# Patient Record
Sex: FEMALE | Race: WHITE | NOT HISPANIC OR LATINO | Employment: FULL TIME | URBAN - METROPOLITAN AREA
[De-identification: names, ages, dates, MRNs, and addresses within clinical notes are randomized per-mention and may not be internally consistent; named-entity substitution may affect disease eponyms.]

---

## 2017-12-18 ENCOUNTER — CONVERSION ENCOUNTER (OUTPATIENT)
Dept: GENERAL RADIOLOGY | Facility: HOSPITAL | Age: 62
End: 2017-12-18

## 2018-08-01 ENCOUNTER — OFFICE VISIT CONVERTED (OUTPATIENT)
Dept: FAMILY MEDICINE CLINIC | Facility: CLINIC | Age: 63
End: 2018-08-01
Attending: NURSE PRACTITIONER

## 2019-01-28 ENCOUNTER — OFFICE VISIT CONVERTED (OUTPATIENT)
Dept: FAMILY MEDICINE CLINIC | Facility: CLINIC | Age: 64
End: 2019-01-28
Attending: NURSE PRACTITIONER

## 2019-02-01 ENCOUNTER — HOSPITAL ENCOUNTER (OUTPATIENT)
Dept: GENERAL RADIOLOGY | Facility: HOSPITAL | Age: 64
Discharge: HOME | End: 2019-02-01
Attending: NURSE PRACTITIONER

## 2019-07-26 ENCOUNTER — HOSPITAL ENCOUNTER (OUTPATIENT)
Dept: LAB | Facility: HOSPITAL | Age: 64
Discharge: HOME | End: 2019-07-26
Attending: NURSE PRACTITIONER

## 2019-07-26 ENCOUNTER — OFFICE VISIT CONVERTED (OUTPATIENT)
Dept: FAMILY MEDICINE CLINIC | Facility: CLINIC | Age: 64
End: 2019-07-26
Attending: NURSE PRACTITIONER

## 2019-07-26 LAB
ALBUMIN SERPL-MCNC: 4.1 G/DL (ref 3.5–5)
ALBUMIN/GLOB SERPL: 1.4 {RATIO} (ref 1.4–2.6)
ALP SERPL-CCNC: 64 U/L (ref 43–160)
ALT SERPL-CCNC: 12 U/L (ref 10–40)
ANION GAP SERPL CALC-SCNC: 17 MMOL/L (ref 8–19)
APPEARANCE UR: CLEAR
AST SERPL-CCNC: 15 U/L (ref 15–50)
BILIRUB SERPL-MCNC: 0.44 MG/DL (ref 0.2–1.3)
BILIRUB UR QL: NEGATIVE
BUN SERPL-MCNC: 9 MG/DL (ref 5–25)
BUN/CREAT SERPL: 11 {RATIO} (ref 6–20)
CALCIUM SERPL-MCNC: 9.6 MG/DL (ref 8.7–10.4)
CHLORIDE SERPL-SCNC: 89 MMOL/L (ref 99–111)
CHOLEST SERPL-MCNC: 191 MG/DL (ref 107–200)
CHOLEST/HDLC SERPL: 4.2 {RATIO} (ref 3–6)
COLOR UR: ABNORMAL
CONV BACTERIA: NEGATIVE
CONV CO2: 27 MMOL/L (ref 22–32)
CONV COLLECTION SOURCE (UA): ABNORMAL
CONV HYALINE CASTS IN URINE MICRO: ABNORMAL /[LPF]
CONV TOTAL PROTEIN: 7 G/DL (ref 6.3–8.2)
CONV UROBILINOGEN IN URINE BY AUTOMATED TEST STRIP: 1 {EHRLICHU}/DL (ref 0.1–1)
CREAT UR-MCNC: 0.79 MG/DL (ref 0.5–0.9)
GFR SERPLBLD BASED ON 1.73 SQ M-ARVRAT: >60 ML/MIN/{1.73_M2}
GLOBULIN UR ELPH-MCNC: 2.9 G/DL (ref 2–3.5)
GLUCOSE SERPL-MCNC: 88 MG/DL (ref 65–99)
GLUCOSE UR QL: NEGATIVE MG/DL
HDLC SERPL-MCNC: 46 MG/DL (ref 40–60)
HGB UR QL STRIP: NEGATIVE
KETONES UR QL STRIP: 15 MG/DL
LDLC SERPL CALC-MCNC: 120 MG/DL (ref 70–100)
LEUKOCYTE ESTERASE UR QL STRIP: ABNORMAL
NITRITE UR QL STRIP: NEGATIVE
OSMOLALITY SERPL CALC.SUM OF ELEC: 268 MOSM/KG (ref 273–304)
PH UR STRIP.AUTO: 6 [PH] (ref 5–8)
POTASSIUM SERPL-SCNC: 3.1 MMOL/L (ref 3.5–5.3)
PROT UR QL: NEGATIVE MG/DL
RBC #/AREA URNS HPF: ABNORMAL /[HPF]
SODIUM SERPL-SCNC: 130 MMOL/L (ref 135–147)
SP GR UR: 1.02 (ref 1–1.03)
TRIGL SERPL-MCNC: 123 MG/DL (ref 40–150)
VLDLC SERPL-MCNC: 25 MG/DL (ref 5–37)
WBC #/AREA URNS HPF: ABNORMAL /[HPF]

## 2019-07-27 LAB — 25(OH)D3 SERPL-MCNC: 76.1 NG/ML (ref 30–100)

## 2019-10-03 ENCOUNTER — OFFICE VISIT CONVERTED (OUTPATIENT)
Dept: CARDIOLOGY | Facility: CLINIC | Age: 64
End: 2019-10-03
Attending: INTERNAL MEDICINE

## 2020-05-26 ENCOUNTER — OFFICE VISIT CONVERTED (OUTPATIENT)
Dept: FAMILY MEDICINE CLINIC | Facility: CLINIC | Age: 65
End: 2020-05-26
Attending: NURSE PRACTITIONER

## 2020-05-26 ENCOUNTER — HOSPITAL ENCOUNTER (OUTPATIENT)
Dept: LAB | Facility: HOSPITAL | Age: 65
Discharge: HOME | End: 2020-05-26
Attending: NURSE PRACTITIONER

## 2020-05-26 LAB
ALBUMIN SERPL-MCNC: 4.2 G/DL (ref 3.5–5)
ALBUMIN/GLOB SERPL: 1.4 {RATIO} (ref 1.4–2.6)
ALP SERPL-CCNC: 59 U/L (ref 43–160)
ALT SERPL-CCNC: 12 U/L (ref 10–40)
ANION GAP SERPL CALC-SCNC: 15 MMOL/L (ref 8–19)
APPEARANCE UR: CLEAR
AST SERPL-CCNC: 16 U/L (ref 15–50)
BASOPHILS # BLD AUTO: 0.01 10*3/UL (ref 0–0.2)
BASOPHILS NFR BLD AUTO: 0.2 % (ref 0–3)
BILIRUB SERPL-MCNC: 0.38 MG/DL (ref 0.2–1.3)
BILIRUB UR QL: NEGATIVE
BUN SERPL-MCNC: 7 MG/DL (ref 5–25)
BUN/CREAT SERPL: 10 {RATIO} (ref 6–20)
CALCIUM SERPL-MCNC: 9.6 MG/DL (ref 8.7–10.4)
CHLORIDE SERPL-SCNC: 89 MMOL/L (ref 99–111)
CHOLEST SERPL-MCNC: 223 MG/DL (ref 107–200)
CHOLEST/HDLC SERPL: 4.8 {RATIO} (ref 3–6)
COLOR UR: YELLOW
CONV ABS IMM GRAN: 0.01 10*3/UL (ref 0–0.2)
CONV BACTERIA: NEGATIVE
CONV CO2: 26 MMOL/L (ref 22–32)
CONV COLLECTION SOURCE (UA): ABNORMAL
CONV IMMATURE GRAN: 0.2 % (ref 0–1.8)
CONV TOTAL PROTEIN: 7.1 G/DL (ref 6.3–8.2)
CONV UROBILINOGEN IN URINE BY AUTOMATED TEST STRIP: 0.2 {EHRLICHU}/DL (ref 0.1–1)
CREAT UR-MCNC: 0.71 MG/DL (ref 0.5–0.9)
DEPRECATED RDW RBC AUTO: 40 FL (ref 36.4–46.3)
EOSINOPHIL # BLD AUTO: 0.03 10*3/UL (ref 0–0.7)
EOSINOPHIL # BLD AUTO: 0.6 % (ref 0–7)
ERYTHROCYTE [DISTWIDTH] IN BLOOD BY AUTOMATED COUNT: 12.7 % (ref 11.7–14.4)
GFR SERPLBLD BASED ON 1.73 SQ M-ARVRAT: >60 ML/MIN/{1.73_M2}
GLOBULIN UR ELPH-MCNC: 2.9 G/DL (ref 2–3.5)
GLUCOSE SERPL-MCNC: 92 MG/DL (ref 65–99)
GLUCOSE UR QL: NEGATIVE MG/DL
HCT VFR BLD AUTO: 36.5 % (ref 37–47)
HDLC SERPL-MCNC: 46 MG/DL (ref 40–60)
HGB BLD-MCNC: 12.8 G/DL (ref 12–16)
HGB UR QL STRIP: NEGATIVE
KETONES UR QL STRIP: ABNORMAL MG/DL
LDLC SERPL CALC-MCNC: 149 MG/DL (ref 70–100)
LEUKOCYTE ESTERASE UR QL STRIP: ABNORMAL
LYMPHOCYTES # BLD AUTO: 1.52 10*3/UL (ref 1–5)
LYMPHOCYTES NFR BLD AUTO: 32 % (ref 20–45)
MCH RBC QN AUTO: 30.6 PG (ref 27–31)
MCHC RBC AUTO-ENTMCNC: 35.1 G/DL (ref 33–37)
MCV RBC AUTO: 87.3 FL (ref 81–99)
MONOCYTES # BLD AUTO: 0.43 10*3/UL (ref 0.2–1.2)
MONOCYTES NFR BLD AUTO: 9.1 % (ref 3–10)
NEUTROPHILS # BLD AUTO: 2.75 10*3/UL (ref 2–8)
NEUTROPHILS NFR BLD AUTO: 57.9 % (ref 30–85)
NITRITE UR QL STRIP: NEGATIVE
NRBC CBCN: 0 % (ref 0–0.7)
OSMOLALITY SERPL CALC.SUM OF ELEC: 262 MOSM/KG (ref 273–304)
PH UR STRIP.AUTO: 7 [PH] (ref 5–8)
PLATELET # BLD AUTO: 238 10*3/UL (ref 130–400)
PMV BLD AUTO: 9.3 FL (ref 9.4–12.3)
POTASSIUM SERPL-SCNC: 3 MMOL/L (ref 3.5–5.3)
PROT UR QL: NEGATIVE MG/DL
RBC # BLD AUTO: 4.18 10*6/UL (ref 4.2–5.4)
RBC #/AREA URNS HPF: ABNORMAL /[HPF]
SODIUM SERPL-SCNC: 127 MMOL/L (ref 135–147)
SP GR UR: 1.01 (ref 1–1.03)
TRIGL SERPL-MCNC: 139 MG/DL (ref 40–150)
VLDLC SERPL-MCNC: 28 MG/DL (ref 5–37)
WBC # BLD AUTO: 4.75 10*3/UL (ref 4.8–10.8)
WBC #/AREA URNS HPF: ABNORMAL /[HPF]

## 2020-05-27 LAB
CONV HEPATITIS B SURFACE AG W CONFIRMATION RE: NEGATIVE
HAV IGM SERPL QL IA: NEGATIVE
HBV CORE IGM SERPL QL IA: NEGATIVE
HCV AB SER DONR QL: <0.1 S/CO RATIO (ref 0–0.9)

## 2020-07-02 ENCOUNTER — TELEPHONE CONVERTED (OUTPATIENT)
Dept: GASTROENTEROLOGY | Facility: CLINIC | Age: 65
End: 2020-07-02
Attending: NURSE PRACTITIONER

## 2020-07-07 ENCOUNTER — HOSPITAL ENCOUNTER (OUTPATIENT)
Dept: LAB | Facility: HOSPITAL | Age: 65
Discharge: HOME | End: 2020-07-07
Attending: NURSE PRACTITIONER

## 2020-07-07 LAB
ALBUMIN SERPL-MCNC: 4 G/DL (ref 3.5–5)
ALBUMIN/GLOB SERPL: 1.5 {RATIO} (ref 1.4–2.6)
ALP SERPL-CCNC: 54 U/L (ref 43–160)
ALT SERPL-CCNC: 12 U/L (ref 10–40)
ANION GAP SERPL CALC-SCNC: 14 MMOL/L (ref 8–19)
APPEARANCE UR: CLEAR
AST SERPL-CCNC: 15 U/L (ref 15–50)
BILIRUB SERPL-MCNC: 0.33 MG/DL (ref 0.2–1.3)
BILIRUB UR QL: NEGATIVE
BUN SERPL-MCNC: 11 MG/DL (ref 5–25)
BUN/CREAT SERPL: 14 {RATIO} (ref 6–20)
CALCIUM SERPL-MCNC: 9.4 MG/DL (ref 8.7–10.4)
CHLORIDE SERPL-SCNC: 98 MMOL/L (ref 99–111)
COLOR UR: YELLOW
CONV BACTERIA: NEGATIVE
CONV CO2: 27 MMOL/L (ref 22–32)
CONV COLLECTION SOURCE (UA): ABNORMAL
CONV TOTAL PROTEIN: 6.6 G/DL (ref 6.3–8.2)
CONV UROBILINOGEN IN URINE BY AUTOMATED TEST STRIP: 1 {EHRLICHU}/DL (ref 0.1–1)
CREAT UR-MCNC: 0.77 MG/DL (ref 0.5–0.9)
GFR SERPLBLD BASED ON 1.73 SQ M-ARVRAT: >60 ML/MIN/{1.73_M2}
GLOBULIN UR ELPH-MCNC: 2.6 G/DL (ref 2–3.5)
GLUCOSE SERPL-MCNC: 103 MG/DL (ref 65–99)
GLUCOSE UR QL: NEGATIVE MG/DL
HGB UR QL STRIP: NEGATIVE
KETONES UR QL STRIP: NEGATIVE MG/DL
LEUKOCYTE ESTERASE UR QL STRIP: ABNORMAL
NITRITE UR QL STRIP: NEGATIVE
OSMOLALITY SERPL CALC.SUM OF ELEC: 280 MOSM/KG (ref 273–304)
PH UR STRIP.AUTO: 7.5 [PH] (ref 5–8)
POTASSIUM SERPL-SCNC: 3.7 MMOL/L (ref 3.5–5.3)
PROT UR QL: NEGATIVE MG/DL
RBC #/AREA URNS HPF: ABNORMAL /[HPF]
SODIUM SERPL-SCNC: 135 MMOL/L (ref 135–147)
SP GR UR: 1.01 (ref 1–1.03)
WBC #/AREA URNS HPF: ABNORMAL /[HPF]

## 2020-07-23 ENCOUNTER — HOSPITAL ENCOUNTER (OUTPATIENT)
Dept: GENERAL RADIOLOGY | Facility: HOSPITAL | Age: 65
Discharge: HOME | End: 2020-07-23
Attending: NURSE PRACTITIONER

## 2020-08-03 ENCOUNTER — HOSPITAL ENCOUNTER (OUTPATIENT)
Dept: PREADMISSION TESTING | Facility: HOSPITAL | Age: 65
Discharge: HOME | End: 2020-08-03
Attending: INTERNAL MEDICINE

## 2020-08-04 LAB — SARS-COV-2 RNA SPEC QL NAA+PROBE: NOT DETECTED

## 2020-08-06 ENCOUNTER — HOSPITAL ENCOUNTER (OUTPATIENT)
Dept: GASTROENTEROLOGY | Facility: HOSPITAL | Age: 65
Setting detail: HOSPITAL OUTPATIENT SURGERY
Discharge: HOME | End: 2020-08-06
Attending: INTERNAL MEDICINE

## 2020-09-29 ENCOUNTER — TELEPHONE CONVERTED (OUTPATIENT)
Dept: GASTROENTEROLOGY | Facility: CLINIC | Age: 65
End: 2020-09-29
Attending: NURSE PRACTITIONER

## 2020-10-14 ENCOUNTER — OFFICE VISIT CONVERTED (OUTPATIENT)
Dept: FAMILY MEDICINE CLINIC | Facility: CLINIC | Age: 65
End: 2020-10-14
Attending: NURSE PRACTITIONER

## 2020-10-20 ENCOUNTER — HOSPITAL ENCOUNTER (OUTPATIENT)
Dept: CARDIOLOGY | Facility: HOSPITAL | Age: 65
Discharge: HOME | End: 2020-10-20
Attending: NURSE PRACTITIONER

## 2020-11-03 ENCOUNTER — HOSPITAL ENCOUNTER (OUTPATIENT)
Dept: GENERAL RADIOLOGY | Facility: HOSPITAL | Age: 65
Discharge: HOME | End: 2020-11-03
Attending: NURSE PRACTITIONER

## 2020-12-30 ENCOUNTER — HOSPITAL ENCOUNTER (OUTPATIENT)
Dept: GENERAL RADIOLOGY | Facility: HOSPITAL | Age: 65
Discharge: HOME | End: 2020-12-30
Attending: NURSE PRACTITIONER

## 2021-04-08 ENCOUNTER — OFFICE VISIT CONVERTED (OUTPATIENT)
Dept: FAMILY MEDICINE CLINIC | Facility: CLINIC | Age: 66
End: 2021-04-08
Attending: NURSE PRACTITIONER

## 2021-04-09 ENCOUNTER — HOSPITAL ENCOUNTER (OUTPATIENT)
Dept: LAB | Facility: HOSPITAL | Age: 66
Discharge: HOME | End: 2021-04-09
Attending: NURSE PRACTITIONER

## 2021-04-09 LAB
25(OH)D3 SERPL-MCNC: 73.4 NG/ML (ref 30–100)
ALBUMIN SERPL-MCNC: 3.7 G/DL (ref 3.5–5)
ALBUMIN/GLOB SERPL: 1.1 {RATIO} (ref 1.4–2.6)
ALP SERPL-CCNC: 61 U/L (ref 43–160)
ALT SERPL-CCNC: 11 U/L (ref 10–40)
ANION GAP SERPL CALC-SCNC: 13 MMOL/L (ref 8–19)
APPEARANCE UR: CLEAR
AST SERPL-CCNC: 15 U/L (ref 15–50)
BASOPHILS # BLD AUTO: 0.02 10*3/UL (ref 0–0.2)
BASOPHILS NFR BLD AUTO: 0.5 % (ref 0–3)
BILIRUB SERPL-MCNC: 0.39 MG/DL (ref 0.2–1.3)
BILIRUB UR QL: NEGATIVE
BUN SERPL-MCNC: 9 MG/DL (ref 5–25)
BUN/CREAT SERPL: 13 {RATIO} (ref 6–20)
CALCIUM SERPL-MCNC: 9.5 MG/DL (ref 8.7–10.4)
CHLORIDE SERPL-SCNC: 95 MMOL/L (ref 99–111)
CHOLEST SERPL-MCNC: 219 MG/DL (ref 107–200)
CHOLEST/HDLC SERPL: 4.8 {RATIO} (ref 3–6)
COLOR UR: YELLOW
CONV ABS IMM GRAN: 0.01 10*3/UL (ref 0–0.2)
CONV CO2: 27 MMOL/L (ref 22–32)
CONV COLLECTION SOURCE (UA): ABNORMAL
CONV IMMATURE GRAN: 0.3 % (ref 0–1.8)
CONV TOTAL PROTEIN: 7 G/DL (ref 6.3–8.2)
CONV UROBILINOGEN IN URINE BY AUTOMATED TEST STRIP: 1 {EHRLICHU}/DL (ref 0.1–1)
CREAT UR-MCNC: 0.71 MG/DL (ref 0.5–0.9)
DEPRECATED RDW RBC AUTO: 40.6 FL (ref 36.4–46.3)
EOSINOPHIL # BLD AUTO: 0.05 10*3/UL (ref 0–0.7)
EOSINOPHIL # BLD AUTO: 1.3 % (ref 0–7)
ERYTHROCYTE [DISTWIDTH] IN BLOOD BY AUTOMATED COUNT: 12.4 % (ref 11.7–14.4)
FOLATE SERPL-MCNC: 14.7 NG/ML (ref 4.8–20)
GFR SERPLBLD BASED ON 1.73 SQ M-ARVRAT: >60 ML/MIN/{1.73_M2}
GLOBULIN UR ELPH-MCNC: 3.3 G/DL (ref 2–3.5)
GLUCOSE SERPL-MCNC: 104 MG/DL (ref 65–99)
GLUCOSE UR QL: NEGATIVE MG/DL
HCT VFR BLD AUTO: 37 % (ref 37–47)
HDLC SERPL-MCNC: 46 MG/DL (ref 40–60)
HGB BLD-MCNC: 13.1 G/DL (ref 12–16)
HGB UR QL STRIP: NEGATIVE
KETONES UR QL STRIP: NEGATIVE MG/DL
LDLC SERPL CALC-MCNC: 139 MG/DL (ref 70–100)
LEUKOCYTE ESTERASE UR QL STRIP: ABNORMAL
LYMPHOCYTES # BLD AUTO: 1.36 10*3/UL (ref 1–5)
LYMPHOCYTES NFR BLD AUTO: 35.7 % (ref 20–45)
MCH RBC QN AUTO: 31.6 PG (ref 27–31)
MCHC RBC AUTO-ENTMCNC: 35.4 G/DL (ref 33–37)
MCV RBC AUTO: 89.4 FL (ref 81–99)
MONOCYTES # BLD AUTO: 0.4 10*3/UL (ref 0.2–1.2)
MONOCYTES NFR BLD AUTO: 10.5 % (ref 3–10)
NEUTROPHILS # BLD AUTO: 1.97 10*3/UL (ref 2–8)
NEUTROPHILS NFR BLD AUTO: 51.7 % (ref 30–85)
NITRITE UR QL STRIP: NEGATIVE
NRBC CBCN: 0 % (ref 0–0.7)
OSMOLALITY SERPL CALC.SUM OF ELEC: 271 MOSM/KG (ref 273–304)
PH UR STRIP.AUTO: 8.5 [PH] (ref 5–8)
PLATELET # BLD AUTO: 235 10*3/UL (ref 130–400)
PMV BLD AUTO: 9.3 FL (ref 9.4–12.3)
POTASSIUM SERPL-SCNC: 3.5 MMOL/L (ref 3.5–5.3)
PROT UR QL: NEGATIVE MG/DL
RBC # BLD AUTO: 4.14 10*6/UL (ref 4.2–5.4)
RBC #/AREA URNS HPF: ABNORMAL /[HPF]
SODIUM SERPL-SCNC: 131 MMOL/L (ref 135–147)
SP GR UR: 1.01 (ref 1–1.03)
SQUAMOUS SPT QL MICRO: ABNORMAL /[HPF]
T4 FREE SERPL-MCNC: 1.6 NG/DL (ref 0.9–1.8)
TRIGL SERPL-MCNC: 169 MG/DL (ref 40–150)
TSH SERPL-ACNC: 4.02 M[IU]/L (ref 0.27–4.2)
VIT B12 SERPL-MCNC: 191 PG/ML (ref 211–911)
VLDLC SERPL-MCNC: 34 MG/DL (ref 5–37)
WBC # BLD AUTO: 3.81 10*3/UL (ref 4.8–10.8)
WBC #/AREA URNS HPF: ABNORMAL /[HPF]

## 2021-04-10 LAB — CANCER AG125 SERPL-ACNC: 17.3 U/ML (ref 0–38.1)

## 2021-04-26 ENCOUNTER — HOSPITAL ENCOUNTER (OUTPATIENT)
Dept: MRI IMAGING | Facility: HOSPITAL | Age: 66
Discharge: HOME | End: 2021-04-26
Attending: NURSE PRACTITIONER

## 2021-04-26 LAB
CREAT BLD-MCNC: 0.7 MG/DL (ref 0.6–1.4)
GFR SERPLBLD BASED ON 1.73 SQ M-ARVRAT: >60 ML/MIN/{1.73_M2}

## 2021-04-30 ENCOUNTER — OFFICE VISIT CONVERTED (OUTPATIENT)
Dept: NEUROLOGY | Facility: CLINIC | Age: 66
End: 2021-04-30
Attending: PSYCHIATRY & NEUROLOGY

## 2021-05-11 NOTE — H&P
"   History and Physical      Patient Name: Tatyana Shea   Patient ID: [id number]   Sex: Female   YOB: 1955    Primary Care Provider: John BLOOM   Referring Provider: John BLOOM    Visit Date: April 30, 2021    Provider: Denny Staton MD   Location: [facility]   Location Address: [address] [place] [address]  [id number]   Location Phone: [phone]          Chief Complaint     Pt is here with c/o dizzy spells and feels like her head is \"swimming\"       History Of Present Illness  Tatyana Shea is a 65 year old /White female who presents today to [facility] today referred from John BLOOM.      65-year-old woman evaluated for vertigo.  She states that the symptoms started on March 8.  She states that at that time she was talking to her son and also when she had this episode of vertigo in which she was spinning and she had to keep her eyes closed for 2 minutes.  She denies anything to her son and her son was asking if she was okay.  After the vertigo went away she was able to speak back to her son.  There was no other symptoms.  There was no tinnitus, hearing loss, numbness of the face, tongue, coordination problems, gait abnormalities, focal weakness, double vision, blindness.  She was sitting at that time.  Minor 1 happened again a few weeks later that lasted for about the same time and only was not as bad.  The last 1 happened 1 week ago in which she was vertiginous and nauseated.  She states that she has no associated headache with this.  She has no history of migraine headaches.  She had an MRI of the brain which reviewed which was unremarkable except for nonspecific white matter changes.  She had a carotid ultrasound which was unremarkable as well.    She does not get vertiginous associate with head movements.  She states that she has been off balance for the last year.  She feels like she is tipsy when she walks.  " Sometimes she gets dizzy from a sitting to standing position.    She has no history of migraine headaches.  There is no history of headaches or jaw claudication.    She states that a year ago there was dimming of vision in her left eye that lasted for a minute.  It did not happen again.  There is no other symptoms with it.  She went to see an optometrist.    She has hypercholesterolemia, hypertension.  She is not taking medications for hypercholesterolemia at this time.       Past Medical History  Diverticulitis; Gallstones; Hiatal hernia; Hypertension; Reflux Disease; Screening Mammogram         Past Surgical History  Cholecystecomy         Medication List  D3-50 Cholecalciferol 1,250 mcg (50,000 unit) oral capsule; krill oil 500 mg oral capsule; losartan-hydrochlorothiazide 100-25 mg oral tablet; meloxicam 15 mg oral tablet; pantoprazole 40 mg oral tablet,delayed release (DR/EC)         Allergy List  NO KNOWN DRUG ALLERGIES       Allergies Reconciled  Family Medical History  Atrial Fibrillation; Congestive Heart Failure; No family history of colorectal cancer         Social History  Alcohol (Never); Tobacco (Never)         Immunizations  Name Date Admin   COVID Moderna 03/04/2021   COVID Moderna 02/04/2021   Influenza 10/13/2020   Influenza 10/01/2019   Influenza Refused   [id number] Deferred   Prevnar 13 10/01/2019   Prevnar 13 10/01/2019   Prevnar 13 10/01/2019   Prevnar 13 10/01/2019         Review of Systems  · Constitutional  o Denies  o : chills, excessive sweating, fatigue, fever, sycope/passing out, weight gain, weight loss  · Eyes  o Admits  o : changes in vision  o Denies  o : blurry vision, double vision  · HENT  o Denies  o : loss of hearing, ringing in the ears, ear aches, sore throat, nasal congestion, sinus pain, nose bleeds, seasonal allergies  · Cardiovascular  o Denies  o : blood clots, swollen legs, anemia, easy burising or bleeding, transfusions  · Respiratory  o Denies  o : shortness of  "breath, dry cough, productive cough, pneumonia, COPD  · Gastrointestinal  o Denies  o : difficulty swallowing, reflux  · Genitourinary  o Denies  o : incontinence  · Neurologic  o Admits  o : headache, dizziness/vertigo  o Denies  o : seizure, stroke, tremor, loss of balance, falls, difficulty with sleep, numbness/tingling/paresthesia , difficulty with coordination, difficulty with dexterity, weakness  · Musculoskeletal  o Denies  o : neck stiffness/pain, swollen lymph nodes, muscle aches, joint pain, weakness, spasms, sciatica, pain radiating in arm, pain radiating in leg, low back pain  · Endocrine  o Denies  o : diabetes, thyroid disorder  · Psychiatric  o Denies  o : anxiety, depression      Vitals  Date Time BP Position Site L\R Cuff Size HR RR TEMP (F) WT  HT  BMI kg/m2 BSA m2 O2 Sat FR L/min FiO2 HC       04/30/2021 03:37 /72 Sitting    78 - R   210lbs 0oz 5'  6\" 33.89 2.11             Physical Examination     She is alert, fluent, phasic, follows commands well.  Optic disks are normal, visual fields of full, EOMs are full directions gaze, pupils equal react light, facial sensation symmetrical, facial strength is full, soft palate elevation and tongue are normal.  There is no weakness of the upper or lower extremities individual muscle testing.  Fine finger movements are intact.  Cerebellar testing is intact finger-to-nose and rapid altering movements.  Reflexes are symmetrical in the biceps triceps decrease the patellar's and ankles.  Station gait she is able to tiptoe, heel walk, jia and tandem and loses her balance after 4 steps.  Blood pressure is 150/80 on the right arm 160/80 in the left arm.  Heart is regular rhythm normal in rate.  Carotids are clear bilaterally.  Suring Hallpike maneuver to the left into the right did not produce objective vertigo or nystagmus.  Head thrust testing is negative.           Assessment  · MRI of brain abnormal     793.0/R90.89  I discussed with her that the white " matter changes on MRI are nonspecific. It does not show stroke. He does not show brain tumor. Discussed with her that the white matter changes does not predict that she is going to get dementia.  · Vertigo     780.4/R42  I discussed with her that the symptom of vertigo alone is a low probability for stroke. She does have risk factors including hypertension and her age. She is to take a baby aspirin on a daily basis until the work-up is completed and until I see her in 2 months time. I will order a CT angiogram of the head and neck. I discussed with her that should she have any other symptoms and educated her on the symptoms of TIA and stroke she is to seek medical attention.    Total time spent with patient and coordinating patient care was 60 minutes.    · Unsteadiness     781.2/R26.81  She has had episodes of unsteadiness for the past year. I would recommend for vitamin B12 level, methylmalonic acid to be performed.      Plan  · Orders  o CTA Head with IV Contrast [facility]; no Oral Prep (64772) - 780.4/R42, 781.2/R26.81 - 04/30/2021  o CTA Neck (Not Cervical Spine) without and with IV Contrast [facility]; no Oral Prep (85671) - 780.4/R42, 781.2/R26.81 - 04/30/2021  · Instructions  o Encouraged to follow-up with Primary Care Provider for preventative care.            Electronically Signed by: Denny Staton MD -Author on April 30, 2021 05:28:36 PM

## 2021-05-13 NOTE — PROGRESS NOTES
Progress Note      Patient Name: Tatyana Shea   Patient ID: [id number]   Sex: Female   YOB: 1955    Primary Care Provider: John BLOOM   Referring Provider: Silke BLOOM    Visit Date: October 14, 2020    Provider: MERLE Alvarnega   Location: [facility]   Location Address: [address] [place] [address], [address]  [id number]   Location Phone: [phone]          Chief Complaint     The patient is here for vision changes.       History Of Present Illness  Tatyana Shea is a 65 year old /White female who presents for evaluation and treatment of:      Will have episodes of waking up in the morning and have.  Visual issues and can't see out of left eye.  Has been seen by Ophthamology and to evaluate for carotid doppler.    Brought in life line screening. Has a family history aortic aneurysm and would like checked.    Due for bone density.           Past Medical History  Disease Name Date Onset Notes   Diverticulitis --  --    Gallstones --  --    Hiatal hernia --  --    Hypertension --  --    Reflux Disease --  --    Screening Mammogram 02/01/2019 --          Past Surgical History  Procedure Name Date Notes   Cholecystecomy 2010 --          Medication List  Name Date Started Instructions   D3-50 Cholecalciferol 1,250 mcg (50,000 unit) oral capsule  take 1 capsule by oral route daily   krill oil 500 mg oral capsule  --    losartan-hydrochlorothiazide 100-25 mg oral tablet 05/27/2020 take 1 tablet by oral route once daily for 90 days   meloxicam 15 mg oral tablet 07/26/2019 take 1 tablet by oral route daily as needed for 90 days   pantoprazole 40 mg oral tablet,delayed release (DR/EC) 09/29/2020 take 1 tablet (40 mg) by oral route once daily for 30 days   potassium chloride 10 mEq oral capsule, extended release 05/27/2020 take 1 capsule (10 meq) by oral route once daily with food for 90 days         Allergy List  Allergen Name Date Reaction Notes  "  NO KNOWN DRUG ALLERGIES --  --  --          Family Medical History  Disease Name Relative/Age Notes   Atrial Fibrillation  --    Congestive Heart Failure Mother/    at 82   No family history of colorectal cancer  --          Social History  Finding Status Start/Stop Quantity Notes   Alcohol Never --/-- --  --    Tobacco Never --/-- --  --          Immunizations  NameDate Admin Mfg Trade Name Lot Number Route Inj VIS Given VIS Publication   Zdydofnap33/ SKB Fluarix, quadrivalent, preservative free [id number] NE NE 10/14/2020    Comments:    [id number] 2019 NE Not Entered  NE NE     Comments: pt will get at pharmacy   Prevnar  NE Not Entered  NE NE     Comments: Not Given Information: 2019 Deferred, Tatyana Shea (Self), CVX code Pneumococcal conjugate PCV 13 (133) Comments:         Review of Systems  · Constitutional  o Denies  o : fever, fatigue, weight loss, weight gain  · Eyes  o Admits  o : changes in vision  · Cardiovascular  o Denies  o : lower extremity edema, claudication, chest pressure, palpitations  · Respiratory  o Denies  o : shortness of breath, wheezing, cough, hemoptysis, dyspnea on exertion  · Gastrointestinal  o Denies  o : nausea, vomiting, diarrhea, constipation, abdominal pain      Vitals  Date Time BP Position Site L\R Cuff Size HR RR TEMP (F) WT  HT  BMI kg/m2 BSA m2 O2 Sat FR L/min FiO2 HC       10/14/2020 08:36 /68 Sitting    79 - R 16 97.2 202lbs 0oz 5'  6\" 32.6 2.07 98 %  21%          Physical Examination  · Constitutional  o Appearance  o : well-nourished, well developed, alert, in no acute distress  · Eyes  o Conjunctivae  o : conjunctivae normal  o Sclerae  o : sclerae white  o Pupils and Irises  o : pupils equal, round, and reactive to light and accommodation bilaterally  o Corneas  o : tear film normal, no lesions present  o Eyelids/Ocular Adnexae  o : eyelid appearance normal, no exudates present, eye moisture level " normal  · Respiratory  o Respiratory Effort  o : breathing unlabored  o Inspection of Chest  o : normal appearance, no retractions  o Auscultation of Lungs  o : normal breath sounds throughout  · Cardiovascular  o Heart  o :   § Auscultation of Heart  § : regular rate and rhythm without murmur, PMI normal  o Peripheral Vascular System  o :   § Carotid Arteries  § : normal pulses bilaterally  § Extremities  § : no cyanosis, clubbing or edema; less than 2 second refill noted  · Musculoskeletal  o General  o : No joint swelling or deformity noted. Muscle tone, strength and development grossly normal.  · Skin and Subcutaneous Tissue  o General Inspection  o : no rashes or lesions present, no areas of discoloration  · Neurologic  o Mental Status Examination  o : judgement, insight intact, modd and affect appropriate  o Motor Examination  o : strength grossly intact in all four extremities  o Gait and Station  o : normal gait, able to stand without difficulty          Assessment  · Post menopausal syndrome     V49.81/Z78.0  · Screening for AAA (abdominal aortic aneurysm)     V81.2/Z13.6  · Essential hypertension     401.9/I10  · Family history of abdominal aortic aneurysm     V17.49/Z82.49  · Vision changes     368.9/H53.9  · Dizzy     780.4/R42       If carotid doppler negative will get MRI brain       Plan  · Orders  o DEXA Bone Density, 1 or more sites, axial skeleton [facility] [place] [facility] (90625) - V49.81/Z78.0 - 10/14/2020  o HTN/Lipid Panel (CMP, Lipid) [facility] [place] [facility] (95942, 87340) - 401.9/I10 - 12/14/2020  o CBC with Auto Diff [facility] [place] [facility] (43945) - 401.9/I10 - 12/14/2020  o Urinalysis with Reflex Microscopy ([facility] [place] [facility]) (30950) - 401.9/I10 - 12/14/2020  o ACO-39: Current medications updated and reviewed (, 1159F) - - 10/14/2020  o ACO-14: Influenza immunization administered or previously received [facility] [place] [facility] () - - 10/14/2020  o Abdominal aortic sonography (41549) - V17.49/Z82.49, V81.2/Z13.6 - 10/14/2020  o Carotid Doppler Bilateral [facility] [place] [facility] (38768) - -  10/14/2020  · Medications  o famotidine 20 mg oral tablet   SIG: take 1 tablet (20 mg) by oral route once daily at bedtime   DISP: (0) tablet with 0 refills  Discontinued on 10/14/2020     o Medications have been Reconciled  o Transition of Care or Provider Policy  · Instructions  o Stop taking calcium supplements for at least 48 hours prior to your exam. Failure to stop supplements could alter results, and the radiologists will require you to reschedule your test.  o Patient was educated/instructed on their diagnosis, treatment and medications prior to discharge from the clinic today.  o Minutes spent with patient including greater than 50% in Education/Counseling/Care Coordination.  o Time spent with the patient was minutes, more than 50% face to face.  · Disposition  o Call or Return if symptoms worsen or persist.  o Return in 6 months            Electronically Signed by: MERLE Alvarenga -Author on October 14, 2020 09:28:40 AM

## 2021-05-13 NOTE — PROGRESS NOTES
Progress Note      Patient Name: Tatyana Shea   Patient ID: [id number]   Sex: Female   YOB: 1955    Primary Care Provider: John BLOOM   Referring Provider: John BLOOM    Visit Date: July 2, 2020    Provider: MERLE Riley   [place] [facility]   Location Address: [address] [place] [address]  [id number]   Location Phone: [phone]          Chief Complaint  · ABD pain  · Needing colon      History Of Present Illness  TELEHEALTH TELEPHONE VISIT  Chief Complaint: PT state Dr. Aguilera referred her to get another colonoscopy. She was having ABD pain, headache. She has saw a difference in her stool, color and shape. She did have a CT scan last month, they didn't find anything. No other issue at this time.   Tatyana Shea is a 65 year old /White female who is presenting for evaluation via telehealth telephone visit. Verbal consent obtained before beginning visit.   Provider spent 23 minutes with the patient during the telehealth visit.   The following staff were present during this visit: ANNA Rand; Boy Hull MA   Past Medical History/ Overview of Patient Symptoms     New pt states she has had nausea over the last couple months, no abd pain, states stool is darker, no blood seen, states BMs are not as often as before, but did have a 2 week episode of diarrhea 3 months ago. Pt has HB, takes Pepcid daily, cannot skip. Also on Meloxicam for several yrs, takes a couple times/month. NO unint wt loss. Pt states she's had a couple episodes where her food sits in the esophagus at times and it would not go down.   Last colonoscopy 2010, Dr Francois.     5/26/2020 CMP unremarkable except low sodium and potassium, 127 and 3 respectively, CBC unremarkable with hemoglobin 12.8, acute hepatitis panel negative. Pt was started on K+ and has repeat labs scheduled.   CT of the abdomen and pelvis with contrast 5/27/2020: Moderate sized hiatal hernia,  scattered sigmoid diverticula but no findings to suggest acute diverticulitis, this was read by Dr. Rivas    Pt states she had a negative cardiac w/u last year. Dx w PVCs. Was told to F/U as needed.   Denies pulmonary issues.       Past Medical History  Diverticulitis; Gallstones; Hiatal hernia; Hypertension; Reflux Disease; Screening Mammogram         Past Surgical History  Cholecystecomy         Medication List  Name Date Started Instructions   D3-50 Cholecalciferol 1,250 mcg (50,000 unit) oral capsule  take 1 capsule by oral route daily   famotidine 20 mg oral tablet  take 1 tablet (20 mg) by oral route once daily at bedtime   krill oil 500 mg oral capsule  --    losartan-hydrochlorothiazide 100-25 mg oral tablet 05/27/2020 take 1 tablet by oral route once daily for 90 days   meloxicam 15 mg oral tablet 07/26/2019 take 1 tablet by oral route daily as needed for 90 days   potassium chloride 10 mEq oral capsule, extended release 05/27/2020 take 1 capsule (10 meq) by oral route once daily with food for 90 days         Allergy List  NO KNOWN DRUG ALLERGIES       Allergies Reconciled  Family Medical History  Atrial Fibrillation; Congestive Heart Failure; No family history of colorectal cancer         Social History  Alcohol (Never); Tobacco (Never)         Immunizations  Name Date Admin   Influenza    Influenza Refused   [id number] Deferred   Prevnar 13    Prevnar 13    Prevnar 13    Prevnar 13              Assessment  · Hiatal hernia     553.3/K44.9  · Heartburn     787.1/R12  · Change in bowel habits     787.99/R19.4  · Dark stools     792.1/R19.5  · Nausea     787.02/R11.0  · Dysphagia     787.20/R13.10      Plan  · Orders  o Physician Telephone Evaluation, 21-30 minutes (39070) - - 07/02/2020  o [facility] Pre-Op Covid-19 Screening (46106) - - 07/02/2020  · Medications  o NuLYTELY with Flavor Packs 420 gram oral recon soln   SIG: take as directed for 1 day per office instructions   DISP: (1) 4000 ml bottle with 0  refills  Prescribed on 07/02/2020     o Medications have been Reconciled  o Transition of Care or Provider Policy  · Instructions  o Call the office with any concerns or questions.  o Please Sign Permit for: EGD/COLONOSCOPY Indication: intermittent dysphagia, hiatal hernia/ HB, nausea, change in bowel pattern dark stools Surgical Risk and Benefits: Possible risks/complications, benefits, and alternatives to surgical or invasive procedure have been explained to patient and/or legal guardian; Patient has been evaluated and can tolerate anesthesia and/or sedation. Risks, benefits, and alternatives to anesthesia and sedation have been explained to patient and/or legal guardian.Follow Up after Procedure.Other Instructions:            Electronically Signed by: MERLE Riley -Author on July 2, 2020 11:27:50 AM

## 2021-05-13 NOTE — PROGRESS NOTES
Progress Note      Patient Name: Tatyana Shea   Patient ID: [id number]   Sex: Female   YOB: 1955    Primary Care Provider: John BLOOM   Referring Provider: Silke BLOOM    Visit Date: September 29, 2020    Provider: MERLE Riley   Location: [facility]   Location Address: [address] [place] [address]  [id number]   Location Phone: [phone]          History Of Present Illness  TELEHEALTH TELEPHONE VISIT  Tatyana Shea is a 65 year old /White female who is presenting for evaluation via telehealth telephone visit. Verbal consent obtained before beginning visit.   Provider spent 28 minutes with the patient during the telehealth visit.   The following staff were present during this visit: Boy Hull MA; Tia CASTILLO   Past Medical History/ Overview of Patient Symptoms     Patient initially presented July 2020 with complaints of nausea, darker stool.  Reported being on Pepcid daily and could not skip also on meloxicam for several years taking a couple of times per month.  Intermittent esophageal dysphagia.  CT the abdomen and pelvis with contrast done in May that showed a moderate sized hiatal hernia, scattered sigmoid diverticula, read by Dr. Rivas    EGD colonoscopy 8/6/2020: Large size hiatal hernia, grade B esophagitispositive for intestinal metaplasia, no dysplasia, 3 polyps in the stomach completely removedfundic gland polyps, normal duodenum; good prep, normal mucosa throughout the colon, 15 mm polyp in the appendiceal orifice removed piecemealadenomatous, 5 mm polyp in the rectum completely removedhyperplastic, random colon biopsy negative    Pt does have HB still w Pepcid. Still w c/o nausea, decreased appetite. Pt has had constipation since scope was done, pt states she is taking laxatives on weekends, no BM during week, afraid to take laxatives during week d/t work.       Past Medical History  Diverticulitis;  Gallstones; Hiatal hernia; Hypertension; Reflux Disease; Screening Mammogram         Past Surgical History  Cholecystecomy         Medication List  Name Date Started Instructions   D3-50 Cholecalciferol 1,250 mcg (50,000 unit) oral capsule  take 1 capsule by oral route daily   famotidine 20 mg oral tablet  take 1 tablet (20 mg) by oral route once daily at bedtime   krill oil 500 mg oral capsule  --    losartan-hydrochlorothiazide 100-25 mg oral tablet 05/27/2020 take 1 tablet by oral route once daily for 90 days   meloxicam 15 mg oral tablet 07/26/2019 take 1 tablet by oral route daily as needed for 90 days   potassium chloride 10 mEq oral capsule, extended release 05/27/2020 take 1 capsule (10 meq) by oral route once daily with food for 90 days         Allergy List  NO KNOWN DRUG ALLERGIES       Allergies Reconciled  Family Medical History  Atrial Fibrillation; Congestive Heart Failure; No family history of colorectal cancer         Social History  Alcohol (Never); Tobacco (Never)         Immunizations  Name Date Admin   Influenza    Influenza Refused   [id number] Deferred   Prevnar 13    Prevnar 13    Prevnar 13    Prevnar 13              Assessment  · Hiatal hernia     553.3/K44.9  · Reflux esophagitis     530.11/K21.0  Gr B; + HB symptom  · Li's esophagus     530.85/K22.70  Endo neg/bx + IM GE Jxn  · Fundic gland polyps of stomach, benign     211.1/D13.1  · Constipation     564.00/K59.00  · Polyp of rectum     569.0/K62.1  · Polyp of ascending colon     211.3/K63.5  · Nausea     787.02/R11.0      Plan  · Medications  o pantoprazole 40 mg oral tablet,delayed release (DR/EC)   SIG: take 1 tablet (40 mg) by oral route once daily for 30 days   DISP: (30) tablets with 1 refills  Prescribed on 09/29/2020     o Medications have been Reconciled  o Transition of Care or Provider Policy  · Instructions  o Plan Of Care:   o Recommended pt try Colace and Benefiber for constipation as a daily regimen. If this is not  effective call back and can trial Trulance or Amitiza  o Recommended trying Protonix 40 mg/d x 2 mo then decrease to 20 mg/d. I D/w pt this is recommended w Li's cell changes, also r/w pt long term risks of PPI, so would like to move her down to lower dose if she tolerates. Advised pt to trial for a few weeks to see if nausea improves w this, but if it does not, recommend GES. Pt will call w update.   o Explained need for repeat colonoscopy 6 mo d/t piecemeal removal...pt cannot do in Feb. 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 Please go ahead and schedule during this time frame.   o Recall EGD 3 yrs.             Electronically Signed by: MERLE Riley -Author on September 29, 2020 04:57:35 PM

## 2021-05-13 NOTE — PROGRESS NOTES
Progress Note      Patient Name: Tatyana Shea   Patient ID: [id number]   Sex: Female   YOB: 1955    Primary Care Provider: John BLOOM    Visit Date: May 26, 2020    Provider: MERLE Alvarenga   Location: [facility]   Location Address: [address] [place] [address], [address]  [id number]   Location Phone: [phone]          Chief Complaint     The patient complains of nausea, dx of diverticulosis in the past.       History Of Present Illness  Tatyana Shea is a 64 year old /White female who presents for evaluation and treatment of:      Patient with left lower quadrant tenderness.  Has been told in the past that she had diverticulosis her last colonoscopy by her report was 12 years ago.  She has no stool changes.  But denies blood in her stool.  Denies a fever cough any unexplained weight loss.  Patient is feels very fatigued and drained.  Denies any urinary symptoms.  Has not had a change in her medications or her diet.  Has been in the house more due to COVID.           Past Medical History  Disease Name Date Onset Notes   Diverticulitis --  --    Gallstones --  --    Hiatal hernia --  --    Hypertension --  --    Reflux Disease --  --    Screening Mammogram 02/01/2019 --          Past Surgical History  Procedure Name Date Notes   Cholecystecomy 2010 --          Medication List  Name Date Started Instructions   famotidine 20 mg oral tablet  take 1 tablet (20 mg) by oral route once daily at bedtime   losartan-hydrochlorothiazide 100-25 mg oral tablet 07/26/2019 take 1 tablet by oral route once daily for 90 days   meloxicam 15 mg oral tablet 07/26/2019 take 1 tablet by oral route daily as needed for 90 days   Sleep Aid (diphenhydramine) 25 mg oral capsule  take 1 capsule by oral route daily for 30 days         Allergy List  Allergen Name Date Reaction Notes   NO KNOWN DRUG ALLERGIES --  --  --          Family Medical History  Disease Name Relative/Age Notes   Atrial  "Fibrillation  --    Congestive Heart Failure Mother/    at 82         Social History  Finding Status Start/Stop Quantity Notes   Alcohol Never --/-- --  --    Tobacco Never --/-- --  --          Immunizations  NameDate Admin Mfg Trade Name Lot Number Route Inj VIS Given VIS Publication   Ogykjuwry64/2019 SKB Fluarix, quadrivalent, preservative free 2A2KX NE NE 2020    Comments:    InfluenzaRefused 2019 NE Not Entered  NE NE     Comments:    [id number] 2019 NE Not Entered  NE NE     Comments: pt will get at pharmacy   Prevnar  NE Not Entered  NE NE     Comments: Not Given Information: 2019 DeferredTatyana (Self), CVX code Pneumococcal conjugate PCV 13 (133) Comments:   Prevnar  NE Not Entered  NE NE     Comments: Not Given Information: 2019 DeferredTatynaa (Self), CVX code Pneumococcal conjugate PCV 13 (133) Comments:   Prevnar  NE Not Entered  NE NE     Comments: Not Given Information: 2019 DeferredTatyana (Self), CVX code Pneumococcal conjugate PCV 13 (133) Comments:   Prevnar  NE Not Entered  NE NE     Comments: Not Given Information: 2019 Tatyana Valles (Self), CVX code Pneumococcal conjugate PCV 13 (133) Comments:         Review of Systems  · Constitutional  o Denies  o : fever, fatigue, weight loss, weight gain  · Cardiovascular  o Denies  o : lower extremity edema, claudication, chest pressure, palpitations  · Respiratory  o Denies  o : shortness of breath, wheezing, cough, hemoptysis, dyspnea on exertion  · Gastrointestinal  o Admits  o : nausea, diarrhea, constipation, abdominal pain  o Denies  o : vomiting      Vitals  Date Time BP Position Site L\R Cuff Size HR RR TEMP (F) WT  HT  BMI kg/m2 BSA m2 O2 Sat        2020 01:15 /82 Sitting    76 - R 16 97.8 210lbs 0oz 5'  6\" 33.89 2.11 96 %          Physical Examination  · Constitutional  o Appearance  o : " well-nourished, well developed, alert, in no acute distress  · Eyes  o Conjunctivae  o : conjunctivae normal  o Sclerae  o : sclerae white  o Pupils and Irises  o : pupils equal, round, and reactive to light and accommodation bilaterally  o Corneas  o : tear film normal, no lesions present  o Eyelids/Ocular Adnexae  o : eyelid appearance normal, no exudates present, eye moisture level normal  · Respiratory  o Respiratory Effort  o : breathing unlabored  o Inspection of Chest  o : normal appearance, no retractions  o Auscultation of Lungs  o : normal breath sounds throughout  · Cardiovascular  o Heart  o :   § Auscultation of Heart  § : regular rate and rhythm without murmur, PMI normal  o Peripheral Vascular System  o :   § Extremities  § : no cyanosis, clubbing or edema; less than 2 second refill noted  · Gastrointestinal  o Abdominal Examination  o : tenderness to palpation present, normal bowel sounds, tone normal without rigidity or guarding, no rebound tenderness present  · Musculoskeletal  o General  o : No joint swelling or deformity noted. Muscle tone, strength and development grossly normal.  · Skin and Subcutaneous Tissue  o General Inspection  o : no rashes or lesions present, no areas of discoloration  · Neurologic  o Mental Status Examination  o : judgement, insight intact, modd and affect appropriate  o Motor Examination  o : strength grossly intact in all four extremities  o Gait and Station  o : normal gait, able to stand without difficulty          Assessment  · Screening for depression     V79.0/Z13.89  · Screening for colon cancer     V76.51/Z12.11  · Visit for screening mammogram     V76.12/Z12.31  · Encounter for screening for cardiovascular disorders     V81.2/Z13.6  · Abdominal pain     789.00/R10.9  · Essential hypertension     401.9/I10  · Diverticulosis     562.10/K57.90      Plan  · Orders  o Annual depression screening, 15 minutes (, 59296) - V79.0/Z13.89 - 05/26/2020  o ACO-18:  Negative screen for clinical depression using a standardized tool () - V79.0/Z13.89 - 05/26/2020   due to covid and being in house more.  o COLONOSCOPY REFERRAL (COLON) - V76.51/Z12.11 - 05/26/2020   Has seen dr. Foster in the past  o Screening Mammography; Bilateral 3D (03211, , 81749) - V76.12/Z12.31 - 05/26/2020  o Lipid Panel [facility] (58243) - V81.2/Z13.6 - 05/26/2020  o Acute hepatitis panel (HAV IgM, HbcAb IgM, HbsAg, HCV) (84444, 16131, 21378, 54044, 95763) - 789.00/R10.9 - 05/26/2020  o CBC with Auto Diff [facility] (83004) - 789.00/R10.9 - 05/26/2020  o CMP [facility] (04708) - 789.00/R10.9 - 05/26/2020  o Abdomen and Pelvis (CT) (with contrast) (01569) - 789.00/R10.9 - 05/26/2020   Decision Support Number: [id number] NPI: [id number] Ordering Provider Name: John Patient Age: 64 Patient Gender: Female Selected Service: CT ABDOMEN-PELVIS W CONTRAST Selected Indication(s): LLQ abdominal pain Appropriateness Score: 9 Consultation Results: Adheres [facility] Code:  HCP Modifier: ME  o Urinalysis with Reflex Microscopy if abnormal ([facility]) (64029) - 401.9/I10 - 05/26/2020  o ACO - Pt declines to or was not able to provide an Advance Care Plan or name a Surrogate Decision Maker (1124F) - - 05/26/2020  o Lipid Panel [facility] (73460) - 401.9/I10, V81.2/Z13.6 - 05/26/2020  o ACO-39: Current medications updated and reviewed () - - 05/26/2020  o ACO-15: Pneumococcal Vaccine Administered or Previously Received (4040F) - - 05/26/2020  o ACO-14: Influenza immunization administered or previously received () - - 05/26/2020  o ACO-13: Fall Risk Screening with no falls in past year or only one fall without injury in the past year (1101F) - - 05/26/2020  · Medications  o Medications have been Reconciled  o Transition of Care or Provider Policy  · Instructions  o Depression Screen completed and scanned into the EMR under the designated folder within the patient's documents.  o Today's PHQ-9 result is _5__  o Instructed to seek  medical attention urgently for new or worsening symptoms.  o Patient was educated/instructed on their diagnosis, treatment and medications prior to discharge from the clinic today.  · Disposition  o Return in 6 months  o Care Transition  o ALLAN Sent            Electronically Signed by: MERLE Alvarenga - on May 26, 2020 01:54:21 PM

## 2021-05-14 VITALS
HEIGHT: 66 IN | SYSTOLIC BLOOD PRESSURE: 186 MMHG | HEART RATE: 78 BPM | WEIGHT: 210 LBS | BODY MASS INDEX: 33.75 KG/M2 | DIASTOLIC BLOOD PRESSURE: 72 MMHG

## 2021-05-14 VITALS
DIASTOLIC BLOOD PRESSURE: 86 MMHG | TEMPERATURE: 97.9 F | BODY MASS INDEX: 33.91 KG/M2 | OXYGEN SATURATION: 97 % | HEART RATE: 80 BPM | WEIGHT: 211 LBS | HEIGHT: 66 IN | SYSTOLIC BLOOD PRESSURE: 126 MMHG | RESPIRATION RATE: 18 BRPM

## 2021-05-14 VITALS
OXYGEN SATURATION: 98 % | WEIGHT: 202 LBS | TEMPERATURE: 97.2 F | HEART RATE: 79 BPM | SYSTOLIC BLOOD PRESSURE: 136 MMHG | DIASTOLIC BLOOD PRESSURE: 68 MMHG | RESPIRATION RATE: 16 BRPM | BODY MASS INDEX: 32.47 KG/M2 | HEIGHT: 66 IN

## 2021-05-14 NOTE — PROGRESS NOTES
Progress Note      Patient Name: Tatyana Shea   Patient ID: [id number]   Sex: Female   YOB: 1955    Primary Care Provider: John BLOOM   Referring Provider: John BLOOM    Visit Date: April 8, 2021    Provider: MERLE Alvarenga   Location: [facility]   Location Address: [address] [place] [address], [address] [place] [address]  [id number]   Location Phone: [phone]          Chief Complaint     The patient is here for nausea , dizziness off and on.       History Of Present Illness  Tatyana Shea is a 65 year old /White female who presents for evaluation and treatment of:      Had dizziness episode and felt like in a whirlwind of dizziness lasting 10 seconds.  Bloating and gained a few pounds.  concerned about cancer.  Family history of ovarian cancer.    Has Li's esophagus.  Couldn't  take Pepcid and pantoprazole.  But still having issues with nausea.  Burning after changing eating patterns has helped.    Has a history of vitamin D deficiency and potassium deficiency due to medication most likely.  Needs labs to check       Past Medical History  Disease Name Date Onset Notes   Diverticulitis --  --    Gallstones --  --    Hiatal hernia --  --    Hypertension --  --    Reflux Disease --  --    Screening Mammogram 02/01/2019 --          Past Surgical History  Procedure Name Date Notes   Cholecystecomy 2010 --          Medication List  Name Date Started Instructions   D3-50 Cholecalciferol 1,250 mcg (50,000 unit) oral capsule  take 1 capsule by oral route daily   krill oil 500 mg oral capsule  --    losartan-hydrochlorothiazide 100-25 mg oral tablet 05/27/2020 take 1 tablet by oral route once daily for 90 days   meloxicam 15 mg oral tablet 07/26/2019 take 1 tablet by oral route daily as needed for 90 days   pantoprazole 40 mg oral tablet,delayed release (DR/EC) 12/07/2020 take 1 tablet (40 mg) by oral route once daily for 30 days         Allergy  "List  Allergen Name Date Reaction Notes   NO KNOWN DRUG ALLERGIES --  --  --        Allergies Reconciled  Family Medical History  Disease Name Relative/Age Notes   Atrial Fibrillation  --    Congestive Heart Failure Mother/    at 82   No family history of colorectal cancer  --          Social History  Finding Status Start/Stop Quantity Notes   Alcohol Never --/-- --  --    Tobacco Never --/-- --  --          Immunizations  NameDate Admin Mfg Trade Name Lot Number Route Inj VIS Given VIS Publication   COVID [id number] MOD Moderna COVID-19 Vaccine  NE NE 2021    Comments:    COVID [id number] MOD Moderna COVID-19 Vaccine  NE NE 2021    Comments:    Izzoyfqdx48/ SKB Fluarix, quadrivalent, preservative free 2A2KX NE NE 10/14/2020    Comments:    Tqmpiikbz80Gmkvxphh 2019 NE Not Entered  NE NE     Comments: pt will get at pharmacy   Prevnar  NE Not Entered  NE NE     Comments: Not Given Information: 2019 Deferred, Tatyana Shea (Self), CVX code Pneumococcal conjugate PCV 13 (133) Comments:         Review of Systems  · Constitutional  o Denies  o : fever, fatigue, weight loss, weight gain  · HENT  o Admits  o : vertigo  · Cardiovascular  o Denies  o : lower extremity edema, claudication, chest pressure, palpitations  · Respiratory  o Denies  o : shortness of breath, wheezing, cough, hemoptysis, dyspnea on exertion  · Gastrointestinal  o Admits  o : nausea  o Denies  o : vomiting, diarrhea, constipation, abdominal pain      Vitals  Date Time BP Position Site L\R Cuff Size HR RR TEMP (F) WT  HT  BMI kg/m2 BSA m2 O2 Sat FR L/min FiO2        2021 03:10 /86 Sitting    80 - R 18 97.9 211lbs 0oz 5'  6\" 34.06 2.11 97 %  21%          Physical Examination  · Constitutional  o Appearance  o : well-nourished, well developed, alert, in no acute distress  · Eyes  o Conjunctivae  o : conjunctivae normal  o Sclerae  o : sclerae white  o Pupils and Irises  o : " pupils equal, round, and reactive to light and accommodation bilaterally  o Corneas  o : tear film normal, no lesions present  o Eyelids/Ocular Adnexae  o : eyelid appearance normal, no exudates present, eye moisture level normal  · Ears, Nose, Mouth and Throat  o Ears  o : external ear auricle normal, otic canal normal, TM with no reddness, effusion, retraction  o Nose  o : external normal, nasal mucosa normal, turbinates normal  o Oral Cavity  o : tongue no lesion, oral mucosa normal  o Throat  o : no erythemia, exudate or lesions  · Neck  o Inspection/Palpation  o : normal appearance, no masses or tenderness, trachea midline, no enlarged cervical or supraclavicular lymphnodes palpated  o Thyroid  o : gland size normal, nontender, no nodules or masses present on palpation, thyroid motion normal during swallowing  · Respiratory  o Respiratory Effort  o : breathing unlabored  o Inspection of Chest  o : normal appearance, no retractions  o Auscultation of Lungs  o : normal breath sounds throughout  · Cardiovascular  o Heart  o :   § Auscultation of Heart  § : regular rate and rhythm without murmur, PMI normal  o Peripheral Vascular System  o :   § Carotid Arteries  § : normal pulses bilaterally, no bruits present  § Pedal Pulses  § : pulses 2 bilaterally  § Extremities  § : no cyanosis, clubbing or edema; less than 2 second refill noted  · Musculoskeletal  o General  o : No joint swelling or deformity noted. Muscle tone, strength and development grossly normal.  · Skin and Subcutaneous Tissue  o General Inspection  o : no rashes or lesions present, no areas of discoloration  · Neurologic  o Mental Status Examination  o : judgement, insight intact, modd and affect appropriate  o Motor Examination  o : strength grossly intact in all four extremities  o Gait and Station  o : normal gait, able to stand without difficulty          Assessment  · Vitamin D deficiency     268.9/E55.9  · Dizziness of unknown  etiology     780.4/R42  labs and discussed MRI vs Neurolgy for further evaluation  · Family history of ovarian cancer     V16.41/Z80.41  Gave her the number to the [place] ovarian cancer screening program for her to call and see if she qualifies to get screened every year.  · Nausea     787.02/R11.0  Follows up and having issues with pantoprazole on insurance paying for.      Plan  · Orders  o Vitamin D Level (91074) - 268.9/E55.9 - 04/08/2021  o Thyroid Profile (49605, THYII, 69026) - 780.4/R42 - 04/08/2021  o ACO-14: Influenza immunization administered or previously received [facility] () - - 04/08/2021  o ACO-39: Current medications updated and reviewed (, 1159F) - - 04/08/2021  o B12 Folate levels (B12FO) - 780.4/R42, 787.02/R11.0 - 04/08/2021  o  (73327) - V16.41/Z80.41 - 04/08/2021  o NEUROLOGY CONSULTATION. (NEURO) - 780.4/R42 - 04/08/2021  · Medications  o Medications have been Reconciled  o Transition of Care or Provider Policy  · Instructions  o Patient was educated/instructed on their diagnosis, treatment and medications prior to discharge from the clinic today.  o Minutes spent with patient including greater than 50% in Education/Counseling/Care Coordination.  o Time spent with the patient was minutes, more than 50% face to face.            Electronically Signed by: MERLE Alvarenga -Author on April 9, 2021 01:14:19 PM

## 2021-05-15 VITALS
RESPIRATION RATE: 16 BRPM | SYSTOLIC BLOOD PRESSURE: 128 MMHG | HEIGHT: 66 IN | WEIGHT: 210 LBS | TEMPERATURE: 97.8 F | OXYGEN SATURATION: 96 % | BODY MASS INDEX: 33.75 KG/M2 | HEART RATE: 76 BPM | DIASTOLIC BLOOD PRESSURE: 82 MMHG

## 2021-05-15 VITALS
HEIGHT: 66 IN | HEART RATE: 76 BPM | SYSTOLIC BLOOD PRESSURE: 160 MMHG | BODY MASS INDEX: 32.47 KG/M2 | WEIGHT: 202 LBS | DIASTOLIC BLOOD PRESSURE: 72 MMHG

## 2021-05-15 VITALS
OXYGEN SATURATION: 97 % | HEART RATE: 85 BPM | BODY MASS INDEX: 33.11 KG/M2 | TEMPERATURE: 98.3 F | DIASTOLIC BLOOD PRESSURE: 70 MMHG | SYSTOLIC BLOOD PRESSURE: 137 MMHG | RESPIRATION RATE: 16 BRPM | WEIGHT: 206 LBS | HEIGHT: 66 IN

## 2021-05-16 VITALS
SYSTOLIC BLOOD PRESSURE: 141 MMHG | WEIGHT: 218.31 LBS | OXYGEN SATURATION: 98 % | HEART RATE: 84 BPM | DIASTOLIC BLOOD PRESSURE: 71 MMHG | BODY MASS INDEX: 35.08 KG/M2 | RESPIRATION RATE: 27 BRPM | TEMPERATURE: 97.1 F | HEIGHT: 66 IN

## 2021-05-16 VITALS
WEIGHT: 209 LBS | DIASTOLIC BLOOD PRESSURE: 69 MMHG | SYSTOLIC BLOOD PRESSURE: 142 MMHG | HEIGHT: 66 IN | OXYGEN SATURATION: 98 % | BODY MASS INDEX: 33.59 KG/M2 | RESPIRATION RATE: 16 BRPM | HEART RATE: 76 BPM | TEMPERATURE: 97.8 F

## 2021-05-19 ENCOUNTER — HOSPITAL ENCOUNTER (OUTPATIENT)
Dept: LAB | Facility: HOSPITAL | Age: 66
Discharge: HOME | End: 2021-05-19
Attending: NURSE PRACTITIONER

## 2021-05-19 LAB
ALBUMIN SERPL-MCNC: 4 G/DL (ref 3.5–5)
ALBUMIN/GLOB SERPL: 1.4 {RATIO} (ref 1.4–2.6)
ALP SERPL-CCNC: 56 U/L (ref 43–160)
ALT SERPL-CCNC: 13 U/L (ref 10–40)
ANION GAP SERPL CALC-SCNC: 15 MMOL/L (ref 8–19)
AST SERPL-CCNC: 15 U/L (ref 15–50)
BASOPHILS # BLD AUTO: 0.02 10*3/UL (ref 0–0.2)
BASOPHILS NFR BLD AUTO: 0.5 % (ref 0–3)
BILIRUB SERPL-MCNC: 0.31 MG/DL (ref 0.2–1.3)
BUN SERPL-MCNC: 13 MG/DL (ref 5–25)
BUN/CREAT SERPL: 17 {RATIO} (ref 6–20)
CALCIUM SERPL-MCNC: 9.4 MG/DL (ref 8.7–10.4)
CHLORIDE SERPL-SCNC: 98 MMOL/L (ref 99–111)
CHOLEST SERPL-MCNC: 204 MG/DL (ref 107–200)
CHOLEST/HDLC SERPL: 4.1 {RATIO} (ref 3–6)
CONV ABS IMM GRAN: 0.01 10*3/UL (ref 0–0.2)
CONV CO2: 28 MMOL/L (ref 22–32)
CONV IMMATURE GRAN: 0.2 % (ref 0–1.8)
CONV TOTAL PROTEIN: 6.8 G/DL (ref 6.3–8.2)
CREAT UR-MCNC: 0.76 MG/DL (ref 0.5–0.9)
DEPRECATED RDW RBC AUTO: 40.6 FL (ref 36.4–46.3)
EOSINOPHIL # BLD AUTO: 0.11 10*3/UL (ref 0–0.7)
EOSINOPHIL # BLD AUTO: 2.7 % (ref 0–7)
ERYTHROCYTE [DISTWIDTH] IN BLOOD BY AUTOMATED COUNT: 12.5 % (ref 11.7–14.4)
GFR SERPLBLD BASED ON 1.73 SQ M-ARVRAT: >60 ML/MIN/{1.73_M2}
GLOBULIN UR ELPH-MCNC: 2.8 G/DL (ref 2–3.5)
GLUCOSE SERPL-MCNC: 108 MG/DL (ref 65–99)
HCT VFR BLD AUTO: 33 % (ref 37–47)
HDLC SERPL-MCNC: 50 MG/DL (ref 40–60)
HGB BLD-MCNC: 11.5 G/DL (ref 12–16)
LDLC SERPL CALC-MCNC: 135 MG/DL (ref 70–100)
LYMPHOCYTES # BLD AUTO: 1.21 10*3/UL (ref 1–5)
LYMPHOCYTES NFR BLD AUTO: 29.9 % (ref 20–45)
MCH RBC QN AUTO: 31.4 PG (ref 27–31)
MCHC RBC AUTO-ENTMCNC: 34.8 G/DL (ref 33–37)
MCV RBC AUTO: 90.2 FL (ref 81–99)
MONOCYTES # BLD AUTO: 0.37 10*3/UL (ref 0.2–1.2)
MONOCYTES NFR BLD AUTO: 9.1 % (ref 3–10)
NEUTROPHILS # BLD AUTO: 2.33 10*3/UL (ref 2–8)
NEUTROPHILS NFR BLD AUTO: 57.6 % (ref 30–85)
NRBC CBCN: 0 % (ref 0–0.7)
OSMOLALITY SERPL CALC.SUM OF ELEC: 285 MOSM/KG (ref 273–304)
PLATELET # BLD AUTO: 225 10*3/UL (ref 130–400)
PMV BLD AUTO: 9.3 FL (ref 9.4–12.3)
POTASSIUM SERPL-SCNC: 3.8 MMOL/L (ref 3.5–5.3)
RBC # BLD AUTO: 3.66 10*6/UL (ref 4.2–5.4)
SODIUM SERPL-SCNC: 137 MMOL/L (ref 135–147)
TRIGL SERPL-MCNC: 93 MG/DL (ref 40–150)
VLDLC SERPL-MCNC: 19 MG/DL (ref 5–37)
WBC # BLD AUTO: 4.05 10*3/UL (ref 4.8–10.8)

## 2021-05-26 ENCOUNTER — HOSPITAL ENCOUNTER (OUTPATIENT)
Dept: CT IMAGING | Facility: HOSPITAL | Age: 66
Discharge: HOME | End: 2021-05-26
Attending: PSYCHIATRY & NEUROLOGY

## 2021-05-27 ENCOUNTER — HOSPITAL ENCOUNTER (OUTPATIENT)
Dept: LAB | Facility: HOSPITAL | Age: 66
Discharge: HOME | End: 2021-05-27
Attending: NURSE PRACTITIONER

## 2021-05-27 LAB
FOLATE SERPL-MCNC: 12.6 NG/ML (ref 4.8–20)
IRON SATN MFR SERPL: 12 % (ref 20–55)
IRON SERPL-MCNC: 44 UG/DL (ref 60–170)
TIBC SERPL-MCNC: 368 UG/DL (ref 245–450)
TRANSFERRIN SERPL-MCNC: 257 MG/DL (ref 250–380)
VIT B12 SERPL-MCNC: >2000 PG/ML (ref 211–911)

## 2021-06-18 PROBLEM — I10 HYPERTENSION: Status: ACTIVE | Noted: 2021-06-18

## 2021-06-18 PROBLEM — K21.9 ESOPHAGEAL REFLUX: Status: ACTIVE | Noted: 2021-06-18

## 2021-06-18 PROBLEM — K80.20 GALLSTONES: Status: ACTIVE | Noted: 2021-06-18

## 2021-06-18 PROBLEM — K57.92 DIVERTICULITIS: Status: ACTIVE | Noted: 2021-06-18

## 2021-06-18 PROBLEM — K44.9 HIATAL HERNIA: Status: ACTIVE | Noted: 2021-06-18

## 2021-06-18 RX ORDER — FERROUS SULFATE 325(65) MG
325 TABLET ORAL 2 TIMES DAILY
COMMUNITY
Start: 2021-05-28 | End: 2022-04-08

## 2021-06-18 RX ORDER — RANITIDINE 150 MG/1
CAPSULE ORAL
COMMUNITY
End: 2021-06-18 | Stop reason: SDUPTHER

## 2021-06-18 RX ORDER — DIPHENHYDRAMINE HCL 25 MG
25 CAPSULE ORAL NIGHTLY PRN
COMMUNITY
End: 2021-08-06

## 2021-06-18 RX ORDER — KRILL/OM-3/DHA/EPA/PHOSPHO/AST 500-110 MG
500 CAPSULE ORAL DAILY
COMMUNITY

## 2021-06-18 RX ORDER — FAMOTIDINE 20 MG/1
TABLET, FILM COATED ORAL
COMMUNITY
End: 2021-06-18 | Stop reason: SDUPTHER

## 2021-06-18 RX ORDER — PANTOPRAZOLE SODIUM 40 MG/1
40 TABLET, DELAYED RELEASE ORAL DAILY
COMMUNITY
Start: 2020-12-07 | End: 2022-05-25

## 2021-06-18 RX ORDER — LOSARTAN POTASSIUM AND HYDROCHLOROTHIAZIDE 25; 100 MG/1; MG/1
1 TABLET ORAL DAILY
COMMUNITY
Start: 2021-05-19 | End: 2021-07-30 | Stop reason: HOSPADM

## 2021-06-21 ENCOUNTER — CONSULT (OUTPATIENT)
Dept: ONCOLOGY | Facility: HOSPITAL | Age: 66
End: 2021-06-21

## 2021-06-21 VITALS
DIASTOLIC BLOOD PRESSURE: 72 MMHG | TEMPERATURE: 97 F | OXYGEN SATURATION: 97 % | HEIGHT: 66 IN | RESPIRATION RATE: 16 BRPM | HEART RATE: 72 BPM | BODY MASS INDEX: 33.73 KG/M2 | WEIGHT: 209.88 LBS | SYSTOLIC BLOOD PRESSURE: 157 MMHG

## 2021-06-21 DIAGNOSIS — D64.9 ANEMIA, UNSPECIFIED TYPE: Primary | ICD-10-CM

## 2021-06-21 PROCEDURE — G0463 HOSPITAL OUTPT CLINIC VISIT: HCPCS

## 2021-06-21 PROCEDURE — 99204 OFFICE O/P NEW MOD 45 MIN: CPT | Performed by: INTERNAL MEDICINE

## 2021-06-21 RX ORDER — ASPIRIN 81 MG/1
81 TABLET, CHEWABLE ORAL DAILY
COMMUNITY
End: 2022-05-25

## 2021-06-21 RX ORDER — FAMOTIDINE 10 MG
10 TABLET ORAL 2 TIMES DAILY
COMMUNITY
End: 2021-08-06

## 2021-06-21 NOTE — PROGRESS NOTES
Tatyana Shea   : 1955     LOCATION: [facility] HEMATOLOGY & ONCOLOGY     Chief Complaint  Anemia (New patient evaluation for leukopenia, anemia)    Referring Provider: MERLE Alvarenga  PCP: John Aguilera APRN    Oncology/Hematology History Overview Note   HEME/ONC DX/RX/INVESTIGATIONS  DX:   Anemia, neutropenia, iron and B12 deficiency     RX:   Oral B12, started around 2021  Ferrous sulfate BID started around 2021. Changed to QOD on 2021    INVESTIGATIONS:   2020, EGD: focal intestinal metaplasia consistent with Li's esophagus, no dysplasia identified.  Fundic gland polyp.  Colonoscopy: fragments of tubular adenoma.  Colonic mucosa with lymphoid aggregates.  Rectal polyp, fragments of hyperplastic polyp.    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 On 2020 she had WBC of 4.7 hemoglobin 12.8 platelets 238.  On 2021 WBC was 4.0 hemoglobin 11.5 and platelets 225.  Prior labs in 2021 showed B12 and iron deficiency. She has been taking oral B12 and ferrous sulfate for about a month.  Her history also includes EGD and colonoscopy summarized above.  She was noted to have Li's esophagus and colonic polyps.  There was no evidence of bleeding. The patient acknowledges that missed follow-up appointment with gastroenterology and was advised to reconnect with gastroenterology. The patient is very anxious today. She denies acute symptoms.  There is no dyspnea, palpitation or chest pain.  She has chronic stable fatigue. She was extremely concerned about the possibility of having a leukemic diagnosis.   I reassured the patient that there is no evidence of leukemia.  I advised the patient to continue ferrous sulfate and oral B12. I told her that most likely she has nutritional related  anemia but evaluation is undergoing.    Review of Systems   Constitutional: Positive for fatigue (6/10). Negative for appetite change, diaphoresis, fever, unexpected weight gain and unexpected weight loss.   HENT: Negative for hearing loss, sore throat and voice change.    Eyes: Negative for blurred vision, double vision, pain, redness and visual disturbance.   Respiratory: Negative for cough, shortness of breath and wheezing.    Cardiovascular: Negative for chest pain, palpitations and leg swelling.   Gastrointestinal: Positive for nausea.   Endocrine: Negative for cold intolerance, heat intolerance, polydipsia and polyuria.   Genitourinary: Negative for decreased urine volume, difficulty urinating, frequency and urinary incontinence.   Musculoskeletal: Negative for arthralgias, back pain, joint swelling and myalgias.   Skin: Negative for color change, rash, skin lesions and bruise.   Neurological: Positive for headache. Negative for dizziness, seizures and numbness.   Hematological: Negative for adenopathy. Does not bruise/bleed easily.   Psychiatric/Behavioral: Negative for depressed mood. The patient is not nervous/anxious.      Current Outpatient Medications on File Prior to Visit   Medication Sig Dispense Refill   • aspirin 81 MG chewable tablet Chew 81 mg Daily.     • cholecalciferol (VITAMIN D3) 1.25 MG (49147 UT) capsule D3-50 Cholecalciferol 1,250 mcg (50,000 unit) oral capsule take 1 capsule by oral route daily   Active     • famotidine (PEPCID) 10 MG tablet Take 10 mg by mouth 2 (Two) Times a Day.     • ferrous sulfate 325 (65 FE) MG tablet ferrous sulfate 325 mg (65 mg iron) oral tablet take 1 tablet (325 mg) by oral route 2 times per day for 90 days 5/28/2021  Active     • Krill Oil (Omega-3) 500 MG capsule      • losartan-hydrochlorothiazide (HYZAAR) 100-25 MG per tablet      • pantoprazole (PROTONIX) 40 MG EC tablet pantoprazole 40 mg oral tablet,delayed release (DR/EC) take 1 tablet (40 mg) by oral  "route once daily for 30 days 12/7/2020  Active     • diphenhydrAMINE (BENADRYL) 25 mg capsule Sleep Aid (diphenhydramine) 25 mg oral capsule take 1 capsule by oral route daily for 30 days   Suspended       No current facility-administered medications on file prior to visit.       No Known Allergies    Past Medical History:   Diagnosis Date   • Acid reflux    • Diverticulitis    • Gall stones    • Hiatal hernia    • HTN (hypertension)      Past Surgical History:   Procedure Laterality Date   • CHOLECYSTECTOMY       Social History     Socioeconomic History   • Marital status:      Spouse name: Not on file   • Number of children: Not on file   • Years of education: Not on file   • Highest education level: Not on file   Tobacco Use   • Smoking status: Never Smoker   Substance and Sexual Activity   • Alcohol use: Never     Family History   Problem Relation Age of Onset   • Heart failure Mother    • Atrial fibrillation Other         UNSPECIFIED     Immunization History   Administered Date(s) Administered   • COVID-19 (MODERNA) 02/04/2021, 03/04/2021   • Influenza, Unspecified 10/13/2020   • Pneumococcal Conjugate 13-Valent (PCV13) 10/01/2019       Objective     Vitals:    06/21/21 1019   BP: 157/72   Pulse: 72   Resp: 16   Temp: 97 °F (36.1 °C)   SpO2: 97%   Weight: 95.2 kg (209 lb 14.1 oz)   Height: 168.5 cm (66.34\")   PainSc: 0-No pain     Body mass index is 33.53 kg/m².     Physical Exam    Alert, oriented x3, cooperative, not in distress, increased BMI  HEENT: Normocephalic, wearing a facemask.  Lungs: No tachypnea. Clear bilaterally.  Heart: Regular rate and rhythm.  Extremities: No ankle edema.  Neurologic: Moves all extremities.      Iron   Date Value Ref Range Status   05/27/2021 44 (L) 60 - 170 ug/dL Final     Iron Saturation   Date Value Ref Range Status   05/27/2021 12 (L) 20 - 55 % Final     Transferrin   Date Value Ref Range Status   05/27/2021 257.00 250.00 - 380.00 mg/dL Final     TIBC   Date Value " Ref Range Status   05/27/2021 368 245 - 450 ug/dL Final     Comment:     As of 10/15/03, the chemistry department began utilizing a Transferrin  assay. Data suggests that Transferrin is a better estimate of Total Iron  binding capacity than the TIBC assay. As a result the TIBC will now be a  calculated estimate from the measured Transferrin.       Vitamin B-12   Date Value Ref Range Status   05/27/2021 >2000 (H) 211 - 911 pg/mL Final   04/09/2021 191 (L) 211 - 911 pg/mL Final     Folate   Date Value Ref Range Status   05/27/2021 12.6 4.8 - 20.0 ng/mL Final     Comment:     Results may be falsely decreased due to light exposure if  testing added on after collection.     04/09/2021 14.7 4.8 - 20.0 ng/mL Final     Comment:     Results may be falsely decreased due to light exposure if  testing added on after collection.       ECOG score: 0           Result Review :   The following data was reviewed by: Zenon Ness MD on 06/21/2021:  Lab Results   Component Value Date    HGB 11.5 (L) 05/19/2021    HCT 33.0 (L) 05/19/2021    MCV 90.2 05/19/2021     05/19/2021    WBC 4.05 (L) 05/19/2021    NEUTROABS 2.33 05/19/2021    LYMPHSABS 1.21 05/19/2021    MONOSABS 0.37 05/19/2021    EOSABS 0.11 05/19/2021    BASOSABS 0.02 05/19/2021     Lab Results   Component Value Date    BUN 13 05/19/2021    CREATININE 0.76 05/19/2021     05/19/2021    K 3.8 05/19/2021    CL 98 (L) 05/19/2021    CO2 28 05/19/2021    CALCIUM 9.4 05/19/2021    PROTEINTOT 6.8 05/19/2021    ALBUMIN 4.0 05/19/2021    BILITOT 0.31 05/19/2021    ALKPHOS 56 05/19/2021    AST 15 05/19/2021    ALT 13 05/19/2021          Assessment and Plan      ASSESSMENT  Anemia and minimal neutropenia associated with low iron and low B12 levels.  She started oral replacement with ferrous sulfate and B12 about a month ago.  Differential diagnosis include nutritional deficiency associated anemia and neutropenia, pernicious anemia, and less likely myeloproliferative disorder  and myelodysplasia.     The etiology of the nutritional deficiency is unclear but she had GI work-up last month and has not followed up with GI.  She is on antacids and that may cause nutritional deficiencies.    PLAN/RECOMMENDATIONS  Continue ferrous sulfate and oral B12.    Follow-up next month with labs 1 week prior to visit.    Diagnoses and all orders for this visit:    1. Anemia, unspecified type (Primary)  -     CBC Auto Differential  -     Comprehensive Metabolic Panel  -     Ferritin  -     Iron Profile  -     Vitamin B12 & Folate  -     Intrinsic Factor Ab; Future  -     Anti-parietal Antibody; Future    Other orders  -     SCANNED - LABS  -     SCANNED - LABS  -     SCANNED - LABS        Patient was given instructions and counseling regarding her condition or for health maintenance advice. Please see specific information pulled into the AVS if appropriate.     Zenon Ness MD    6/21/2021

## 2021-07-02 ENCOUNTER — OFFICE VISIT (OUTPATIENT)
Dept: NEUROLOGY | Facility: CLINIC | Age: 66
End: 2021-07-02

## 2021-07-02 VITALS
HEIGHT: 66 IN | HEART RATE: 69 BPM | BODY MASS INDEX: 33.43 KG/M2 | TEMPERATURE: 97.8 F | SYSTOLIC BLOOD PRESSURE: 148 MMHG | DIASTOLIC BLOOD PRESSURE: 75 MMHG | WEIGHT: 208 LBS

## 2021-07-02 DIAGNOSIS — G47.33 OBSTRUCTIVE SLEEP APNEA: Primary | ICD-10-CM

## 2021-07-02 PROCEDURE — 99214 OFFICE O/P EST MOD 30 MIN: CPT | Performed by: PSYCHIATRY & NEUROLOGY

## 2021-07-02 NOTE — PROGRESS NOTES
"Chief Complaint  Neurologic Problem (Review CTA)    Subjective          Tatyana Shea is a 66 y.o. female who presents to [facility] NEUROLOGY & NEUROSURGERY  History of Present Illness  66-year-old woman here for follow-up of another complaint.  She states that she had an dizzy and off pressure in the mornings.  She states that she is like she is drunk when she wakes up in the morning and gets better throughout the day.  She has problems with being fuzzy in her head.  She states that moving her eyes makes her head more fuzzy and dizzy.  She has a hard time concentrating.  This is started in the last 6 months to a year.  This time she feels off balance.  It is not there all the time.    She states that she sleeps well at night.  She feels rested when she wakes up in the morning.  She sleeps by herself.    She had a work-up including a CTA of the head which was normal.  She is not having any more vertiginous symptoms.  That only happened a couple of times.  Her vitamin B12 level is normal.    Objective   Vital Signs:   /75 (BP Location: Right arm, Patient Position: Sitting, Cuff Size: Adult)   Pulse 69   Temp 97.8 °F (36.6 °C)   Ht 168.5 cm (66.34\")   Wt 94.3 kg (208 lb)   BMI 33.23 kg/m²     Physical Exam   She is alert, fluent, phasic, follows commands well.  Reflexes are decreased in the biceps, triceps, patellar's and ankles.  Plantars are flexors.  Vibration is normal.  Romberg is negative.  There is no weakness of the upper or lower extremities and with muscle testing.  Is able to stand up on either leg for 10 seconds at a time.  She is unable to stand on one leg with eyes closed.  She is able to tiptoe, heel walk, jia and tandem without difficulty.        Assessment and Plan  Diagnoses and all orders for this visit:    1. Obstructive sleep apnea (Primary)  Assessment & Plan:  I discussed with her that her feeling like her head is dizzy, hard time concentrating, feeling off " balance is most likely secondary to a sleep disorder such as sleep apnea syndrome.  I will refer her to Dr. Nadege Flores for sleep evaluation.  I will see her again in 2 months time if the sleep study is unremarkable.    I reviewed with her the work-up that was performed.    Orders:  -     Ambulatory Referral to Sleep Medicine       Total time spent with the patient and coordinating patient care was 30 minutes.    Follow Up  Return in about 2 months (around 9/2/2021).  Patient was given instructions and counseling regarding her condition or for health maintenance advice. Please see specific information pulled into the AVS if appropriate.

## 2021-07-09 ENCOUNTER — CLINICAL SUPPORT (OUTPATIENT)
Dept: GASTROENTEROLOGY | Facility: CLINIC | Age: 66
End: 2021-07-09

## 2021-07-09 ENCOUNTER — PREP FOR SURGERY (OUTPATIENT)
Dept: OTHER | Facility: HOSPITAL | Age: 66
End: 2021-07-09

## 2021-07-09 DIAGNOSIS — Z09 ENCOUNTER FOR COLONOSCOPY FOLLOWING COLON POLYP REMOVAL: Primary | ICD-10-CM

## 2021-07-09 DIAGNOSIS — Z86.010 ENCOUNTER FOR COLONOSCOPY FOLLOWING COLON POLYP REMOVAL: Primary | ICD-10-CM

## 2021-07-09 RX ORDER — POLYETHYLENE GLYCOL 3350, SODIUM SULFATE ANHYDROUS, SODIUM BICARBONATE, SODIUM CHLORIDE, POTASSIUM CHLORIDE 236; 22.74; 6.74; 5.86; 2.97 G/4L; G/4L; G/4L; G/4L; G/4L
4 POWDER, FOR SOLUTION ORAL ONCE
Qty: 4000 ML | Refills: 0 | Status: SHIPPED | OUTPATIENT
Start: 2021-07-10 | End: 2021-07-10

## 2021-07-13 PROBLEM — Z12.11 ENCOUNTER FOR COLONOSCOPY FOLLOWING COLON POLYP REMOVAL: Status: ACTIVE | Noted: 2021-07-13

## 2021-07-13 PROBLEM — Z09 ENCOUNTER FOR COLONOSCOPY FOLLOWING COLON POLYP REMOVAL: Status: ACTIVE | Noted: 2021-07-13

## 2021-07-13 PROBLEM — Z86.010 ENCOUNTER FOR COLONOSCOPY FOLLOWING COLON POLYP REMOVAL: Status: ACTIVE | Noted: 2021-07-13

## 2021-07-13 PROBLEM — Z86.0100 ENCOUNTER FOR COLONOSCOPY FOLLOWING COLON POLYP REMOVAL: Status: ACTIVE | Noted: 2021-07-13

## 2021-07-19 ENCOUNTER — LAB (OUTPATIENT)
Dept: ONCOLOGY | Facility: HOSPITAL | Age: 66
End: 2021-07-19

## 2021-07-19 DIAGNOSIS — D64.9 ANEMIA, UNSPECIFIED TYPE: ICD-10-CM

## 2021-07-19 LAB
ALBUMIN SERPL-MCNC: 4.1 G/DL (ref 3.5–5.2)
ALBUMIN/GLOB SERPL: 1.9 G/DL
ALP SERPL-CCNC: 57 U/L (ref 39–117)
ALT SERPL W P-5'-P-CCNC: 9 U/L (ref 1–33)
ANION GAP SERPL CALCULATED.3IONS-SCNC: 6.8 MMOL/L (ref 5–15)
AST SERPL-CCNC: 12 U/L (ref 1–32)
BASOPHILS # BLD AUTO: 0.02 10*3/MM3 (ref 0–0.2)
BASOPHILS NFR BLD AUTO: 0.5 % (ref 0–1.5)
BILIRUB SERPL-MCNC: 0.2 MG/DL (ref 0–1.2)
BUN SERPL-MCNC: 11 MG/DL (ref 8–23)
BUN/CREAT SERPL: 17.2 (ref 7–25)
CALCIUM SPEC-SCNC: 9.6 MG/DL (ref 8.6–10.5)
CHLORIDE SERPL-SCNC: 97 MMOL/L (ref 98–107)
CO2 SERPL-SCNC: 28.2 MMOL/L (ref 22–29)
CREAT SERPL-MCNC: 0.64 MG/DL (ref 0.57–1)
DEPRECATED RDW RBC AUTO: 42.5 FL (ref 37–54)
EOSINOPHIL # BLD AUTO: 0.1 10*3/MM3 (ref 0–0.4)
EOSINOPHIL NFR BLD AUTO: 2.4 % (ref 0.3–6.2)
ERYTHROCYTE [DISTWIDTH] IN BLOOD BY AUTOMATED COUNT: 12.3 % (ref 12.3–15.4)
FERRITIN SERPL-MCNC: 32.95 NG/ML (ref 13–150)
FOLATE SERPL-MCNC: 12.6 NG/ML (ref 4.78–24.2)
GFR SERPL CREATININE-BSD FRML MDRD: 92 ML/MIN/1.73
GLOBULIN UR ELPH-MCNC: 2.2 GM/DL
GLUCOSE SERPL-MCNC: 108 MG/DL (ref 65–99)
HCT VFR BLD AUTO: 31.8 % (ref 34–46.6)
HGB BLD-MCNC: 11.4 G/DL (ref 12–15.9)
IMM GRANULOCYTES # BLD AUTO: 0 10*3/MM3 (ref 0–0.05)
IMM GRANULOCYTES NFR BLD AUTO: 0 % (ref 0–0.5)
IRON 24H UR-MRATE: 64 MCG/DL (ref 37–145)
IRON SATN MFR SERPL: 19 % (ref 20–50)
LYMPHOCYTES # BLD AUTO: 1.32 10*3/MM3 (ref 0.7–3.1)
LYMPHOCYTES NFR BLD AUTO: 31.1 % (ref 19.6–45.3)
MCH RBC QN AUTO: 33.2 PG (ref 26.6–33)
MCHC RBC AUTO-ENTMCNC: 35.8 G/DL (ref 31.5–35.7)
MCV RBC AUTO: 92.7 FL (ref 79–97)
MONOCYTES # BLD AUTO: 0.33 10*3/MM3 (ref 0.1–0.9)
MONOCYTES NFR BLD AUTO: 7.8 % (ref 5–12)
NEUTROPHILS NFR BLD AUTO: 2.47 10*3/MM3 (ref 1.7–7)
NEUTROPHILS NFR BLD AUTO: 58.2 % (ref 42.7–76)
PLATELET # BLD AUTO: 220 10*3/MM3 (ref 140–450)
PMV BLD AUTO: 8.8 FL (ref 6–12)
POTASSIUM SERPL-SCNC: 3.4 MMOL/L (ref 3.5–5.2)
PROT SERPL-MCNC: 6.3 G/DL (ref 6–8.5)
RBC # BLD AUTO: 3.43 10*6/MM3 (ref 3.77–5.28)
SODIUM SERPL-SCNC: 132 MMOL/L (ref 136–145)
TIBC SERPL-MCNC: 344 MCG/DL (ref 298–536)
TRANSFERRIN SERPL-MCNC: 231 MG/DL (ref 200–360)
VIT B12 BLD-MCNC: >2000 PG/ML (ref 211–946)
WBC # BLD AUTO: 4.24 10*3/MM3 (ref 3.4–10.8)

## 2021-07-19 PROCEDURE — 83516 IMMUNOASSAY NONANTIBODY: CPT

## 2021-07-19 PROCEDURE — 84466 ASSAY OF TRANSFERRIN: CPT

## 2021-07-19 PROCEDURE — 86340 INTRINSIC FACTOR ANTIBODY: CPT

## 2021-07-19 PROCEDURE — 82746 ASSAY OF FOLIC ACID SERUM: CPT

## 2021-07-19 PROCEDURE — 80053 COMPREHEN METABOLIC PANEL: CPT

## 2021-07-19 PROCEDURE — 82728 ASSAY OF FERRITIN: CPT

## 2021-07-19 PROCEDURE — 82607 VITAMIN B-12: CPT

## 2021-07-19 PROCEDURE — 36415 COLL VENOUS BLD VENIPUNCTURE: CPT

## 2021-07-19 PROCEDURE — 85025 COMPLETE CBC W/AUTO DIFF WBC: CPT

## 2021-07-19 PROCEDURE — 83540 ASSAY OF IRON: CPT

## 2021-07-20 LAB — PCA AB SER-ACNC: 1.9 UNITS (ref 0–20)

## 2021-07-21 LAB — IF BLOCK AB SER-ACNC: 4.4 AU/ML (ref 0–1.1)

## 2021-07-26 ENCOUNTER — APPOINTMENT (OUTPATIENT)
Dept: CT IMAGING | Facility: HOSPITAL | Age: 66
End: 2021-07-26

## 2021-07-26 ENCOUNTER — OFFICE VISIT (OUTPATIENT)
Dept: ONCOLOGY | Facility: HOSPITAL | Age: 66
End: 2021-07-26

## 2021-07-26 ENCOUNTER — HOSPITAL ENCOUNTER (OUTPATIENT)
Facility: HOSPITAL | Age: 66
Discharge: HOME | End: 2021-07-30
Attending: EMERGENCY MEDICINE | Admitting: INTERNAL MEDICINE

## 2021-07-26 VITALS
SYSTOLIC BLOOD PRESSURE: 158 MMHG | TEMPERATURE: 97.7 F | WEIGHT: 202.38 LBS | DIASTOLIC BLOOD PRESSURE: 67 MMHG | RESPIRATION RATE: 18 BRPM | BODY MASS INDEX: 32.33 KG/M2 | OXYGEN SATURATION: 98 % | HEART RATE: 80 BPM

## 2021-07-26 DIAGNOSIS — D50.9 IRON DEFICIENCY ANEMIA, UNSPECIFIED IRON DEFICIENCY ANEMIA TYPE: Primary | ICD-10-CM

## 2021-07-26 DIAGNOSIS — E87.6 HYPOKALEMIA: ICD-10-CM

## 2021-07-26 DIAGNOSIS — R26.2 DIFFICULTY WALKING: ICD-10-CM

## 2021-07-26 DIAGNOSIS — D64.9 ANEMIA, UNSPECIFIED TYPE: ICD-10-CM

## 2021-07-26 DIAGNOSIS — R55 SYNCOPE, UNSPECIFIED SYNCOPE TYPE: Primary | ICD-10-CM

## 2021-07-26 DIAGNOSIS — D50.0 IRON DEFICIENCY ANEMIA DUE TO CHRONIC BLOOD LOSS: ICD-10-CM

## 2021-07-26 DIAGNOSIS — S09.90XA MINOR CLOSED HEAD INJURY: ICD-10-CM

## 2021-07-26 DIAGNOSIS — S00.01XA ABRASION OF SCALP, INITIAL ENCOUNTER: ICD-10-CM

## 2021-07-26 DIAGNOSIS — K92.2 UPPER GI BLEED: ICD-10-CM

## 2021-07-26 LAB
ALBUMIN SERPL-MCNC: 3.7 G/DL (ref 3.5–5.2)
ALBUMIN/GLOB SERPL: 1.6 G/DL
ALP SERPL-CCNC: 52 U/L (ref 39–117)
ALT SERPL W P-5'-P-CCNC: 11 U/L (ref 1–33)
ANION GAP SERPL CALCULATED.3IONS-SCNC: 12.2 MMOL/L (ref 5–15)
AST SERPL-CCNC: 15 U/L (ref 1–32)
BACTERIA UR QL AUTO: ABNORMAL /HPF
BASOPHILS # BLD AUTO: 0.02 10*3/MM3 (ref 0–0.2)
BASOPHILS NFR BLD AUTO: 0.4 % (ref 0–1.5)
BILIRUB SERPL-MCNC: 0.2 MG/DL (ref 0–1.2)
BILIRUB UR QL STRIP: NEGATIVE
BUN SERPL-MCNC: 11 MG/DL (ref 8–23)
BUN/CREAT SERPL: 10.9 (ref 7–25)
CALCIUM SPEC-SCNC: 8.8 MG/DL (ref 8.6–10.5)
CHLORIDE SERPL-SCNC: 93 MMOL/L (ref 98–107)
CLARITY UR: CLEAR
CO2 SERPL-SCNC: 24.8 MMOL/L (ref 22–29)
COLOR UR: YELLOW
CREAT SERPL-MCNC: 1.01 MG/DL (ref 0.57–1)
DEPRECATED RDW RBC AUTO: 39.3 FL (ref 37–54)
EOSINOPHIL # BLD AUTO: 0.04 10*3/MM3 (ref 0–0.4)
EOSINOPHIL NFR BLD AUTO: 0.7 % (ref 0.3–6.2)
ERYTHROCYTE [DISTWIDTH] IN BLOOD BY AUTOMATED COUNT: 12.1 % (ref 12.3–15.4)
GFR SERPL CREATININE-BSD FRML MDRD: 55 ML/MIN/1.73
GLOBULIN UR ELPH-MCNC: 2.3 GM/DL
GLUCOSE SERPL-MCNC: 137 MG/DL (ref 65–99)
GLUCOSE UR STRIP-MCNC: NEGATIVE MG/DL
HCT VFR BLD AUTO: 28.6 % (ref 34–46.6)
HGB BLD-MCNC: 10.5 G/DL (ref 12–15.9)
HGB UR QL STRIP.AUTO: NEGATIVE
HOLD SPECIMEN: NORMAL
HOLD SPECIMEN: NORMAL
HYALINE CASTS UR QL AUTO: ABNORMAL /LPF
IMM GRANULOCYTES # BLD AUTO: 0.01 10*3/MM3 (ref 0–0.05)
IMM GRANULOCYTES NFR BLD AUTO: 0.2 % (ref 0–0.5)
KETONES UR QL STRIP: ABNORMAL
LEUKOCYTE ESTERASE UR QL STRIP.AUTO: ABNORMAL
LYMPHOCYTES # BLD AUTO: 1.32 10*3/MM3 (ref 0.7–3.1)
LYMPHOCYTES NFR BLD AUTO: 23.2 % (ref 19.6–45.3)
MAGNESIUM SERPL-MCNC: 1.8 MG/DL (ref 1.6–2.4)
MCH RBC QN AUTO: 32.8 PG (ref 26.6–33)
MCHC RBC AUTO-ENTMCNC: 36.7 G/DL (ref 31.5–35.7)
MCV RBC AUTO: 89.4 FL (ref 79–97)
MONOCYTES # BLD AUTO: 0.48 10*3/MM3 (ref 0.1–0.9)
MONOCYTES NFR BLD AUTO: 8.5 % (ref 5–12)
NEUTROPHILS NFR BLD AUTO: 3.81 10*3/MM3 (ref 1.7–7)
NEUTROPHILS NFR BLD AUTO: 67 % (ref 42.7–76)
NITRITE UR QL STRIP: NEGATIVE
NRBC BLD AUTO-RTO: 0 /100 WBC (ref 0–0.2)
PH UR STRIP.AUTO: 7.5 [PH] (ref 5–8)
PLATELET # BLD AUTO: 235 10*3/MM3 (ref 140–450)
PMV BLD AUTO: 9 FL (ref 6–12)
POTASSIUM SERPL-SCNC: 2.7 MMOL/L (ref 3.5–5.2)
PROT SERPL-MCNC: 6 G/DL (ref 6–8.5)
PROT UR QL STRIP: NEGATIVE
RBC # BLD AUTO: 3.2 10*6/MM3 (ref 3.77–5.28)
RBC # UR: ABNORMAL /HPF
REF LAB TEST METHOD: ABNORMAL
SODIUM SERPL-SCNC: 130 MMOL/L (ref 136–145)
SP GR UR STRIP: 1.01 (ref 1–1.03)
SQUAMOUS #/AREA URNS HPF: ABNORMAL /HPF
TROPONIN T SERPL-MCNC: <0.01 NG/ML (ref 0–0.03)
UROBILINOGEN UR QL STRIP: ABNORMAL
WBC # BLD AUTO: 5.68 10*3/MM3 (ref 3.4–10.8)
WBC UR QL AUTO: ABNORMAL /HPF
WHOLE BLOOD HOLD SPECIMEN: NORMAL

## 2021-07-26 PROCEDURE — 99285 EMERGENCY DEPT VISIT HI MDM: CPT

## 2021-07-26 PROCEDURE — 70450 CT HEAD/BRAIN W/O DYE: CPT

## 2021-07-26 PROCEDURE — 25010000003 POTASSIUM CHLORIDE 10 MEQ/100ML SOLUTION: Performed by: EMERGENCY MEDICINE

## 2021-07-26 PROCEDURE — 96375 TX/PRO/DX INJ NEW DRUG ADDON: CPT

## 2021-07-26 PROCEDURE — 81001 URINALYSIS AUTO W/SCOPE: CPT | Performed by: EMERGENCY MEDICINE

## 2021-07-26 PROCEDURE — 82962 GLUCOSE BLOOD TEST: CPT

## 2021-07-26 PROCEDURE — 83735 ASSAY OF MAGNESIUM: CPT | Performed by: EMERGENCY MEDICINE

## 2021-07-26 PROCEDURE — 84484 ASSAY OF TROPONIN QUANT: CPT | Performed by: EMERGENCY MEDICINE

## 2021-07-26 PROCEDURE — 90715 TDAP VACCINE 7 YRS/> IM: CPT | Performed by: EMERGENCY MEDICINE

## 2021-07-26 PROCEDURE — 93010 ELECTROCARDIOGRAM REPORT: CPT | Performed by: INTERNAL MEDICINE

## 2021-07-26 PROCEDURE — G0378 HOSPITAL OBSERVATION PER HR: HCPCS

## 2021-07-26 PROCEDURE — 80053 COMPREHEN METABOLIC PANEL: CPT | Performed by: EMERGENCY MEDICINE

## 2021-07-26 PROCEDURE — 85025 COMPLETE CBC W/AUTO DIFF WBC: CPT | Performed by: EMERGENCY MEDICINE

## 2021-07-26 PROCEDURE — 93005 ELECTROCARDIOGRAM TRACING: CPT | Performed by: EMERGENCY MEDICINE

## 2021-07-26 PROCEDURE — 90471 IMMUNIZATION ADMIN: CPT | Performed by: EMERGENCY MEDICINE

## 2021-07-26 PROCEDURE — 25010000002 TDAP 5-2.5-18.5 LF-MCG/0.5 SUSPENSION: Performed by: EMERGENCY MEDICINE

## 2021-07-26 PROCEDURE — G0463 HOSPITAL OUTPT CLINIC VISIT: HCPCS

## 2021-07-26 PROCEDURE — 96365 THER/PROPH/DIAG IV INF INIT: CPT

## 2021-07-26 PROCEDURE — 93005 ELECTROCARDIOGRAM TRACING: CPT

## 2021-07-26 PROCEDURE — 96366 THER/PROPH/DIAG IV INF ADDON: CPT

## 2021-07-26 PROCEDURE — 99214 OFFICE O/P EST MOD 30 MIN: CPT | Performed by: INTERNAL MEDICINE

## 2021-07-26 PROCEDURE — 87086 URINE CULTURE/COLONY COUNT: CPT | Performed by: EMERGENCY MEDICINE

## 2021-07-26 RX ORDER — SODIUM CHLORIDE 9 MG/ML
250 INJECTION, SOLUTION INTRAVENOUS ONCE
Status: CANCELLED | OUTPATIENT
Start: 2021-08-02

## 2021-07-26 RX ORDER — SODIUM CHLORIDE 9 MG/ML
250 INJECTION, SOLUTION INTRAVENOUS ONCE
Status: CANCELLED | OUTPATIENT
Start: 2021-08-09

## 2021-07-26 RX ORDER — POTASSIUM CHLORIDE 750 MG/1
40 CAPSULE, EXTENDED RELEASE ORAL ONCE
Status: COMPLETED | OUTPATIENT
Start: 2021-07-26 | End: 2021-07-26

## 2021-07-26 RX ORDER — SODIUM CHLORIDE 0.9 % (FLUSH) 0.9 %
10 SYRINGE (ML) INJECTION AS NEEDED
Status: DISCONTINUED | OUTPATIENT
Start: 2021-07-26 | End: 2021-07-30 | Stop reason: HOSPADM

## 2021-07-26 RX ORDER — PANTOPRAZOLE SODIUM 40 MG/10ML
40 INJECTION, POWDER, LYOPHILIZED, FOR SOLUTION INTRAVENOUS ONCE
Status: COMPLETED | OUTPATIENT
Start: 2021-07-26 | End: 2021-07-26

## 2021-07-26 RX ORDER — POTASSIUM CHLORIDE 7.45 MG/ML
10 INJECTION INTRAVENOUS
Status: COMPLETED | OUTPATIENT
Start: 2021-07-26 | End: 2021-07-26

## 2021-07-26 RX ADMIN — POTASSIUM CHLORIDE 40 MEQ: 750 CAPSULE, EXTENDED RELEASE ORAL at 20:28

## 2021-07-26 RX ADMIN — TETANUS TOXOID, REDUCED DIPHTHERIA TOXOID AND ACELLULAR PERTUSSIS VACCINE, ADSORBED 0.5 ML: 5; 2.5; 8; 8; 2.5 SUSPENSION INTRAMUSCULAR at 22:35

## 2021-07-26 RX ADMIN — SODIUM CHLORIDE 1000 ML: 9 INJECTION, SOLUTION INTRAVENOUS at 20:20

## 2021-07-26 RX ADMIN — PANTOPRAZOLE SODIUM 40 MG: 40 INJECTION, POWDER, FOR SOLUTION INTRAVENOUS at 23:02

## 2021-07-26 RX ADMIN — POTASSIUM CHLORIDE 10 MEQ: 7.46 INJECTION, SOLUTION INTRAVENOUS at 20:20

## 2021-07-26 RX ADMIN — POTASSIUM CHLORIDE 10 MEQ: 7.46 INJECTION, SOLUTION INTRAVENOUS at 21:45

## 2021-07-26 NOTE — PROGRESS NOTES
Tatyana Shea   : 1955     LOCATION: [facility] HEMATOLOGY & ONCOLOGY     Chief Complaint  Anemia and Follow-up    Referring Provider: MERLE Alvarenga  PCP: John Aguilera APRN    Oncology/Hematology History Overview Note   HEME/ONC DX/RX/INVESTIGATIONS  DX:   Anemia, neutropenia, iron and B12 deficiency     RX:   Oral B12, started around 2021  Ferrous sulfate BID started around 2021. Changed to QOD on 2021    INVESTIGATIONS:   2020, EGD: focal intestinal metaplasia consistent with Li's esophagus, no dysplasia identified.  Fundic gland polyp.  Colonoscopy: fragments of tubular adenoma.  Colonic mucosa with lymphoid aggregates.  Rectal polyp, fragments of hyperplastic polyp.    21, B12 = 191  21  B12 = 2000, iron 44 TIBC 368 sat 12%           HEME/ONC DX/RX/INVESTIGATIONS  DX:   RX:   INVESTIGATIONS:     Subjective        History of Present Illness   Pt here for f/u anemia   c/o of reflux type Gi symptoms  Denies any AKERS  Review of Systems   Constitutional: Positive for fatigue. Negative for appetite change, diaphoresis, fever, unexpected weight gain and unexpected weight loss.   HENT: Negative for hearing loss, sore throat and voice change.    Eyes: Negative for blurred vision, double vision, pain, redness and visual disturbance.   Respiratory: Negative for cough, shortness of breath and wheezing.    Cardiovascular: Negative for chest pain, palpitations and leg swelling.   Gastrointestinal: Positive for nausea.   Endocrine: Negative for cold intolerance, heat intolerance, polydipsia and polyuria.   Genitourinary: Negative for decreased urine volume, difficulty urinating, frequency and urinary incontinence.   Musculoskeletal: Negative for arthralgias, back pain, joint swelling and myalgias.   Skin: Negative for color change, rash, skin lesions and bruise.   Neurological: Negative for dizziness, seizures, numbness and headache.   Hematological: Negative for  adenopathy. Does not bruise/bleed easily.   Psychiatric/Behavioral: Negative for depressed mood. The patient is not nervous/anxious.      Current Outpatient Medications on File Prior to Visit   Medication Sig Dispense Refill   • aspirin 81 MG chewable tablet Chew 81 mg Daily.     • cholecalciferol (VITAMIN D3) 1.25 MG (71882 UT) capsule D3-50 Cholecalciferol 1,250 mcg (50,000 unit) oral capsule take 1 capsule by oral route daily   Active     • famotidine (PEPCID) 10 MG tablet Take 10 mg by mouth 2 (Two) Times a Day.     • Krill Oil (Omega-3) 500 MG capsule      • losartan-hydrochlorothiazide (HYZAAR) 100-25 MG per tablet      • diphenhydrAMINE (BENADRYL) 25 mg capsule Sleep Aid (diphenhydramine) 25 mg oral capsule take 1 capsule by oral route daily for 30 days   Suspended     • ferrous sulfate 325 (65 FE) MG tablet ferrous sulfate 325 mg (65 mg iron) oral tablet take 1 tablet (325 mg) by oral route 2 times per day for 90 days 5/28/2021  Active     • pantoprazole (PROTONIX) 40 MG EC tablet pantoprazole 40 mg oral tablet,delayed release (DR/EC) take 1 tablet (40 mg) by oral route once daily for 30 days 12/7/2020  Active       No current facility-administered medications on file prior to visit.       No Known Allergies    Past Medical History:   Diagnosis Date   • Acid reflux    • Diverticulitis    • Gall stones    • Hiatal hernia    • HTN (hypertension)      Past Surgical History:   Procedure Laterality Date   • CHOLECYSTECTOMY     • COLONOSCOPY      2020 with       Social History     Socioeconomic History   • Marital status:      Spouse name: Not on file   • Number of children: Not on file   • Years of education: Not on file   • Highest education level: Not on file   Tobacco Use   • Smoking status: Never Smoker   • Smokeless tobacco: Never Used   Substance and Sexual Activity   • Alcohol use: Never     Family History   Problem Relation Age of Onset   • Heart failure Mother    • Atrial fibrillation  Other         UNSPECIFIED     Immunization History   Administered Date(s) Administered   • COVID-19 (MODERNA) 02/04/2021, 03/04/2021   • Influenza, Unspecified 10/13/2020   • Pneumococcal Conjugate 13-Valent (PCV13) 10/01/2019       Objective     /67 Comment: LT ARM  Pulse 80   Temp 97.7 °F (36.5 °C) (Temporal)   Resp 18   Wt 91.8 kg (202 lb 6.1 oz)   SpO2 98% Comment:  AIR  BMI 32.33 kg/m²         Physical Exam  Constitutional:       Appearance: Normal appearance.   HENT:      Head: Normocephalic and atraumatic.   Eyes:      Pupils: Pupils are equal, round, and reactive to light.   Cardiovascular:      Pulses: Normal pulses.   Abdominal:      General: Bowel sounds are normal.   Musculoskeletal:      Cervical back: Normal range of motion.   Skin:     General: Skin is dry.   Neurological:      General: No focal deficit present.      Mental Status: She is alert.               Iron   Date Value Ref Range Status   07/19/2021 64 37 - 145 mcg/dL Final   05/27/2021 44 (L) 60 - 170 ug/dL Final     Iron Saturation   Date Value Ref Range Status   07/19/2021 19 (L) 20 - 50 % Final   05/27/2021 12 (L) 20 - 55 % Final     Transferrin   Date Value Ref Range Status   07/19/2021 231 200 - 360 mg/dL Final   05/27/2021 257.00 250.00 - 380.00 mg/dL Final     TIBC   Date Value Ref Range Status   07/19/2021 344 298 - 536 mcg/dL Final   05/27/2021 368 245 - 450 ug/dL Final     Comment:     As of 10/15/03, the chemistry department began utilizing a Transferrin  assay. Data suggests that Transferrin is a better estimate of Total Iron  binding capacity than the TIBC assay. As a result the TIBC will now be a  calculated estimate from the measured Transferrin.       Ferritin   Date Value Ref Range Status   07/19/2021 32.95 13.00 - 150.00 ng/mL Final     Vitamin B-12   Date Value Ref Range Status   07/19/2021 >2,000 (H) 211 - 946 pg/mL Final   05/27/2021 >2000 (H) 211 - 911 pg/mL Final   04/09/2021 191 (L) 211 - 911 pg/mL Final      Folate   Date Value Ref Range Status   07/19/2021 12.60 4.78 - 24.20 ng/mL Final   05/27/2021 12.6 4.8 - 20.0 ng/mL Final     Comment:     Results may be falsely decreased due to light exposure if  testing added on after collection.     04/09/2021 14.7 4.8 - 20.0 ng/mL Final     Comment:     Results may be falsely decreased due to light exposure if  testing added on after collection.                   PHQ-9 Total Score: 0         Result Review :   The following data was reviewed by: Mabel Dean MD on 07/26/2021:  Lab Results   Component Value Date    HGB 11.4 (L) 07/19/2021    HCT 31.8 (L) 07/19/2021    MCV 92.7 07/19/2021     07/19/2021    WBC 4.24 07/19/2021    NEUTROABS 2.47 07/19/2021    LYMPHSABS 1.32 07/19/2021    MONOSABS 0.33 07/19/2021    EOSABS 0.10 07/19/2021    BASOSABS 0.02 07/19/2021     Lab Results   Component Value Date    GLUCOSE 108 (H) 07/19/2021    BUN 11 07/19/2021    CREATININE 0.64 07/19/2021     (L) 07/19/2021    K 3.4 (L) 07/19/2021    CL 97 (L) 07/19/2021    CO2 28.2 07/19/2021    CALCIUM 9.6 07/19/2021    PROTEINTOT 6.3 07/19/2021    ALBUMIN 4.10 07/19/2021    BILITOT 0.2 07/19/2021    ALKPHOS 57 07/19/2021    AST 12 07/19/2021    ALT 9 07/19/2021         Data reviewed: labs          Assessment and Plan      ASSESSMENT  Iron deficiency    B12 deficiency     PLAN/RECOMMENDATIONS    Pt cbc with anemia persistent and not improving   iron sat still low despite over 3 months of oral iron therapy   Will replete with iv iron    f/u cbc and iron profile in 2 months     2    B12 deficiency   intrinsic factor antibodies noted   will cont on oral b12 for now   if iron replacement does not improve the anemia will consider IM b12 replacement   labs and OV in 2 months     Diagnoses and all orders for this visit:    1. Iron deficiency anemia, unspecified iron deficiency anemia type (Primary)  -     CBC & Differential; Future  -     Iron Profile; Future  -     ONCBCN INFUSION  APPOINTMENT REQUEST 01; Future  -     ONCBCN INFUSION APPOINTMENT REQUEST 01; Future  -     Vitamin B12; Future    Other orders  -     sodium chloride 0.9 % infusion 250 mL  -     ferric carboxymaltose (INJECTAFER) 750 mg in sodium chloride 0.9 % 100 mL IVPB  -     sodium chloride 0.9 % infusion 250 mL  -     ferric carboxymaltose (INJECTAFER) 750 mg in sodium chloride 0.9 % 100 mL IVPB        Patient was given instructions and counseling regarding her condition or for health maintenance advice. Please see specific information pulled into the AVS if appropriate.     Mabel Dean MD    7/26/2021

## 2021-07-27 ENCOUNTER — APPOINTMENT (OUTPATIENT)
Dept: CARDIOLOGY | Facility: HOSPITAL | Age: 66
End: 2021-07-27

## 2021-07-27 LAB
ANION GAP SERPL CALCULATED.3IONS-SCNC: 7.3 MMOL/L (ref 5–15)
BASOPHILS # BLD AUTO: 0.02 10*3/MM3 (ref 0–0.2)
BASOPHILS NFR BLD AUTO: 0.4 % (ref 0–1.5)
BUN SERPL-MCNC: 9 MG/DL (ref 8–23)
BUN/CREAT SERPL: 10.2 (ref 7–25)
CALCIUM SPEC-SCNC: 8.7 MG/DL (ref 8.6–10.5)
CHLORIDE SERPL-SCNC: 97 MMOL/L (ref 98–107)
CO2 SERPL-SCNC: 26.7 MMOL/L (ref 22–29)
CREAT SERPL-MCNC: 0.88 MG/DL (ref 0.57–1)
DEPRECATED RDW RBC AUTO: 39.7 FL (ref 37–54)
DEPRECATED RDW RBC AUTO: 41 FL (ref 37–54)
EOSINOPHIL # BLD AUTO: 0.02 10*3/MM3 (ref 0–0.4)
EOSINOPHIL NFR BLD AUTO: 0.4 % (ref 0.3–6.2)
ERYTHROCYTE [DISTWIDTH] IN BLOOD BY AUTOMATED COUNT: 12.1 % (ref 12.3–15.4)
ERYTHROCYTE [DISTWIDTH] IN BLOOD BY AUTOMATED COUNT: 12.4 % (ref 12.3–15.4)
GFR SERPL CREATININE-BSD FRML MDRD: 64 ML/MIN/1.73
GLUCOSE BLDC GLUCOMTR-MCNC: 111 MG/DL (ref 70–99)
GLUCOSE SERPL-MCNC: 123 MG/DL (ref 65–99)
HCT VFR BLD AUTO: 27.7 % (ref 34–46.6)
HCT VFR BLD AUTO: 29 % (ref 34–46.6)
HGB BLD-MCNC: 10.2 G/DL (ref 12–15.9)
HGB BLD-MCNC: 10.7 G/DL (ref 12–15.9)
IMM GRANULOCYTES # BLD AUTO: 0.01 10*3/MM3 (ref 0–0.05)
IMM GRANULOCYTES NFR BLD AUTO: 0.2 % (ref 0–0.5)
LYMPHOCYTES # BLD AUTO: 0.92 10*3/MM3 (ref 0.7–3.1)
LYMPHOCYTES NFR BLD AUTO: 17.5 % (ref 19.6–45.3)
MCH RBC QN AUTO: 33.4 PG (ref 26.6–33)
MCH RBC QN AUTO: 33.6 PG (ref 26.6–33)
MCHC RBC AUTO-ENTMCNC: 36.8 G/DL (ref 31.5–35.7)
MCHC RBC AUTO-ENTMCNC: 36.9 G/DL (ref 31.5–35.7)
MCV RBC AUTO: 90.6 FL (ref 79–97)
MCV RBC AUTO: 91.1 FL (ref 79–97)
MONOCYTES # BLD AUTO: 0.5 10*3/MM3 (ref 0.1–0.9)
MONOCYTES NFR BLD AUTO: 9.5 % (ref 5–12)
NEUTROPHILS NFR BLD AUTO: 3.79 10*3/MM3 (ref 1.7–7)
NEUTROPHILS NFR BLD AUTO: 72 % (ref 42.7–76)
NRBC BLD AUTO-RTO: 0 /100 WBC (ref 0–0.2)
PLATELET # BLD AUTO: 206 10*3/MM3 (ref 140–450)
PLATELET # BLD AUTO: 209 10*3/MM3 (ref 140–450)
PMV BLD AUTO: 8.8 FL (ref 6–12)
PMV BLD AUTO: 8.8 FL (ref 6–12)
POTASSIUM SERPL-SCNC: 3.8 MMOL/L (ref 3.5–5.2)
QT INTERVAL: 432 MS
RBC # BLD AUTO: 3.04 10*6/MM3 (ref 3.77–5.28)
RBC # BLD AUTO: 3.2 10*6/MM3 (ref 3.77–5.28)
SODIUM SERPL-SCNC: 131 MMOL/L (ref 136–145)
WBC # BLD AUTO: 5.26 10*3/MM3 (ref 3.4–10.8)
WBC # BLD AUTO: 7.41 10*3/MM3 (ref 3.4–10.8)

## 2021-07-27 PROCEDURE — G0378 HOSPITAL OBSERVATION PER HR: HCPCS

## 2021-07-27 PROCEDURE — 85025 COMPLETE CBC W/AUTO DIFF WBC: CPT | Performed by: INTERNAL MEDICINE

## 2021-07-27 PROCEDURE — 80048 BASIC METABOLIC PNL TOTAL CA: CPT | Performed by: PHYSICIAN ASSISTANT

## 2021-07-27 PROCEDURE — 93306 TTE W/DOPPLER COMPLETE: CPT

## 2021-07-27 PROCEDURE — 96361 HYDRATE IV INFUSION ADD-ON: CPT

## 2021-07-27 PROCEDURE — 85027 COMPLETE CBC AUTOMATED: CPT | Performed by: PHYSICIAN ASSISTANT

## 2021-07-27 PROCEDURE — 99220 PR INITIAL OBSERVATION CARE/DAY 70 MINUTES: CPT | Performed by: PHYSICIAN ASSISTANT

## 2021-07-27 RX ORDER — ACETAMINOPHEN 325 MG/1
650 TABLET ORAL EVERY 4 HOURS PRN
Status: DISCONTINUED | OUTPATIENT
Start: 2021-07-27 | End: 2021-07-30 | Stop reason: HOSPADM

## 2021-07-27 RX ORDER — SODIUM CHLORIDE 0.9 % (FLUSH) 0.9 %
10 SYRINGE (ML) INJECTION EVERY 12 HOURS SCHEDULED
Status: DISCONTINUED | OUTPATIENT
Start: 2021-07-27 | End: 2021-07-30 | Stop reason: HOSPADM

## 2021-07-27 RX ORDER — ACETAMINOPHEN 650 MG/1
650 SUPPOSITORY RECTAL EVERY 4 HOURS PRN
Status: DISCONTINUED | OUTPATIENT
Start: 2021-07-27 | End: 2021-07-30 | Stop reason: HOSPADM

## 2021-07-27 RX ORDER — SODIUM CHLORIDE, SODIUM LACTATE, POTASSIUM CHLORIDE, CALCIUM CHLORIDE 600; 310; 30; 20 MG/100ML; MG/100ML; MG/100ML; MG/100ML
100 INJECTION, SOLUTION INTRAVENOUS CONTINUOUS
Status: DISCONTINUED | OUTPATIENT
Start: 2021-07-27 | End: 2021-07-27

## 2021-07-27 RX ORDER — SODIUM CHLORIDE 0.9 % (FLUSH) 0.9 %
10 SYRINGE (ML) INJECTION AS NEEDED
Status: DISCONTINUED | OUTPATIENT
Start: 2021-07-27 | End: 2021-07-30 | Stop reason: HOSPADM

## 2021-07-27 RX ADMIN — SODIUM CHLORIDE, POTASSIUM CHLORIDE, SODIUM LACTATE AND CALCIUM CHLORIDE 100 ML/HR: 600; 310; 30; 20 INJECTION, SOLUTION INTRAVENOUS at 01:01

## 2021-07-27 RX ADMIN — SODIUM CHLORIDE, POTASSIUM CHLORIDE, SODIUM LACTATE AND CALCIUM CHLORIDE 100 ML/HR: 600; 310; 30; 20 INJECTION, SOLUTION INTRAVENOUS at 11:15

## 2021-07-27 RX ADMIN — SODIUM CHLORIDE, PRESERVATIVE FREE 10 ML: 5 INJECTION INTRAVENOUS at 20:12

## 2021-07-27 RX ADMIN — SODIUM CHLORIDE, PRESERVATIVE FREE 10 ML: 5 INJECTION INTRAVENOUS at 01:01

## 2021-07-28 ENCOUNTER — ANESTHESIA (OUTPATIENT)
Dept: GASTROENTEROLOGY | Facility: HOSPITAL | Age: 66
End: 2021-07-28

## 2021-07-28 ENCOUNTER — ANESTHESIA EVENT (OUTPATIENT)
Dept: GASTROENTEROLOGY | Facility: HOSPITAL | Age: 66
End: 2021-07-28

## 2021-07-28 LAB
ALBUMIN SERPL-MCNC: 3.4 G/DL (ref 3.5–5.2)
ALBUMIN/GLOB SERPL: 1.4 G/DL
ALP SERPL-CCNC: 48 U/L (ref 39–117)
ALT SERPL W P-5'-P-CCNC: 8 U/L (ref 1–33)
ANION GAP SERPL CALCULATED.3IONS-SCNC: 6.1 MMOL/L (ref 5–15)
AST SERPL-CCNC: 12 U/L (ref 1–32)
BACTERIA SPEC AEROBE CULT: NORMAL
BASOPHILS # BLD AUTO: 0.01 10*3/MM3 (ref 0–0.2)
BASOPHILS NFR BLD AUTO: 0.2 % (ref 0–1.5)
BILIRUB SERPL-MCNC: 0.2 MG/DL (ref 0–1.2)
BUN SERPL-MCNC: 6 MG/DL (ref 8–23)
BUN/CREAT SERPL: 9.8 (ref 7–25)
CALCIUM SPEC-SCNC: 9 MG/DL (ref 8.6–10.5)
CHLORIDE SERPL-SCNC: 98 MMOL/L (ref 98–107)
CO2 SERPL-SCNC: 28.9 MMOL/L (ref 22–29)
CREAT SERPL-MCNC: 0.61 MG/DL (ref 0.57–1)
DEPRECATED RDW RBC AUTO: 41.9 FL (ref 37–54)
EOSINOPHIL # BLD AUTO: 0.07 10*3/MM3 (ref 0–0.4)
EOSINOPHIL NFR BLD AUTO: 1.5 % (ref 0.3–6.2)
ERYTHROCYTE [DISTWIDTH] IN BLOOD BY AUTOMATED COUNT: 12.5 % (ref 12.3–15.4)
GFR SERPL CREATININE-BSD FRML MDRD: 98 ML/MIN/1.73
GLOBULIN UR ELPH-MCNC: 2.4 GM/DL
GLUCOSE SERPL-MCNC: 105 MG/DL (ref 65–99)
HCT VFR BLD AUTO: 27.8 % (ref 34–46.6)
HGB BLD-MCNC: 10.1 G/DL (ref 12–15.9)
IMM GRANULOCYTES # BLD AUTO: 0 10*3/MM3 (ref 0–0.05)
IMM GRANULOCYTES NFR BLD AUTO: 0 % (ref 0–0.5)
LYMPHOCYTES # BLD AUTO: 1.08 10*3/MM3 (ref 0.7–3.1)
LYMPHOCYTES NFR BLD AUTO: 23.8 % (ref 19.6–45.3)
MAGNESIUM SERPL-MCNC: 1.9 MG/DL (ref 1.6–2.4)
MCH RBC QN AUTO: 33.4 PG (ref 26.6–33)
MCHC RBC AUTO-ENTMCNC: 36.3 G/DL (ref 31.5–35.7)
MCV RBC AUTO: 92.1 FL (ref 79–97)
MONOCYTES # BLD AUTO: 0.49 10*3/MM3 (ref 0.1–0.9)
MONOCYTES NFR BLD AUTO: 10.8 % (ref 5–12)
NEUTROPHILS NFR BLD AUTO: 2.88 10*3/MM3 (ref 1.7–7)
NEUTROPHILS NFR BLD AUTO: 63.7 % (ref 42.7–76)
NRBC BLD AUTO-RTO: 0 /100 WBC (ref 0–0.2)
PLATELET # BLD AUTO: 199 10*3/MM3 (ref 140–450)
PMV BLD AUTO: 9.2 FL (ref 6–12)
POTASSIUM SERPL-SCNC: 3.6 MMOL/L (ref 3.5–5.2)
PROT SERPL-MCNC: 5.8 G/DL (ref 6–8.5)
RBC # BLD AUTO: 3.02 10*6/MM3 (ref 3.77–5.28)
SODIUM SERPL-SCNC: 133 MMOL/L (ref 136–145)
WBC # BLD AUTO: 4.53 10*3/MM3 (ref 3.4–10.8)

## 2021-07-28 PROCEDURE — 80053 COMPREHEN METABOLIC PANEL: CPT | Performed by: INTERNAL MEDICINE

## 2021-07-28 PROCEDURE — G0378 HOSPITAL OBSERVATION PER HR: HCPCS

## 2021-07-28 PROCEDURE — 88305 TISSUE EXAM BY PATHOLOGIST: CPT | Performed by: INTERNAL MEDICINE

## 2021-07-28 PROCEDURE — 94799 UNLISTED PULMONARY SVC/PX: CPT

## 2021-07-28 PROCEDURE — 83735 ASSAY OF MAGNESIUM: CPT | Performed by: INTERNAL MEDICINE

## 2021-07-28 PROCEDURE — 99219 PR INITIAL OBSERVATION CARE/DAY 50 MINUTES: CPT | Performed by: INTERNAL MEDICINE

## 2021-07-28 PROCEDURE — 43239 EGD BIOPSY SINGLE/MULTIPLE: CPT | Performed by: INTERNAL MEDICINE

## 2021-07-28 PROCEDURE — 99226 PR SBSQ OBSERVATION CARE/DAY 35 MINUTES: CPT | Performed by: INTERNAL MEDICINE

## 2021-07-28 PROCEDURE — 97161 PT EVAL LOW COMPLEX 20 MIN: CPT

## 2021-07-28 PROCEDURE — 25010000002 PROPOFOL 10 MG/ML EMULSION: Performed by: NURSE ANESTHETIST, CERTIFIED REGISTERED

## 2021-07-28 PROCEDURE — 85025 COMPLETE CBC W/AUTO DIFF WBC: CPT | Performed by: INTERNAL MEDICINE

## 2021-07-28 RX ORDER — PROPOFOL 10 MG/ML
VIAL (ML) INTRAVENOUS AS NEEDED
Status: DISCONTINUED | OUTPATIENT
Start: 2021-07-28 | End: 2021-07-28 | Stop reason: SURG

## 2021-07-28 RX ORDER — SODIUM CHLORIDE, SODIUM LACTATE, POTASSIUM CHLORIDE, CALCIUM CHLORIDE 600; 310; 30; 20 MG/100ML; MG/100ML; MG/100ML; MG/100ML
30 INJECTION, SOLUTION INTRAVENOUS CONTINUOUS
Status: DISCONTINUED | OUTPATIENT
Start: 2021-07-28 | End: 2021-07-29

## 2021-07-28 RX ORDER — LIDOCAINE HYDROCHLORIDE 20 MG/ML
INJECTION, SOLUTION INFILTRATION; PERINEURAL AS NEEDED
Status: DISCONTINUED | OUTPATIENT
Start: 2021-07-28 | End: 2021-07-28 | Stop reason: SURG

## 2021-07-28 RX ADMIN — POLYETHYLENE GLYCOL-3350 AND ELECTROLYTES 4000 ML: 236; 6.74; 5.86; 2.97; 22.74 POWDER, FOR SOLUTION ORAL at 18:17

## 2021-07-28 RX ADMIN — SODIUM CHLORIDE, PRESERVATIVE FREE 10 ML: 5 INJECTION INTRAVENOUS at 07:36

## 2021-07-28 RX ADMIN — SODIUM CHLORIDE, POTASSIUM CHLORIDE, SODIUM LACTATE AND CALCIUM CHLORIDE: 600; 310; 30; 20 INJECTION, SOLUTION INTRAVENOUS at 15:06

## 2021-07-28 RX ADMIN — LIDOCAINE HYDROCHLORIDE 100 MG: 20 INJECTION, SOLUTION INFILTRATION; PERINEURAL at 15:05

## 2021-07-28 RX ADMIN — PROPOFOL 50 MG: 10 INJECTION, EMULSION INTRAVENOUS at 15:08

## 2021-07-28 RX ADMIN — SODIUM CHLORIDE, PRESERVATIVE FREE 10 ML: 5 INJECTION INTRAVENOUS at 21:39

## 2021-07-28 RX ADMIN — PROPOFOL 150 MCG/KG/MIN: 10 INJECTION, EMULSION INTRAVENOUS at 15:08

## 2021-07-28 NOTE — ANESTHESIA PREPROCEDURE EVALUATION
Anesthesia Evaluation     Patient summary reviewed and Nursing notes reviewed   no history of anesthetic complications:  NPO Solid Status: > 8 hours  NPO Liquid Status: > 2 hours           Airway   Mallampati: II  TM distance: >3 FB  Neck ROM: full  No difficulty expected  Dental      Comment: L upper tooth broke after fall      Pulmonary - normal exam    breath sounds clear to auscultation  (+) sleep apnea,   Cardiovascular - normal exam  Exercise tolerance: good (4-7 METS)    Rhythm: regular    (+) hypertension,       Neuro/Psych  (+) syncope,     GI/Hepatic/Renal/Endo    (+)  hiatal hernia, GERD,      Musculoskeletal (-) negative ROS    Abdominal    Substance History - negative use     OB/GYN negative ob/gyn ROS         Other - negative ROS                     Anesthesia Plan    ASA 3     general   total IV anesthesia(Total IV Anesthesia    Patient understands anesthesia not responsible for dental damage.  )  intravenous induction     Anesthetic plan, all risks, benefits, and alternatives have been provided, discussed and informed consent has been obtained with: patient.    Plan discussed with CRNA.

## 2021-07-28 NOTE — ANESTHESIA POSTPROCEDURE EVALUATION
Patient: Tatyana Shea    Procedure Summary     Date: 07/28/21 Room / Location: [facility] ENDOSCOPY 4 / [facility] ENDOSCOPY    Anesthesia Start: 1506 Anesthesia Stop: 1525    Procedure: ESOPHAGOGASTRODUODENOSCOPY with bxs and cold snares (N/A ) Diagnosis:       Iron deficiency anemia due to chronic blood loss      (Iron deficiency anemia due to chronic blood loss [D50.0])    Surgeons: Ruel Foster MD Provider: Mick Molina MD    Anesthesia Type: general ASA Status: 3          Anesthesia Type: general    Vitals  Vitals Value Taken Time   /64 07/28/21 1539   Temp 37.1 °C (98.7 °F) 07/28/21 1539   Pulse 68 07/28/21 1539   Resp 13 07/28/21 1539   SpO2 99 % 07/28/21 1539           Post Anesthesia Care and Evaluation    Patient location during evaluation: bedside  Patient participation: complete - patient participated  Level of consciousness: awake  Pain management: adequate  Airway patency: patent  Anesthetic complications: No anesthetic complications  PONV Status: none  Cardiovascular status: acceptable  Respiratory status: acceptable  Hydration status: acceptable    Comments: An Anesthesiologist personally participated in the most demanding procedures (including induction and emergence if applicable) in the anesthesia plan, monitored the course of anesthesia administration at frequent intervals and remained physically present and available for immediate diagnosis and treatment of emergencies.

## 2021-07-29 ENCOUNTER — ANESTHESIA (OUTPATIENT)
Dept: GASTROENTEROLOGY | Facility: HOSPITAL | Age: 66
End: 2021-07-29

## 2021-07-29 ENCOUNTER — ANESTHESIA EVENT (OUTPATIENT)
Dept: GASTROENTEROLOGY | Facility: HOSPITAL | Age: 66
End: 2021-07-29

## 2021-07-29 PROBLEM — K63.5 COLON POLYPS: Status: ACTIVE | Noted: 2021-07-29

## 2021-07-29 LAB
ALBUMIN SERPL-MCNC: 3.7 G/DL (ref 3.5–5.2)
ALBUMIN/GLOB SERPL: 1.5 G/DL
ALP SERPL-CCNC: 52 U/L (ref 39–117)
ALT SERPL W P-5'-P-CCNC: 10 U/L (ref 1–33)
ANION GAP SERPL CALCULATED.3IONS-SCNC: 9.1 MMOL/L (ref 5–15)
ASCENDING AORTA: 3.9 CM
AST SERPL-CCNC: 12 U/L (ref 1–32)
BASOPHILS # BLD AUTO: 0.01 10*3/MM3 (ref 0–0.2)
BASOPHILS NFR BLD AUTO: 0.3 % (ref 0–1.5)
BH CV ECHO MEAS - AI P1/2T: 375 MSEC
BH CV ECHO MEAS - AO MAX PG: 25 MMHG
BH CV ECHO MEAS - AO MEAN PG: 11 MMHG
BH CV ECHO MEAS - AO ROOT DIAM: 3.2 CM
BH CV ECHO MEAS - AO V2 MAX: 248 CM/SEC
BH CV ECHO MEAS - AO V2 VTI: 47 CM
BH CV ECHO MEAS - AVA PLANIMETRY TRACED: 2.1 CM2
BH CV ECHO MEAS - EDV(MOD-SP2): 65.5 ML
BH CV ECHO MEAS - EDV(MOD-SP4): 66 ML
BH CV ECHO MEAS - EF(MOD-BP): 62 %
BH CV ECHO MEAS - ESV(MOD-SP2): 22.5 ML
BH CV ECHO MEAS - ESV(MOD-SP4): 29 ML
BH CV ECHO MEAS - IVSD: 1.8 CM
BH CV ECHO MEAS - LA DIMENSION(2D): 4 CM
BH CV ECHO MEAS - LAT PEAK E' VEL: 7.8 CM/SEC
BH CV ECHO MEAS - LV MAX PG: 9 MMHG
BH CV ECHO MEAS - LV MEAN PG: 5 MMHG
BH CV ECHO MEAS - LV V1 MAX: 150 CM/SEC
BH CV ECHO MEAS - LV V1 VTI: 32 CM
BH CV ECHO MEAS - LVIDD: 3.7 CM
BH CV ECHO MEAS - LVIDS: 2.6 CM
BH CV ECHO MEAS - LVOT DIAM: 2 CM
BH CV ECHO MEAS - LVPWD: 1.6 CM
BH CV ECHO MEAS - MED PEAK E' VEL: 8.55 CM/SEC
BH CV ECHO MEAS - MV A MAX VEL: 93.5 CM/SEC
BH CV ECHO MEAS - MV DEC TIME: 238 MSEC
BH CV ECHO MEAS - MV E MAX VEL: 73 CM/SEC
BH CV ECHO MEAS - MV E/A: 0.8
BH CV ECHO MEAS - MV MAX PG: 4 MMHG
BH CV ECHO MEAS - MV MEAN PG: 2 MMHG
BH CV ECHO MEAS - MV P1/2T: 57 MSEC
BH CV ECHO MEAS - MV V2 VTI: 27 CM
BH CV ECHO MEAS - MVA(P1/2T): 3.9 CM2
BH CV ECHO MEAS - RAP SYSTOLE: 3 MMHG
BH CV ECHO MEAS - RVDD: 2.5 CM
BH CV ECHO MEAS - RVSP: 32 MMHG
BH CV ECHO MEAS - TR MAX PG: 29 MMHG
BH CV ECHO MEAS - TR MAX VEL: 269 CM/SEC
BH CV ECHO MEASUREMENTS AVERAGE E/E' RATIO: 8.93
BILIRUB SERPL-MCNC: 0.4 MG/DL (ref 0–1.2)
BUN SERPL-MCNC: 4 MG/DL (ref 8–23)
BUN/CREAT SERPL: 7.7 (ref 7–25)
CALCIUM SPEC-SCNC: 9 MG/DL (ref 8.6–10.5)
CHLORIDE SERPL-SCNC: 96 MMOL/L (ref 98–107)
CO2 SERPL-SCNC: 28.9 MMOL/L (ref 22–29)
CREAT SERPL-MCNC: 0.52 MG/DL (ref 0.57–1)
DEPRECATED RDW RBC AUTO: 38.7 FL (ref 37–54)
EOSINOPHIL # BLD AUTO: 0.08 10*3/MM3 (ref 0–0.4)
EOSINOPHIL NFR BLD AUTO: 2.2 % (ref 0.3–6.2)
ERYTHROCYTE [DISTWIDTH] IN BLOOD BY AUTOMATED COUNT: 11.8 % (ref 12.3–15.4)
GFR SERPL CREATININE-BSD FRML MDRD: 117 ML/MIN/1.73
GLOBULIN UR ELPH-MCNC: 2.4 GM/DL
GLUCOSE SERPL-MCNC: 96 MG/DL (ref 65–99)
HCT VFR BLD AUTO: 29.5 % (ref 34–46.6)
HGB BLD-MCNC: 10.8 G/DL (ref 12–15.9)
IMM GRANULOCYTES # BLD AUTO: 0.01 10*3/MM3 (ref 0–0.05)
IMM GRANULOCYTES NFR BLD AUTO: 0.3 % (ref 0–0.5)
IVRT: 90 MSEC
LEFT ATRIUM VOLUME INDEX: 32 ML/M2
LYMPHOCYTES # BLD AUTO: 0.94 10*3/MM3 (ref 0.7–3.1)
LYMPHOCYTES NFR BLD AUTO: 25.7 % (ref 19.6–45.3)
MAGNESIUM SERPL-MCNC: 1.7 MG/DL (ref 1.6–2.4)
MAXIMAL PREDICTED HEART RATE: 152 BPM
MCH RBC QN AUTO: 33.2 PG (ref 26.6–33)
MCHC RBC AUTO-ENTMCNC: 36.6 G/DL (ref 31.5–35.7)
MCV RBC AUTO: 90.8 FL (ref 79–97)
MONOCYTES # BLD AUTO: 0.36 10*3/MM3 (ref 0.1–0.9)
MONOCYTES NFR BLD AUTO: 9.8 % (ref 5–12)
NEUTROPHILS NFR BLD AUTO: 2.26 10*3/MM3 (ref 1.7–7)
NEUTROPHILS NFR BLD AUTO: 61.7 % (ref 42.7–76)
NRBC BLD AUTO-RTO: 0 /100 WBC (ref 0–0.2)
PLATELET # BLD AUTO: 213 10*3/MM3 (ref 140–450)
PMV BLD AUTO: 8.9 FL (ref 6–12)
POTASSIUM SERPL-SCNC: 3.5 MMOL/L (ref 3.5–5.2)
PROT SERPL-MCNC: 6.1 G/DL (ref 6–8.5)
RBC # BLD AUTO: 3.25 10*6/MM3 (ref 3.77–5.28)
SODIUM SERPL-SCNC: 134 MMOL/L (ref 136–145)
STRESS TARGET HR: 129 BPM
WBC # BLD AUTO: 3.66 10*3/MM3 (ref 3.4–10.8)

## 2021-07-29 PROCEDURE — 80053 COMPREHEN METABOLIC PANEL: CPT | Performed by: INTERNAL MEDICINE

## 2021-07-29 PROCEDURE — 83735 ASSAY OF MAGNESIUM: CPT | Performed by: INTERNAL MEDICINE

## 2021-07-29 PROCEDURE — G0378 HOSPITAL OBSERVATION PER HR: HCPCS

## 2021-07-29 PROCEDURE — 25010000002 PROPOFOL 10 MG/ML EMULSION: Performed by: NURSE ANESTHETIST, CERTIFIED REGISTERED

## 2021-07-29 PROCEDURE — 45385 COLONOSCOPY W/LESION REMOVAL: CPT | Performed by: INTERNAL MEDICINE

## 2021-07-29 PROCEDURE — 88305 TISSUE EXAM BY PATHOLOGIST: CPT | Performed by: INTERNAL MEDICINE

## 2021-07-29 PROCEDURE — 99225 PR SBSQ OBSERVATION CARE/DAY 25 MINUTES: CPT | Performed by: INTERNAL MEDICINE

## 2021-07-29 PROCEDURE — 85025 COMPLETE CBC W/AUTO DIFF WBC: CPT | Performed by: INTERNAL MEDICINE

## 2021-07-29 RX ORDER — PROPOFOL 10 MG/ML
VIAL (ML) INTRAVENOUS AS NEEDED
Status: DISCONTINUED | OUTPATIENT
Start: 2021-07-29 | End: 2021-07-29 | Stop reason: SURG

## 2021-07-29 RX ORDER — LIDOCAINE HYDROCHLORIDE 20 MG/ML
INJECTION, SOLUTION INFILTRATION; PERINEURAL AS NEEDED
Status: DISCONTINUED | OUTPATIENT
Start: 2021-07-29 | End: 2021-07-29 | Stop reason: SURG

## 2021-07-29 RX ORDER — SODIUM CHLORIDE, SODIUM LACTATE, POTASSIUM CHLORIDE, CALCIUM CHLORIDE 600; 310; 30; 20 MG/100ML; MG/100ML; MG/100ML; MG/100ML
30 INJECTION, SOLUTION INTRAVENOUS CONTINUOUS
Status: DISCONTINUED | OUTPATIENT
Start: 2021-07-29 | End: 2021-07-29

## 2021-07-29 RX ADMIN — SODIUM CHLORIDE, PRESERVATIVE FREE 10 ML: 5 INJECTION INTRAVENOUS at 21:43

## 2021-07-29 RX ADMIN — PROPOFOL 50 MG: 10 INJECTION, EMULSION INTRAVENOUS at 17:22

## 2021-07-29 RX ADMIN — PROPOFOL 125 MCG/KG/MIN: 10 INJECTION, EMULSION INTRAVENOUS at 17:22

## 2021-07-29 RX ADMIN — SODIUM CHLORIDE, POTASSIUM CHLORIDE, SODIUM LACTATE AND CALCIUM CHLORIDE 30 ML/HR: 600; 310; 30; 20 INJECTION, SOLUTION INTRAVENOUS at 16:35

## 2021-07-29 RX ADMIN — SODIUM CHLORIDE, PRESERVATIVE FREE 10 ML: 5 INJECTION INTRAVENOUS at 08:50

## 2021-07-29 RX ADMIN — LIDOCAINE HYDROCHLORIDE 50 MG: 20 INJECTION, SOLUTION INFILTRATION; PERINEURAL at 17:22

## 2021-07-29 NOTE — ANESTHESIA PREPROCEDURE EVALUATION
Anesthesia Evaluation     Patient summary reviewed and Nursing notes reviewed   no history of anesthetic complications:  NPO Solid Status: > 8 hours  NPO Liquid Status: > 2 hours           Airway   Mallampati: I  TM distance: >3 FB  Neck ROM: full  No difficulty expected  Dental      Comment: Broken implant      Pulmonary - normal exam    breath sounds clear to auscultation  (+) sleep apnea,   Cardiovascular - normal exam  Exercise tolerance: good (4-7 METS)    Rhythm: regular    (+) hypertension,     ROS comment: echo 7/26/21 EF 60%, TR, mod AI    Neuro/Psych  (+) syncope (this admission),     GI/Hepatic/Renal/Endo - negative ROS     Musculoskeletal (-) negative ROS    Abdominal    Substance History - negative use     OB/GYN negative ob/gyn ROS         Other   blood dyscrasia anemia,                     Anesthesia Plan    ASA 3     general   total IV anesthesia    Anesthetic plan, all risks, benefits, and alternatives have been provided, discussed and informed consent has been obtained with: patient.

## 2021-07-29 NOTE — ANESTHESIA POSTPROCEDURE EVALUATION
Patient: Tatyana Shea    Procedure Summary     Date: 07/29/21 Room / Location: [facility] ENDOSCOPY 2 / [facility] ENDOSCOPY    Anesthesia Start: 1721 Anesthesia Stop: 1756    Procedure: COLONOSCOPY with polypectomy/snare (N/A ) Diagnosis:       Iron deficiency anemia due to chronic blood loss      (Iron deficiency anemia due to chronic blood loss [D50.0])    Surgeons: Ruel Foster MD Provider: Atul Hernandez MD    Anesthesia Type: general ASA Status: 3          Anesthesia Type: general    Vitals  Vitals Value Taken Time   /62 07/29/21 1758   Temp     Pulse 75 07/29/21 1801   Resp     SpO2 98 % 07/29/21 1801   Vitals shown include unvalidated device data.        Post Anesthesia Care and Evaluation    Patient location during evaluation: bedside  Patient participation: complete - patient participated  Level of consciousness: awake  Pain management: adequate  Airway patency: patent  Anesthetic complications: No anesthetic complications  PONV Status: none  Cardiovascular status: acceptable and stable  Respiratory status: acceptable  Hydration status: acceptable    Comments: An Anesthesiologist personally participated in the most demanding procedures (including induction and emergence if applicable) in the anesthesia plan, monitored the course of anesthesia administration at frequent intervals and remained physically present and available for immediate diagnosis and treatment of emergencies.

## 2021-07-30 ENCOUNTER — READMISSION MANAGEMENT (OUTPATIENT)
Dept: CALL CENTER | Facility: HOSPITAL | Age: 66
End: 2021-07-30

## 2021-07-30 VITALS
BODY MASS INDEX: 32.77 KG/M2 | DIASTOLIC BLOOD PRESSURE: 62 MMHG | WEIGHT: 203.93 LBS | TEMPERATURE: 97.9 F | OXYGEN SATURATION: 97 % | RESPIRATION RATE: 20 BRPM | HEART RATE: 78 BPM | HEIGHT: 66 IN | SYSTOLIC BLOOD PRESSURE: 130 MMHG

## 2021-07-30 LAB
ALBUMIN SERPL-MCNC: 3.5 G/DL (ref 3.5–5.2)
ALBUMIN/GLOB SERPL: 1.6 G/DL
ALP SERPL-CCNC: 49 U/L (ref 39–117)
ALT SERPL W P-5'-P-CCNC: 7 U/L (ref 1–33)
ANION GAP SERPL CALCULATED.3IONS-SCNC: 9.2 MMOL/L (ref 5–15)
AST SERPL-CCNC: 11 U/L (ref 1–32)
BASOPHILS # BLD AUTO: 0.01 10*3/MM3 (ref 0–0.2)
BASOPHILS NFR BLD AUTO: 0.3 % (ref 0–1.5)
BILIRUB SERPL-MCNC: 0.3 MG/DL (ref 0–1.2)
BUN SERPL-MCNC: 5 MG/DL (ref 8–23)
BUN/CREAT SERPL: 9.6 (ref 7–25)
CALCIUM SPEC-SCNC: 8.9 MG/DL (ref 8.6–10.5)
CHLORIDE SERPL-SCNC: 97 MMOL/L (ref 98–107)
CO2 SERPL-SCNC: 28.8 MMOL/L (ref 22–29)
CREAT SERPL-MCNC: 0.52 MG/DL (ref 0.57–1)
CYTO UR: NORMAL
DEPRECATED RDW RBC AUTO: 39.7 FL (ref 37–54)
EOSINOPHIL # BLD AUTO: 0.07 10*3/MM3 (ref 0–0.4)
EOSINOPHIL NFR BLD AUTO: 1.9 % (ref 0.3–6.2)
ERYTHROCYTE [DISTWIDTH] IN BLOOD BY AUTOMATED COUNT: 11.9 % (ref 12.3–15.4)
GFR SERPL CREATININE-BSD FRML MDRD: 117 ML/MIN/1.73
GLOBULIN UR ELPH-MCNC: 2.2 GM/DL
GLUCOSE SERPL-MCNC: 95 MG/DL (ref 65–99)
HCT VFR BLD AUTO: 28.8 % (ref 34–46.6)
HGB BLD-MCNC: 10.5 G/DL (ref 12–15.9)
IMM GRANULOCYTES # BLD AUTO: 0.01 10*3/MM3 (ref 0–0.05)
IMM GRANULOCYTES NFR BLD AUTO: 0.3 % (ref 0–0.5)
LAB AP CASE REPORT: NORMAL
LAB AP CLINICAL INFORMATION: NORMAL
LYMPHOCYTES # BLD AUTO: 1.03 10*3/MM3 (ref 0.7–3.1)
LYMPHOCYTES NFR BLD AUTO: 27.9 % (ref 19.6–45.3)
MAGNESIUM SERPL-MCNC: 1.7 MG/DL (ref 1.6–2.4)
MCH RBC QN AUTO: 33 PG (ref 26.6–33)
MCHC RBC AUTO-ENTMCNC: 36.5 G/DL (ref 31.5–35.7)
MCV RBC AUTO: 90.6 FL (ref 79–97)
MONOCYTES # BLD AUTO: 0.41 10*3/MM3 (ref 0.1–0.9)
MONOCYTES NFR BLD AUTO: 11.1 % (ref 5–12)
NEUTROPHILS NFR BLD AUTO: 2.16 10*3/MM3 (ref 1.7–7)
NEUTROPHILS NFR BLD AUTO: 58.5 % (ref 42.7–76)
NRBC BLD AUTO-RTO: 0 /100 WBC (ref 0–0.2)
PATH REPORT.FINAL DX SPEC: NORMAL
PATH REPORT.GROSS SPEC: NORMAL
PLATELET # BLD AUTO: 188 10*3/MM3 (ref 140–450)
PMV BLD AUTO: 8.8 FL (ref 6–12)
POTASSIUM SERPL-SCNC: 3.3 MMOL/L (ref 3.5–5.2)
PROT SERPL-MCNC: 5.7 G/DL (ref 6–8.5)
RBC # BLD AUTO: 3.18 10*6/MM3 (ref 3.77–5.28)
SODIUM SERPL-SCNC: 135 MMOL/L (ref 136–145)
WBC # BLD AUTO: 3.69 10*3/MM3 (ref 3.4–10.8)

## 2021-07-30 PROCEDURE — 85025 COMPLETE CBC W/AUTO DIFF WBC: CPT | Performed by: INTERNAL MEDICINE

## 2021-07-30 PROCEDURE — 80053 COMPREHEN METABOLIC PANEL: CPT | Performed by: INTERNAL MEDICINE

## 2021-07-30 PROCEDURE — 83735 ASSAY OF MAGNESIUM: CPT | Performed by: INTERNAL MEDICINE

## 2021-07-30 PROCEDURE — G0378 HOSPITAL OBSERVATION PER HR: HCPCS

## 2021-07-30 PROCEDURE — 99217 PR OBSERVATION CARE DISCHARGE MANAGEMENT: CPT | Performed by: INTERNAL MEDICINE

## 2021-07-30 RX ORDER — LOSARTAN POTASSIUM 25 MG/1
25 TABLET ORAL DAILY
Qty: 30 TABLET | Refills: 3 | Status: SHIPPED | OUTPATIENT
Start: 2021-07-30 | End: 2021-08-06 | Stop reason: SDUPTHER

## 2021-07-30 RX ADMIN — SODIUM CHLORIDE, PRESERVATIVE FREE 10 ML: 5 INJECTION INTRAVENOUS at 08:36

## 2021-07-30 NOTE — OUTREACH NOTE
Prep Survey      Responses   [facility] facility patient discharged from?  [facility]   Is LACE score < 7 ?  Yes   Emergency Room discharge w/ pulse ox?  No   Eligibility  [facility]   Hospital  [facility]   Date of Admission  07/26/21   Date of Discharge  07/30/21   Discharge Disposition  Home or Self Care   Discharge diagnosis  Iron deficiency anemia   Does the patient have one of the following disease processes/diagnoses(primary or secondary)?  Other   Does the patient have Home health ordered?  No   Is there a DME ordered?  No   Prep survey completed?  Yes          Shanta Gaines RN

## 2021-08-02 ENCOUNTER — TRANSITIONAL CARE MANAGEMENT TELEPHONE ENCOUNTER (OUTPATIENT)
Dept: CALL CENTER | Facility: HOSPITAL | Age: 66
End: 2021-08-02

## 2021-08-02 ENCOUNTER — HOSPITAL ENCOUNTER (OUTPATIENT)
Dept: ONCOLOGY | Facility: HOSPITAL | Age: 66
Setting detail: INFUSION SERIES
Discharge: HOME | End: 2021-08-02

## 2021-08-02 VITALS
HEART RATE: 80 BPM | RESPIRATION RATE: 18 BRPM | DIASTOLIC BLOOD PRESSURE: 82 MMHG | SYSTOLIC BLOOD PRESSURE: 149 MMHG | TEMPERATURE: 98.1 F | OXYGEN SATURATION: 100 %

## 2021-08-02 DIAGNOSIS — D50.9 IRON DEFICIENCY ANEMIA, UNSPECIFIED IRON DEFICIENCY ANEMIA TYPE: Primary | ICD-10-CM

## 2021-08-02 LAB
CYTO UR: NORMAL
LAB AP CASE REPORT: NORMAL
LAB AP CLINICAL INFORMATION: NORMAL
PATH REPORT.FINAL DX SPEC: NORMAL
PATH REPORT.GROSS SPEC: NORMAL

## 2021-08-02 PROCEDURE — 25010000002 FERRIC CARBOXYMALTOSE 750 MG/15ML SOLUTION 15 ML VIAL: Performed by: INTERNAL MEDICINE

## 2021-08-02 PROCEDURE — 96374 THER/PROPH/DIAG INJ IV PUSH: CPT

## 2021-08-02 PROCEDURE — 96375 TX/PRO/DX INJ NEW DRUG ADDON: CPT

## 2021-08-02 RX ORDER — SODIUM CHLORIDE 9 MG/ML
250 INJECTION, SOLUTION INTRAVENOUS ONCE
Status: COMPLETED | OUTPATIENT
Start: 2021-08-02 | End: 2021-08-02

## 2021-08-02 RX ADMIN — FERRIC CARBOXYMALTOSE INJECTION 750 MG: 50 INJECTION, SOLUTION INTRAVENOUS at 14:07

## 2021-08-02 RX ADMIN — SODIUM CHLORIDE 250 ML: 9 INJECTION, SOLUTION INTRAVENOUS at 14:05

## 2021-08-02 NOTE — OUTREACH NOTE
Call Center TCM Note      Responses   [facility] patient discharged from?  [facility]   Does the patient have one of the following disease processes/diagnoses(primary or secondary)?  Other   TCM attempt successful?  No   Unsuccessful attempts  Attempt 1          Delmis Chanel RN    8/2/2021, 14:40 EDT

## 2021-08-02 NOTE — OUTREACH NOTE
Call Center TCM Note      Responses   [facility] patient discharged from?  [facility]   Does the patient have one of the following disease processes/diagnoses(primary or secondary)?  Other   TCM attempt successful?  No   Unsuccessful attempts  Attempt 2          Delmis Chanel RN    8/2/2021, 17:53 EDT

## 2021-08-03 ENCOUNTER — EDUCATION (OUTPATIENT)
Dept: ONCOLOGY | Facility: HOSPITAL | Age: 66
End: 2021-08-03

## 2021-08-03 ENCOUNTER — TRANSITIONAL CARE MANAGEMENT TELEPHONE ENCOUNTER (OUTPATIENT)
Dept: CALL CENTER | Facility: HOSPITAL | Age: 66
End: 2021-08-03

## 2021-08-03 NOTE — OUTREACH NOTE
Call Center TCM Note      Responses   [facility] patient discharged from?  [facility]   Does the patient have one of the following disease processes/diagnoses(primary or secondary)?  Other   TCM attempt successful?  Yes   Call start time  1105   Call end time  1130   Discharge diagnosis  Iron deficiency anemia   Meds reviewed with patient/caregiver?  Yes   Does the patient have all medications ordered at discharge?  No   What is keeping the patient from filling the prescriptions?  Patient desires to consult PCP [Pt does not want to make medication changes suggested until f/u with PCP]   Nursing Interventions  -- [Encouraged to monitor BP readings/record to take w/her to f/u appts]   Comments regarding appointments  Pt had iron infusion 8/2/21 with another scheduled next week w/ f/u labs to check for absorptions   Does the patient have a primary care provider?   Yes   Does the patient have an appointment with their PCP within 7 days of discharge?  Yes [Hospital D/C follow-up scheduled for 8/6/21 @130pm]   Has the patient kept scheduled appointments due by today?  Yes   Comments  GI appt on 8/5/21   Has home health visited the patient within 72 hours of discharge?  N/A   Psychosocial issues?  No   Did the patient receive a copy of their discharge instructions?  Yes   Nursing interventions  Reviewed instructions with patient   What is the patient's perception of their health status since discharge?  Same [Pt has returned to work but is very concerned regarding her low HGB levels and reason as to decline in numbers.  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 F/u labs on 8/5 ]   Is the patient/caregiver able to teach back signs and symptoms related to disease process for when to call PCP?  Yes   Is the patient/caregiver able to teach back signs and symptoms related to disease process for when to call 911?  Yes   If the patient is a current  smoker, are they able to teach back resources for cessation?  Not a smoker   Additional teach back comments  Pt wants to meet w/a dietician to discuss iron rich diet--will forward to PCP for f/u at visit.    TCM call completed?  Yes          Delmis Chanel RN    8/3/2021, 11:31 EDT

## 2021-08-03 NOTE — PROGRESS NOTES
[facility] • [address] • [place] [address] • [phone]      FERRIC CARBOXYMALTOSE EDUCATION    NAME:  Tatyana Shea      : 1955           DATE: 2021    Education Sheets: (list all that apply)  Injectafer                                                                                                                                                                Regimen:  Injectafer 750mg x 2 doses      Notes:   Discussed possible Side effects with patient including headache, dizziness, flushing, N/V, and high blood pressure. Informed patient that the infusion time would be approximately 15 minutes with a 30 minute observation period afterwards to ensure tolerability prior to leaving. Encouraged patient to let staff know if she experiences any allergic reaction or unusual feelings. Patient also aware that this is the 1st of 2 doses and will return next week for second dose. Patient had questions about how iron was made at a pharmaceutical level, while I did not have the answers for this questions at this time. I did give her some information to contact the company for further information on how the test and develop their IV iron formulation.

## 2021-08-05 ENCOUNTER — TELEMEDICINE (OUTPATIENT)
Dept: GASTROENTEROLOGY | Facility: CLINIC | Age: 66
End: 2021-08-05

## 2021-08-05 DIAGNOSIS — D50.8 OTHER IRON DEFICIENCY ANEMIA: Primary | ICD-10-CM

## 2021-08-05 DIAGNOSIS — K29.70 GASTRITIS WITHOUT BLEEDING, UNSPECIFIED CHRONICITY, UNSPECIFIED GASTRITIS TYPE: ICD-10-CM

## 2021-08-05 DIAGNOSIS — R11.0 NAUSEA: ICD-10-CM

## 2021-08-05 DIAGNOSIS — K44.9 HIATAL HERNIA: ICD-10-CM

## 2021-08-05 DIAGNOSIS — K63.5 POLYP OF CECUM: ICD-10-CM

## 2021-08-05 PROCEDURE — 99213 OFFICE O/P EST LOW 20 MIN: CPT | Performed by: NURSE PRACTITIONER

## 2021-08-05 RX ORDER — MULTIPLE VITAMINS W/ MINERALS TAB 9MG-400MCG
1 TAB ORAL DAILY
COMMUNITY

## 2021-08-05 RX ORDER — CYANOCOBALAMIN (VITAMIN B-12) 500 MCG
500 TABLET ORAL DAILY
COMMUNITY
End: 2022-11-30

## 2021-08-05 NOTE — PROGRESS NOTES
Patient Name: Tatyana Shea   Visit Date: 08/05/2021   Patient ID: [id number]  Provider: MERLE Ellis    Sex: female  Location:  Location Address:  Location Phone: [phone]  [place] [address] [phone]    YOB: 1955  Age: 66 y.o.   Primary Care Provider John Aguilera APRN      Referring Provider: No ref. provider found        Chief Complaint  EGD/Colonoscopy Follow-Up    History of Present Illness  Patient initially presented July 2020 with complaints of nausea and darker stool reported being on Pepcid daily and could not skip, also was on meloxicam for several years and was taking as needed.  Patient had EGD and colonoscopy by Dr. Foster in August 2020 that showed  large hiatal hernia, grade B esophagitis with biopsy positive for intestinal metaplasia, no dysplasia, fundic gland polyps removed from the stomach; piecemeal colon polyp performed at the appendiceal orifice that was adenomatous and a small hyperplastic rectal polyp also removed.  Random colon biopsies negative (recall EGD 3 years)  Her last visit was September 2020, patient had heartburn complaint with Pepcid and complaint of nausea and decreased appetite.  She also had constipation.  I prescribed her Protonix 40 mg/day recommended Colace and Benefiber, repeat colonoscopy recommended in 6 months patient reported she could not do at that time due to her work schedule.  Pt was admitted after syncopal episode 7/26-7/30/21, anemia, hypokalemia noted, Dr. Foster was consulted: EGD 7/28/2021: Large hiatal hernia, mild inflammation characterized by erosions and erythema found in the gastric body with biopsy taken-biopsy negative, fundic gland polyp also on biopsy, duodenum was normal  Colonoscopy 7/29/2021: 3 mm polyp found in the cecum completely removed, benign colon polyp negative for adenoma    Pt states she's been on Iron for the last 3 months, dark stool, but since colonoscopy dark stool resolved but patient is  still taking oral iron so she was a little surprised at this.  Denies nausea now, didn't resolve until was admitted, didn't get home meds at that time. Stopped taking Protonix around Jan 2021. Took Pepcid until hospitalization. Seeing heme and had Iron infusion this week and has one next week. She is feeling much better since iron infusion, no dizziness, no GI c/o.   Past Medical History:   Diagnosis Date   • Acid reflux    • Diverticulitis    • Gall stones    • Hiatal hernia    • HTN (hypertension)    • Iron deficiency anemia        Past Surgical History:   Procedure Laterality Date   • CHOLECYSTECTOMY     • COLONOSCOPY      2020 with     • COLONOSCOPY N/A 7/29/2021    Procedure: COLONOSCOPY with polypectomy/snare;  Surgeon: Ruel Foster MD;  Location: [facility] END[facility]COPY;  Service: Gastroenterology;  Laterality: N/A;  colon polyp   • UPPER GASTROINTESTINAL ENDOSCOPY  07/28/2021    Dr. Foster         Current Outpatient Medications:   •  aspirin 81 MG chewable tablet, Chew 81 mg Daily., Disp: , Rfl:   •  Cholecalciferol 125 MCG (5000 UT) tablet, Take 5,000 Units by mouth Daily., Disp: , Rfl:   •  Cyanocobalamin (Vitamin B 12) 500 MCG tablet, Take 500 mcg by mouth Daily., Disp: , Rfl:   •  ferrous sulfate 325 (65 FE) MG tablet, Take 325 mg by mouth 2 (two) times a day., Disp: , Rfl:   •  Krill Oil (Omega-3) 500 MG capsule, Take 500 mg by mouth Daily., Disp: , Rfl:   •  losartan (Cozaar) 25 MG tablet, Take 1 tablet by mouth Daily., Disp: 30 tablet, Rfl: 3  •  multivitamin with minerals (CENTRUM ADULTS PO), Take 1 tablet by mouth Daily., Disp: , Rfl:   •  diphenhydrAMINE (BENADRYL) 25 mg capsule, Take 25 mg by mouth At Night As Needed., Disp: , Rfl:   •  famotidine (PEPCID) 10 MG tablet, Take 10 mg by mouth 2 (Two) Times a Day., Disp: , Rfl:   •  pantoprazole (PROTONIX) 40 MG EC tablet, Take 40 mg by mouth Daily., Disp: , Rfl:      No Known Allergies    Family History   Problem Relation Age of Onset   •  Heart failure Mother    • Atrial fibrillation Other         UNSPECIFIED   • Colon cancer Neg Hx         Social History     Tobacco Use   • Smoking status: Never Smoker   • Smokeless tobacco: Never Used   Vaping Use   • Vaping Use: Never used   Substance Use Topics   • Alcohol use: Never   • Drug use: Never       Objective     Vital Signs:   There were no vitals taken for this visit.      Physical Exam      Result Review :   The following data was reviewed by: MERLE Ellis on 08/05/2021:  CMP    CMP 7/28/21 7/29/21 7/30/21   Glucose 105 (A) 96 95   BUN 6 (A) 4 (A) 5 (A)   Creatinine 0.61 0.52 (A) 0.52 (A)   eGFR Non African Am 98 117 117   Sodium 133 (A) 134 (A) 135 (A)   Potassium 3.6 3.5 3.3 (A)   Chloride 98 96 (A) 97 (A)   Calcium 9.0 9.0 8.9   Albumin 3.40 (A) 3.70 3.50   Total Bilirubin 0.2 0.4 0.3   Alkaline Phosphatase 48 52 49   AST (SGOT) 12 12 11   ALT (SGPT) 8 10 7   (A) Abnormal value            CBC    CBC 7/28/21 7/29/21 7/30/21   WBC 4.53 3.66 3.69   RBC 3.02 (A) 3.25 (A) 3.18 (A)   Hemoglobin 10.1 (A) 10.8 (A) 10.5 (A)   Hematocrit 27.8 (A) 29.5 (A) 28.8 (A)   MCV 92.1 90.8 90.6   MCH 33.4 (A) 33.2 (A) 33.0   MCHC 36.3 (A) 36.6 (A) 36.5 (A)   RDW 12.5 11.8 (A) 11.9 (A)   Platelets 199 213 188   (A) Abnormal value                      Assessment and Plan    Diagnoses and all orders for this visit:    1. Other iron deficiency anemia (Primary)  -     CBC Auto Differential; Future  -     Endoscopy, GI With Capsule    2. Hiatal hernia    3. Gastritis without bleeding, unspecified chronicity, unspecified gastritis type    4. Polyp of cecum    5. Nausea  Comments:  RESOLVED            Follow Up   Recheck CBC end of this week to ensure stable, patient will continue to follow-up with hematology as well  Capsule endo, r/b d/w pt including less than 3% risk of retention and she's agreeable, no contraindications identified.  Recommended PPI x 1 month (patient was not wanting to take but was  agreeable for short-term)  EGD recall for August 2023 (hx IM GE jxn 2020), colonoscopy recall 5 years  We had to convert visit to phone due to issues connecting online with video.  It was a 31-minute phone call.    Patient was given instructions and counseling regarding her condition or for health maintenance advice. Please see specific information pulled into the AVS if appropriate.

## 2021-08-06 ENCOUNTER — OFFICE VISIT (OUTPATIENT)
Dept: FAMILY MEDICINE CLINIC | Facility: CLINIC | Age: 66
End: 2021-08-06

## 2021-08-06 VITALS
DIASTOLIC BLOOD PRESSURE: 82 MMHG | OXYGEN SATURATION: 93 % | TEMPERATURE: 98.1 F | WEIGHT: 203 LBS | HEART RATE: 63 BPM | HEIGHT: 66 IN | BODY MASS INDEX: 32.62 KG/M2 | RESPIRATION RATE: 16 BRPM | SYSTOLIC BLOOD PRESSURE: 154 MMHG

## 2021-08-06 DIAGNOSIS — E87.6 HYPOKALEMIA: ICD-10-CM

## 2021-08-06 DIAGNOSIS — E55.9 VITAMIN D DEFICIENCY: ICD-10-CM

## 2021-08-06 DIAGNOSIS — D64.9 ANEMIA, UNSPECIFIED TYPE: Primary | ICD-10-CM

## 2021-08-06 DIAGNOSIS — I10 ESSENTIAL HYPERTENSION: ICD-10-CM

## 2021-08-06 PROCEDURE — 99213 OFFICE O/P EST LOW 20 MIN: CPT | Performed by: NURSE PRACTITIONER

## 2021-08-06 RX ORDER — LOSARTAN POTASSIUM 25 MG/1
25 TABLET ORAL DAILY
Qty: 30 TABLET | Refills: 3 | Status: SHIPPED | OUTPATIENT
Start: 2021-08-06 | End: 2021-09-10

## 2021-08-06 NOTE — PROGRESS NOTES
Chief Complaint  Hospital Follow Up Visit (syncope)    Subjective        Tatyana Shea presents to [facility]  Here for follow up with hospital follow up.  Passed out at home.  Concerned about anemia and bleeding in gastroenterology.  Has had nausea since last year.  Held medication .  States nausea has gone away.  Has a little headache.        Past History:    Medical History: has a past medical history of Acid reflux, Diverticulitis, Gall stones, Hiatal hernia, HTN (hypertension), and Iron deficiency anemia.     Surgical History: has a past surgical history that includes Cholecystectomy; Colonoscopy; Colonoscopy (N/A, 7/29/2021); and Upper gastrointestinal endoscopy (07/28/2021).     Family History: family history includes Atrial fibrillation in an other family member; Heart failure in her mother.     Social History: reports that she has never smoked. She has never used smokeless tobacco. She reports that she does not drink alcohol and does not use drugs.    Allergies: Patient has no known allergies.          Current Outpatient Medications:   •  aspirin 81 MG chewable tablet, Chew 81 mg Daily., Disp: , Rfl:   •  Cholecalciferol 125 MCG (5000 UT) tablet, Take 5,000 Units by mouth Daily., Disp: , Rfl:   •  Cyanocobalamin (Vitamin B 12) 500 MCG tablet, Take 500 mcg by mouth Daily., Disp: , Rfl:   •  ferrous sulfate 325 (65 FE) MG tablet, Take 325 mg by mouth 2 (two) times a day., Disp: , Rfl:   •  Krill Oil (Omega-3) 500 MG capsule, Take 500 mg by mouth Daily., Disp: , Rfl:   •  losartan (Cozaar) 25 MG tablet, Take 1 tablet by mouth Daily., Disp: 30 tablet, Rfl: 3  •  multivitamin with minerals (CENTRUM ADULTS PO), Take 1 tablet by mouth Daily., Disp: , Rfl:   •  pantoprazole (PROTONIX) 40 MG EC tablet, Take 40 mg by mouth Daily., Disp: , Rfl:   •  diphenhydrAMINE (BENADRYL) 25 mg capsule, Take 25 mg by mouth At Night As Needed., Disp: , Rfl:   •  famotidine (PEPCID) 10 MG tablet,  "Take 10 mg by mouth 2 (Two) Times a Day., Disp: , Rfl:     Medications Discontinued During This Encounter   Medication Reason   • losartan (Cozaar) 25 MG tablet Reorder         Review of Systems   Constitutional: Negative for fever.   Respiratory: Negative for cough and shortness of breath.    Cardiovascular: Negative for chest pain, palpitations and leg swelling.   Neurological: Negative for dizziness, weakness, numbness and headache.        Objective         Vitals:    08/06/21 1342   BP: 154/82   BP Location: Right arm   Patient Position: Sitting   Cuff Size: Adult   Pulse: 63   Resp: 16   Temp: 98.1 °F (36.7 °C)   TempSrc: Infrared   SpO2: 93%   Weight: 92.1 kg (203 lb)   Height: 167.6 cm (66\")     Body mass index is 32.77 kg/m².         Physical Exam  Vitals reviewed.   Constitutional:       Appearance: Normal appearance. She is well-developed.   HENT:      Head: Normocephalic and atraumatic.      Mouth/Throat:      Pharynx: No oropharyngeal exudate.   Eyes:      Conjunctiva/sclera: Conjunctivae normal.      Pupils: Pupils are equal, round, and reactive to light.   Cardiovascular:      Rate and Rhythm: Normal rate and regular rhythm.      Heart sounds: No murmur heard.   No friction rub. No gallop.    Pulmonary:      Effort: Pulmonary effort is normal.      Breath sounds: Normal breath sounds. No wheezing or rhonchi.   Skin:     General: Skin is warm and dry.   Neurological:      Mental Status: She is alert and oriented to person, place, and time.   Psychiatric:         Mood and Affect: Mood and affect normal.         Behavior: Behavior normal.         Thought Content: Thought content normal.         Judgment: Judgment normal.             Result Review :  Hospital follow up.  Had a syncopal episode and diagnosed with anemia and had colonoscopy, and EGD that were normal.                 Assessment and Plan     Diagnoses and all orders for this visit:    1. Anemia, unspecified type (Primary)  -     Vitamin B12; " Future  -     CBC w AUTO Differential; Future    2. Vitamin D deficiency  -     Vitamin D 25 hydroxy; Future    3. Hypokalemia  -     Comprehensive metabolic panel; Future    4. Essential hypertension  -     losartan (Cozaar) 25 MG tablet; Take 1 tablet by mouth Daily.  Dispense: 30 tablet; Refill: 3              Follow Up     Return in about 1 month (around 9/6/2021) for Next scheduled follow up.    Patient was given instructions and counseling regarding her condition or for health maintenance advice. Please see specific information pulled into the AVS if appropriate.

## 2021-08-09 ENCOUNTER — HOSPITAL ENCOUNTER (OUTPATIENT)
Dept: ONCOLOGY | Facility: HOSPITAL | Age: 66
Setting detail: INFUSION SERIES
Discharge: HOME | End: 2021-08-09

## 2021-08-09 VITALS
RESPIRATION RATE: 18 BRPM | DIASTOLIC BLOOD PRESSURE: 74 MMHG | HEART RATE: 74 BPM | SYSTOLIC BLOOD PRESSURE: 155 MMHG | TEMPERATURE: 98.1 F | OXYGEN SATURATION: 100 %

## 2021-08-09 DIAGNOSIS — E55.9 VITAMIN D DEFICIENCY: ICD-10-CM

## 2021-08-09 DIAGNOSIS — E87.6 HYPOKALEMIA: ICD-10-CM

## 2021-08-09 DIAGNOSIS — D50.9 IRON DEFICIENCY ANEMIA, UNSPECIFIED IRON DEFICIENCY ANEMIA TYPE: ICD-10-CM

## 2021-08-09 DIAGNOSIS — D64.9 ANEMIA, UNSPECIFIED TYPE: Primary | ICD-10-CM

## 2021-08-09 LAB
ALBUMIN SERPL-MCNC: 4.3 G/DL (ref 3.5–5.2)
ALBUMIN/GLOB SERPL: 1.6 G/DL
ALP SERPL-CCNC: 76 U/L (ref 39–117)
ALT SERPL W P-5'-P-CCNC: 11 U/L (ref 1–33)
ANION GAP SERPL CALCULATED.3IONS-SCNC: 8.6 MMOL/L (ref 5–15)
AST SERPL-CCNC: 14 U/L (ref 1–32)
BASOPHILS # BLD AUTO: 0.01 10*3/MM3 (ref 0–0.2)
BASOPHILS NFR BLD AUTO: 0.3 % (ref 0–1.5)
BILIRUB SERPL-MCNC: 0.2 MG/DL (ref 0–1.2)
BUN SERPL-MCNC: 9 MG/DL (ref 8–23)
BUN/CREAT SERPL: 15.5 (ref 7–25)
CALCIUM SPEC-SCNC: 9 MG/DL (ref 8.6–10.5)
CHLORIDE SERPL-SCNC: 99 MMOL/L (ref 98–107)
CO2 SERPL-SCNC: 25.4 MMOL/L (ref 22–29)
CREAT SERPL-MCNC: 0.58 MG/DL (ref 0.57–1)
DEPRECATED RDW RBC AUTO: 44.1 FL (ref 37–54)
EOSINOPHIL # BLD AUTO: 0.06 10*3/MM3 (ref 0–0.4)
EOSINOPHIL NFR BLD AUTO: 1.8 % (ref 0.3–6.2)
ERYTHROCYTE [DISTWIDTH] IN BLOOD BY AUTOMATED COUNT: 13 % (ref 12.3–15.4)
GFR SERPL CREATININE-BSD FRML MDRD: 103 ML/MIN/1.73
GLOBULIN UR ELPH-MCNC: 2.7 GM/DL
GLUCOSE SERPL-MCNC: 100 MG/DL (ref 65–99)
HCT VFR BLD AUTO: 35 % (ref 34–46.6)
HGB BLD-MCNC: 12.2 G/DL (ref 12–15.9)
IMM GRANULOCYTES # BLD AUTO: 0 10*3/MM3 (ref 0–0.05)
IMM GRANULOCYTES NFR BLD AUTO: 0 % (ref 0–0.5)
LYMPHOCYTES # BLD AUTO: 1.03 10*3/MM3 (ref 0.7–3.1)
LYMPHOCYTES NFR BLD AUTO: 30.4 % (ref 19.6–45.3)
MCH RBC QN AUTO: 33.1 PG (ref 26.6–33)
MCHC RBC AUTO-ENTMCNC: 34.9 G/DL (ref 31.5–35.7)
MCV RBC AUTO: 94.9 FL (ref 79–97)
MONOCYTES # BLD AUTO: 0.3 10*3/MM3 (ref 0.1–0.9)
MONOCYTES NFR BLD AUTO: 8.8 % (ref 5–12)
NEUTROPHILS NFR BLD AUTO: 1.99 10*3/MM3 (ref 1.7–7)
NEUTROPHILS NFR BLD AUTO: 58.7 % (ref 42.7–76)
NRBC BLD AUTO-RTO: 0 /100 WBC (ref 0–0.2)
PLATELET # BLD AUTO: 240 10*3/MM3 (ref 140–450)
PMV BLD AUTO: 9.2 FL (ref 6–12)
POTASSIUM SERPL-SCNC: 3.5 MMOL/L (ref 3.5–5.2)
PROT SERPL-MCNC: 7 G/DL (ref 6–8.5)
RBC # BLD AUTO: 3.69 10*6/MM3 (ref 3.77–5.28)
SODIUM SERPL-SCNC: 133 MMOL/L (ref 136–145)
WBC # BLD AUTO: 3.39 10*3/MM3 (ref 3.4–10.8)

## 2021-08-09 PROCEDURE — 96374 THER/PROPH/DIAG INJ IV PUSH: CPT

## 2021-08-09 PROCEDURE — 96375 TX/PRO/DX INJ NEW DRUG ADDON: CPT

## 2021-08-09 PROCEDURE — 80053 COMPREHEN METABOLIC PANEL: CPT | Performed by: NURSE PRACTITIONER

## 2021-08-09 PROCEDURE — 25010000002 FERRIC CARBOXYMALTOSE 750 MG/15ML SOLUTION 15 ML VIAL: Performed by: INTERNAL MEDICINE

## 2021-08-09 PROCEDURE — 82607 VITAMIN B-12: CPT | Performed by: NURSE PRACTITIONER

## 2021-08-09 PROCEDURE — 85025 COMPLETE CBC W/AUTO DIFF WBC: CPT | Performed by: NURSE PRACTITIONER

## 2021-08-09 PROCEDURE — 82306 VITAMIN D 25 HYDROXY: CPT | Performed by: NURSE PRACTITIONER

## 2021-08-09 RX ORDER — SODIUM CHLORIDE 9 MG/ML
250 INJECTION, SOLUTION INTRAVENOUS ONCE
Status: COMPLETED | OUTPATIENT
Start: 2021-08-09 | End: 2021-08-09

## 2021-08-09 RX ADMIN — FERRIC CARBOXYMALTOSE INJECTION 750 MG: 50 INJECTION, SOLUTION INTRAVENOUS at 14:45

## 2021-08-09 RX ADMIN — SODIUM CHLORIDE 250 ML: 9 INJECTION, SOLUTION INTRAVENOUS at 14:45

## 2021-08-10 DIAGNOSIS — D64.9 ANEMIA, UNSPECIFIED TYPE: Primary | ICD-10-CM

## 2021-08-10 LAB
25(OH)D3 SERPL-MCNC: 71.3 NG/ML
VIT B12 BLD-MCNC: >2000 PG/ML (ref 211–946)

## 2021-08-26 DIAGNOSIS — R79.89 ABNORMAL CBC: ICD-10-CM

## 2021-08-26 DIAGNOSIS — E87.1 LOW SODIUM LEVELS: Primary | ICD-10-CM

## 2021-08-27 ENCOUNTER — LAB (OUTPATIENT)
Dept: LAB | Facility: HOSPITAL | Age: 66
End: 2021-08-27

## 2021-08-27 DIAGNOSIS — D50.9 IRON DEFICIENCY ANEMIA, UNSPECIFIED IRON DEFICIENCY ANEMIA TYPE: ICD-10-CM

## 2021-08-27 DIAGNOSIS — D50.8 OTHER IRON DEFICIENCY ANEMIA: ICD-10-CM

## 2021-08-27 LAB
ALBUMIN SERPL-MCNC: 4.1 G/DL (ref 3.5–5.2)
ALBUMIN/GLOB SERPL: 1.9 G/DL
ALP SERPL-CCNC: 67 U/L (ref 39–117)
ALT SERPL W P-5'-P-CCNC: 13 U/L (ref 1–33)
ANION GAP SERPL CALCULATED.3IONS-SCNC: 9.9 MMOL/L (ref 5–15)
AST SERPL-CCNC: 16 U/L (ref 1–32)
BASOPHILS # BLD AUTO: 0.01 10*3/MM3 (ref 0–0.2)
BASOPHILS NFR BLD AUTO: 0.2 % (ref 0–1.5)
BILIRUB SERPL-MCNC: <0.2 MG/DL (ref 0–1.2)
BUN SERPL-MCNC: 10 MG/DL (ref 8–23)
BUN/CREAT SERPL: 21.3 (ref 7–25)
CALCIUM SPEC-SCNC: 8.9 MG/DL (ref 8.6–10.5)
CHLORIDE SERPL-SCNC: 99 MMOL/L (ref 98–107)
CO2 SERPL-SCNC: 26.1 MMOL/L (ref 22–29)
CREAT SERPL-MCNC: 0.47 MG/DL (ref 0.57–1)
DEPRECATED RDW RBC AUTO: 48 FL (ref 37–54)
EOSINOPHIL # BLD AUTO: 0.06 10*3/MM3 (ref 0–0.4)
EOSINOPHIL NFR BLD AUTO: 1.4 % (ref 0.3–6.2)
ERYTHROCYTE [DISTWIDTH] IN BLOOD BY AUTOMATED COUNT: 13.4 % (ref 12.3–15.4)
GFR SERPL CREATININE-BSD FRML MDRD: 132 ML/MIN/1.73
GLOBULIN UR ELPH-MCNC: 2.2 GM/DL
GLUCOSE SERPL-MCNC: 93 MG/DL (ref 65–99)
HCT VFR BLD AUTO: 32.3 % (ref 34–46.6)
HGB BLD-MCNC: 11.2 G/DL (ref 12–15.9)
IMM GRANULOCYTES # BLD AUTO: 0.01 10*3/MM3 (ref 0–0.05)
IMM GRANULOCYTES NFR BLD AUTO: 0.2 % (ref 0–0.5)
IRON 24H UR-MRATE: 68 MCG/DL (ref 37–145)
IRON SATN MFR SERPL: 26 % (ref 20–50)
LYMPHOCYTES # BLD AUTO: 1.31 10*3/MM3 (ref 0.7–3.1)
LYMPHOCYTES NFR BLD AUTO: 31.5 % (ref 19.6–45.3)
MCH RBC QN AUTO: 33.9 PG (ref 26.6–33)
MCHC RBC AUTO-ENTMCNC: 34.7 G/DL (ref 31.5–35.7)
MCV RBC AUTO: 97.9 FL (ref 79–97)
MONOCYTES # BLD AUTO: 0.31 10*3/MM3 (ref 0.1–0.9)
MONOCYTES NFR BLD AUTO: 7.5 % (ref 5–12)
NEUTROPHILS NFR BLD AUTO: 2.46 10*3/MM3 (ref 1.7–7)
NEUTROPHILS NFR BLD AUTO: 59.2 % (ref 42.7–76)
NRBC BLD AUTO-RTO: 0 /100 WBC (ref 0–0.2)
PLATELET # BLD AUTO: 205 10*3/MM3 (ref 140–450)
PMV BLD AUTO: 9.5 FL (ref 6–12)
POTASSIUM SERPL-SCNC: 3.5 MMOL/L (ref 3.5–5.2)
PROT SERPL-MCNC: 6.3 G/DL (ref 6–8.5)
RBC # BLD AUTO: 3.3 10*6/MM3 (ref 3.77–5.28)
SODIUM SERPL-SCNC: 135 MMOL/L (ref 136–145)
TIBC SERPL-MCNC: 262 MCG/DL (ref 298–536)
TRANSFERRIN SERPL-MCNC: 176 MG/DL (ref 200–360)
VIT B12 BLD-MCNC: 917 PG/ML (ref 211–946)
WBC # BLD AUTO: 4.16 10*3/MM3 (ref 3.4–10.8)

## 2021-08-27 PROCEDURE — 80053 COMPREHEN METABOLIC PANEL: CPT | Performed by: NURSE PRACTITIONER

## 2021-08-27 PROCEDURE — 84466 ASSAY OF TRANSFERRIN: CPT

## 2021-08-27 PROCEDURE — 82607 VITAMIN B-12: CPT

## 2021-08-27 PROCEDURE — 36415 COLL VENOUS BLD VENIPUNCTURE: CPT | Performed by: NURSE PRACTITIONER

## 2021-08-27 PROCEDURE — 85025 COMPLETE CBC W/AUTO DIFF WBC: CPT

## 2021-08-27 PROCEDURE — 83540 ASSAY OF IRON: CPT

## 2021-08-29 DIAGNOSIS — D64.9 ANEMIA, UNSPECIFIED TYPE: Primary | ICD-10-CM

## 2021-09-10 ENCOUNTER — OFFICE VISIT (OUTPATIENT)
Dept: FAMILY MEDICINE CLINIC | Facility: CLINIC | Age: 66
End: 2021-09-10

## 2021-09-10 VITALS
HEART RATE: 96 BPM | SYSTOLIC BLOOD PRESSURE: 142 MMHG | RESPIRATION RATE: 16 BRPM | OXYGEN SATURATION: 100 % | TEMPERATURE: 97.3 F | DIASTOLIC BLOOD PRESSURE: 90 MMHG

## 2021-09-10 DIAGNOSIS — I10 ESSENTIAL HYPERTENSION: ICD-10-CM

## 2021-09-10 DIAGNOSIS — D50.9 IRON DEFICIENCY ANEMIA, UNSPECIFIED IRON DEFICIENCY ANEMIA TYPE: Primary | ICD-10-CM

## 2021-09-10 DIAGNOSIS — E78.00 ELEVATED CHOLESTEROL: ICD-10-CM

## 2021-09-10 PROCEDURE — 99213 OFFICE O/P EST LOW 20 MIN: CPT | Performed by: NURSE PRACTITIONER

## 2021-09-10 RX ORDER — LOSARTAN POTASSIUM AND HYDROCHLOROTHIAZIDE 12.5; 5 MG/1; MG/1
1 TABLET ORAL DAILY
Qty: 90 TABLET | Refills: 1 | Status: SHIPPED | OUTPATIENT
Start: 2021-09-10 | End: 2022-05-25

## 2021-09-10 NOTE — PROGRESS NOTES
Chief Complaint  Hypertension (f/u)    Subjective        Tatyana Shea presents to [facility]  Here for follow up on anemia:    Hypertension:  Having a little swelling in legs. Denies shortness of air.  Blood pressure 142/90.        Past History:    Medical History: has a past medical history of Acid reflux, Diverticulitis, Gall stones, Hiatal hernia, HTN (hypertension), and Iron deficiency anemia.     Surgical History: has a past surgical history that includes Cholecystectomy; Colonoscopy; Colonoscopy (N/A, 7/29/2021); Upper gastrointestinal endoscopy (07/28/2021); and Esophagogastroduodenoscopy (N/A, 7/28/2021).     Family History: family history includes Atrial fibrillation in an other family member; Heart failure in her mother.     Social History: reports that she has never smoked. She has never used smokeless tobacco. She reports that she does not drink alcohol and does not use drugs.    Allergies: Patient has no known allergies.          Current Outpatient Medications:   •  aspirin 81 MG chewable tablet, Chew 81 mg Daily., Disp: , Rfl:   •  Cholecalciferol 125 MCG (5000 UT) tablet, Take 5,000 Units by mouth Daily., Disp: , Rfl:   •  Cyanocobalamin (Vitamin B 12) 500 MCG tablet, Take 500 mcg by mouth Daily., Disp: , Rfl:   •  ferrous sulfate 325 (65 FE) MG tablet, Take 325 mg by mouth 2 (two) times a day., Disp: , Rfl:   •  Krill Oil (Omega-3) 500 MG capsule, Take 500 mg by mouth Daily., Disp: , Rfl:   •  multivitamin with minerals (CENTRUM ADULTS PO), Take 1 tablet by mouth Daily., Disp: , Rfl:   •  losartan-hydrochlorothiazide (HYZAAR) 50-12.5 MG per tablet, Take 1 tablet by mouth Daily., Disp: 90 tablet, Rfl: 1  •  pantoprazole (PROTONIX) 40 MG EC tablet, Take 40 mg by mouth Daily., Disp: , Rfl:     Medications Discontinued During This Encounter   Medication Reason   • losartan (Cozaar) 25 MG tablet *Therapy completed         Review of Systems   Constitutional: Negative for  fever.   Respiratory: Negative for cough and shortness of breath.    Cardiovascular: Negative for chest pain, palpitations and leg swelling.   Neurological: Negative for dizziness, weakness, numbness and headache.        Objective         Vitals:    09/10/21 1536   BP: 142/90   BP Location: Right arm   Patient Position: Sitting   Cuff Size: Adult   Pulse: 96   Resp: 16   Temp: 97.3 °F (36.3 °C)   TempSrc: Infrared   SpO2: 100%     There is no height or weight on file to calculate BMI.         Physical Exam  Vitals reviewed.   Constitutional:       Appearance: Normal appearance. She is well-developed.   HENT:      Head: Normocephalic and atraumatic.      Mouth/Throat:      Pharynx: No oropharyngeal exudate.   Eyes:      Conjunctiva/sclera: Conjunctivae normal.      Pupils: Pupils are equal, round, and reactive to light.   Cardiovascular:      Rate and Rhythm: Normal rate and regular rhythm.      Heart sounds: No murmur heard.   No friction rub. No gallop.    Pulmonary:      Effort: Pulmonary effort is normal.      Breath sounds: Normal breath sounds. No wheezing or rhonchi.   Skin:     General: Skin is warm and dry.   Neurological:      Mental Status: She is alert and oriented to person, place, and time.   Psychiatric:         Mood and Affect: Mood and affect normal.         Behavior: Behavior normal.         Thought Content: Thought content normal.         Judgment: Judgment normal.             Result Review :               Assessment and Plan     Diagnoses and all orders for this visit:    1. Iron deficiency anemia, unspecified iron deficiency anemia type (Primary)  -     Ferritin; Future    2. Essential hypertension  -     losartan-hydrochlorothiazide (HYZAAR) 50-12.5 MG per tablet; Take 1 tablet by mouth Daily.  Dispense: 90 tablet; Refill: 1  -     Comprehensive metabolic panel; Future    3. Elevated cholesterol  -     Lipid Panel w/ Chol/HDL Ratio; Future              Follow Up     Return in about 1 month  (around 10/10/2021).    Patient was given instructions and counseling regarding her condition or for health maintenance advice. Please see specific information pulled into the AVS if appropriate.          Yes

## 2021-09-23 DIAGNOSIS — D50.9 IRON DEFICIENCY ANEMIA, UNSPECIFIED IRON DEFICIENCY ANEMIA TYPE: Primary | ICD-10-CM

## 2021-09-24 ENCOUNTER — LAB (OUTPATIENT)
Dept: ONCOLOGY | Facility: HOSPITAL | Age: 66
End: 2021-09-24

## 2021-09-24 DIAGNOSIS — D50.9 IRON DEFICIENCY ANEMIA, UNSPECIFIED IRON DEFICIENCY ANEMIA TYPE: ICD-10-CM

## 2021-09-24 LAB
BASOPHILS # BLD AUTO: 0.01 10*3/MM3 (ref 0–0.2)
BASOPHILS NFR BLD AUTO: 0.2 % (ref 0–1.5)
DEPRECATED RDW RBC AUTO: 43.8 FL (ref 37–54)
EOSINOPHIL # BLD AUTO: 0.06 10*3/MM3 (ref 0–0.4)
EOSINOPHIL NFR BLD AUTO: 1.2 % (ref 0.3–6.2)
ERYTHROCYTE [DISTWIDTH] IN BLOOD BY AUTOMATED COUNT: 12.4 % (ref 12.3–15.4)
HCT VFR BLD AUTO: 32.6 % (ref 34–46.6)
HGB BLD-MCNC: 11.5 G/DL (ref 12–15.9)
IMM GRANULOCYTES # BLD AUTO: 0.01 10*3/MM3 (ref 0–0.05)
IMM GRANULOCYTES NFR BLD AUTO: 0.2 % (ref 0–0.5)
IRON 24H UR-MRATE: 49 MCG/DL (ref 37–145)
IRON SATN MFR SERPL: 16 % (ref 20–50)
LYMPHOCYTES # BLD AUTO: 1.77 10*3/MM3 (ref 0.7–3.1)
LYMPHOCYTES NFR BLD AUTO: 35.7 % (ref 19.6–45.3)
MCH RBC QN AUTO: 34.1 PG (ref 26.6–33)
MCHC RBC AUTO-ENTMCNC: 35.3 G/DL (ref 31.5–35.7)
MCV RBC AUTO: 96.7 FL (ref 79–97)
MONOCYTES # BLD AUTO: 0.45 10*3/MM3 (ref 0.1–0.9)
MONOCYTES NFR BLD AUTO: 9.1 % (ref 5–12)
NEUTROPHILS NFR BLD AUTO: 2.66 10*3/MM3 (ref 1.7–7)
NEUTROPHILS NFR BLD AUTO: 53.6 % (ref 42.7–76)
NRBC BLD AUTO-RTO: 0 /100 WBC (ref 0–0.2)
PLATELET # BLD AUTO: 200 10*3/MM3 (ref 140–450)
PMV BLD AUTO: 9.3 FL (ref 6–12)
RBC # BLD AUTO: 3.37 10*6/MM3 (ref 3.77–5.28)
TIBC SERPL-MCNC: 302 MCG/DL (ref 298–536)
TRANSFERRIN SERPL-MCNC: 203 MG/DL (ref 200–360)
VIT B12 BLD-MCNC: 1612 PG/ML (ref 211–946)
WBC # BLD AUTO: 4.96 10*3/MM3 (ref 3.4–10.8)

## 2021-09-24 PROCEDURE — 85025 COMPLETE CBC W/AUTO DIFF WBC: CPT

## 2021-09-24 PROCEDURE — 82607 VITAMIN B-12: CPT

## 2021-09-24 PROCEDURE — 84466 ASSAY OF TRANSFERRIN: CPT

## 2021-09-24 PROCEDURE — 83540 ASSAY OF IRON: CPT

## 2021-09-24 PROCEDURE — 36415 COLL VENOUS BLD VENIPUNCTURE: CPT

## 2021-09-27 ENCOUNTER — OFFICE VISIT (OUTPATIENT)
Dept: ONCOLOGY | Facility: HOSPITAL | Age: 66
End: 2021-09-27

## 2021-09-27 VITALS
HEART RATE: 82 BPM | RESPIRATION RATE: 16 BRPM | TEMPERATURE: 97.6 F | SYSTOLIC BLOOD PRESSURE: 170 MMHG | OXYGEN SATURATION: 100 % | WEIGHT: 203.71 LBS | DIASTOLIC BLOOD PRESSURE: 80 MMHG | BODY MASS INDEX: 32.88 KG/M2

## 2021-09-27 DIAGNOSIS — D50.9 IRON DEFICIENCY ANEMIA, UNSPECIFIED IRON DEFICIENCY ANEMIA TYPE: ICD-10-CM

## 2021-09-27 PROCEDURE — 99212 OFFICE O/P EST SF 10 MIN: CPT | Performed by: INTERNAL MEDICINE

## 2021-09-27 PROCEDURE — G0463 HOSPITAL OUTPT CLINIC VISIT: HCPCS

## 2021-09-27 NOTE — PROGRESS NOTES
Tatyana Shea   : 1955     LOCATION: [facility]EMAT[facility]     Chief Complaint  Anemia (-fu)    Referring Provider: MERLE Alvarenga  PCP: John Aguilera APRN    Oncology/Hematology History Overview Note   HEME/ONC DX/RX/INVESTIGATIONS  DX:   Anemia, neutropenia, iron and B12 deficiency     RX:   Oral B12, started around 2021  Ferrous sulfate BID started around 2021. Changed to QOD on 2021    INVESTIGATIONS:   2020, EGD: focal intestinal metaplasia consistent with Li's esophagus, no dysplasia identified.  Fundic gland polyp.  Colonoscopy: fragments of tubular adenoma.  Colonic mucosa with lymphoid aggregates.  Rectal polyp, fragments of hyperplastic polyp.    21, B12 = 191  21  B12 = 2000, iron 44 TIBC 368 sat 12%             Subjective        History of Present Illness   Here for f/u iron deficiency anemia  S/p iv iron therapy   last dose 21        Review of Systems   Constitutional: Positive for fatigue (10). Negative for appetite change, diaphoresis, fever, unexpected weight gain and unexpected weight loss.   HENT: Negative for hearing loss, sore throat and voice change.    Eyes: Negative for blurred vision, double vision, pain, redness and visual disturbance.   Respiratory: Negative for cough, shortness of breath and wheezing.    Cardiovascular: Negative for chest pain, palpitations and leg swelling.   Endocrine: Negative for cold intolerance, heat intolerance, polydipsia and polyuria.   Genitourinary: Negative for decreased urine volume, difficulty urinating, frequency and urinary incontinence.   Musculoskeletal: Negative for arthralgias, back pain, joint swelling and myalgias.   Skin: Negative for color change, rash, skin lesions and bruise.   Neurological: Negative for dizziness, seizures, numbness and headache.   Hematological: Negative for adenopathy. Does not bruise/bleed easily.   Psychiatric/Behavioral: Negative for depressed  mood. The patient is not nervous/anxious.    All other systems reviewed and are negative.    Current Outpatient Medications on File Prior to Visit   Medication Sig Dispense Refill   • aspirin 81 MG chewable tablet Chew 81 mg Daily.     • Cholecalciferol 125 MCG (5000 UT) tablet Take 5,000 Units by mouth Daily.     • Cyanocobalamin (Vitamin B 12) 500 MCG tablet Take 500 mcg by mouth Daily.     • ferrous sulfate 325 (65 FE) MG tablet Take 325 mg by mouth 2 (two) times a day.     • Krill Oil (Omega-3) 500 MG capsule Take 500 mg by mouth Daily.     • losartan-hydrochlorothiazide (HYZAAR) 50-12.5 MG per tablet Take 1 tablet by mouth Daily. 90 tablet 1   • multivitamin with minerals (CENTRUM ADULTS PO) Take 1 tablet by mouth Daily.     • pantoprazole (PROTONIX) 40 MG EC tablet Take 40 mg by mouth Daily.       No current facility-administered medications on file prior to visit.       No Known Allergies    Past Medical History:   Diagnosis Date   • Acid reflux    • Diverticulitis    • Gall stones    • Hiatal hernia    • HTN (hypertension)    • Iron deficiency anemia      Past Surgical History:   Procedure Laterality Date   • CHOLECYSTECTOMY     • COLONOSCOPY      2020 with     • COLONOSCOPY N/A 7/29/2021    Procedure: COLONOSCOPY with polypectomy/snare;  Surgeon: Ruel Foster MD;  Location: [facility] ENDOSCOPY;  Service: Gastroenterology;  Laterality: N/A;  colon polyp   • ENDOSCOPY N/A 7/28/2021    Procedure: ESOPHAGOGASTRODUODENOSCOPY with bxs and cold snares;  Surgeon: Ruel Foster MD;  Location: [facility] ENDOSCOPY;  Service: Gastroenterology;  Laterality: N/A;  hiatal hernia  gastric polyps   • UPPER GASTROINTESTINAL ENDOSCOPY  07/28/2021    Dr. Foster     Social History     Socioeconomic History   • Marital status: Single     Spouse name: Not on file   • Number of children: Not on file   • Years of education: Not on file   • Highest education level: Not on file   Tobacco Use   • Smoking status:  Never Smoker   • Smokeless tobacco: Never Used   Vaping Use   • Vaping Use: Never used   Substance and Sexual Activity   • Alcohol use: Never   • Drug use: Never   • Sexual activity: Defer     Family History   Problem Relation Age of Onset   • Heart failure Mother    • Atrial fibrillation Other         UNSPECIFIED   • Colon cancer Neg Hx      Immunization History   Administered Date(s) Administered   • COVID-19 (MODERNA) 02/03/2021, 02/04/2021, 03/03/2021, 03/04/2021   • Influenza, Unspecified 10/13/2020   • Pneumococcal Conjugate 13-Valent (PCV13) 10/01/2019   • Tdap 07/26/2021       Objective     Vitals:    09/27/21 1528   BP: 170/80   Pulse: 82   Resp: 16   Temp: 97.6 °F (36.4 °C)   SpO2: 100%   Weight: 92.4 kg (203 lb 11.3 oz)   PainSc: 0-No pain     Body mass index is 32.88 kg/m².     Physical Exam    Deferred for discussion      Iron   Date Value Ref Range Status   09/24/2021 49 37 - 145 mcg/dL Final   08/27/2021 68 37 - 145 mcg/dL Final   07/19/2021 64 37 - 145 mcg/dL Final     Iron Saturation   Date Value Ref Range Status   09/24/2021 16 (L) 20 - 50 % Final   08/27/2021 26 20 - 50 % Final   07/19/2021 19 (L) 20 - 50 % Final     Transferrin   Date Value Ref Range Status   09/24/2021 203 200 - 360 mg/dL Final   08/27/2021 176 (L) 200 - 360 mg/dL Final   07/19/2021 231 200 - 360 mg/dL Final     TIBC   Date Value Ref Range Status   09/24/2021 302 298 - 536 mcg/dL Final   08/27/2021 262 (L) 298 - 536 mcg/dL Final   07/19/2021 344 298 - 536 mcg/dL Final     Ferritin   Date Value Ref Range Status   07/19/2021 32.95 13.00 - 150.00 ng/mL Final     Vitamin B-12   Date Value Ref Range Status   09/24/2021 1,612 (H) 211 - 946 pg/mL Final   08/27/2021 917 211 - 946 pg/mL Final   08/09/2021 >2,000 (H) 211 - 946 pg/mL Final     Folate   Date Value Ref Range Status   07/19/2021 12.60 4.78 - 24.20 ng/mL Final   05/27/2021 12.6 4.8 - 20.0 ng/mL Final     Comment:     Results may be falsely decreased due to light exposure  if  testing added on after collection.     04/09/2021 14.7 4.8 - 20.0 ng/mL Final     Comment:     Results may be falsely decreased due to light exposure if  testing added on after collection.       ECOG score: 0           PHQ-9 Total Score:           Result Review :   The following data was reviewed by: Mabel Dean MD on 09/27/2021:  Lab Results   Component Value Date    HGB 11.5 (L) 09/24/2021    HCT 32.6 (L) 09/24/2021    MCV 96.7 09/24/2021     09/24/2021    WBC 4.96 09/24/2021    NEUTROABS 2.66 09/24/2021    LYMPHSABS 1.77 09/24/2021    MONOSABS 0.45 09/24/2021    EOSABS 0.06 09/24/2021    BASOSABS 0.01 09/24/2021     Lab Results   Component Value Date    GLUCOSE 93 08/27/2021    BUN 10 08/27/2021    CREATININE 0.47 (L) 08/27/2021     (L) 08/27/2021    K 3.5 08/27/2021    CL 99 08/27/2021    CO2 26.1 08/27/2021    CALCIUM 8.9 08/27/2021    PROTEINTOT 6.3 08/27/2021    ALBUMIN 4.10 08/27/2021    BILITOT <0.2 08/27/2021    ALKPHOS 67 08/27/2021    AST 16 08/27/2021    ALT 13 08/27/2021         Data reviewed: labs          Assessment and Plan      ASSESSMENT  anemia   PLAN/RECOMMENDATIONS  Labs cbc improving   iron sat still low   advised pt to restart oral iron therapy with vit C to help absorption  F/u  2 months with repeat labs  Diagnoses and all orders for this visit:    1. Iron deficiency anemia, unspecified iron deficiency anemia type  -     CBC & Differential; Future  -     Iron Profile; Future  -     Ferritin; Future      I spent 15 minutes caring for Tatyana on this date of service. This time includes time spent by me in the following activities: reviewing tests, counseling and educating the patient/family/caregiver, documenting information in the medical record and independently interpreting results and communicating that information with the patient/family/caregiver    Patient was given instructions and counseling regarding her condition or for health maintenance advice. Please see specific  information pulled into the AVS if appropriate.     Mabel Dean MD    9/27/2021

## 2021-11-02 ENCOUNTER — TRANSCRIBE ORDERS (OUTPATIENT)
Dept: ADMINISTRATIVE | Facility: HOSPITAL | Age: 66
End: 2021-11-02

## 2021-11-02 DIAGNOSIS — Z12.31 BREAST CANCER SCREENING BY MAMMOGRAM: Primary | ICD-10-CM

## 2021-11-24 ENCOUNTER — LAB (OUTPATIENT)
Dept: LAB | Facility: HOSPITAL | Age: 66
End: 2021-11-24

## 2021-11-24 DIAGNOSIS — D50.9 IRON DEFICIENCY ANEMIA, UNSPECIFIED IRON DEFICIENCY ANEMIA TYPE: ICD-10-CM

## 2021-11-24 LAB
BASOPHILS # BLD AUTO: 0.01 10*3/MM3 (ref 0–0.2)
BASOPHILS NFR BLD AUTO: 0.3 % (ref 0–1.5)
DEPRECATED RDW RBC AUTO: 42.5 FL (ref 37–54)
EOSINOPHIL # BLD AUTO: 0.09 10*3/MM3 (ref 0–0.4)
EOSINOPHIL NFR BLD AUTO: 2.7 % (ref 0.3–6.2)
ERYTHROCYTE [DISTWIDTH] IN BLOOD BY AUTOMATED COUNT: 11.8 % (ref 12.3–15.4)
FERRITIN SERPL-MCNC: 81.87 NG/ML (ref 13–150)
HCT VFR BLD AUTO: 30.9 % (ref 34–46.6)
HGB BLD-MCNC: 10.3 G/DL (ref 12–15.9)
IMM GRANULOCYTES # BLD AUTO: 0.02 10*3/MM3 (ref 0–0.05)
IMM GRANULOCYTES NFR BLD AUTO: 0.6 % (ref 0–0.5)
IRON 24H UR-MRATE: 99 MCG/DL (ref 37–145)
IRON SATN MFR SERPL: 30 % (ref 20–50)
LYMPHOCYTES # BLD AUTO: 1.14 10*3/MM3 (ref 0.7–3.1)
LYMPHOCYTES NFR BLD AUTO: 34.8 % (ref 19.6–45.3)
MCH RBC QN AUTO: 33.1 PG (ref 26.6–33)
MCHC RBC AUTO-ENTMCNC: 33.3 G/DL (ref 31.5–35.7)
MCV RBC AUTO: 99.4 FL (ref 79–97)
MONOCYTES # BLD AUTO: 0.32 10*3/MM3 (ref 0.1–0.9)
MONOCYTES NFR BLD AUTO: 9.8 % (ref 5–12)
NEUTROPHILS NFR BLD AUTO: 1.7 10*3/MM3 (ref 1.7–7)
NEUTROPHILS NFR BLD AUTO: 51.8 % (ref 42.7–76)
NRBC BLD AUTO-RTO: 0 /100 WBC (ref 0–0.2)
PLATELET # BLD AUTO: 225 10*3/MM3 (ref 140–450)
PMV BLD AUTO: 9.3 FL (ref 6–12)
RBC # BLD AUTO: 3.11 10*6/MM3 (ref 3.77–5.28)
TIBC SERPL-MCNC: 331 MCG/DL (ref 298–536)
TRANSFERRIN SERPL-MCNC: 222 MG/DL (ref 200–360)
WBC NRBC COR # BLD: 3.28 10*3/MM3 (ref 3.4–10.8)

## 2021-11-24 PROCEDURE — 36415 COLL VENOUS BLD VENIPUNCTURE: CPT

## 2021-11-24 PROCEDURE — 82728 ASSAY OF FERRITIN: CPT

## 2021-11-24 PROCEDURE — 85025 COMPLETE CBC W/AUTO DIFF WBC: CPT

## 2021-11-24 PROCEDURE — 84466 ASSAY OF TRANSFERRIN: CPT

## 2021-11-24 PROCEDURE — 83540 ASSAY OF IRON: CPT

## 2021-11-29 ENCOUNTER — OFFICE VISIT (OUTPATIENT)
Dept: ONCOLOGY | Facility: HOSPITAL | Age: 66
End: 2021-11-29

## 2021-11-29 VITALS
DIASTOLIC BLOOD PRESSURE: 80 MMHG | BODY MASS INDEX: 32.77 KG/M2 | RESPIRATION RATE: 16 BRPM | HEART RATE: 80 BPM | OXYGEN SATURATION: 100 % | WEIGHT: 203.04 LBS | TEMPERATURE: 97.5 F | SYSTOLIC BLOOD PRESSURE: 160 MMHG

## 2021-11-29 DIAGNOSIS — D64.9 ANEMIA, UNSPECIFIED TYPE: Primary | ICD-10-CM

## 2021-11-29 PROCEDURE — G0463 HOSPITAL OUTPT CLINIC VISIT: HCPCS

## 2021-11-29 PROCEDURE — 99214 OFFICE O/P EST MOD 30 MIN: CPT | Performed by: INTERNAL MEDICINE

## 2021-11-29 NOTE — PROGRESS NOTES
Tatyanamaycol Shea   : 1955     LOCATION: [facility] HEMATOLOGY & ONCOLOGY     Chief Complaint  Anemia (-fu)    Referring Provider: MERLE Alvarenga  PCP: John Aguilera APRN    Oncology/Hematology History Overview Note   HEME/ONC DX/RX/INVESTIGATIONS  DX:   Anemia, neutropenia, iron and B12 deficiency     RX:   Oral B12, started around 2021  Ferrous sulfate BID started around 2021. Changed to QOD on 2021    INVESTIGATIONS:   2020, EGD: focal intestinal metaplasia consistent with Li's esophagus, no dysplasia identified.  Fundic gland polyp.  Colonoscopy: fragments of tubular adenoma.  Colonic mucosa with lymphoid aggregates.  Rectal polyp, fragments of hyperplastic polyp.    21, B12 = 191  21  B12 = 2000, iron 44 TIBC 368 sat 12%           Subjective        History of Present Illness   Ms. Shea presents for follow-up anemia  She received IV iron therapy in summer 2021 and more recently has been placed on oral iron therapy.  Despite this however her hemoglobin has continued to slowly decline  She denies any dyspnea on exertion or shortness of breath    Review of Systems   Constitutional: Positive for fatigue.   Neurological: Positive for dizziness.     Current Outpatient Medications on File Prior to Visit   Medication Sig Dispense Refill   • aspirin 81 MG chewable tablet Chew 81 mg Daily.     • Cholecalciferol 125 MCG (5000 UT) tablet Take 5,000 Units by mouth Daily.     • Cyanocobalamin (Vitamin B 12) 500 MCG tablet Take 500 mcg by mouth Daily.     • ferrous sulfate 325 (65 FE) MG tablet Take 325 mg by mouth 2 (two) times a day.     • Krill Oil (Omega-3) 500 MG capsule Take 500 mg by mouth Daily.     • losartan-hydrochlorothiazide (HYZAAR) 50-12.5 MG per tablet Take 1 tablet by mouth Daily. 90 tablet 1   • multivitamin with minerals (CENTRUM ADULTS PO) Take 1 tablet by mouth Daily.     • pantoprazole (PROTONIX) 40 MG EC tablet Take 40 mg by mouth Daily.       No  current facility-administered medications on file prior to visit.       No Known Allergies    Past Medical History:   Diagnosis Date   • Acid reflux    • Diverticulitis    • Gall stones    • Hiatal hernia    • HTN (hypertension)    • Iron deficiency anemia      Past Surgical History:   Procedure Laterality Date   • CHOLECYSTECTOMY     • COLONOSCOPY      2020 with     • COLONOSCOPY N/A 7/29/2021    Procedure: COLONOSCOPY with polypectomy/snare;  Surgeon: Ruel Foster MD;  Location: [facility] ENDOSCOPY;  Service: Gastroenterology;  Laterality: N/A;  colon polyp   • ENDOSCOPY N/A 7/28/2021    Procedure: ESOPHAGOGASTRODUODENOSCOPY with bxs and cold snares;  Surgeon: Ruel Foster MD;  Location: [facility] ENDOSCOPY;  Service: Gastroenterology;  Laterality: N/A;  hiatal hernia  gastric polyps   • UPPER GASTROINTESTINAL ENDOSCOPY  07/28/2021    Dr. Foster     Social History     Socioeconomic History   • Marital status: Single   Tobacco Use   • Smoking status: Never Smoker   • Smokeless tobacco: Never Used   Vaping Use   • Vaping Use: Never used   Substance and Sexual Activity   • Alcohol use: Never   • Drug use: Never   • Sexual activity: Defer     Family History   Problem Relation Age of Onset   • Heart failure Mother    • Atrial fibrillation Other         UNSPECIFIED   • Colon cancer Neg Hx      Immunization History   Administered Date(s) Administered   • COVID-19 (MODERNA) 1st, 2nd, 3rd Dose Only 02/03/2021, 02/04/2021, 03/03/2021, 03/04/2021   • COVID-19 (PFIZER) 11/22/2021   • Influenza, Unspecified 10/13/2020   • Pneumococcal Conjugate 13-Valent (PCV13) 10/01/2019   • Tdap 07/26/2021       Objective           Physical Exam    Deferred for discussion      Iron   Date Value Ref Range Status   11/24/2021 99 37 - 145 mcg/dL Final   09/24/2021 49 37 - 145 mcg/dL Final   08/27/2021 68 37 - 145 mcg/dL Final     Iron Saturation   Date Value Ref Range Status   11/24/2021 30 20 - 50 % Final   09/24/2021 16  (L) 20 - 50 % Final   08/27/2021 26 20 - 50 % Final     Transferrin   Date Value Ref Range Status   11/24/2021 222 200 - 360 mg/dL Final   09/24/2021 203 200 - 360 mg/dL Final   08/27/2021 176 (L) 200 - 360 mg/dL Final     TIBC   Date Value Ref Range Status   11/24/2021 331 298 - 536 mcg/dL Final   09/24/2021 302 298 - 536 mcg/dL Final   08/27/2021 262 (L) 298 - 536 mcg/dL Final     Ferritin   Date Value Ref Range Status   11/24/2021 81.87 13.00 - 150.00 ng/mL Final   07/19/2021 32.95 13.00 - 150.00 ng/mL Final     Vitamin B-12   Date Value Ref Range Status   09/24/2021 1,612 (H) 211 - 946 pg/mL Final   08/27/2021 917 211 - 946 pg/mL Final   08/09/2021 >2,000 (H) 211 - 946 pg/mL Final     Folate   Date Value Ref Range Status   07/19/2021 12.60 4.78 - 24.20 ng/mL Final   05/27/2021 12.6 4.8 - 20.0 ng/mL Final     Comment:     Results may be falsely decreased due to light exposure if  testing added on after collection.     04/09/2021 14.7 4.8 - 20.0 ng/mL Final     Comment:     Results may be falsely decreased due to light exposure if  testing added on after collection.       ECOG score: 0           PHQ-9 Total Score: 2         Result Review :   The following data was reviewed by: Mabel Dean MD on 11/29/2021:  Lab Results   Component Value Date    HGB 10.3 (L) 11/24/2021    HCT 30.9 (L) 11/24/2021    MCV 99.4 (H) 11/24/2021     11/24/2021    WBC 3.28 (L) 11/24/2021    NEUTROABS 1.70 11/24/2021    LYMPHSABS 1.14 11/24/2021    MONOSABS 0.32 11/24/2021    EOSABS 0.09 11/24/2021    BASOSABS 0.01 11/24/2021     Lab Results   Component Value Date    GLUCOSE 93 08/27/2021    BUN 10 08/27/2021    CREATININE 0.47 (L) 08/27/2021     (L) 08/27/2021    K 3.5 08/27/2021    CL 99 08/27/2021    CO2 26.1 08/27/2021    CALCIUM 8.9 08/27/2021    PROTEINTOT 6.3 08/27/2021    ALBUMIN 4.10 08/27/2021    BILITOT <0.2 08/27/2021    ALKPHOS 67 08/27/2021    AST 16 08/27/2021    ALT 13 08/27/2021         Data reviewed: labs           Assessment and Plan      ASSESSMENT   anemia  PLAN/RECOMMENDATIONS  I discussed with the patient that given her progressive anemia, that a bone marrow aspirate and biopsy should be considered at this point to rule out a myelodysplastic syndrome  Patient agrees with this plan and bone marrow aspirate and biopsy has been ordered  Follow-up post bone marrow aspirate and biopsy for next steps    Diagnoses and all orders for this visit:    1. Anemia, unspecified type (Primary)  -     Bone Marrow Exam; Future  -     CT Guided Biopsy Bone Marrow; Future  -     COVID PRE-OP / PRE-PROCEDURE SCREENING ORDER (NO ISOLATION) - Swab, Nasopharynx; Future      I spent 20 minutes caring for Tatyana on this date of service. This time includes time spent by me in the following activities: reviewing tests, counseling and educating the patient/family/caregiver, ordering medications, tests, or procedures, documenting information in the medical record and independently interpreting results and communicating that information with the patient/family/caregiver    Patient was given instructions and counseling regarding her condition or for health maintenance advice. Please see specific information pulled into the AVS if appropriate.     Mabel Dean MD    11/29/2021

## 2021-12-03 ENCOUNTER — TELEPHONE (OUTPATIENT)
Dept: ONCOLOGY | Facility: HOSPITAL | Age: 66
End: 2021-12-03

## 2021-12-03 NOTE — TELEPHONE ENCOUNTER
This nurse spoke to the pt around 1435 today. The pt had called and was speaking to Kae in scheduling. The pt is worried about her HGB dropping below 10 before her f/u with Dr Dean and questions when her Bone Marrow Biopsy results would be back. Kae reached out to this nurse and Dr Dean. This nurse spoke to Dr Dean about the pt's POC. This nurse spoke to the pt. This nurse informed the pt that Dr Dean states that her HGB will not drop below 10 in the period of time between now and her f/u apt. The pt was also instructed that her Bone Marrow Biopsy results should be completed and on file in around 3 weeks. pt was made aware that if there are any emergent results, that someone would call her. pt was made aware that if her results were not emergent that Dr Dean would discuss the results with her at her F/U visit. pt voices understanding.

## 2021-12-03 NOTE — TELEPHONE ENCOUNTER
Caller: Tatyana Shea    Relationship to patient: Self    Best call back number: [phone]    Type of visit: FOLLOW UP    Requested date: ANY DAY ASAP AFTER 3PM    If rescheduling, when is the original appointment: 01/17/22    Additional notes: PLEASE CALL ONCE R/S.

## 2021-12-06 ENCOUNTER — TELEPHONE (OUTPATIENT)
Dept: ONCOLOGY | Facility: HOSPITAL | Age: 66
End: 2021-12-06

## 2021-12-06 NOTE — TELEPHONE ENCOUNTER
Caller: Tatyana Shea    Relationship to patient: Self    Best call back number: [phone]    Chief complaint: R/S F/U APPT WOULD LIKE TO MOVE IT BACK TO 01/17/22 11:30 OR EARLIER WHERE IT WAS ORIGINALLY SCHEDULED  PLEASE REALIZED IS OFF THAT DAY     Type of visit: F/U APPT    Requested date: 01/17 11:30 OR EARLIER PLEASE     If rescheduling, when is the original appointment: 01/18/2022    Additional notes:

## 2021-12-20 ENCOUNTER — HOSPITAL ENCOUNTER (OUTPATIENT)
Dept: CT IMAGING | Facility: HOSPITAL | Age: 66
Discharge: HOME | End: 2021-12-20
Admitting: INTERNAL MEDICINE

## 2021-12-20 VITALS
SYSTOLIC BLOOD PRESSURE: 155 MMHG | OXYGEN SATURATION: 100 % | HEART RATE: 80 BPM | DIASTOLIC BLOOD PRESSURE: 84 MMHG | RESPIRATION RATE: 13 BRPM

## 2021-12-20 DIAGNOSIS — D64.9 ANEMIA, UNSPECIFIED TYPE: ICD-10-CM

## 2021-12-20 LAB
BASOPHILS # BLD AUTO: 0.01 10*3/MM3 (ref 0–0.2)
BASOPHILS NFR BLD AUTO: 0.3 % (ref 0–1.5)
DEPRECATED RDW RBC AUTO: 42.1 FL (ref 37–54)
EOSINOPHIL # BLD AUTO: 0.06 10*3/MM3 (ref 0–0.4)
EOSINOPHIL NFR BLD AUTO: 1.7 % (ref 0.3–6.2)
ERYTHROCYTE [DISTWIDTH] IN BLOOD BY AUTOMATED COUNT: 12.2 % (ref 12.3–15.4)
HCT VFR BLD AUTO: 29.9 % (ref 34–46.6)
HGB BLD-MCNC: 10.4 G/DL (ref 12–15.9)
LYMPHOCYTES # BLD AUTO: 1.16 10*3/MM3 (ref 0.7–3.1)
LYMPHOCYTES # BLD MANUAL: 1.17 10*3/MM3 (ref 0.7–3.1)
LYMPHOCYTES NFR BLD AUTO: 32.8 % (ref 19.6–45.3)
LYMPHOCYTES NFR BLD MANUAL: 6 % (ref 5–12)
MCH RBC QN AUTO: 32.6 PG (ref 26.6–33)
MCHC RBC AUTO-ENTMCNC: 34.8 G/DL (ref 31.5–35.7)
MCV RBC AUTO: 93.7 FL (ref 79–97)
MONOCYTES # BLD AUTO: 0.29 10*3/MM3 (ref 0.1–0.9)
MONOCYTES # BLD: 0.21 10*3/MM3 (ref 0.1–0.9)
MONOCYTES NFR BLD AUTO: 8.2 % (ref 5–12)
NEUTROPHILS # BLD AUTO: 2.16 10*3/MM3 (ref 1.7–7)
NEUTROPHILS NFR BLD AUTO: 2.01 10*3/MM3 (ref 1.7–7)
NEUTROPHILS NFR BLD AUTO: 56.7 % (ref 42.7–76)
NEUTROPHILS NFR BLD MANUAL: 61 % (ref 42.7–76)
PLAT MORPH BLD: NORMAL
PLATELET # BLD AUTO: 191 10*3/MM3 (ref 140–450)
PMV BLD AUTO: 8.8 FL (ref 6–12)
RBC # BLD AUTO: 3.19 10*6/MM3 (ref 3.77–5.28)
RBC MORPH BLD: NORMAL
VARIANT LYMPHS NFR BLD MANUAL: 33 % (ref 19.6–45.3)
WBC MORPH BLD: NORMAL
WBC NRBC COR # BLD: 3.54 10*3/MM3 (ref 3.4–10.8)

## 2021-12-20 PROCEDURE — 88311 DECALCIFY TISSUE: CPT | Performed by: INTERNAL MEDICINE

## 2021-12-20 PROCEDURE — 85007 BL SMEAR W/DIFF WBC COUNT: CPT | Performed by: RADIOLOGY

## 2021-12-20 PROCEDURE — 25010000002 FENTANYL CITRATE (PF) 50 MCG/ML SOLUTION: Performed by: RADIOLOGY

## 2021-12-20 PROCEDURE — 88305 TISSUE EXAM BY PATHOLOGIST: CPT | Performed by: INTERNAL MEDICINE

## 2021-12-20 PROCEDURE — 25010000002 MIDAZOLAM PER 1MG: Performed by: RADIOLOGY

## 2021-12-20 PROCEDURE — 88313 SPECIAL STAINS GROUP 2: CPT | Performed by: INTERNAL MEDICINE

## 2021-12-20 PROCEDURE — 85097 BONE MARROW INTERPRETATION: CPT | Performed by: INTERNAL MEDICINE

## 2021-12-20 PROCEDURE — 77012 CT SCAN FOR NEEDLE BIOPSY: CPT

## 2021-12-20 PROCEDURE — 85025 COMPLETE CBC W/AUTO DIFF WBC: CPT | Performed by: RADIOLOGY

## 2021-12-20 RX ORDER — MIDAZOLAM HYDROCHLORIDE 2 MG/2ML
INJECTION, SOLUTION INTRAMUSCULAR; INTRAVENOUS
Status: COMPLETED | OUTPATIENT
Start: 2021-12-20 | End: 2021-12-20

## 2021-12-20 RX ORDER — HYDROCODONE BITARTRATE AND ACETAMINOPHEN 5; 325 MG/1; MG/1
2 TABLET ORAL EVERY 6 HOURS PRN
Status: DISCONTINUED | OUTPATIENT
Start: 2021-12-20 | End: 2021-12-21 | Stop reason: HOSPADM

## 2021-12-20 RX ORDER — FENTANYL CITRATE 50 UG/ML
INJECTION, SOLUTION INTRAMUSCULAR; INTRAVENOUS
Status: DISPENSED
Start: 2021-12-20 | End: 2021-12-20

## 2021-12-20 RX ORDER — FENTANYL CITRATE 50 UG/ML
INJECTION, SOLUTION INTRAMUSCULAR; INTRAVENOUS
Status: COMPLETED | OUTPATIENT
Start: 2021-12-20 | End: 2021-12-20

## 2021-12-20 RX ORDER — LIDOCAINE HYDROCHLORIDE 20 MG/ML
INJECTION, SOLUTION INFILTRATION; PERINEURAL
Status: DISPENSED
Start: 2021-12-20 | End: 2021-12-20

## 2021-12-20 RX ORDER — MIDAZOLAM HYDROCHLORIDE 2 MG/2ML
INJECTION, SOLUTION INTRAMUSCULAR; INTRAVENOUS
Status: DISPENSED
Start: 2021-12-20 | End: 2021-12-20

## 2021-12-20 RX ADMIN — MIDAZOLAM HYDROCHLORIDE 1 MG: 1 INJECTION, SOLUTION INTRAMUSCULAR; INTRAVENOUS at 09:03

## 2021-12-20 RX ADMIN — FENTANYL CITRATE 50 MCG: 50 INJECTION, SOLUTION INTRAMUSCULAR; INTRAVENOUS at 09:03

## 2021-12-20 NOTE — POST-PROCEDURE NOTE
IR POST OP NOTE    Procedure:CT bone marrow biopsy      Pre Op DX:Anemia      Post Op DX:same      Anesthesia: Local, CS      Findings:See dictation      Complications:No immediate.       Provider Signature: Dr. Daniel Wright

## 2021-12-20 NOTE — NURSING NOTE
DISCUSSED DISCHARGE INSTRUCTIONS WITH PATIENT. PATIENT RECEIVED PAPER INSTRUCTIONS. DRESSING TO LOWER BACK CDI. DENIES PAIN. ESCORTED PATIENT TO POV. TOLERATED WELL. DRIVEN HOME BY SON.

## 2021-12-20 NOTE — DISCHARGE INSTRUCTIONS
Bone Marrow Aspiration and Bone Marrow Biopsy, Adult, Care After  This sheet gives you information about how to care for yourself after your procedure. Your health care provider may also give you more specific instructions. If you have problems or questions, contact your health care provider.  What can I expect after the procedure?  After the procedure, it is common to have:  · Mild pain and tenderness.  · Swelling.  · Bruising.  Follow these instructions at home:  Puncture site care    · Follow instructions from your health care provider about how to take care of the puncture site. Make sure you:  ? Wash your hands with soap and water before and after you change your bandage (dressing). If soap and water are not available, use hand .  ? Change your dressing as told by your health care provider.  · Check your puncture site every day for signs of infection. Check for:  ? More redness, swelling, or pain.  ? Fluid or blood.  ? Warmth.  ? Pus or a bad smell.    Activity  · Return to your normal activities as told by your health care provider. Ask your health care provider what activities are safe for you.  · Do not lift anything that is heavier than 10 lb (4.5 kg), or the limit that you are told, until your health care provider says that it is safe.  · Do not drive for 24 hours if you were given a sedative during your procedure.  General instructions    · Take over-the-counter and prescription medicines only as told by your health care provider.  · Do not take baths, swim, or use a hot tub until your health care provider approves. Ask your health care provider if you may take showers. You may only be allowed to take sponge baths.  · If directed, put ice on the affected area. To do this:  ? Put ice in a plastic bag.  ? Place a towel between your skin and the bag.  ? Leave the ice on for 20 minutes, 2-3 times a day.  · Keep all follow-up visits as told by your health care provider. This is important.    Contact a  health care provider if:  · Your pain is not controlled with medicine.  · You have a fever.  · You have more redness, swelling, or pain around the puncture site.  · You have fluid or blood coming from the puncture site.  · Your puncture site feels warm to the touch.  · You have pus or a bad smell coming from the puncture site.  Summary  · After the procedure, it is common to have mild pain, tenderness, swelling, and bruising.  · Follow instructions from your health care provider about how to take care of the puncture site and what activities are safe for you.  · Take over-the-counter and prescription medicines only as told by your health care provider.  · Contact a health care provider if you have any signs of infection, such as fluid or blood coming from the puncture site.  This information is not intended to replace advice given to you by your health care provider. Make sure you discuss any questions you have with your health care provider.  Document Revised: 05/05/2020 Document Reviewed: 05/05/2020  [email or web address] Patient Education © 2021 [employer]

## 2021-12-22 LAB — REF LAB TEST METHOD: NORMAL

## 2021-12-27 LAB
CYTO UR: NORMAL
DIFF PNL MAR: NORMAL
LAB AP ASPIRATE SMEAR: NORMAL
LAB AP CASE REPORT: NORMAL
LAB AP CLINICAL INFORMATION: NORMAL
LAB AP CLOT SECTION: NORMAL
LAB AP CORE BIOPSY: NORMAL
LAB AP FLOW CYTOMETRY SUMMARY: NORMAL
LAB AP SPECIAL STAINS: NORMAL
PATH REPORT.FINAL DX SPEC: NORMAL
PATH REPORT.GROSS SPEC: NORMAL
REF LAB TEST METHOD: NORMAL

## 2022-01-17 ENCOUNTER — OFFICE VISIT (OUTPATIENT)
Dept: ONCOLOGY | Facility: HOSPITAL | Age: 67
End: 2022-01-17

## 2022-01-17 ENCOUNTER — LAB (OUTPATIENT)
Dept: ONCOLOGY | Facility: HOSPITAL | Age: 67
End: 2022-01-17

## 2022-01-17 VITALS
WEIGHT: 203.26 LBS | BODY MASS INDEX: 32.81 KG/M2 | TEMPERATURE: 97.3 F | OXYGEN SATURATION: 97 % | HEART RATE: 80 BPM | DIASTOLIC BLOOD PRESSURE: 79 MMHG | SYSTOLIC BLOOD PRESSURE: 169 MMHG | RESPIRATION RATE: 16 BRPM

## 2022-01-17 DIAGNOSIS — D50.9 IRON DEFICIENCY ANEMIA, UNSPECIFIED IRON DEFICIENCY ANEMIA TYPE: ICD-10-CM

## 2022-01-17 LAB
ALBUMIN SERPL-MCNC: 4.2 G/DL (ref 3.5–5.2)
ALBUMIN/GLOB SERPL: 1.6 G/DL
ALP SERPL-CCNC: 61 U/L (ref 39–117)
ALT SERPL W P-5'-P-CCNC: 13 U/L (ref 1–33)
ANION GAP SERPL CALCULATED.3IONS-SCNC: 7 MMOL/L (ref 5–15)
AST SERPL-CCNC: 13 U/L (ref 1–32)
BASOPHILS # BLD AUTO: 0.02 10*3/MM3 (ref 0–0.2)
BASOPHILS NFR BLD AUTO: 0.4 % (ref 0–1.5)
BILIRUB SERPL-MCNC: 0.2 MG/DL (ref 0–1.2)
BUN SERPL-MCNC: 9 MG/DL (ref 8–23)
BUN/CREAT SERPL: 14.3 (ref 7–25)
CALCIUM SPEC-SCNC: 9.2 MG/DL (ref 8.6–10.5)
CHLORIDE SERPL-SCNC: 101 MMOL/L (ref 98–107)
CO2 SERPL-SCNC: 28 MMOL/L (ref 22–29)
CREAT SERPL-MCNC: 0.63 MG/DL (ref 0.57–1)
DEPRECATED RDW RBC AUTO: 40.9 FL (ref 37–54)
EOSINOPHIL # BLD AUTO: 0.07 10*3/MM3 (ref 0–0.4)
EOSINOPHIL NFR BLD AUTO: 1.5 % (ref 0.3–6.2)
ERYTHROCYTE [DISTWIDTH] IN BLOOD BY AUTOMATED COUNT: 12 % (ref 12.3–15.4)
FERRITIN SERPL-MCNC: 21.48 NG/ML (ref 13–150)
FOLATE SERPL-MCNC: >20 NG/ML (ref 4.78–24.2)
GFR SERPL CREATININE-BSD FRML MDRD: 94 ML/MIN/1.73
GLOBULIN UR ELPH-MCNC: 2.6 GM/DL
GLUCOSE SERPL-MCNC: 101 MG/DL (ref 65–99)
HCT VFR BLD AUTO: 34.8 % (ref 34–46.6)
HGB BLD-MCNC: 11.9 G/DL (ref 12–15.9)
IMM GRANULOCYTES # BLD AUTO: 0 10*3/MM3 (ref 0–0.05)
IMM GRANULOCYTES NFR BLD AUTO: 0 % (ref 0–0.5)
IRON 24H UR-MRATE: 56 MCG/DL (ref 37–145)
IRON SATN MFR SERPL: 14 % (ref 20–50)
LYMPHOCYTES # BLD AUTO: 1.34 10*3/MM3 (ref 0.7–3.1)
LYMPHOCYTES NFR BLD AUTO: 29.1 % (ref 19.6–45.3)
MCH RBC QN AUTO: 31.5 PG (ref 26.6–33)
MCHC RBC AUTO-ENTMCNC: 34.2 G/DL (ref 31.5–35.7)
MCV RBC AUTO: 92.1 FL (ref 79–97)
MONOCYTES # BLD AUTO: 0.29 10*3/MM3 (ref 0.1–0.9)
MONOCYTES NFR BLD AUTO: 6.3 % (ref 5–12)
NEUTROPHILS NFR BLD AUTO: 2.88 10*3/MM3 (ref 1.7–7)
NEUTROPHILS NFR BLD AUTO: 62.7 % (ref 42.7–76)
PLATELET # BLD AUTO: 211 10*3/MM3 (ref 140–450)
PMV BLD AUTO: 9 FL (ref 6–12)
POTASSIUM SERPL-SCNC: 3.3 MMOL/L (ref 3.5–5.2)
PROT SERPL-MCNC: 6.8 G/DL (ref 6–8.5)
RBC # BLD AUTO: 3.78 10*6/MM3 (ref 3.77–5.28)
SODIUM SERPL-SCNC: 136 MMOL/L (ref 136–145)
T4 FREE SERPL-MCNC: 1.34 NG/DL (ref 0.93–1.7)
TIBC SERPL-MCNC: 390 MCG/DL (ref 298–536)
TRANSFERRIN SERPL-MCNC: 262 MG/DL (ref 200–360)
TSH SERPL DL<=0.05 MIU/L-ACNC: 2.09 UIU/ML (ref 0.27–4.2)
VIT B12 BLD-MCNC: 736 PG/ML (ref 211–946)
WBC NRBC COR # BLD: 4.6 10*3/MM3 (ref 3.4–10.8)

## 2022-01-17 PROCEDURE — G0463 HOSPITAL OUTPT CLINIC VISIT: HCPCS

## 2022-01-17 PROCEDURE — 84439 ASSAY OF FREE THYROXINE: CPT

## 2022-01-17 PROCEDURE — 82607 VITAMIN B-12: CPT

## 2022-01-17 PROCEDURE — 99214 OFFICE O/P EST MOD 30 MIN: CPT | Performed by: INTERNAL MEDICINE

## 2022-01-17 PROCEDURE — 36415 COLL VENOUS BLD VENIPUNCTURE: CPT

## 2022-01-17 PROCEDURE — 82746 ASSAY OF FOLIC ACID SERUM: CPT

## 2022-01-17 PROCEDURE — 84466 ASSAY OF TRANSFERRIN: CPT

## 2022-01-17 PROCEDURE — 80050 GENERAL HEALTH PANEL: CPT

## 2022-01-17 PROCEDURE — 83540 ASSAY OF IRON: CPT

## 2022-01-17 PROCEDURE — 82728 ASSAY OF FERRITIN: CPT

## 2022-01-17 NOTE — PROGRESS NOTES
Tatyana Shea   : 1955     LOCATION: [facility] HEMATOLOGY & ONCOLOGY     Chief Complaint  Anemia (-bone marrow results)    Referring Provider: MERLE Alvarenga  PCP: John Aguilera APRN    Oncology/Hematology History Overview Note   HEME/ONC DX/RX/INVESTIGATIONS  DX:   Anemia, neutropenia, iron and B12 deficiency     RX:   Oral B12, started around 2021  Ferrous sulfate BID started around 2021. Changed to QOD on 2021    INVESTIGATIONS:   2020, EGD: focal intestinal metaplasia consistent with Li's esophagus, no dysplasia identified.  Fundic gland polyp.  Colonoscopy: fragments of tubular adenoma.  Colonic mucosa with lymphoid aggregates.  Rectal polyp, fragments of hyperplastic polyp.    21, B12 = 191  21  B12 = 2000, iron 44 TIBC 368 sat 12%         HEME/ONC DX/RX/INVESTIGATIONS  DX:anemia   RX:  Iv iron given and 2021  INVESTIGATIONS:   Bone marrow 21  Final Diagnosis   1-2:  Bone marrow (left iliac crest), aspirate smears, clot section, and core biopsy:               -Hypercellular marrow with maturing trilineage hematopoiesis and erythroid hyperplasia               -No dysplasia     3.  Peripheral blood (CBC and smear):               - WBC:  Adequate, normal morphology               - RBC:  Normocytic anemia, normal morphology               - Platelets:  Normal platelet count and morphology     Special Stains    Iron stain demonstrates no iron on core biopsy, no iron on clot section, and no iron and no ring sideroblasts on aspirate smear.  Controls are appropriate.     Flow cytometry: No significant immunophenotypic abnormalities detected     MDS FISH panel: No assay specific abnormalities detected    Subjective        History of Present Illness    pt here for f/u normocytic normochromic anemia  Bone marrow 21  Final Diagnosis   1-2:  Bone marrow (left iliac crest), aspirate smears, clot section, and core biopsy:                -Hypercellular marrow with maturing trilineage hematopoiesis and erythroid hyperplasia               -No dysplasia     3.  Peripheral blood (CBC and smear):               - WBC:  Adequate, normal morphology               - RBC:  Normocytic anemia, normal morphology               - Platelets:  Normal platelet count and morphology     Special Stains    Iron stain demonstrates no iron on core biopsy, no iron on clot section, and no iron and no ring sideroblasts on aspirate smear.  Controls are appropriate.     Flow cytometry: No significant immunophenotypic abnormalities detected     MDS FISH panel: No assay specific abnormalities detected    Review of Systems   Constitutional: Positive for fatigue.   Gastrointestinal: Positive for nausea (when headaches are at worst).   Neurological: Positive for dizziness (upon standing) and headache.   Psychiatric/Behavioral: Positive for depressed mood.   All other systems reviewed and are negative.    Current Outpatient Medications on File Prior to Visit   Medication Sig Dispense Refill   • aspirin 81 MG chewable tablet Chew 81 mg Daily.     • Cholecalciferol 125 MCG (5000 UT) tablet Take 5,000 Units by mouth Daily.     • Cyanocobalamin (Vitamin B 12) 500 MCG tablet Take 500 mcg by mouth Daily.     • ferrous sulfate 325 (65 FE) MG tablet Take 325 mg by mouth 2 (two) times a day.     • Krill Oil (Omega-3) 500 MG capsule Take 500 mg by mouth Daily.     • losartan-hydrochlorothiazide (HYZAAR) 50-12.5 MG per tablet Take 1 tablet by mouth Daily. 90 tablet 1   • multivitamin with minerals (CENTRUM ADULTS PO) Take 1 tablet by mouth Daily.     • pantoprazole (PROTONIX) 40 MG EC tablet Take 40 mg by mouth Daily.       No current facility-administered medications on file prior to visit.       No Known Allergies    Past Medical History:   Diagnosis Date   • Acid reflux    • Diverticulitis    • Gall stones    • Hiatal hernia    • HTN (hypertension)    • Iron deficiency anemia      Past  Surgical History:   Procedure Laterality Date   • CHOLECYSTECTOMY     • COLONOSCOPY      2020 with     • COLONOSCOPY N/A 7/29/2021    Procedure: COLONOSCOPY with polypectomy/snare;  Surgeon: Ruel Foster MD;  Location: [facility] ENDOSCOPY;  Service: Gastroenterology;  Laterality: N/A;  colon polyp   • ENDOSCOPY N/A 7/28/2021    Procedure: ESOPHAGOGASTRODUODENOSCOPY with bxs and cold snares;  Surgeon: Ruel Foster MD;  Location: [facility] ENDOSCOPY;  Service: Gastroenterology;  Laterality: N/A;  hiatal hernia  gastric polyps   • UPPER GASTROINTESTINAL ENDOSCOPY  07/28/2021    Dr. Foster     Social History     Socioeconomic History   • Marital status: Single   Tobacco Use   • Smoking status: Never Smoker   • Smokeless tobacco: Never Used   Vaping Use   • Vaping Use: Never used   Substance and Sexual Activity   • Alcohol use: Never   • Drug use: Never   • Sexual activity: Defer     Family History   Problem Relation Age of Onset   • Heart failure Mother    • Atrial fibrillation Other         UNSPECIFIED   • Colon cancer Neg Hx      Immunization History   Administered Date(s) Administered   • COVID-19 (MODERNA) 1st, 2nd, 3rd Dose Only 02/03/2021, 02/04/2021, 03/03/2021, 03/04/2021   • COVID-19 (PFIZER) 11/22/2021   • Influenza, Unspecified 10/13/2020   • Pneumococcal Conjugate 13-Valent (PCV13) 10/01/2019   • Tdap 07/26/2021       Objective     /79   Pulse 80   Temp 97.3 °F (36.3 °C)   Resp 16   Wt 92.2 kg (203 lb 4.2 oz)   LMP  (LMP Unknown)   SpO2 97%   BMI 32.81 kg/m²         Physical Exam    Deferred for discussion      Iron   Date Value Ref Range Status   11/24/2021 99 37 - 145 mcg/dL Final   09/24/2021 49 37 - 145 mcg/dL Final   08/27/2021 68 37 - 145 mcg/dL Final     Iron Saturation   Date Value Ref Range Status   11/24/2021 30 20 - 50 % Final   09/24/2021 16 (L) 20 - 50 % Final   08/27/2021 26 20 - 50 % Final     Transferrin   Date Value Ref Range Status   11/24/2021 222 200 - 360  mg/dL Final   09/24/2021 203 200 - 360 mg/dL Final   08/27/2021 176 (L) 200 - 360 mg/dL Final     TIBC   Date Value Ref Range Status   11/24/2021 331 298 - 536 mcg/dL Final   09/24/2021 302 298 - 536 mcg/dL Final   08/27/2021 262 (L) 298 - 536 mcg/dL Final     Ferritin   Date Value Ref Range Status   11/24/2021 81.87 13.00 - 150.00 ng/mL Final   07/19/2021 32.95 13.00 - 150.00 ng/mL Final     Vitamin B-12   Date Value Ref Range Status   09/24/2021 1,612 (H) 211 - 946 pg/mL Final   08/27/2021 917 211 - 946 pg/mL Final   08/09/2021 >2,000 (H) 211 - 946 pg/mL Final     Folate   Date Value Ref Range Status   07/19/2021 12.60 4.78 - 24.20 ng/mL Final   05/27/2021 12.6 4.8 - 20.0 ng/mL Final     Comment:     Results may be falsely decreased due to light exposure if  testing added on after collection.     04/09/2021 14.7 4.8 - 20.0 ng/mL Final     Comment:     Results may be falsely decreased due to light exposure if  testing added on after collection.       ECOG score: 0           PHQ-9 Total Score: 3         Result Review :   The following data was reviewed by: Mabel Dean MD on 01/17/2022:  Lab Results   Component Value Date    HGB 11.9 (L) 01/17/2022    HCT 34.8 01/17/2022    MCV 92.1 01/17/2022     01/17/2022    WBC 4.60 01/17/2022    NEUTROABS 2.88 01/17/2022    LYMPHSABS 1.34 01/17/2022    MONOSABS 0.29 01/17/2022    EOSABS 0.07 01/17/2022    BASOSABS 0.02 01/17/2022     Lab Results   Component Value Date    GLUCOSE 93 08/27/2021    BUN 10 08/27/2021    CREATININE 0.47 (L) 08/27/2021     (L) 08/27/2021    K 3.5 08/27/2021    CL 99 08/27/2021    CO2 26.1 08/27/2021    CALCIUM 8.9 08/27/2021    PROTEINTOT 6.3 08/27/2021    ALBUMIN 4.10 08/27/2021    BILITOT <0.2 08/27/2021    ALKPHOS 67 08/27/2021    AST 16 08/27/2021    ALT 13 08/27/2021         Data reviewed: labs          Assessment and Plan      ASSESSMENT   normochromic normocytic anemia  PLAN/RECOMMENDATIONS  Bone marrow with no evidence for  dysplasia  No iron noted in bone marrow asp or biopsy      persistent anemia      will check iron profile , TSH, cbc today CMP and B12 and folate levels   As no iron noted on bone marrow may benefit from addition IV iron especially if iron levels are decreased   pt will be called with results of her blood testing from today  Diagnoses and all orders for this visit:    1. Iron deficiency anemia, unspecified iron deficiency anemia type  -     Iron Profile; Future  -     Ferritin; Future  -     CBC & Differential; Future  -     Comprehensive Metabolic Panel; Future  -     TSH+Free T4; Future  -     Vitamin B12; Future  -     Folate; Future      I spent 30 minutes caring for Tatyana on this date of service. This time includes time spent by me in the following activities: reviewing tests, counseling and educating the patient/family/caregiver, ordering medications, tests, or procedures, documenting information in the medical record and independently interpreting results and communicating that information with the patient/family/caregiver    Patient was given instructions and counseling regarding her condition or for health maintenance advice. Please see specific information pulled into the AVS if appropriate.     Mabel Dean MD    1/17/2022

## 2022-01-18 ENCOUNTER — PATIENT MESSAGE (OUTPATIENT)
Dept: FAMILY MEDICINE CLINIC | Facility: CLINIC | Age: 67
End: 2022-01-18

## 2022-01-18 DIAGNOSIS — E87.6 HYPOKALEMIA: Primary | ICD-10-CM

## 2022-01-19 RX ORDER — POTASSIUM CHLORIDE 750 MG/1
10 TABLET, FILM COATED, EXTENDED RELEASE ORAL DAILY
Qty: 90 TABLET | Refills: 0 | Status: SHIPPED | OUTPATIENT
Start: 2022-01-19 | End: 2022-05-25 | Stop reason: SDUPTHER

## 2022-01-19 NOTE — TELEPHONE ENCOUNTER
From: Tatyana Shea  To: MERLE Alvarenga  Sent: 1/18/2022 7:50 AM EST  Subject: Potassium and sodium are low    I just had lab work done yesterday. It shows that my potassium is in the low range. When my potassium level is low, I have nausea. I believe that I need a prescription for potassium. Thoughts?    Thank you,  Tatyana Shea

## 2022-04-08 ENCOUNTER — LAB (OUTPATIENT)
Dept: ONCOLOGY | Facility: HOSPITAL | Age: 67
End: 2022-04-08

## 2022-04-08 ENCOUNTER — OFFICE VISIT (OUTPATIENT)
Dept: ONCOLOGY | Facility: HOSPITAL | Age: 67
End: 2022-04-08

## 2022-04-08 VITALS
DIASTOLIC BLOOD PRESSURE: 86 MMHG | TEMPERATURE: 97 F | RESPIRATION RATE: 16 BRPM | OXYGEN SATURATION: 96 % | WEIGHT: 205.47 LBS | SYSTOLIC BLOOD PRESSURE: 177 MMHG | HEART RATE: 84 BPM | BODY MASS INDEX: 33.16 KG/M2

## 2022-04-08 DIAGNOSIS — E87.6 HYPOKALEMIA: Primary | ICD-10-CM

## 2022-04-08 DIAGNOSIS — K90.9 MALABSORPTION OF IRON: ICD-10-CM

## 2022-04-08 DIAGNOSIS — D50.9 IRON DEFICIENCY ANEMIA, UNSPECIFIED IRON DEFICIENCY ANEMIA TYPE: Primary | ICD-10-CM

## 2022-04-08 DIAGNOSIS — D50.9 IRON DEFICIENCY ANEMIA, UNSPECIFIED IRON DEFICIENCY ANEMIA TYPE: ICD-10-CM

## 2022-04-08 LAB
ALBUMIN SERPL-MCNC: 4.2 G/DL (ref 3.5–5.2)
ALBUMIN/GLOB SERPL: 1.5 G/DL
ALP SERPL-CCNC: 59 U/L (ref 39–117)
ALT SERPL W P-5'-P-CCNC: 25 U/L (ref 1–33)
ANION GAP SERPL CALCULATED.3IONS-SCNC: 11.4 MMOL/L (ref 5–15)
AST SERPL-CCNC: 21 U/L (ref 1–32)
BASOPHILS # BLD AUTO: 0.01 10*3/MM3 (ref 0–0.2)
BASOPHILS NFR BLD AUTO: 0.3 % (ref 0–1.5)
BILIRUB SERPL-MCNC: 0.5 MG/DL (ref 0–1.2)
BUN SERPL-MCNC: 15 MG/DL (ref 8–23)
BUN/CREAT SERPL: 20.3 (ref 7–25)
CALCIUM SPEC-SCNC: 9.8 MG/DL (ref 8.6–10.5)
CHLORIDE SERPL-SCNC: 98 MMOL/L (ref 98–107)
CO2 SERPL-SCNC: 28.6 MMOL/L (ref 22–29)
CREAT SERPL-MCNC: 0.74 MG/DL (ref 0.57–1)
DEPRECATED RDW RBC AUTO: 42.5 FL (ref 37–54)
EGFRCR SERPLBLD CKD-EPI 2021: 88.3 ML/MIN/1.73
EOSINOPHIL # BLD AUTO: 0.07 10*3/MM3 (ref 0–0.4)
EOSINOPHIL NFR BLD AUTO: 1.9 % (ref 0.3–6.2)
ERYTHROCYTE [DISTWIDTH] IN BLOOD BY AUTOMATED COUNT: 12.9 % (ref 12.3–15.4)
FERRITIN SERPL-MCNC: 35.9 NG/ML (ref 13–150)
GLOBULIN UR ELPH-MCNC: 2.8 GM/DL
GLUCOSE SERPL-MCNC: 110 MG/DL (ref 65–99)
HCT VFR BLD AUTO: 37.5 % (ref 34–46.6)
HGB BLD-MCNC: 13.3 G/DL (ref 12–15.9)
IMM GRANULOCYTES # BLD AUTO: 0.01 10*3/MM3 (ref 0–0.05)
IMM GRANULOCYTES NFR BLD AUTO: 0.3 % (ref 0–0.5)
IRON 24H UR-MRATE: 115 MCG/DL (ref 37–145)
IRON SATN MFR SERPL: 33 % (ref 20–50)
LYMPHOCYTES # BLD AUTO: 1.23 10*3/MM3 (ref 0.7–3.1)
LYMPHOCYTES NFR BLD AUTO: 34.1 % (ref 19.6–45.3)
MCH RBC QN AUTO: 31.6 PG (ref 26.6–33)
MCHC RBC AUTO-ENTMCNC: 35.5 G/DL (ref 31.5–35.7)
MCV RBC AUTO: 89.1 FL (ref 79–97)
MONOCYTES # BLD AUTO: 0.3 10*3/MM3 (ref 0.1–0.9)
MONOCYTES NFR BLD AUTO: 8.3 % (ref 5–12)
NEUTROPHILS NFR BLD AUTO: 1.99 10*3/MM3 (ref 1.7–7)
NEUTROPHILS NFR BLD AUTO: 55.1 % (ref 42.7–76)
PLATELET # BLD AUTO: 201 10*3/MM3 (ref 140–450)
PMV BLD AUTO: 8.9 FL (ref 6–12)
POTASSIUM SERPL-SCNC: 3.5 MMOL/L (ref 3.5–5.2)
PROT SERPL-MCNC: 7 G/DL (ref 6–8.5)
RBC # BLD AUTO: 4.21 10*6/MM3 (ref 3.77–5.28)
SODIUM SERPL-SCNC: 138 MMOL/L (ref 136–145)
TIBC SERPL-MCNC: 350 MCG/DL (ref 298–536)
TRANSFERRIN SERPL-MCNC: 235 MG/DL (ref 200–360)
WBC NRBC COR # BLD: 3.61 10*3/MM3 (ref 3.4–10.8)

## 2022-04-08 PROCEDURE — 80053 COMPREHEN METABOLIC PANEL: CPT

## 2022-04-08 PROCEDURE — 36415 COLL VENOUS BLD VENIPUNCTURE: CPT

## 2022-04-08 PROCEDURE — 99213 OFFICE O/P EST LOW 20 MIN: CPT | Performed by: INTERNAL MEDICINE

## 2022-04-08 PROCEDURE — 82728 ASSAY OF FERRITIN: CPT

## 2022-04-08 PROCEDURE — G0463 HOSPITAL OUTPT CLINIC VISIT: HCPCS

## 2022-04-08 PROCEDURE — 85025 COMPLETE CBC W/AUTO DIFF WBC: CPT

## 2022-04-08 PROCEDURE — 84466 ASSAY OF TRANSFERRIN: CPT

## 2022-04-08 PROCEDURE — 83540 ASSAY OF IRON: CPT

## 2022-04-08 RX ORDER — SODIUM CHLORIDE 9 MG/ML
250 INJECTION, SOLUTION INTRAVENOUS ONCE
Status: CANCELLED | OUTPATIENT
Start: 2022-04-08

## 2022-04-08 RX ORDER — SODIUM CHLORIDE 9 MG/ML
250 INJECTION, SOLUTION INTRAVENOUS ONCE
Status: CANCELLED | OUTPATIENT
Start: 2022-04-15

## 2022-04-08 NOTE — PROGRESS NOTES
Tatyana Shea   : 1955     LOCATION: [facility] HEMATOLOGY & ONCOLOGY     Chief Complaint  Anemia    Referring Provider: MERLE Alvarenga  PCP: John Aguilera APRN    Oncology/Hematology History Overview Note   HEME/ONC DX/RX/INVESTIGATIONS  DX:   Anemia, neutropenia, iron and B12 deficiency     RX:   Oral B12, started around 2021  Ferrous sulfate BID started around 2021. Changed to QOD on 2021    INVESTIGATIONS:   2020, EGD: focal intestinal metaplasia consistent with Li's esophagus, no dysplasia identified.  Fundic gland polyp.  Colonoscopy: fragments of tubular adenoma.  Colonic mucosa with lymphoid aggregates.  Rectal polyp, fragments of hyperplastic polyp.    21, B12 = 191  21  B12 = 2000, iron 44 TIBC 368 sat 12%         Bone marrow 21  Final Diagnosis   1-2:  Bone marrow (left iliac crest), aspirate smears, clot section, and core biopsy:               -Hypercellular marrow with maturing trilineage hematopoiesis and erythroid hyperplasia               -No dysplasia     3.  Peripheral blood (CBC and smear):               - WBC:  Adequate, normal morphology               - RBC:  Normocytic anemia, normal morphology               - Platelets:  Normal platelet count and morphology      Special Stains     Iron stain demonstrates no iron on core biopsy, no iron on clot section, and no iron and no ring sideroblasts on aspirate smear.  Controls are appropriate.      Flow cytometry: No significant immunophenotypic abnormalities detected     MDS FISH panel: No assay specific abnormalities detected        Subjective        History of Present Illness   Patient here for follow-up iron deficiency  Repeat labs showed that her iron saturation is decreased and her bone marrow smear showed no evidence for iron unstained  I does jarrett with the patient that given her persistent mild anemia slightly decreased iron saturation that she would benefit from additional IV  iron    Review of Systems   Constitutional: Positive for fatigue. Negative for appetite change, diaphoresis, fever, unexpected weight gain and unexpected weight loss.   HENT: Negative for hearing loss, sore throat and voice change.    Eyes: Negative for blurred vision, double vision, pain, redness and visual disturbance.   Respiratory: Negative for cough, shortness of breath and wheezing.    Cardiovascular: Negative for chest pain, palpitations and leg swelling.   Gastrointestinal: Positive for nausea.   Endocrine: Negative for cold intolerance, heat intolerance, polydipsia and polyuria.   Genitourinary: Negative for decreased urine volume, difficulty urinating, frequency and urinary incontinence.   Musculoskeletal: Negative for arthralgias, back pain, joint swelling and myalgias.   Skin: Negative for color change, rash, skin lesions and wound.   Neurological: Positive for dizziness (constant) and headache. Negative for seizures and numbness.   Hematological: Negative for adenopathy. Does not bruise/bleed easily.   Psychiatric/Behavioral: Negative for depressed mood. The patient is not nervous/anxious.    All other systems reviewed and are negative.    Current Outpatient Medications on File Prior to Visit   Medication Sig Dispense Refill   • aspirin 81 MG chewable tablet Chew 81 mg Daily.     • Cholecalciferol 125 MCG (5000 UT) tablet Take 5,000 Units by mouth Daily.     • Cyanocobalamin (Vitamin B 12) 500 MCG tablet Take 500 mcg by mouth Daily.     • Krill Oil (Omega-3) 500 MG capsule Take 500 mg by mouth Daily.     • losartan-hydrochlorothiazide (HYZAAR) 50-12.5 MG per tablet Take 1 tablet by mouth Daily. 90 tablet 1   • multivitamin with minerals tablet tablet Take 1 tablet by mouth Daily.     • pantoprazole (PROTONIX) 40 MG EC tablet Take 40 mg by mouth Daily.     • potassium chloride 10 MEQ CR tablet Take 1 tablet by mouth Daily. 90 tablet 0   • [DISCONTINUED] ferrous sulfate 325 (65 FE) MG tablet Take 325 mg  by mouth 2 (two) times a day.       No current facility-administered medications on file prior to visit.       No Known Allergies    Past Medical History:   Diagnosis Date   • Acid reflux    • Diverticulitis    • Gall stones    • Hiatal hernia    • HTN (hypertension)    • Iron deficiency anemia      Past Surgical History:   Procedure Laterality Date   • CHOLECYSTECTOMY     • COLONOSCOPY      2020 with     • COLONOSCOPY N/A 7/29/2021    Procedure: COLONOSCOPY with polypectomy/snare;  Surgeon: Ruel Foster MD;  Location: [facility] ENDOSCOPY;  Service: Gastroenterology;  Laterality: N/A;  colon polyp   • ENDOSCOPY N/A 7/28/2021    Procedure: ESOPHAGOGASTRODUODENOSCOPY with bxs and cold snares;  Surgeon: Ruel Foster MD;  Location: [facility] ENDOSCOPY;  Service: Gastroenterology;  Laterality: N/A;  hiatal hernia  gastric polyps   • UPPER GASTROINTESTINAL ENDOSCOPY  07/28/2021    Dr. Foster     Social History     Socioeconomic History   • Marital status: Single   Tobacco Use   • Smoking status: Never Smoker   • Smokeless tobacco: Never Used   Vaping Use   • Vaping Use: Never used   Substance and Sexual Activity   • Alcohol use: Never   • Drug use: Never   • Sexual activity: Defer     Family History   Problem Relation Age of Onset   • Heart failure Mother    • Atrial fibrillation Other         UNSPECIFIED   • Colon cancer Neg Hx      Immunization History   Administered Date(s) Administered   • COVID-19 (MODERNA) 1st, 2nd, 3rd Dose Only 02/03/2021, 02/04/2021, 03/03/2021, 03/04/2021   • Influenza, Unspecified 10/13/2020   • Pneumococcal Conjugate 13-Valent (PCV13) 10/01/2019   • Tdap 07/26/2021       Objective     /86   Pulse 84   Temp 97 °F (36.1 °C)   Resp 16   Wt 93.2 kg (205 lb 7.5 oz)   LMP  (LMP Unknown)   SpO2 96%   BMI 33.16 kg/m²         Physical Exam    Deferred for discussion      Iron   Date Value Ref Range Status   01/17/2022 56 37 - 145 mcg/dL Final   11/24/2021 99 37 - 145  mcg/dL Final   09/24/2021 49 37 - 145 mcg/dL Final     Iron Saturation   Date Value Ref Range Status   01/17/2022 14 (L) 20 - 50 % Final   11/24/2021 30 20 - 50 % Final   09/24/2021 16 (L) 20 - 50 % Final     Transferrin   Date Value Ref Range Status   01/17/2022 262 200 - 360 mg/dL Final   11/24/2021 222 200 - 360 mg/dL Final   09/24/2021 203 200 - 360 mg/dL Final     TIBC   Date Value Ref Range Status   01/17/2022 390 298 - 536 mcg/dL Final   11/24/2021 331 298 - 536 mcg/dL Final   09/24/2021 302 298 - 536 mcg/dL Final     Ferritin   Date Value Ref Range Status   01/17/2022 21.48 13.00 - 150.00 ng/mL Final   11/24/2021 81.87 13.00 - 150.00 ng/mL Final   07/19/2021 32.95 13.00 - 150.00 ng/mL Final     Vitamin B-12   Date Value Ref Range Status   01/17/2022 736 211 - 946 pg/mL Final   09/24/2021 1,612 (H) 211 - 946 pg/mL Final   08/27/2021 917 211 - 946 pg/mL Final     Folate   Date Value Ref Range Status   01/17/2022 >20.00 4.78 - 24.20 ng/mL Final   07/19/2021 12.60 4.78 - 24.20 ng/mL Final   05/27/2021 12.6 4.8 - 20.0 ng/mL Final     Comment:     Results may be falsely decreased due to light exposure if  testing added on after collection.     04/09/2021 14.7 4.8 - 20.0 ng/mL Final     Comment:     Results may be falsely decreased due to light exposure if  testing added on after collection.       ECOG score: 0           PHQ-9 Total Score:           Result Review :   The following data was reviewed by: Mabel Dean MD on 04/08/2022:  Lab Results   Component Value Date    HGB 13.3 04/08/2022    HCT 37.5 04/08/2022    MCV 89.1 04/08/2022     04/08/2022    WBC 3.61 04/08/2022    NEUTROABS 1.99 04/08/2022    LYMPHSABS 1.23 04/08/2022    MONOSABS 0.30 04/08/2022    EOSABS 0.07 04/08/2022    BASOSABS 0.01 04/08/2022     Lab Results   Component Value Date    GLUCOSE 101 (H) 01/17/2022    BUN 9 01/17/2022    CREATININE 0.63 01/17/2022     01/17/2022    K 3.3 (L) 01/17/2022     01/17/2022    CO2 28.0  01/17/2022    CALCIUM 9.2 01/17/2022    PROTEINTOT 6.8 01/17/2022    ALBUMIN 4.20 01/17/2022    BILITOT 0.2 01/17/2022    ALKPHOS 61 01/17/2022    AST 13 01/17/2022    ALT 13 01/17/2022         Data reviewed: labs          Assessment and Plan      ASSESSMENT   anemia  Iron deficiency  PLAN/RECOMMENDATIONS  I discussed with the patient that her iron saturation remains low and there was no evidence of iron on her bone marrow smears .  In addition there is mild anemia and thus I believe she would benefit from additional IV iron  IV iron ordered  Repeat labs today iron profile and CBC and repeat in 3 months after IV iron infusion  Follow-up in 3 months    Diagnoses and all orders for this visit:    1. Iron deficiency anemia, unspecified iron deficiency anemia type (Primary)  -     CBC & Differential; Future  -     Ferritin; Future  -     Iron Profile; Future  -     CBC & Differential; Future  -     Ferritin; Future  -     Iron Profile; Future  -     ONCBCN INFUSION APPOINTMENT REQUEST 01; Future  -     ONCBCN INFUSION APPOINTMENT REQUEST 01; Future    I spent 20 minutes caring for Tatyana on this date of service. This time includes time spent by me in the following activities: reviewing tests, counseling and educating the patient/family/caregiver, ordering medications, tests, or procedures, documenting information in the medical record and independently interpreting results and communicating that information with the patient/family/caregiver      Patient was given instructions and counseling regarding her condition or for health maintenance advice. Please see specific information pulled into the AVS if appropriate.     Mabel Dean MD    4/8/2022

## 2022-04-11 ENCOUNTER — PATIENT MESSAGE (OUTPATIENT)
Dept: FAMILY MEDICINE CLINIC | Facility: CLINIC | Age: 67
End: 2022-04-11

## 2022-04-11 DIAGNOSIS — I10 ESSENTIAL HYPERTENSION: ICD-10-CM

## 2022-04-11 PROBLEM — K90.9 MALABSORPTION OF IRON: Status: ACTIVE | Noted: 2022-04-11

## 2022-04-11 RX ORDER — ACETAMINOPHEN 325 MG/1
650 TABLET ORAL ONCE
Status: CANCELLED | OUTPATIENT
Start: 2022-06-21

## 2022-04-11 RX ORDER — SODIUM CHLORIDE 9 MG/ML
250 INJECTION, SOLUTION INTRAVENOUS ONCE
Status: CANCELLED | OUTPATIENT
Start: 2022-06-21

## 2022-04-11 RX ORDER — ACETAMINOPHEN 325 MG/1
650 TABLET ORAL ONCE
Status: CANCELLED | OUTPATIENT
Start: 2022-07-05

## 2022-04-11 RX ORDER — SODIUM CHLORIDE 9 MG/ML
250 INJECTION, SOLUTION INTRAVENOUS ONCE
Status: CANCELLED | OUTPATIENT
Start: 2022-05-31

## 2022-04-11 RX ORDER — ACETAMINOPHEN 325 MG/1
650 TABLET ORAL ONCE
Status: CANCELLED | OUTPATIENT
Start: 2022-05-31

## 2022-04-11 RX ORDER — ACETAMINOPHEN 325 MG/1
650 TABLET ORAL ONCE
Status: CANCELLED | OUTPATIENT
Start: 2022-06-14

## 2022-04-11 RX ORDER — SODIUM CHLORIDE 9 MG/ML
250 INJECTION, SOLUTION INTRAVENOUS ONCE
Status: CANCELLED | OUTPATIENT
Start: 2022-06-28

## 2022-04-11 RX ORDER — SODIUM CHLORIDE 9 MG/ML
250 INJECTION, SOLUTION INTRAVENOUS ONCE
Status: CANCELLED | OUTPATIENT
Start: 2022-05-17

## 2022-04-11 RX ORDER — SODIUM CHLORIDE 9 MG/ML
250 INJECTION, SOLUTION INTRAVENOUS ONCE
Status: CANCELLED | OUTPATIENT
Start: 2022-06-07

## 2022-04-11 RX ORDER — ACETAMINOPHEN 325 MG/1
650 TABLET ORAL ONCE
Status: CANCELLED | OUTPATIENT
Start: 2022-06-07

## 2022-04-11 RX ORDER — SODIUM CHLORIDE 9 MG/ML
250 INJECTION, SOLUTION INTRAVENOUS ONCE
Status: CANCELLED | OUTPATIENT
Start: 2022-05-24

## 2022-04-11 RX ORDER — SODIUM CHLORIDE 9 MG/ML
250 INJECTION, SOLUTION INTRAVENOUS ONCE
Status: CANCELLED | OUTPATIENT
Start: 2022-06-14

## 2022-04-11 RX ORDER — ACETAMINOPHEN 325 MG/1
650 TABLET ORAL ONCE
Status: CANCELLED | OUTPATIENT
Start: 2022-06-28

## 2022-04-11 RX ORDER — ACETAMINOPHEN 325 MG/1
650 TABLET ORAL ONCE
Status: CANCELLED | OUTPATIENT
Start: 2022-05-24

## 2022-04-11 RX ORDER — ACETAMINOPHEN 325 MG/1
650 TABLET ORAL ONCE
Status: CANCELLED | OUTPATIENT
Start: 2022-05-17

## 2022-04-11 RX ORDER — SODIUM CHLORIDE 9 MG/ML
250 INJECTION, SOLUTION INTRAVENOUS ONCE
Status: CANCELLED | OUTPATIENT
Start: 2022-07-05

## 2022-04-11 NOTE — TELEPHONE ENCOUNTER
From: Tatyana Shea  To: MERLE Alvarenga  Sent: 4/11/2022 2:41 PM EDT  Subject: Blood Pressure    My blood pressure has been slowing increasing over the past few months. It was 177 at my visit with Dr. Dean on 4/9. I've also had headaches above my eyes for a few months now. I don't know if this increase is due to my iron deficiency diagnosis or not. I'm told that my body is not absorbing iron, and I have no iron stores in my bone marrow. Can my body also not be absorbing the BP medication, or do those two things work in different ways? I have an appointment with you, but it's not until 5/25.    Thanks,  Tatyana Shea

## 2022-05-17 ENCOUNTER — HOSPITAL ENCOUNTER (OUTPATIENT)
Dept: ONCOLOGY | Facility: HOSPITAL | Age: 67
Setting detail: INFUSION SERIES
Discharge: HOME | End: 2022-05-17

## 2022-05-17 ENCOUNTER — HOSPITAL ENCOUNTER (OUTPATIENT)
Dept: MAMMOGRAPHY | Facility: HOSPITAL | Age: 67
Discharge: HOME | End: 2022-05-17
Admitting: NURSE PRACTITIONER

## 2022-05-17 VITALS
HEART RATE: 81 BPM | OXYGEN SATURATION: 96 % | DIASTOLIC BLOOD PRESSURE: 74 MMHG | RESPIRATION RATE: 16 BRPM | TEMPERATURE: 98.6 F | SYSTOLIC BLOOD PRESSURE: 140 MMHG

## 2022-05-17 DIAGNOSIS — Z12.31 BREAST CANCER SCREENING BY MAMMOGRAM: ICD-10-CM

## 2022-05-17 DIAGNOSIS — K90.9 MALABSORPTION OF IRON: Primary | ICD-10-CM

## 2022-05-17 DIAGNOSIS — D50.9 IRON DEFICIENCY ANEMIA, UNSPECIFIED IRON DEFICIENCY ANEMIA TYPE: ICD-10-CM

## 2022-05-17 PROCEDURE — 77067 SCR MAMMO BI INCL CAD: CPT

## 2022-05-17 PROCEDURE — 25010000002 NA FERRIC GLUC CPLX PER 12.5 MG: Performed by: INTERNAL MEDICINE

## 2022-05-17 PROCEDURE — 77063 BREAST TOMOSYNTHESIS BI: CPT

## 2022-05-17 PROCEDURE — 96365 THER/PROPH/DIAG IV INF INIT: CPT

## 2022-05-17 RX ORDER — SODIUM CHLORIDE 9 MG/ML
250 INJECTION, SOLUTION INTRAVENOUS ONCE
Status: COMPLETED | OUTPATIENT
Start: 2022-05-17 | End: 2022-05-17

## 2022-05-17 RX ORDER — ACETAMINOPHEN 325 MG/1
650 TABLET ORAL ONCE
Status: COMPLETED | OUTPATIENT
Start: 2022-05-17 | End: 2022-05-17

## 2022-05-17 RX ADMIN — SODIUM CHLORIDE 250 ML: 9 INJECTION, SOLUTION INTRAVENOUS at 14:32

## 2022-05-17 RX ADMIN — ACETAMINOPHEN 650 MG: 325 TABLET ORAL at 14:29

## 2022-05-17 RX ADMIN — SODIUM CHLORIDE 125 MG: 9 INJECTION, SOLUTION INTRAVENOUS at 14:34

## 2022-05-18 DIAGNOSIS — Z12.31 ENCOUNTER FOR SCREENING MAMMOGRAM FOR MALIGNANT NEOPLASM OF BREAST: Primary | ICD-10-CM

## 2022-05-20 ENCOUNTER — TELEPHONE (OUTPATIENT)
Dept: FAMILY MEDICINE CLINIC | Facility: CLINIC | Age: 67
End: 2022-05-20

## 2022-05-24 ENCOUNTER — HOSPITAL ENCOUNTER (OUTPATIENT)
Dept: ONCOLOGY | Facility: HOSPITAL | Age: 67
Setting detail: INFUSION SERIES
Discharge: HOME | End: 2022-05-24

## 2022-05-24 VITALS
TEMPERATURE: 97.8 F | DIASTOLIC BLOOD PRESSURE: 77 MMHG | SYSTOLIC BLOOD PRESSURE: 162 MMHG | HEART RATE: 78 BPM | RESPIRATION RATE: 16 BRPM | OXYGEN SATURATION: 97 %

## 2022-05-24 DIAGNOSIS — K90.9 MALABSORPTION OF IRON: Primary | ICD-10-CM

## 2022-05-24 DIAGNOSIS — D50.9 IRON DEFICIENCY ANEMIA, UNSPECIFIED IRON DEFICIENCY ANEMIA TYPE: ICD-10-CM

## 2022-05-24 PROCEDURE — 96374 THER/PROPH/DIAG INJ IV PUSH: CPT

## 2022-05-24 PROCEDURE — 25010000002 NA FERRIC GLUC CPLX PER 12.5 MG: Performed by: INTERNAL MEDICINE

## 2022-05-24 PROCEDURE — 96365 THER/PROPH/DIAG IV INF INIT: CPT

## 2022-05-24 RX ORDER — ACETAMINOPHEN 325 MG/1
650 TABLET ORAL ONCE
Status: COMPLETED | OUTPATIENT
Start: 2022-05-24 | End: 2022-05-24

## 2022-05-24 RX ORDER — SODIUM CHLORIDE 9 MG/ML
250 INJECTION, SOLUTION INTRAVENOUS ONCE
Status: COMPLETED | OUTPATIENT
Start: 2022-05-24 | End: 2022-05-24

## 2022-05-24 RX ADMIN — SODIUM CHLORIDE 125 MG: 9 INJECTION, SOLUTION INTRAVENOUS at 13:36

## 2022-05-24 RX ADMIN — ACETAMINOPHEN 650 MG: 325 TABLET ORAL at 13:35

## 2022-05-24 RX ADMIN — SODIUM CHLORIDE 250 ML: 9 INJECTION, SOLUTION INTRAVENOUS at 13:35

## 2022-05-25 ENCOUNTER — OFFICE VISIT (OUTPATIENT)
Dept: FAMILY MEDICINE CLINIC | Facility: CLINIC | Age: 67
End: 2022-05-25

## 2022-05-25 VITALS
HEIGHT: 66 IN | WEIGHT: 205.6 LBS | BODY MASS INDEX: 33.04 KG/M2 | SYSTOLIC BLOOD PRESSURE: 158 MMHG | DIASTOLIC BLOOD PRESSURE: 90 MMHG | TEMPERATURE: 97.7 F | RESPIRATION RATE: 16 BRPM | HEART RATE: 81 BPM | OXYGEN SATURATION: 97 %

## 2022-05-25 DIAGNOSIS — I10 ESSENTIAL HYPERTENSION: ICD-10-CM

## 2022-05-25 DIAGNOSIS — D50.8 OTHER IRON DEFICIENCY ANEMIA: Primary | ICD-10-CM

## 2022-05-25 DIAGNOSIS — R14.0 BLOATING: ICD-10-CM

## 2022-05-25 DIAGNOSIS — E87.6 HYPOKALEMIA: ICD-10-CM

## 2022-05-25 PROCEDURE — 99214 OFFICE O/P EST MOD 30 MIN: CPT | Performed by: NURSE PRACTITIONER

## 2022-05-25 RX ORDER — FAMOTIDINE 20 MG/1
20 TABLET, FILM COATED ORAL 2 TIMES DAILY
COMMUNITY
End: 2022-11-30 | Stop reason: SDUPTHER

## 2022-05-25 RX ORDER — LOSARTAN POTASSIUM 50 MG/1
50 TABLET ORAL DAILY
Qty: 90 TABLET | Refills: 1 | Status: SHIPPED | OUTPATIENT
Start: 2022-05-25 | End: 2022-11-30 | Stop reason: SDUPTHER

## 2022-05-25 RX ORDER — POTASSIUM CHLORIDE 750 MG/1
10 TABLET, FILM COATED, EXTENDED RELEASE ORAL DAILY
Qty: 90 TABLET | Refills: 1 | Status: SHIPPED | OUTPATIENT
Start: 2022-05-25 | End: 2022-11-30 | Stop reason: SDUPTHER

## 2022-05-25 RX ORDER — FUROSEMIDE 20 MG/1
20 TABLET ORAL DAILY
Qty: 90 TABLET | Refills: 1 | Status: SHIPPED | OUTPATIENT
Start: 2022-05-25 | End: 2022-11-30 | Stop reason: SDUPTHER

## 2022-05-25 NOTE — PROGRESS NOTES
Answers for HPI/ROS submitted by the patient on 5/24/2022  Please describe your symptoms.: Dizziness, Nausea, Headaches, Iron anemia  Have you had these symptoms before?: Yes  How long have you been having these symptoms?: Greater than 2 weeks  Please list any medications you are currently taking for this condition.: Iron infusions are treating low iron, but no diagnosis after a year.  Please describe any probable cause for these symptoms. : Unknown.  What is the primary reason for your visit?: Other    Chief Complaint  Headache, Hypertension (fluctuation), Anemia (Undiagnosed reason for it), Urinary Frequency, Nausea (For 2 years), and Abdominal Pain (For 2 weeks)    Subjective        Tatyana Shea presents to [facility]  Headache that is continuing every day upon awakening with nausea.      Hypertension:  Blood pressure is fluctuating.  The water pill she is on  Is still causing edema and is urinating more with this medication than lasix.  Is still on potassium for hypokalemia.    Abdominal pain:  For 2 weeks. Left lower quadrant warmth and tenderness.    Iron deficiency is still seeing     Urinary frequency:  Getting up at night to have to urinat.            Past History:    Medical History: has a past medical history of Acid reflux, Diverticulitis, Diverticulosis (15 yrs ago), Gall stones, Headache (2021), Hiatal hernia, HTN (hypertension), Iron deficiency anemia, Obesity (In my 40s), Peptic ulceration (In my 40s), and Pulmonary embolism (HCC) (1975).     Surgical History: has a past surgical history that includes Cholecystectomy; Colonoscopy; Colonoscopy (N/A, 07/29/2021); Upper gastrointestinal endoscopy (07/28/2021); Esophagogastroduodenoscopy (N/A, 07/28/2021); and Cosmetic surgery (2013).     Family History: family history includes Atrial fibrillation in an other family member; Developmental Disability in her son; Heart disease in her mother; Heart failure in her mother;  "Hyperlipidemia in her mother.     Social History: reports that she has never smoked. She has never used smokeless tobacco. She reports previous alcohol use. She reports that she does not use drugs.    Allergies: Patient has no known allergies.          Current Outpatient Medications:   •  Cholecalciferol 125 MCG (5000 UT) tablet, Take 5,000 Units by mouth Daily., Disp: , Rfl:   •  Cyanocobalamin (Vitamin B 12) 500 MCG tablet, Take 500 mcg by mouth Daily., Disp: , Rfl:   •  famotidine (PEPCID) 20 MG tablet, Take 20 mg by mouth 2 (Two) Times a Day., Disp: , Rfl:   •  Krill Oil (Omega-3) 500 MG capsule, Take 500 mg by mouth Daily., Disp: , Rfl:   •  multivitamin with minerals tablet tablet, Take 1 tablet by mouth Daily., Disp: , Rfl:   •  potassium chloride 10 MEQ CR tablet, Take 1 tablet by mouth Daily., Disp: 90 tablet, Rfl: 1  •  furosemide (Lasix) 20 MG tablet, Take 1 tablet by mouth Daily., Disp: 90 tablet, Rfl: 1  •  losartan (Cozaar) 50 MG tablet, Take 1 tablet by mouth Daily., Disp: 90 tablet, Rfl: 1  No current facility-administered medications for this visit.    Medications Discontinued During This Encounter   Medication Reason   • aspirin 81 MG chewable tablet *Therapy completed   • pantoprazole (PROTONIX) 40 MG EC tablet *Therapy completed   • losartan-hydrochlorothiazide (HYZAAR) 50-12.5 MG per tablet *Therapy completed   • potassium chloride 10 MEQ CR tablet Reorder         Review of Systems     Objective         Vitals:    05/25/22 0717   BP: 158/90   BP Location: Right arm   Patient Position: Sitting   Cuff Size: Adult   Pulse: 81   Resp: 16   Temp: 97.7 °F (36.5 °C)   TempSrc: Infrared   SpO2: 97%   Weight: 93.3 kg (205 lb 9.6 oz)   Height: 167.6 cm (65.98\")     Body mass index is 33.2 kg/m².         Physical Exam  Vitals reviewed.   Constitutional:       Appearance: Normal appearance. She is well-developed.   HENT:      Head: Normocephalic and atraumatic.      Mouth/Throat:      Pharynx: No " oropharyngeal exudate.   Eyes:      Conjunctiva/sclera: Conjunctivae normal.      Pupils: Pupils are equal, round, and reactive to light.   Cardiovascular:      Rate and Rhythm: Normal rate and regular rhythm.      Heart sounds: Normal heart sounds. No murmur heard.    No friction rub. No gallop.   Pulmonary:      Effort: Pulmonary effort is normal.      Breath sounds: Normal breath sounds. No wheezing or rhonchi.   Abdominal:      Tenderness: There is abdominal tenderness in the epigastric area.          Comments: Epigastric.   Skin:     General: Skin is warm and dry.   Neurological:      Mental Status: She is alert and oriented to person, place, and time.   Psychiatric:         Mood and Affect: Mood and affect normal.         Behavior: Behavior normal.         Thought Content: Thought content normal.         Judgment: Judgment normal.             Result Review :               Assessment and Plan     Diagnoses and all orders for this visit:    1. Other iron deficiency anemia (Primary)  -     Cancel: Ambulatory Referral to Gastroenterology  -     Vitamin B12; Future  -     Ambulatory Referral to Gastroenterology    2. Hypokalemia  -     potassium chloride 10 MEQ CR tablet; Take 1 tablet by mouth Daily.  Dispense: 90 tablet; Refill: 1    3. Essential hypertension  -     Lipid Panel w/ Chol/HDL Ratio; Future  -     Comprehensive metabolic panel; Future  -     losartan (Cozaar) 50 MG tablet; Take 1 tablet by mouth Daily.  Dispense: 90 tablet; Refill: 1  -     furosemide (Lasix) 20 MG tablet; Take 1 tablet by mouth Daily.  Dispense: 90 tablet; Refill: 1    4. Bloating  -     Celiac Panel Reflex To Titer; Future  -     H. Pylori Breath Test - Breath, Lung; Future              Follow Up     Return in about 6 months (around 11/25/2022).    Patient was given instructions and counseling regarding her condition or for health maintenance advice. Please see specific information pulled into the AVS if appropriate.

## 2022-05-26 ENCOUNTER — LAB (OUTPATIENT)
Dept: LAB | Facility: HOSPITAL | Age: 67
End: 2022-05-26

## 2022-05-26 DIAGNOSIS — D50.8 OTHER IRON DEFICIENCY ANEMIA: ICD-10-CM

## 2022-05-26 DIAGNOSIS — I10 ESSENTIAL HYPERTENSION: ICD-10-CM

## 2022-05-26 DIAGNOSIS — R14.0 BLOATING: ICD-10-CM

## 2022-05-26 LAB
ALBUMIN SERPL-MCNC: 4.1 G/DL (ref 3.5–5.2)
ALBUMIN/GLOB SERPL: 1.6 G/DL
ALP SERPL-CCNC: 55 U/L (ref 39–117)
ALT SERPL W P-5'-P-CCNC: 9 U/L (ref 1–33)
ANION GAP SERPL CALCULATED.3IONS-SCNC: 9.3 MMOL/L (ref 5–15)
AST SERPL-CCNC: 10 U/L (ref 1–32)
BILIRUB SERPL-MCNC: 0.5 MG/DL (ref 0–1.2)
BUN SERPL-MCNC: 13 MG/DL (ref 8–23)
BUN/CREAT SERPL: 22.8 (ref 7–25)
CALCIUM SPEC-SCNC: 9.5 MG/DL (ref 8.6–10.5)
CHLORIDE SERPL-SCNC: 99 MMOL/L (ref 98–107)
CHOLEST SERPL-MCNC: 239 MG/DL (ref 0–200)
CO2 SERPL-SCNC: 27.7 MMOL/L (ref 22–29)
CREAT SERPL-MCNC: 0.57 MG/DL (ref 0.57–1)
EGFRCR SERPLBLD CKD-EPI 2021: 99.1 ML/MIN/1.73
GLOBULIN UR ELPH-MCNC: 2.6 GM/DL
GLUCOSE SERPL-MCNC: 95 MG/DL (ref 65–99)
HDLC SERPL QL: 4.98
HDLC SERPL-MCNC: 48 MG/DL (ref 40–60)
LDLC SERPL CALC-MCNC: 161 MG/DL (ref 0–100)
POTASSIUM SERPL-SCNC: 3.5 MMOL/L (ref 3.5–5.2)
PROT SERPL-MCNC: 6.7 G/DL (ref 6–8.5)
SODIUM SERPL-SCNC: 136 MMOL/L (ref 136–145)
TRIGL SERPL-MCNC: 166 MG/DL (ref 0–150)
UREA BREATH TEST QL: NEGATIVE
VIT B12 BLD-MCNC: >2000 PG/ML (ref 211–946)
VLDLC SERPL-MCNC: 30 MG/DL (ref 5–40)

## 2022-05-26 PROCEDURE — 82784 ASSAY IGA/IGD/IGG/IGM EACH: CPT

## 2022-05-26 PROCEDURE — 82607 VITAMIN B-12: CPT

## 2022-05-26 PROCEDURE — 36415 COLL VENOUS BLD VENIPUNCTURE: CPT

## 2022-05-26 PROCEDURE — 83013 H PYLORI (C-13) BREATH: CPT

## 2022-05-26 PROCEDURE — 80061 LIPID PANEL: CPT

## 2022-05-26 PROCEDURE — 86364 TISS TRNSGLTMNASE EA IG CLAS: CPT

## 2022-05-26 PROCEDURE — 86258 DGP ANTIBODY EACH IG CLASS: CPT

## 2022-05-26 PROCEDURE — 80053 COMPREHEN METABOLIC PANEL: CPT

## 2022-05-27 LAB
GLIADIN PEPTIDE IGA SER-ACNC: 4 UNITS (ref 0–19)
IGA SERPL-MCNC: 219 MG/DL (ref 87–352)
TTG IGA SER-ACNC: <2 U/ML (ref 0–3)

## 2022-05-31 ENCOUNTER — APPOINTMENT (OUTPATIENT)
Dept: ONCOLOGY | Facility: HOSPITAL | Age: 67
End: 2022-05-31

## 2022-05-31 ENCOUNTER — HOSPITAL ENCOUNTER (OUTPATIENT)
Dept: ONCOLOGY | Facility: HOSPITAL | Age: 67
Setting detail: INFUSION SERIES
Discharge: HOME | End: 2022-05-31

## 2022-05-31 VITALS
HEART RATE: 77 BPM | TEMPERATURE: 98.7 F | RESPIRATION RATE: 18 BRPM | DIASTOLIC BLOOD PRESSURE: 61 MMHG | OXYGEN SATURATION: 96 % | SYSTOLIC BLOOD PRESSURE: 140 MMHG

## 2022-05-31 DIAGNOSIS — K90.9 MALABSORPTION OF IRON: Primary | ICD-10-CM

## 2022-05-31 DIAGNOSIS — D50.9 IRON DEFICIENCY ANEMIA, UNSPECIFIED IRON DEFICIENCY ANEMIA TYPE: ICD-10-CM

## 2022-05-31 PROCEDURE — 96365 THER/PROPH/DIAG IV INF INIT: CPT

## 2022-05-31 PROCEDURE — 25010000002 NA FERRIC GLUC CPLX PER 12.5 MG: Performed by: INTERNAL MEDICINE

## 2022-05-31 RX ORDER — SODIUM CHLORIDE 9 MG/ML
250 INJECTION, SOLUTION INTRAVENOUS ONCE
Status: COMPLETED | OUTPATIENT
Start: 2022-05-31 | End: 2022-05-31

## 2022-05-31 RX ORDER — ACETAMINOPHEN 325 MG/1
650 TABLET ORAL ONCE
Status: COMPLETED | OUTPATIENT
Start: 2022-05-31 | End: 2022-05-31

## 2022-05-31 RX ADMIN — SODIUM CHLORIDE 250 ML: 9 INJECTION, SOLUTION INTRAVENOUS at 14:42

## 2022-05-31 RX ADMIN — SODIUM CHLORIDE 125 MG: 9 INJECTION, SOLUTION INTRAVENOUS at 15:07

## 2022-05-31 RX ADMIN — ACETAMINOPHEN 650 MG: 325 TABLET ORAL at 14:45

## 2022-06-02 ENCOUNTER — TELEPHONE (OUTPATIENT)
Dept: FAMILY MEDICINE CLINIC | Facility: CLINIC | Age: 67
End: 2022-06-02

## 2022-06-06 ENCOUNTER — TELEPHONE (OUTPATIENT)
Dept: FAMILY MEDICINE CLINIC | Facility: CLINIC | Age: 67
End: 2022-06-06

## 2022-06-06 DIAGNOSIS — D50.9 IRON DEFICIENCY ANEMIA, UNSPECIFIED IRON DEFICIENCY ANEMIA TYPE: ICD-10-CM

## 2022-06-06 DIAGNOSIS — E78.2 MIXED HYPERLIPIDEMIA: Primary | ICD-10-CM

## 2022-06-06 RX ORDER — ATORVASTATIN CALCIUM 10 MG/1
10 TABLET, FILM COATED ORAL DAILY
Qty: 90 TABLET | Refills: 1 | Status: SHIPPED | OUTPATIENT
Start: 2022-06-06

## 2022-06-06 NOTE — TELEPHONE ENCOUNTER
I placed an order to go see Dr. Dunbar in [place].  And unfortunately your levels are not getting any better on your cholesterol.  But watch for muscle aches or pains.  After you start so that we can determine as that is a side effect of that medication.

## 2022-06-06 NOTE — TELEPHONE ENCOUNTER
----- Message from Tatyana Shea sent at 6/5/2022  1:51 PM EDT -----  Regarding: Lab results  If [facility] thinks I need to go on statins, then I guess I should. I don't like what I read about them, however. Please ask John to make a referral for me to see a gastroenterologist (see her April message to me).  I was called by [facility] and quizzed about wanting to be referred to another gastro Dr., and said they would review it, but they did not get back to me about it. I suppose I can go back to the last Dr that saw me, since I am in pain and have multiple symptoms that I need diagnosed. Thanks.

## 2022-06-07 ENCOUNTER — HOSPITAL ENCOUNTER (OUTPATIENT)
Dept: ONCOLOGY | Facility: HOSPITAL | Age: 67
Setting detail: INFUSION SERIES
Discharge: HOME | End: 2022-06-07

## 2022-06-07 VITALS
OXYGEN SATURATION: 97 % | TEMPERATURE: 98.6 F | RESPIRATION RATE: 18 BRPM | HEART RATE: 77 BPM | SYSTOLIC BLOOD PRESSURE: 132 MMHG | DIASTOLIC BLOOD PRESSURE: 60 MMHG

## 2022-06-07 DIAGNOSIS — D50.9 IRON DEFICIENCY ANEMIA, UNSPECIFIED IRON DEFICIENCY ANEMIA TYPE: ICD-10-CM

## 2022-06-07 DIAGNOSIS — K90.9 MALABSORPTION OF IRON: Primary | ICD-10-CM

## 2022-06-07 PROCEDURE — 25010000002 NA FERRIC GLUC CPLX PER 12.5 MG: Performed by: INTERNAL MEDICINE

## 2022-06-07 PROCEDURE — 96365 THER/PROPH/DIAG IV INF INIT: CPT

## 2022-06-07 RX ORDER — SODIUM CHLORIDE 9 MG/ML
250 INJECTION, SOLUTION INTRAVENOUS ONCE
Status: COMPLETED | OUTPATIENT
Start: 2022-06-07 | End: 2022-06-07

## 2022-06-07 RX ADMIN — SODIUM CHLORIDE 250 ML: 9 INJECTION, SOLUTION INTRAVENOUS at 13:36

## 2022-06-07 RX ADMIN — SODIUM CHLORIDE 125 MG: 9 INJECTION, SOLUTION INTRAVENOUS at 13:37

## 2022-06-09 ENCOUNTER — OFFICE VISIT (OUTPATIENT)
Dept: FAMILY MEDICINE CLINIC | Facility: CLINIC | Age: 67
End: 2022-06-09

## 2022-06-09 VITALS
WEIGHT: 206 LBS | HEIGHT: 66 IN | BODY MASS INDEX: 33.11 KG/M2 | SYSTOLIC BLOOD PRESSURE: 154 MMHG | RESPIRATION RATE: 16 BRPM | DIASTOLIC BLOOD PRESSURE: 76 MMHG | HEART RATE: 92 BPM | OXYGEN SATURATION: 97 % | TEMPERATURE: 97.8 F

## 2022-06-09 DIAGNOSIS — R10.32 LEFT LOWER QUADRANT ABDOMINAL PAIN: Primary | ICD-10-CM

## 2022-06-09 PROCEDURE — 99213 OFFICE O/P EST LOW 20 MIN: CPT | Performed by: NURSE PRACTITIONER

## 2022-06-09 NOTE — PROGRESS NOTES
Chief Complaint  Abdominal Pain (Hx of diverticulitis has had constant pain ), Headache, and Nausea    Subjective        Tatyana Shea presents to [facility]  Having pain left lower quadrant.  Constant. Since 6 weeks this time and hasn't gone away.  Headaches and  nausea        Past History:    Medical History: has a past medical history of Acid reflux, Diverticulitis, Diverticulosis (15 yrs ago), Gall stones, Headache (2021), Hiatal hernia, HTN (hypertension), Iron deficiency anemia, Obesity (In my 40s), Peptic ulceration (In my 40s), and Pulmonary embolism (HCC) (1975).     Surgical History: has a past surgical history that includes Cholecystectomy; Colonoscopy; Colonoscopy (N/A, 07/29/2021); Upper gastrointestinal endoscopy (07/28/2021); Esophagogastroduodenoscopy (N/A, 07/28/2021); and Cosmetic surgery (2013).     Family History: family history includes Atrial fibrillation in an other family member; Developmental Disability in her son; Heart disease in her mother; Heart failure in her mother; Hyperlipidemia in her mother.     Social History: reports that she has never smoked. She has never used smokeless tobacco. She reports previous alcohol use. She reports that she does not use drugs.    Allergies: Patient has no known allergies.          Current Outpatient Medications:   •  atorvastatin (LIPITOR) 10 MG tablet, Take 1 tablet by mouth Daily., Disp: 90 tablet, Rfl: 1  •  Cholecalciferol 125 MCG (5000 UT) tablet, Take 5,000 Units by mouth Daily., Disp: , Rfl:   •  Cyanocobalamin (Vitamin B 12) 500 MCG tablet, Take 500 mcg by mouth Daily., Disp: , Rfl:   •  famotidine (PEPCID) 20 MG tablet, Take 20 mg by mouth 2 (Two) Times a Day., Disp: , Rfl:   •  furosemide (Lasix) 20 MG tablet, Take 1 tablet by mouth Daily., Disp: 90 tablet, Rfl: 1  •  Krill Oil (Omega-3) 500 MG capsule, Take 500 mg by mouth Daily., Disp: , Rfl:   •  losartan (Cozaar) 50 MG tablet, Take 1 tablet by mouth  "Daily., Disp: 90 tablet, Rfl: 1  •  multivitamin with minerals tablet tablet, Take 1 tablet by mouth Daily., Disp: , Rfl:   •  potassium chloride 10 MEQ CR tablet, Take 1 tablet by mouth Daily., Disp: 90 tablet, Rfl: 1    There are no discontinued medications.      Review of Systems   Constitutional: Negative for fever.   Respiratory: Negative for cough and shortness of breath.    Cardiovascular: Negative for chest pain, palpitations and leg swelling.   Neurological: Negative for dizziness, weakness, numbness and headache.        Objective         Vitals:    06/09/22 1334   BP: 154/76   BP Location: Right arm   Patient Position: Sitting   Cuff Size: Adult   Pulse: 92   Resp: 16   Temp: 97.8 °F (36.6 °C)   TempSrc: Infrared   SpO2: 97%   Weight: 93.4 kg (206 lb)   Height: 167.6 cm (65.98\")     Body mass index is 33.27 kg/m².         Physical Exam  Vitals reviewed.   Constitutional:       Appearance: Normal appearance. She is well-developed.   HENT:      Head: Normocephalic and atraumatic.      Mouth/Throat:      Pharynx: No oropharyngeal exudate.   Eyes:      Conjunctiva/sclera: Conjunctivae normal.      Pupils: Pupils are equal, round, and reactive to light.   Cardiovascular:      Rate and Rhythm: Normal rate and regular rhythm.      Heart sounds: Normal heart sounds. No murmur heard.    No friction rub. No gallop.   Pulmonary:      Effort: Pulmonary effort is normal.      Breath sounds: Normal breath sounds. No wheezing or rhonchi.   Abdominal:      General: Bowel sounds are normal.      Palpations: Abdomen is soft.      Tenderness: There is abdominal tenderness.          Comments: Tenderness to area     Skin:     General: Skin is warm and dry.   Neurological:      Mental Status: She is alert and oriented to person, place, and time.   Psychiatric:         Mood and Affect: Mood and affect normal.         Behavior: Behavior normal.         Thought Content: Thought content normal.         Judgment: Judgment normal. "             Result Review :               Assessment and Plan     Diagnoses and all orders for this visit:    1. Left lower quadrant abdominal pain (Primary)  -     CT Abdomen Pelvis With & Without Contrast; Future              Follow Up     No follow-ups on file.    Patient was given instructions and counseling regarding her condition or for health maintenance advice. Please see specific information pulled into the AVS if appropriate.

## 2022-06-14 ENCOUNTER — APPOINTMENT (OUTPATIENT)
Dept: ONCOLOGY | Facility: HOSPITAL | Age: 67
End: 2022-06-14

## 2022-06-14 ENCOUNTER — HOSPITAL ENCOUNTER (OUTPATIENT)
Dept: ONCOLOGY | Facility: HOSPITAL | Age: 67
Setting detail: INFUSION SERIES
Discharge: HOME | End: 2022-06-14

## 2022-06-14 VITALS
TEMPERATURE: 97.6 F | OXYGEN SATURATION: 97 % | DIASTOLIC BLOOD PRESSURE: 71 MMHG | RESPIRATION RATE: 20 BRPM | SYSTOLIC BLOOD PRESSURE: 146 MMHG | HEART RATE: 77 BPM

## 2022-06-14 DIAGNOSIS — D50.9 IRON DEFICIENCY ANEMIA, UNSPECIFIED IRON DEFICIENCY ANEMIA TYPE: ICD-10-CM

## 2022-06-14 DIAGNOSIS — K90.9 MALABSORPTION OF IRON: Primary | ICD-10-CM

## 2022-06-14 PROCEDURE — 25010000002 NA FERRIC GLUC CPLX PER 12.5 MG: Performed by: INTERNAL MEDICINE

## 2022-06-14 PROCEDURE — 96365 THER/PROPH/DIAG IV INF INIT: CPT

## 2022-06-14 RX ORDER — SODIUM CHLORIDE 9 MG/ML
250 INJECTION, SOLUTION INTRAVENOUS ONCE
Status: COMPLETED | OUTPATIENT
Start: 2022-06-14 | End: 2022-06-14

## 2022-06-14 RX ORDER — ACETAMINOPHEN 325 MG/1
650 TABLET ORAL ONCE
Status: COMPLETED | OUTPATIENT
Start: 2022-06-14 | End: 2022-06-14

## 2022-06-14 RX ADMIN — SODIUM CHLORIDE 125 MG: 9 INJECTION, SOLUTION INTRAVENOUS at 09:03

## 2022-06-14 RX ADMIN — ACETAMINOPHEN 650 MG: 325 TABLET ORAL at 08:58

## 2022-06-14 RX ADMIN — SODIUM CHLORIDE 250 ML: 9 INJECTION, SOLUTION INTRAVENOUS at 08:58

## 2022-06-21 ENCOUNTER — HOSPITAL ENCOUNTER (OUTPATIENT)
Dept: ONCOLOGY | Facility: HOSPITAL | Age: 67
Setting detail: INFUSION SERIES
Discharge: HOME | End: 2022-06-21

## 2022-06-21 VITALS
OXYGEN SATURATION: 100 % | SYSTOLIC BLOOD PRESSURE: 145 MMHG | TEMPERATURE: 98.2 F | DIASTOLIC BLOOD PRESSURE: 62 MMHG | HEART RATE: 66 BPM | RESPIRATION RATE: 18 BRPM

## 2022-06-21 DIAGNOSIS — K90.9 MALABSORPTION OF IRON: Primary | ICD-10-CM

## 2022-06-21 DIAGNOSIS — D50.9 IRON DEFICIENCY ANEMIA, UNSPECIFIED IRON DEFICIENCY ANEMIA TYPE: ICD-10-CM

## 2022-06-21 PROCEDURE — 96374 THER/PROPH/DIAG INJ IV PUSH: CPT

## 2022-06-21 PROCEDURE — 25010000002 NA FERRIC GLUC CPLX PER 12.5 MG: Performed by: INTERNAL MEDICINE

## 2022-06-21 RX ORDER — SODIUM CHLORIDE 9 MG/ML
250 INJECTION, SOLUTION INTRAVENOUS ONCE
Status: COMPLETED | OUTPATIENT
Start: 2022-06-21 | End: 2022-06-21

## 2022-06-21 RX ADMIN — SODIUM CHLORIDE 125 MG: 9 INJECTION, SOLUTION INTRAVENOUS at 14:54

## 2022-06-21 RX ADMIN — SODIUM CHLORIDE 250 ML: 9 INJECTION, SOLUTION INTRAVENOUS at 14:49

## 2022-06-22 ENCOUNTER — PATIENT MESSAGE (OUTPATIENT)
Dept: FAMILY MEDICINE CLINIC | Facility: CLINIC | Age: 67
End: 2022-06-22

## 2022-06-27 ENCOUNTER — TELEPHONE (OUTPATIENT)
Dept: FAMILY MEDICINE CLINIC | Facility: CLINIC | Age: 67
End: 2022-06-27

## 2022-06-27 NOTE — TELEPHONE ENCOUNTER
Patient called into the office and was asking for her results on her CT scan. The patient stated that she hadn't heard anything back.     Results were given, patient was made aware but still had many questions. She is concerned that she has colon or pancreatic cancer. I advised the patient that I as going to send John a message and see what the next steps are, patient declined and stated that she is already going to gastro this Wednesday, and she will just follow up with them concerning this issue.

## 2022-06-27 NOTE — TELEPHONE ENCOUNTER
LMTCB to give results of CT Abdomen/Pelvis.  Hub to read or share    Your CT shows diverticulosis that you already knew about but it did not have any inflammation or diverticulitis or colitis.  And your hiatal hernia would be in your stomach at your esophagus.  That could cause you to have some issues with digestion as far as heartburn and things of that nature.  Nothing else was acute.

## 2022-06-28 ENCOUNTER — HOSPITAL ENCOUNTER (OUTPATIENT)
Dept: ONCOLOGY | Facility: HOSPITAL | Age: 67
Setting detail: INFUSION SERIES
End: 2022-06-28

## 2022-06-28 ENCOUNTER — HOSPITAL ENCOUNTER (OUTPATIENT)
Dept: ONCOLOGY | Facility: HOSPITAL | Age: 67
Setting detail: INFUSION SERIES
Discharge: HOME | End: 2022-06-28

## 2022-06-28 VITALS
OXYGEN SATURATION: 97 % | RESPIRATION RATE: 18 BRPM | HEART RATE: 88 BPM | DIASTOLIC BLOOD PRESSURE: 55 MMHG | TEMPERATURE: 98.2 F | SYSTOLIC BLOOD PRESSURE: 144 MMHG

## 2022-06-28 DIAGNOSIS — K90.9 MALABSORPTION OF IRON: Primary | ICD-10-CM

## 2022-06-28 DIAGNOSIS — D50.9 IRON DEFICIENCY ANEMIA, UNSPECIFIED IRON DEFICIENCY ANEMIA TYPE: ICD-10-CM

## 2022-06-28 PROCEDURE — 25010000002 NA FERRIC GLUC CPLX PER 12.5 MG: Performed by: INTERNAL MEDICINE

## 2022-06-28 PROCEDURE — 96365 THER/PROPH/DIAG IV INF INIT: CPT

## 2022-06-28 RX ORDER — SODIUM CHLORIDE 9 MG/ML
250 INJECTION, SOLUTION INTRAVENOUS ONCE
Status: COMPLETED | OUTPATIENT
Start: 2022-06-28 | End: 2022-06-28

## 2022-06-28 RX ADMIN — SODIUM CHLORIDE 250 ML: 9 INJECTION, SOLUTION INTRAVENOUS at 14:38

## 2022-06-28 RX ADMIN — SODIUM CHLORIDE 125 MG: 9 INJECTION, SOLUTION INTRAVENOUS at 14:39

## 2022-06-29 ENCOUNTER — OFFICE VISIT (OUTPATIENT)
Dept: GASTROENTEROLOGY | Facility: CLINIC | Age: 67
End: 2022-06-29

## 2022-06-29 ENCOUNTER — PREP FOR SURGERY (OUTPATIENT)
Dept: SURGERY | Facility: SURGERY CENTER | Age: 67
End: 2022-06-29

## 2022-06-29 VITALS
HEIGHT: 65 IN | SYSTOLIC BLOOD PRESSURE: 147 MMHG | TEMPERATURE: 97.2 F | HEART RATE: 75 BPM | BODY MASS INDEX: 34.52 KG/M2 | OXYGEN SATURATION: 98 % | WEIGHT: 207.2 LBS | DIASTOLIC BLOOD PRESSURE: 78 MMHG

## 2022-06-29 DIAGNOSIS — K44.9 HIATAL HERNIA: ICD-10-CM

## 2022-06-29 DIAGNOSIS — D50.8 OTHER IRON DEFICIENCY ANEMIA: Primary | ICD-10-CM

## 2022-06-29 DIAGNOSIS — K63.5 POLYP OF CECUM: ICD-10-CM

## 2022-06-29 DIAGNOSIS — R14.0 BLOATING: ICD-10-CM

## 2022-06-29 DIAGNOSIS — R68.81 EARLY SATIETY: ICD-10-CM

## 2022-06-29 DIAGNOSIS — R11.0 NAUSEA: Primary | ICD-10-CM

## 2022-06-29 DIAGNOSIS — R11.0 NAUSEA: ICD-10-CM

## 2022-06-29 DIAGNOSIS — K57.30 DIVERTICULOSIS OF COLON: ICD-10-CM

## 2022-06-29 DIAGNOSIS — D50.8 OTHER IRON DEFICIENCY ANEMIA: ICD-10-CM

## 2022-06-29 PROCEDURE — 99214 OFFICE O/P EST MOD 30 MIN: CPT | Performed by: INTERNAL MEDICINE

## 2022-06-29 RX ORDER — SODIUM CHLORIDE 0.9 % (FLUSH) 0.9 %
3 SYRINGE (ML) INJECTION EVERY 12 HOURS SCHEDULED
Status: CANCELLED | OUTPATIENT
Start: 2022-06-29

## 2022-06-29 RX ORDER — SODIUM CHLORIDE 0.9 % (FLUSH) 0.9 %
10 SYRINGE (ML) INJECTION AS NEEDED
Status: CANCELLED | OUTPATIENT
Start: 2022-06-29

## 2022-06-29 RX ORDER — SODIUM CHLORIDE, SODIUM LACTATE, POTASSIUM CHLORIDE, CALCIUM CHLORIDE 600; 310; 30; 20 MG/100ML; MG/100ML; MG/100ML; MG/100ML
30 INJECTION, SOLUTION INTRAVENOUS CONTINUOUS PRN
Status: CANCELLED | OUTPATIENT
Start: 2022-06-29

## 2022-06-29 NOTE — PATIENT INSTRUCTIONS
For further evaluation of early satiety and nausea, we will order a gastric emptying study. You will be contacted to schedule this testing.    2.  For further evaluation of maira deficiency anemia, we will check your stool for blood.    3.  For further evaluation of nausea and dysphagia, we will schedule an EGD.

## 2022-06-29 NOTE — PROGRESS NOTES
"Chief Complaint   Patient presents with   • Nausea   • Difficulty Swallowing         History of Present Illness  Patient today for consultation are new and unexplained iron deficiency anemia as well as nausea and bloating.  She had recent CT showed a hiatal hernia and diverticulosis.  Colonoscopy last year showed a small cecal polyp.  H. pylori breath test was negative and celiac antibodies were negative.    She reports a history of nausea for 2 years. She reports feeling \"terrible and worn out\". She has a history of dysphagia and epigastric pressure. Dysphagia is not her most prominent symptom. She has a history of a large hiatal hernia. She reports early satiety. She is not diabetic.     She reports having LLQ abdominal discomfort for the past couple of months. She underwent a CT scan of the abdomen and pelvis on 6/17/2022. Her iron saturation was at 14% 5 months ago, most recent lab work demonstrated a normal iron panel, ferritin, and CBC. She has received iron infusions over the past few months. There has not been an identified etiology for her low iron saturation.        Result Review :       COLONOSCOPY (07/29/2021 17:16)  COLONOSCOPY (07/29/2021 17:16)  H. Pylori Breath Test - Breath, Lung (05/26/2022 07:52)  Celiac Panel Reflex To Titer (05/26/2022 07:48)  Progress Notes by John Aguilera APRN (06/09/2022 13:30)  SCANNED - IMAGING (06/17/2022)  CBC & Differential (04/08/2022 10:21)  Iron Profile (04/08/2022 10:21)  Ferritin (04/08/2022 10:21)    Vital Signs:   /78   Pulse 75   Temp 97.2 °F (36.2 °C)   Ht 165.1 cm (65\")   Wt 94 kg (207 lb 3.2 oz)   SpO2 98%   BMI 34.48 kg/m²     Body mass index is 34.48 kg/m².     Physical Exam  Vitals reviewed.   Constitutional:       Appearance: Normal appearance.   Abdominal:      General: Bowel sounds are normal. There is no distension.      Palpations: Abdomen is soft. Abdomen is not rigid. There is no mass or pulsatile mass.      Tenderness: There is no " abdominal tenderness. There is no guarding or rebound.           Assessment and Plan    Diagnoses and all orders for this visit:    1. Other iron deficiency anemia (Primary)  -     NM Gastric Emptying; Future  -     Occult Blood, Fecal By Immunoassay - Stool, Per Rectum    2. Nausea  -     NM Gastric Emptying; Future  -     Occult Blood, Fecal By Immunoassay - Stool, Per Rectum    3. Bloating  -     NM Gastric Emptying; Future  -     Occult Blood, Fecal By Immunoassay - Stool, Per Rectum    4. Polyp of cecum    5. Hiatal hernia  -     NM Gastric Emptying; Future  -     Occult Blood, Fecal By Immunoassay - Stool, Per Rectum    6. Diverticulosis of colon    7. Early satiety  -     NM Gastric Emptying; Future  -     Occult Blood, Fecal By Immunoassay - Stool, Per Rectum         BRIEF SUMMARY  Patient here for consultation.  She complains of new issues with dysphagia as well as worsening issues with nausea and unexplained iron deficiency.  We will proceed with a gastric emptying study to evaluate her nausea and will repeat her EGD with possible dilation also to assess for history of Li's esophagus and to assess for Dashawn's erosions given large hiatal hernia.  Also check stool Hemoccult.  May proceed with capsule study pending outcome of other studies.    I have reviewed and confirmed the accuracy of the HPI and Assessment and Plan as documented by the MERLE Grace        Follow Up   No follow-ups on file.    Patient Instructions   1. For further evaluation of early satiety and nausea, we will order a gastric emptying study. You will be contacted to schedule this testing.    2.  For further evaluation of maira deficiency anemia, we will check your stool for blood.    3.  For further evaluation of nausea and dysphagia, we will schedule an EGD.

## 2022-06-30 PROBLEM — R68.81 EARLY SATIETY: Status: ACTIVE | Noted: 2022-06-30

## 2022-06-30 PROBLEM — R14.0 BLOATING: Status: ACTIVE | Noted: 2022-06-30

## 2022-06-30 PROBLEM — R11.0 NAUSEA: Status: ACTIVE | Noted: 2022-06-30

## 2022-07-05 ENCOUNTER — HOSPITAL ENCOUNTER (OUTPATIENT)
Dept: ONCOLOGY | Facility: HOSPITAL | Age: 67
Setting detail: INFUSION SERIES
Discharge: HOME | End: 2022-07-05

## 2022-07-05 ENCOUNTER — APPOINTMENT (OUTPATIENT)
Dept: ONCOLOGY | Facility: HOSPITAL | Age: 67
End: 2022-07-05

## 2022-07-05 VITALS
HEART RATE: 82 BPM | RESPIRATION RATE: 16 BRPM | TEMPERATURE: 98.8 F | DIASTOLIC BLOOD PRESSURE: 60 MMHG | OXYGEN SATURATION: 96 % | SYSTOLIC BLOOD PRESSURE: 118 MMHG

## 2022-07-05 DIAGNOSIS — D50.9 IRON DEFICIENCY ANEMIA, UNSPECIFIED IRON DEFICIENCY ANEMIA TYPE: ICD-10-CM

## 2022-07-05 DIAGNOSIS — K90.9 MALABSORPTION OF IRON: Primary | ICD-10-CM

## 2022-07-05 PROCEDURE — 25010000002 NA FERRIC GLUC CPLX PER 12.5 MG: Performed by: INTERNAL MEDICINE

## 2022-07-05 PROCEDURE — 96374 THER/PROPH/DIAG INJ IV PUSH: CPT

## 2022-07-05 PROCEDURE — 96365 THER/PROPH/DIAG IV INF INIT: CPT

## 2022-07-05 RX ORDER — SODIUM CHLORIDE 9 MG/ML
250 INJECTION, SOLUTION INTRAVENOUS ONCE
Status: COMPLETED | OUTPATIENT
Start: 2022-07-05 | End: 2022-07-05

## 2022-07-05 RX ADMIN — SODIUM CHLORIDE 125 MG: 9 INJECTION, SOLUTION INTRAVENOUS at 14:43

## 2022-07-05 RX ADMIN — SODIUM CHLORIDE 250 ML: 9 INJECTION, SOLUTION INTRAVENOUS at 14:43

## 2022-07-09 LAB — HEMOCCULT STL QL IA: NEGATIVE

## 2022-07-26 ENCOUNTER — PATIENT MESSAGE (OUTPATIENT)
Dept: FAMILY MEDICINE CLINIC | Facility: CLINIC | Age: 67
End: 2022-07-26

## 2022-07-26 DIAGNOSIS — M54.50 ACUTE LOW BACK PAIN, UNSPECIFIED BACK PAIN LATERALITY, UNSPECIFIED WHETHER SCIATICA PRESENT: Primary | ICD-10-CM

## 2022-07-26 DIAGNOSIS — M79.642 LEFT HAND PAIN: ICD-10-CM

## 2022-07-26 NOTE — TELEPHONE ENCOUNTER
"From: Tatyana Shea  To: MERLE Alvarenga  Sent: 7/26/2022 6:38 AM EDT  Subject: Bone pain    Good Morning,  I have a bone in my thumb that is very painful and when I bend my thumb, it pops in and out. On the other side of my thumb is a bone that is either out of place, or swollen. It is very painful to touch. I also have lower back pain (all summer), and pain in both legs. I don't want to self-diagnose, but I have many symptoms of bone cancer. The cause of my iron anemia has not been diagnosed yet, and with the pain I currently have, could they tie together? Apparently, I no longer have Dr. Dean, and my next appointment (8/19) I see that I've been assigned a new APRN to just \"manage\" my low iron. As you know, I really want to get to the bottom of my low iron, and now the bone pain. This is all very scary. Please advise.  Thank you,  Tatyana Shea  "

## 2022-08-10 ENCOUNTER — APPOINTMENT (OUTPATIENT)
Dept: NUCLEAR MEDICINE | Facility: HOSPITAL | Age: 67
End: 2022-08-10

## 2022-08-12 ENCOUNTER — LAB (OUTPATIENT)
Dept: ONCOLOGY | Facility: HOSPITAL | Age: 67
End: 2022-08-12

## 2022-08-12 DIAGNOSIS — D50.9 IRON DEFICIENCY ANEMIA, UNSPECIFIED IRON DEFICIENCY ANEMIA TYPE: ICD-10-CM

## 2022-08-12 DIAGNOSIS — E87.6 HYPOKALEMIA: ICD-10-CM

## 2022-08-12 LAB
ALBUMIN SERPL-MCNC: 4.3 G/DL (ref 3.5–5.2)
ALBUMIN/GLOB SERPL: 1.5 G/DL
ALP SERPL-CCNC: 64 U/L (ref 39–117)
ALT SERPL W P-5'-P-CCNC: 13 U/L (ref 1–33)
ANION GAP SERPL CALCULATED.3IONS-SCNC: 12.2 MMOL/L (ref 5–15)
AST SERPL-CCNC: 14 U/L (ref 1–32)
BASOPHILS # BLD AUTO: 0.01 10*3/MM3 (ref 0–0.2)
BASOPHILS NFR BLD AUTO: 0.2 % (ref 0–1.5)
BILIRUB SERPL-MCNC: 0.3 MG/DL (ref 0–1.2)
BUN SERPL-MCNC: 14 MG/DL (ref 8–23)
BUN/CREAT SERPL: 20.6 (ref 7–25)
CALCIUM SPEC-SCNC: 9.6 MG/DL (ref 8.6–10.5)
CHLORIDE SERPL-SCNC: 101 MMOL/L (ref 98–107)
CO2 SERPL-SCNC: 26.8 MMOL/L (ref 22–29)
CREAT SERPL-MCNC: 0.68 MG/DL (ref 0.57–1)
DEPRECATED RDW RBC AUTO: 40.4 FL (ref 37–54)
EGFRCR SERPLBLD CKD-EPI 2021: 94.4 ML/MIN/1.73
EOSINOPHIL # BLD AUTO: 0.07 10*3/MM3 (ref 0–0.4)
EOSINOPHIL NFR BLD AUTO: 1.5 % (ref 0.3–6.2)
ERYTHROCYTE [DISTWIDTH] IN BLOOD BY AUTOMATED COUNT: 11.9 % (ref 12.3–15.4)
FERRITIN SERPL-MCNC: 205.2 NG/ML (ref 13–150)
GLOBULIN UR ELPH-MCNC: 2.9 GM/DL
GLUCOSE SERPL-MCNC: 87 MG/DL (ref 65–99)
HCT VFR BLD AUTO: 37.4 % (ref 34–46.6)
HGB BLD-MCNC: 13.4 G/DL (ref 12–15.9)
IMM GRANULOCYTES # BLD AUTO: 0.01 10*3/MM3 (ref 0–0.05)
IMM GRANULOCYTES NFR BLD AUTO: 0.2 % (ref 0–0.5)
IRON 24H UR-MRATE: 100 MCG/DL (ref 37–145)
IRON SATN MFR SERPL: 33 % (ref 20–50)
LYMPHOCYTES # BLD AUTO: 1.75 10*3/MM3 (ref 0.7–3.1)
LYMPHOCYTES NFR BLD AUTO: 37 % (ref 19.6–45.3)
MCH RBC QN AUTO: 33.2 PG (ref 26.6–33)
MCHC RBC AUTO-ENTMCNC: 35.8 G/DL (ref 31.5–35.7)
MCV RBC AUTO: 92.6 FL (ref 79–97)
MONOCYTES # BLD AUTO: 0.33 10*3/MM3 (ref 0.1–0.9)
MONOCYTES NFR BLD AUTO: 7 % (ref 5–12)
NEUTROPHILS NFR BLD AUTO: 2.56 10*3/MM3 (ref 1.7–7)
NEUTROPHILS NFR BLD AUTO: 54.1 % (ref 42.7–76)
NRBC BLD AUTO-RTO: 0 /100 WBC (ref 0–0.2)
PLATELET # BLD AUTO: 178 10*3/MM3 (ref 140–450)
PMV BLD AUTO: 9 FL (ref 6–12)
POTASSIUM SERPL-SCNC: 3.6 MMOL/L (ref 3.5–5.2)
PROT SERPL-MCNC: 7.2 G/DL (ref 6–8.5)
RBC # BLD AUTO: 4.04 10*6/MM3 (ref 3.77–5.28)
SODIUM SERPL-SCNC: 140 MMOL/L (ref 136–145)
TIBC SERPL-MCNC: 302 MCG/DL (ref 298–536)
TRANSFERRIN SERPL-MCNC: 203 MG/DL (ref 200–360)
WBC NRBC COR # BLD: 4.73 10*3/MM3 (ref 3.4–10.8)

## 2022-08-12 PROCEDURE — 84466 ASSAY OF TRANSFERRIN: CPT

## 2022-08-12 PROCEDURE — 36415 COLL VENOUS BLD VENIPUNCTURE: CPT

## 2022-08-12 PROCEDURE — 83540 ASSAY OF IRON: CPT

## 2022-08-12 PROCEDURE — 80053 COMPREHEN METABOLIC PANEL: CPT

## 2022-08-12 PROCEDURE — 82728 ASSAY OF FERRITIN: CPT

## 2022-08-12 PROCEDURE — 85025 COMPLETE CBC W/AUTO DIFF WBC: CPT

## 2022-08-16 ENCOUNTER — TELEPHONE (OUTPATIENT)
Dept: FAMILY MEDICINE CLINIC | Facility: CLINIC | Age: 67
End: 2022-08-16

## 2022-08-16 DIAGNOSIS — M79.642 LEFT HAND PAIN: ICD-10-CM

## 2022-08-16 DIAGNOSIS — R10.30 INGUINAL PAIN, UNSPECIFIED LATERALITY: Primary | ICD-10-CM

## 2022-08-16 NOTE — TELEPHONE ENCOUNTER
Patient went to get xrays completed. Xray of hand was cancelled, patient is still needing this completed. Are we able to reorder this?    Also patient is concerned that the xray of the lumbar spine will not show the area she is having pain from. She thinks that she has pulled a groin muscle. She is having pain at the top of her right leg going straight down.

## 2022-08-17 ENCOUNTER — TELEPHONE (OUTPATIENT)
Dept: FAMILY MEDICINE CLINIC | Facility: CLINIC | Age: 67
End: 2022-08-17

## 2022-08-17 NOTE — TELEPHONE ENCOUNTER
Went ahead and placed an order for bilateral hip x-ray.  Should be able to image all the groin and pelvis.  Make sure that it is not anything in the bone.  Could be soft tissue or ligamental strain.

## 2022-08-18 ENCOUNTER — HOSPITAL ENCOUNTER (OUTPATIENT)
Dept: GENERAL RADIOLOGY | Facility: HOSPITAL | Age: 67
Discharge: HOME | End: 2022-08-18

## 2022-08-18 DIAGNOSIS — M79.642 LEFT HAND PAIN: ICD-10-CM

## 2022-08-18 DIAGNOSIS — R10.30 INGUINAL PAIN, UNSPECIFIED LATERALITY: ICD-10-CM

## 2022-08-18 DIAGNOSIS — M54.50 ACUTE LOW BACK PAIN, UNSPECIFIED BACK PAIN LATERALITY, UNSPECIFIED WHETHER SCIATICA PRESENT: ICD-10-CM

## 2022-08-18 PROCEDURE — 73522 X-RAY EXAM HIPS BI 3-4 VIEWS: CPT

## 2022-08-18 PROCEDURE — 72110 X-RAY EXAM L-2 SPINE 4/>VWS: CPT

## 2022-08-18 PROCEDURE — 73130 X-RAY EXAM OF HAND: CPT

## 2022-08-19 ENCOUNTER — OFFICE VISIT (OUTPATIENT)
Dept: ONCOLOGY | Facility: HOSPITAL | Age: 67
End: 2022-08-19

## 2022-08-19 DIAGNOSIS — D50.9 IRON DEFICIENCY ANEMIA, UNSPECIFIED IRON DEFICIENCY ANEMIA TYPE: Primary | ICD-10-CM

## 2022-08-19 PROCEDURE — 99214 OFFICE O/P EST MOD 30 MIN: CPT | Performed by: NURSE PRACTITIONER

## 2022-08-19 PROCEDURE — G0463 HOSPITAL OUTPT CLINIC VISIT: HCPCS | Performed by: NURSE PRACTITIONER

## 2022-08-19 NOTE — PROGRESS NOTES
Chief Complaint  Anemia    John Aguilera, MERLE Aguilera, John, APR[place]        Subjective          Tatyana Shea presents to [facility] for anemia    History of Present Illness    Patient saw Dr. Dean back in April 2022. She ws given recent Ferrlecit infusions x 8 finishing these on 7/5/2022. She presents for follow up today to review her lab results. Also reports she is following with GI specialist Dr. Manjinder ABDALLA in [place] now and has plans to do further testing.     She reports she feels fatigued and rates 5/10 on scale.     Labs shows iron studies, ferritin are normalized now. Hemoglobin is in the normal range. Folate and B-12 were normal previously. Prior GI evaluation by Dr. Foster showed large HH, gastritis. Polyp was removed. Capsule endoscopy was ordered but do not see results.       Oncology/Hematology History Overview Note   HEME/ONC DX/RX/INVESTIGATIONS  DX:   Anemia, neutropenia, iron and B12 deficiency     RX:   Oral B12, started around 4/2021  Ferrous sulfate BID started around 5/2021. Changed to QOD on 6/21/2021    INVESTIGATIONS:   8/6/2020, EGD: focal intestinal metaplasia consistent with Li's esophagus, no dysplasia identified.  Fundic gland polyp.  Colonoscopy: fragments of tubular adenoma.  Colonic mucosa with lymphoid aggregates.  Rectal polyp, fragments of hyperplastic polyp.    4/9/21, B12 = 191  5/27/21  B12 = 2000, iron 44 TIBC 368 sat 12%         Review of Systems   Constitutional: Positive for fatigue.   HENT: Negative.    Eyes: Negative.    Respiratory: Negative.    Cardiovascular: Negative.    Gastrointestinal: Negative.    Endocrine: Negative.    Genitourinary: Negative.    Musculoskeletal: Negative.    Allergic/Immunologic: Negative.    Neurological: Positive for headache.   Hematological: Negative.    Psychiatric/Behavioral: Negative.    All other systems reviewed and are negative.      Current Outpatient Medications on File Prior to  Visit   Medication Sig Dispense Refill   • atorvastatin (LIPITOR) 10 MG tablet Take 1 tablet by mouth Daily. 90 tablet 1   • Cholecalciferol 125 MCG (5000 UT) tablet Take 5,000 Units by mouth Daily.     • Cyanocobalamin (Vitamin B 12) 500 MCG tablet Take 500 mcg by mouth Daily.     • famotidine (PEPCID) 20 MG tablet Take 20 mg by mouth 2 (Two) Times a Day.     • furosemide (Lasix) 20 MG tablet Take 1 tablet by mouth Daily. 90 tablet 1   • Krill Oil (Omega-3) 500 MG capsule Take 500 mg by mouth Daily.     • losartan (Cozaar) 50 MG tablet Take 1 tablet by mouth Daily. 90 tablet 1   • multivitamin with minerals tablet tablet Take 1 tablet by mouth Daily.     • potassium chloride 10 MEQ CR tablet Take 1 tablet by mouth Daily. 90 tablet 1     No current facility-administered medications on file prior to visit.       No Known Allergies  Past Medical History:   Diagnosis Date   • Acid reflux    • Diverticulitis    • Diverticulosis 15 yrs ago    Currently having some mild pain in lower left   • Gall stones    • Headache 2021   • Hiatal hernia    • HTN (hypertension)    • Iron deficiency anemia    • Obesity In my 40s    Eat for temporary comfort   • Peptic ulceration In my 40s    I had a bleeding ulcer due to my lifestyle at that time   • Pulmonary embolism (HCC) 1975    Due to birth control pills, I was told     Past Surgical History:   Procedure Laterality Date   • CHOLECYSTECTOMY     • COLONOSCOPY      2020 with     • COLONOSCOPY N/A 07/29/2021    Procedure: COLONOSCOPY with polypectomy/snare;  Surgeon: Ruel Foster MD;  Location: [facility];  Service: Gastroenterology;  Laterality: N/A;  colon polyp   • COSMETIC SURGERY  2013    Removed bags under and above my eyes   • ENDOSCOPY N/A 07/28/2021    Procedure: ESOPHAGOGASTRODUODENOSCOPY with bxs and cold snares;  Surgeon: Ruel Foster MD;  Location: [facility] ENDOSCOPY;  Service: Gastroenterology;  Laterality: N/A;  hiatal hernia  gastric polyps    • UPPER GASTROINTESTINAL ENDOSCOPY  07/28/2021    Dr. Foster     Social History     Socioeconomic History   • Marital status: Single   Tobacco Use   • Smoking status: Never Smoker   • Smokeless tobacco: Never Used   Vaping Use   • Vaping Use: Never used   Substance and Sexual Activity   • Alcohol use: Not Currently     Comment: Glass of wine a couple times a year   • Drug use: Never   • Sexual activity: Not Currently     Birth control/protection: None     Family History   Problem Relation Age of Onset   • Heart failure Mother    • Heart disease Mother         Had a heart attack, then congestive heart failure, dead in one month at 82.   • Hyperlipidemia Mother    • Developmental Disability Son         Auditory processing disorder, anxiety, language and learning disorder in math.   • Atrial fibrillation Other         UNSPECIFIED   • Colon cancer Neg Hx    • Colon polyps Neg Hx    • Crohn's disease Neg Hx    • Irritable bowel syndrome Neg Hx    • Ulcerative colitis Neg Hx      Immunization History   Administered Date(s) Administered   • COVID-19 (MODERNA) 1st, 2nd, 3rd Dose Only 02/03/2021, 02/04/2021, 03/03/2021, 03/04/2021   • COVID-19 (PFIZER) PURPLE CAP 11/22/2021   • Fluzone High-Dose 65+yrs 10/07/2021   • Influenza, Unspecified 10/13/2020   • Pneumococcal Conjugate 13-Valent (PCV13) 10/01/2019   • Tdap 07/26/2021       Objective   Physical Exam  Vitals and nursing note reviewed.   Constitutional:       Appearance: She is obese.   HENT:      Nose: Nose normal.      Mouth/Throat:      Mouth: Mucous membranes are moist.   Eyes:      Pupils: Pupils are equal, round, and reactive to light.   Cardiovascular:      Rate and Rhythm: Normal rate and regular rhythm.      Pulses: Normal pulses.      Heart sounds: Normal heart sounds.   Pulmonary:      Effort: Pulmonary effort is normal.      Breath sounds: Normal breath sounds.   Abdominal:      Palpations: Abdomen is soft.   Musculoskeletal:         General: Normal range  of motion.      Cervical back: Normal range of motion and neck supple.   Skin:     General: Skin is warm and dry.      Capillary Refill: Capillary refill takes less than 2 seconds.   Neurological:      General: No focal deficit present.      Mental Status: She is alert and oriented to person, place, and time.   Psychiatric:         Mood and Affect: Mood normal.         Thought Content: Thought content normal.         Judgment: Judgment normal.         There were no vitals filed for this visit.  ECOG score: 0         ECOG: (0) Fully Active - Able to Carry On All Pre-disease Performance Without Restriction  Fall Risk Assessment was completed, and patient is at low risk for falls.  PHQ-9 Total Score: 1       The patient is  experiencing fatigue. Fatigue score: 5    PT/OT Functional Screening: PT fx screen: No needs identified  Speech Functional Screening: Speech fx screen: No needs identified  Rehab to be ordered: Rehab to be ordered: No needs identified        Result Review :   The following data was reviewed by: MERLE Espinal on 08/19/2022:  Lab Results   Component Value Date    HGB 13.4 08/12/2022    HCT 37.4 08/12/2022    MCV 92.6 08/12/2022     08/12/2022    WBC 4.73 08/12/2022    NEUTROABS 2.56 08/12/2022    LYMPHSABS 1.75 08/12/2022    MONOSABS 0.33 08/12/2022    EOSABS 0.07 08/12/2022    BASOSABS 0.01 08/12/2022     Lab Results   Component Value Date    GLUCOSE 87 08/12/2022    BUN 14 08/12/2022    CREATININE 0.68 08/12/2022     08/12/2022    K 3.6 08/12/2022     08/12/2022    CO2 26.8 08/12/2022    CALCIUM 9.6 08/12/2022    PROTEINTOT 7.2 08/12/2022    ALBUMIN 4.30 08/12/2022    BILITOT 0.3 08/12/2022    ALKPHOS 64 08/12/2022    AST 14 08/12/2022    ALT 13 08/12/2022          Assessment and Plan    Diagnoses and all orders for this visit:    1. Iron deficiency anemia, unspecified iron deficiency anemia type (Primary)  -     Iron Profile; Future  -     Ferritin; Future  -      CBC & Differential; Future    History of iron deficiency anemia. Received 8 Ferrlecit infusions and tolerate well finishing on 7/5/22. Has adequate iron stores now and hemoglobin is normalized. Continue close follow up with Dr. Dunbar in [place] for GI evaluation. Patient reports she snores at nite. We discussed sleep study. She could have sleep apnea causing her fatigue as well or medication related.     Recheck labs in 4 months with follow up with MD.       I spent 20 minutes caring for Tatyana on this date of service. This time includes time spent by me in the following activities:preparing for the visit, reviewing tests, obtaining and/or reviewing a separately obtained history, performing a medically appropriate examination and/or evaluation , counseling and educating the patient/family/caregiver, ordering medications, tests, or procedures, referring and communicating with other health care professionals , documenting information in the medical record and independently interpreting results and communicating that information with the patient/family/caregiver    Patient was given instructions and counseling regarding her condition or for health maintenance advice. Please see specific information pulled into the AVS if appropriate.     Monica Nelson, APRN    8/19/2022

## 2022-08-22 ENCOUNTER — PATIENT MESSAGE (OUTPATIENT)
Dept: FAMILY MEDICINE CLINIC | Facility: CLINIC | Age: 67
End: 2022-08-22

## 2022-08-22 DIAGNOSIS — M79.642 LEFT HAND PAIN: Primary | ICD-10-CM

## 2022-08-24 NOTE — TELEPHONE ENCOUNTER
From: Tatyana Shea  To: MERLE Alvarenga  Sent: 8/22/2022 5:47 AM EDT  Subject: My left hand pain    Can you refer me to someone (maybe hand specialist) about my left hand/thumb pain? If anything can be done to improve it, then I want to pursue it. It has reduced the use of my thumb by at least 50%. When I  books or try to unscrew a med bottle, I am in great pain. There is a bone in my thumb that sticks out more than my other thumb and when I put any pressure on it, it greatly hurts.  Thanks,  Tatyana Shea

## 2022-08-25 PROCEDURE — U0004 COV-19 TEST NON-CDC HGH THRU: HCPCS | Performed by: PHYSICIAN ASSISTANT

## 2022-09-13 ENCOUNTER — OFFICE VISIT (OUTPATIENT)
Dept: ORTHOPEDIC SURGERY | Facility: CLINIC | Age: 67
End: 2022-09-13

## 2022-09-13 ENCOUNTER — PREP FOR SURGERY (OUTPATIENT)
Dept: OTHER | Facility: HOSPITAL | Age: 67
End: 2022-09-13

## 2022-09-13 VITALS — HEIGHT: 65 IN | HEART RATE: 67 BPM | BODY MASS INDEX: 34.49 KG/M2 | OXYGEN SATURATION: 99 % | WEIGHT: 207 LBS

## 2022-09-13 DIAGNOSIS — R22.32 MASS OF SKIN OF LEFT THUMB: ICD-10-CM

## 2022-09-13 DIAGNOSIS — M79.642 LEFT HAND PAIN: Primary | ICD-10-CM

## 2022-09-13 DIAGNOSIS — M65.312 TRIGGER FINGER OF LEFT THUMB: Primary | ICD-10-CM

## 2022-09-13 DIAGNOSIS — M65.312 TRIGGER FINGER OF LEFT THUMB: ICD-10-CM

## 2022-09-13 PROCEDURE — 99203 OFFICE O/P NEW LOW 30 MIN: CPT | Performed by: ORTHOPAEDIC SURGERY

## 2022-09-13 RX ORDER — CEFAZOLIN SODIUM IN 0.9 % NACL 3 G/100 ML
3 INTRAVENOUS SOLUTION, PIGGYBACK (ML) INTRAVENOUS ONCE
Status: CANCELLED | OUTPATIENT
Start: 2022-09-13 | End: 2022-09-13

## 2022-09-13 RX ORDER — CEFAZOLIN SODIUM 2 G/100ML
2 INJECTION, SOLUTION INTRAVENOUS ONCE
Status: CANCELLED | OUTPATIENT
Start: 2022-09-13 | End: 2022-09-13

## 2022-09-13 NOTE — PROGRESS NOTES
"Chief Complaint  Initial Evaluation of the Left Hand     Subjective      Tatyana Shea presents to [facility] for an evaluation of left hand. Patient states pain at the palm of the thumb, she feels snapping of the thumb as well with motion. She also has a mass on the thumb. One morning she had difficulty with bending the thumb. This has been ongoing for some time. Symptoms progressively worsened with time.     No Known Allergies     Social History     Socioeconomic History   • Marital status: Single   Tobacco Use   • Smoking status: Never Smoker   • Smokeless tobacco: Never Used   Vaping Use   • Vaping Use: Never used   Substance and Sexual Activity   • Alcohol use: Not Currently     Comment: Glass of wine a couple times a year   • Drug use: Never   • Sexual activity: Not Currently     Birth control/protection: None        Review of Systems     Objective   Vital Signs:   Pulse 67   Ht 165.1 cm (65\")   Wt 93.9 kg (207 lb)   SpO2 99%   BMI 34.45 kg/m²       Physical Exam  Constitutional:       Appearance: Normal appearance. Patient is well-developed and normal weight.   HENT:      Head: Normocephalic.      Right Ear: Hearing and external ear normal.      Left Ear: Hearing and external ear normal.      Nose: Nose normal.   Eyes:      Conjunctiva/sclera: Conjunctivae normal.   Cardiovascular:      Rate and Rhythm: Normal rate.   Pulmonary:      Effort: Pulmonary effort is normal.      Breath sounds: No wheezing or rales.   Abdominal:      Palpations: Abdomen is soft.      Tenderness: There is no abdominal tenderness.   Musculoskeletal:      Cervical back: Normal range of motion.   Skin:     Findings: No rash.   Neurological:      Mental Status: Patient is alert and oriented to person, place, and time.   Psychiatric:         Mood and Affect: Mood and affect normal.         Judgment: Judgment normal.       Ortho Exam      LEFT HAND: Tender A1 pulley. Swelling of A1 pulley. Positive " triggering of the left thumb. Sensation grossly intact. Neurovascular intact.  Mass about 1 cm in size on the dorsum of the thumb. Skin intact. Full wrist flexion and extension. Intact thumb motion with pain.     Procedures      Imaging Results (Most Recent)     Procedure Component Value Units Date/Time    XR Hand 2 View Left [[id number]] Resulted: 09/13/22 1532     Updated: 09/13/22 1520           Result Review :     X-Ray Report:  left hand pain  X-Ray  Indication: Evaluation of left hand pain   AP and Lateral view(s)  Findings: Mild interphalangeal osteoarthritis. No fractures.   Prior studies available for comparison: no            Assessment and Plan     Diagnoses and all orders for this visit:    1. Left hand pain (Primary)  -     XR Hand 2 View Left    2. Mass of skin of left thumb    3. Trigger finger of left thumb        Discussed trigger thumb release versus injection. Patient wishes to proceed with trigger thumb release.     Discussed surgery., Risks/benefits discussed with patient including, but not limited to: infection, bleeding, neurovascular damage, re-rupture, aesthetic deformity, need for further surgery, and death. and Surgery pamphlet given.    Follow Up     Post-operatively.       Patient was given instructions and counseling regarding her condition or for health maintenance advice. Please see specific information pulled into the AVS if appropriate.     Scribed for Inés Rashid MD by Janette Hennessy.  09/13/22   15:26 EDT    I have personally performed the services described in this document as scribed by the above individual and it is both accurate and complete. Inés Rashid MD 09/13/22

## 2022-10-10 ENCOUNTER — HOSPITAL ENCOUNTER (OUTPATIENT)
Dept: NUCLEAR MEDICINE | Facility: HOSPITAL | Age: 67
Discharge: HOME | End: 2022-10-10
Admitting: NURSE PRACTITIONER

## 2022-10-10 DIAGNOSIS — R11.0 NAUSEA: ICD-10-CM

## 2022-10-10 DIAGNOSIS — R14.0 BLOATING: ICD-10-CM

## 2022-10-10 DIAGNOSIS — K44.9 HIATAL HERNIA: ICD-10-CM

## 2022-10-10 DIAGNOSIS — R68.81 EARLY SATIETY: ICD-10-CM

## 2022-10-10 DIAGNOSIS — D50.8 OTHER IRON DEFICIENCY ANEMIA: ICD-10-CM

## 2022-10-10 PROCEDURE — 0 TECHNETIUM SULFUR COLLOID: Performed by: NURSE PRACTITIONER

## 2022-10-10 PROCEDURE — 78264 GASTRIC EMPTYING IMG STUDY: CPT

## 2022-10-10 PROCEDURE — A9541 TC99M SULFUR COLLOID: HCPCS | Performed by: NURSE PRACTITIONER

## 2022-10-10 RX ADMIN — TECHNETIUM TC 99M SULFUR COLLOID 1 DOSE: KIT at 08:15

## 2022-10-21 ENCOUNTER — OUTSIDE FACILITY SERVICE (OUTPATIENT)
Dept: GASTROENTEROLOGY | Facility: CLINIC | Age: 67
End: 2022-10-21

## 2022-10-21 PROCEDURE — 43249 ESOPH EGD DILATION <30 MM: CPT | Performed by: INTERNAL MEDICINE

## 2022-10-21 PROCEDURE — 43239 EGD BIOPSY SINGLE/MULTIPLE: CPT | Performed by: INTERNAL MEDICINE

## 2022-10-21 RX ORDER — PANTOPRAZOLE SODIUM 40 MG/1
40 TABLET, DELAYED RELEASE ORAL DAILY
Qty: 30 TABLET | Refills: 2 | Status: SHIPPED | OUTPATIENT
Start: 2022-10-21 | End: 2023-01-23

## 2022-11-01 DIAGNOSIS — R19.8 ABNORMAL FINDINGS ON ESOPHAGOGASTRODUODENOSCOPY (EGD): Primary | ICD-10-CM

## 2022-11-03 ENCOUNTER — TELEPHONE (OUTPATIENT)
Dept: GASTROENTEROLOGY | Facility: CLINIC | Age: 67
End: 2022-11-03

## 2022-11-03 NOTE — TELEPHONE ENCOUNTER
Patient called wanting results for procedure done by Dr. Dunbar on 10/21. Path is in the chart but has not been reviewed.

## 2022-11-08 ENCOUNTER — TELEPHONE (OUTPATIENT)
Dept: GASTROENTEROLOGY | Facility: CLINIC | Age: 67
End: 2022-11-08

## 2022-11-10 ENCOUNTER — TELEPHONE (OUTPATIENT)
Dept: GASTROENTEROLOGY | Facility: CLINIC | Age: 67
End: 2022-11-10

## 2022-11-30 ENCOUNTER — OFFICE VISIT (OUTPATIENT)
Dept: FAMILY MEDICINE CLINIC | Facility: CLINIC | Age: 67
End: 2022-11-30

## 2022-11-30 VITALS
OXYGEN SATURATION: 97 % | SYSTOLIC BLOOD PRESSURE: 156 MMHG | HEART RATE: 95 BPM | TEMPERATURE: 97.6 F | BODY MASS INDEX: 36.25 KG/M2 | RESPIRATION RATE: 16 BRPM | DIASTOLIC BLOOD PRESSURE: 80 MMHG | HEIGHT: 65 IN | WEIGHT: 217.6 LBS

## 2022-11-30 DIAGNOSIS — I73.9 INTERMITTENT CLAUDICATION: ICD-10-CM

## 2022-11-30 DIAGNOSIS — I10 PRIMARY HYPERTENSION: Primary | ICD-10-CM

## 2022-11-30 DIAGNOSIS — Z82.49 FAMILY HISTORY OF MI (MYOCARDIAL INFARCTION): ICD-10-CM

## 2022-11-30 DIAGNOSIS — E87.6 HYPOKALEMIA: ICD-10-CM

## 2022-11-30 DIAGNOSIS — Q84.6 NAIL ANOMALY: ICD-10-CM

## 2022-11-30 DIAGNOSIS — D50.9 IRON DEFICIENCY ANEMIA, UNSPECIFIED IRON DEFICIENCY ANEMIA TYPE: ICD-10-CM

## 2022-11-30 DIAGNOSIS — I10 ESSENTIAL HYPERTENSION: ICD-10-CM

## 2022-11-30 PROCEDURE — 99214 OFFICE O/P EST MOD 30 MIN: CPT | Performed by: NURSE PRACTITIONER

## 2022-11-30 RX ORDER — POTASSIUM CHLORIDE 750 MG/1
10 TABLET, FILM COATED, EXTENDED RELEASE ORAL DAILY
Qty: 90 TABLET | Refills: 1 | Status: SHIPPED | OUTPATIENT
Start: 2022-11-30

## 2022-11-30 RX ORDER — FAMOTIDINE 20 MG/1
20 TABLET, FILM COATED ORAL 2 TIMES DAILY
Qty: 180 TABLET | Refills: 1 | Status: SHIPPED | OUTPATIENT
Start: 2022-11-30

## 2022-11-30 RX ORDER — LOSARTAN POTASSIUM 50 MG/1
50 TABLET ORAL 2 TIMES DAILY
Qty: 180 TABLET | Refills: 1 | Status: SHIPPED | OUTPATIENT
Start: 2022-11-30 | End: 2022-12-29 | Stop reason: SDUPTHER

## 2022-11-30 RX ORDER — FUROSEMIDE 20 MG/1
20 TABLET ORAL 2 TIMES DAILY
Qty: 180 TABLET | Refills: 1 | Status: SHIPPED | OUTPATIENT
Start: 2022-11-30 | End: 2022-12-29 | Stop reason: SDUPTHER

## 2022-11-30 NOTE — PROGRESS NOTES
Chief Complaint  Hypertension (Bp has been up, would like bp med and diaretic together if she is having to take both), Anemia (Had IV this summer and has lab f/u for it soon), Leg Pain (Right - from groin down. Feels she hurt it this summer while pushing carpet), Dizziness (Still having dizziness), and ingrown thumb nail    Subjective        Tatyana Shea presents to [facility]  History of Present Illness  Headache that is continuing every day upon awakening with nausea.      Hypertension:  Blood pressure is fluctuating.  The water pill she is on  Is still causing edema and is urinating more with this medication than lasix.  Is still on potassium for hypokalemia.    Iron deficiency is still seeing hematology    Right groin pain and will start after walking awhile.            Past History:    Medical History: has a past medical history of Acid reflux, Diverticulitis, Diverticulosis (15 yrs ago), Gall stones, Headache (2021), Hiatal hernia, HTN (hypertension), Iron deficiency anemia, Obesity (In my 40s), Peptic ulceration (In my 40s), and Pulmonary embolism (HCC) (1975).     Surgical History: has a past surgical history that includes Cholecystectomy; Colonoscopy; Colonoscopy (N/A, 07/29/2021); Upper gastrointestinal endoscopy (07/28/2021); Esophagogastroduodenoscopy (N/A, 07/28/2021); and Cosmetic surgery (2013).     Family History: family history includes Atrial fibrillation in an other family member; Developmental Disability in her son; Heart disease in her mother; Heart failure in her mother; Hyperlipidemia in her mother.     Social History: reports that she has never smoked. She has never used smokeless tobacco. She reports that she does not currently use alcohol. She reports that she does not use drugs.    Allergies: Patient has no known allergies.          Current Outpatient Medications:   •  atorvastatin (LIPITOR) 10 MG tablet, Take 1 tablet by mouth Daily., Disp: 90 tablet,  "Rfl: 1  •  Cholecalciferol 125 MCG (5000 UT) tablet, Take 5,000 Units by mouth Daily., Disp: , Rfl:   •  famotidine (PEPCID) 20 MG tablet, Take 20 mg by mouth 2 (Two) Times a Day., Disp: , Rfl:   •  furosemide (Lasix) 20 MG tablet, Take 1 tablet by mouth Daily., Disp: 90 tablet, Rfl: 1  •  Krill Oil (Omega-3) 500 MG capsule, Take 500 mg by mouth Daily., Disp: , Rfl:   •  losartan (Cozaar) 50 MG tablet, Take 1 tablet by mouth Daily., Disp: 90 tablet, Rfl: 1  •  multivitamin with minerals tablet tablet, Take 1 tablet by mouth Daily., Disp: , Rfl:   •  pantoprazole (PROTONIX) 40 MG EC tablet, Take 1 tablet by mouth Daily., Disp: 30 tablet, Rfl: 2  •  potassium chloride 10 MEQ CR tablet, Take 1 tablet by mouth Daily., Disp: 90 tablet, Rfl: 1    Medications Discontinued During This Encounter   Medication Reason   • Cyanocobalamin (Vitamin B 12) 500 MCG tablet *Therapy completed         Review of Systems   Constitutional: Negative for fever.   Respiratory: Negative for cough and shortness of breath.    Cardiovascular: Negative for chest pain, palpitations and leg swelling.   Neurological: Negative for dizziness, weakness, numbness and headache.        Objective         Vitals:    11/30/22 1512   BP: 174/80   BP Location: Right arm   Patient Position: Sitting   Cuff Size: Adult   Pulse: 95   Resp: 16   Temp: 97.6 °F (36.4 °C)   TempSrc: Temporal   SpO2: 97%   Weight: 98.7 kg (217 lb 9.6 oz)   Height: 165.1 cm (65\")     Body mass index is 36.21 kg/m².         Physical Exam  Vitals reviewed.   Constitutional:       Appearance: Normal appearance. She is well-developed.   HENT:      Head: Normocephalic and atraumatic.      Mouth/Throat:      Pharynx: No oropharyngeal exudate.   Eyes:      Conjunctiva/sclera: Conjunctivae normal.      Pupils: Pupils are equal, round, and reactive to light.   Cardiovascular:      Rate and Rhythm: Normal rate and regular rhythm.      Heart sounds: Normal heart sounds. No murmur heard.    No " friction rub. No gallop.   Pulmonary:      Effort: Pulmonary effort is normal.      Breath sounds: Normal breath sounds. No wheezing or rhonchi.   Skin:     General: Skin is warm and dry.   Neurological:      Mental Status: She is alert and oriented to person, place, and time.   Psychiatric:         Mood and Affect: Mood and affect normal.         Behavior: Behavior normal.         Thought Content: Thought content normal.         Judgment: Judgment normal.             Result Review :               Assessment and Plan     Diagnoses and all orders for this visit:    1. Primary hypertension (Primary)  -     Comprehensive metabolic panel; Future  -     Lipid Panel w/ Chol/HDL Ratio; Future  -     Urinalysis With Culture If Indicated -; Future  -     Ambulatory Referral to Cardiology    2. Family history of MI (myocardial infarction)  -     Ambulatory Referral to Cardiology    3. Nail anomaly  -     Ambulatory Referral to Dermatology    4. Intermittent claudication (HCC)  -     Doppler Arterial Lower Extremity Stress CAR; Future    5. Iron deficiency anemia, unspecified iron deficiency anemia type        Will increase losartan to 2 a day and will try double lasix on Friday to see how she tolerates with urination.  If blood pressure better with double lasix will go back to 1 losartan.      Follow Up     Return in about 6 months (around 5/30/2023).    Patient was given instructions and counseling regarding her condition or for health maintenance advice. Please see specific information pulled into the AVS if appropriate.

## 2022-12-14 ENCOUNTER — HOSPITAL ENCOUNTER (OUTPATIENT)
Dept: CARDIOLOGY | Facility: HOSPITAL | Age: 67
Discharge: HOME | End: 2022-12-14
Admitting: NURSE PRACTITIONER

## 2022-12-14 DIAGNOSIS — I73.9 INTERMITTENT CLAUDICATION: ICD-10-CM

## 2022-12-14 LAB
BH CV LOWER ARTERIAL LEFT ABI RATIO: 1.12
BH CV LOWER ARTERIAL LEFT DORSALIS PEDIS SYS MAX: 195
BH CV LOWER ARTERIAL LEFT GREAT TOE SYS MAX: 167
BH CV LOWER ARTERIAL LEFT LOW THIGH SYS MAX: 197
BH CV LOWER ARTERIAL LEFT POPLITEAL SYS MAX: 226
BH CV LOWER ARTERIAL LEFT POST TIBIAL SYS MAX: 200
BH CV LOWER ARTERIAL LEFT TBI RATIO: 0.94
BH CV LOWER ARTERIAL RIGHT ABI RATIO: 1.12
BH CV LOWER ARTERIAL RIGHT DORSALIS PEDIS SYS MAX: 200
BH CV LOWER ARTERIAL RIGHT GREAT TOE SYS MAX: 154
BH CV LOWER ARTERIAL RIGHT LOW THIGH SYS MAX: 194
BH CV LOWER ARTERIAL RIGHT POPLITEAL SYS MAX: 203
BH CV LOWER ARTERIAL RIGHT POST TIBIAL SYS MAX: 187
BH CV LOWER ARTERIAL RIGHT TBI RATIO: 0.87
MAXIMAL PREDICTED HEART RATE: 151 BPM
STRESS TARGET HR: 128 BPM
UPPER ARTERIAL LEFT ARM BRACHIAL SYS MAX: 175 MMHG
UPPER ARTERIAL RIGHT ARM BRACHIAL SYS MAX: 178 MMHG

## 2022-12-14 PROCEDURE — 93924 LWR XTR VASC STDY BILAT: CPT

## 2022-12-14 PROCEDURE — 93924 LWR XTR VASC STDY BILAT: CPT | Performed by: SURGERY

## 2022-12-19 ENCOUNTER — TELEPHONE (OUTPATIENT)
Dept: FAMILY MEDICINE CLINIC | Facility: CLINIC | Age: 67
End: 2022-12-19

## 2022-12-19 ENCOUNTER — LAB (OUTPATIENT)
Dept: ONCOLOGY | Facility: HOSPITAL | Age: 67
End: 2022-12-19

## 2022-12-19 DIAGNOSIS — D50.9 IRON DEFICIENCY ANEMIA, UNSPECIFIED IRON DEFICIENCY ANEMIA TYPE: ICD-10-CM

## 2022-12-19 LAB
BASOPHILS # BLD AUTO: 0.01 10*3/MM3 (ref 0–0.2)
BASOPHILS NFR BLD AUTO: 0.2 % (ref 0–1.5)
DEPRECATED RDW RBC AUTO: 41.1 FL (ref 37–54)
EOSINOPHIL # BLD AUTO: 0.05 10*3/MM3 (ref 0–0.4)
EOSINOPHIL NFR BLD AUTO: 1.2 % (ref 0.3–6.2)
ERYTHROCYTE [DISTWIDTH] IN BLOOD BY AUTOMATED COUNT: 11.8 % (ref 12.3–15.4)
FERRITIN SERPL-MCNC: 107.8 NG/ML (ref 13–150)
HCT VFR BLD AUTO: 37.3 % (ref 34–46.6)
HGB BLD-MCNC: 13.3 G/DL (ref 12–15.9)
IMM GRANULOCYTES # BLD AUTO: 0 10*3/MM3 (ref 0–0.05)
IMM GRANULOCYTES NFR BLD AUTO: 0 % (ref 0–0.5)
IRON 24H UR-MRATE: 109 MCG/DL (ref 37–145)
IRON SATN MFR SERPL: 33 % (ref 20–50)
LYMPHOCYTES # BLD AUTO: 1.39 10*3/MM3 (ref 0.7–3.1)
LYMPHOCYTES NFR BLD AUTO: 33.3 % (ref 19.6–45.3)
MCH RBC QN AUTO: 33.3 PG (ref 26.6–33)
MCHC RBC AUTO-ENTMCNC: 35.7 G/DL (ref 31.5–35.7)
MCV RBC AUTO: 93.3 FL (ref 79–97)
MONOCYTES # BLD AUTO: 0.3 10*3/MM3 (ref 0.1–0.9)
MONOCYTES NFR BLD AUTO: 7.2 % (ref 5–12)
NEUTROPHILS NFR BLD AUTO: 2.42 10*3/MM3 (ref 1.7–7)
NEUTROPHILS NFR BLD AUTO: 58.1 % (ref 42.7–76)
PLATELET # BLD AUTO: 178 10*3/MM3 (ref 140–450)
PMV BLD AUTO: 8.7 FL (ref 6–12)
RBC # BLD AUTO: 4 10*6/MM3 (ref 3.77–5.28)
TIBC SERPL-MCNC: 326 MCG/DL (ref 298–536)
TRANSFERRIN SERPL-MCNC: 219 MG/DL (ref 200–360)
WBC NRBC COR # BLD: 4.17 10*3/MM3 (ref 3.4–10.8)

## 2022-12-19 PROCEDURE — 36415 COLL VENOUS BLD VENIPUNCTURE: CPT

## 2022-12-19 PROCEDURE — 85025 COMPLETE CBC W/AUTO DIFF WBC: CPT

## 2022-12-19 PROCEDURE — 84466 ASSAY OF TRANSFERRIN: CPT

## 2022-12-19 PROCEDURE — 83540 ASSAY OF IRON: CPT

## 2022-12-19 PROCEDURE — 82728 ASSAY OF FERRITIN: CPT

## 2022-12-20 ENCOUNTER — OFFICE VISIT (OUTPATIENT)
Dept: GASTROENTEROLOGY | Facility: CLINIC | Age: 67
End: 2022-12-20

## 2022-12-20 VITALS
HEART RATE: 80 BPM | BODY MASS INDEX: 36.24 KG/M2 | DIASTOLIC BLOOD PRESSURE: 82 MMHG | HEIGHT: 65 IN | SYSTOLIC BLOOD PRESSURE: 160 MMHG | WEIGHT: 217.5 LBS | TEMPERATURE: 98.7 F | OXYGEN SATURATION: 98 %

## 2022-12-20 DIAGNOSIS — K44.9 HIATAL HERNIA: Chronic | ICD-10-CM

## 2022-12-20 DIAGNOSIS — R10.13 EPIGASTRIC PAIN: Chronic | ICD-10-CM

## 2022-12-20 DIAGNOSIS — K21.9 GASTROESOPHAGEAL REFLUX DISEASE, UNSPECIFIED WHETHER ESOPHAGITIS PRESENT: Primary | Chronic | ICD-10-CM

## 2022-12-20 PROCEDURE — 99214 OFFICE O/P EST MOD 30 MIN: CPT | Performed by: NURSE PRACTITIONER

## 2022-12-20 NOTE — PATIENT INSTRUCTIONS
For GERD and hiatal hernia, we recommend continuing pantoprazole 40 mg daily. We recommend that you change the timing of your medication and take it 1 hour before your evening meal.    2.   Continue antireflux precautions such as avoiding eating 3-4 hours before bedtime and sleeping with the head of the bed slightly elevated.     3.    We also recommend liquid Gaviscon OTC. This is available at  your local grocery or pharmacy. You may follow package instructions for use and will likely benefit by taking this at bedtime.     4.   Recommend follow up telephone visit in 3 months for reassessment of symptoms.

## 2022-12-20 NOTE — PROGRESS NOTES
Chief Complaint   Patient presents with   • Follow-up   • Heartburn     After EGD, 8 cm HH         History of Present Illness  67-year-old female presents the office today for follow-up.  She was last seen in office on 6/29/2022.  She is a history of a hiatal hernia, iron deficiency anemia, nausea, bloating, colon polyps.  She underwent EGD on 10/21/2022 revealing an 8 cm hiatal hernia, erosive gastropathy, and a mild Schatzki's ring that was traversed and dilated.  She has questions regarding the use of pantoprazole and famotidine.    She reports having heartburn and epigastric pain daily. Symptoms are worse at night.  She sleeps with the head of her bed elevated and avoids eating 3 to 4 hours before bedtime.  She currently takes her pantoprazole around 8 PM, which is after dinner.  She reports epigastric discomfort that occurs on an almost daily basis.  Recent CBC demonstrated normal H/H.  Her iron profile was also within normal limits as well as her ferritin level.  It is likely that her gastric erosions were the source of her anemia.  She denies any nausea, vomiting, or dysphagia.    Last colonoscopy was performed on 7/29/2021 revealing 1 benign 3 mm polyp in the colon.  No evidence of adenomatous tissue on biopsy.    Review of Systems   Constitutional: Negative for fever and unexpected weight change.   HENT: Negative for trouble swallowing.    Cardiovascular: Negative for chest pain.   Gastrointestinal: Positive for abdominal pain. Negative for abdominal distention, anal bleeding, blood in stool, constipation, diarrhea, nausea, rectal pain and vomiting.         Result Review :       Office Visit with Marcelo Dunbar MD (06/29/2022)  ENDOSCOPY, INT (10/21/2022)  SCANNED PATHOLOGY (10/21/2022)  COLONOSCOPY (07/29/2021 17:16)  CBC & Differential (12/19/2022 08:34)  Ferritin (12/19/2022 08:34)  Iron Profile (12/19/2022 08:34)  Celiac Panel Reflex To Titer (05/26/2022 07:48)  Tissue Pathology Exam (07/29/2021  "17:38)    Vital Signs:   /82   Pulse 80   Temp 98.7 °F (37.1 °C)   Ht 165.1 cm (65\")   Wt 98.7 kg (217 lb 8 oz)   SpO2 98%   BMI 36.19 kg/m²     Body mass index is 36.19 kg/m².     Physical Exam  Vitals reviewed.   Constitutional:       Appearance: Normal appearance.   Pulmonary:      Effort: Pulmonary effort is normal. No respiratory distress.   Abdominal:      General: Abdomen is flat. Bowel sounds are normal. There is no distension.      Palpations: Abdomen is soft. There is no mass.      Tenderness: There is no guarding.   Musculoskeletal:         General: Normal range of motion.   Skin:     General: Skin is warm and dry.   Neurological:      General: No focal deficit present.      Mental Status: She is alert and oriented to person, place, and time.   Psychiatric:         Mood and Affect: Mood normal.         Behavior: Behavior normal.         Thought Content: Thought content normal.         Judgment: Judgment normal.       Assessment and Plan    Diagnoses and all orders for this visit:    1. Gastroesophageal reflux disease, unspecified whether esophagitis present (Primary)    2. Hiatal hernia  Comments:  8 cm    3. Epigastric pain            Patient Instructions   1.  For GERD and hiatal hernia, we recommend continuing pantoprazole 40 mg daily. We recommend that you change the timing of your medication and take it 1 hour before your evening meal.    2.   Continue antireflux precautions such as avoiding eating 3-4 hours before bedtime and sleeping with the head of the bed slightly elevated.     3.    We also recommend liquid Gaviscon OTC. This is available at  your local grocery or pharmacy. You may follow package instructions for use and will likely benefit by taking this at bedtime.     4.   Recommend follow up telephone visit in 3 months for reassessment of symptoms.      Discussion:    EGD findings and pathology reviewed with patient today during her office visit.  Discussion was held regarding " use of pantoprazole versus famotidine and how the medications work.  Patient continues to report epigastric discomfort; however, she has taken her medication in a cold hours after her evening meal.  We will have her change the timing of her medication and take it 1 to 2 hours before her evening meal.  We have also recommended the use of liquid Gaviscon at bedtime to see if this helps improve her evening symptoms.  We will plan to reassess symptoms again in 3 months.  May need to consider adding famotidine back into her regimen and/or use of sucralfate.  Continue to implement antireflux precautions.  Patient verbalized understanding of above plan of care and is in agreement.  All questions answered and support provided.     EMR Dragon/Transcription Disclaimer:  This document has been Dictated utilizing Dragon dictation.

## 2022-12-21 ENCOUNTER — HOSPITAL ENCOUNTER (OUTPATIENT)
Facility: HOSPITAL | Age: 67
Setting detail: HOSPITAL OUTPATIENT SURGERY
Discharge: HOME | End: 2022-12-21
Attending: ORTHOPAEDIC SURGERY | Admitting: ORTHOPAEDIC SURGERY

## 2022-12-21 ENCOUNTER — ANESTHESIA (OUTPATIENT)
Dept: PERIOP | Facility: HOSPITAL | Age: 67
End: 2022-12-21

## 2022-12-21 ENCOUNTER — ANESTHESIA EVENT (OUTPATIENT)
Dept: PERIOP | Facility: HOSPITAL | Age: 67
End: 2022-12-21

## 2022-12-21 VITALS
HEART RATE: 65 BPM | HEIGHT: 66 IN | OXYGEN SATURATION: 100 % | SYSTOLIC BLOOD PRESSURE: 155 MMHG | RESPIRATION RATE: 12 BRPM | DIASTOLIC BLOOD PRESSURE: 77 MMHG | TEMPERATURE: 96.9 F | WEIGHT: 218.03 LBS | BODY MASS INDEX: 35.04 KG/M2

## 2022-12-21 DIAGNOSIS — M65.312 TRIGGER FINGER OF LEFT THUMB: ICD-10-CM

## 2022-12-21 PROCEDURE — 25010000002 PROPOFOL 10 MG/ML EMULSION: Performed by: NURSE ANESTHETIST, CERTIFIED REGISTERED

## 2022-12-21 PROCEDURE — 25010000002 MIDAZOLAM PER 1 MG: Performed by: ANESTHESIOLOGY

## 2022-12-21 PROCEDURE — 26055 INCISE FINGER TENDON SHEATH: CPT | Performed by: ORTHOPAEDIC SURGERY

## 2022-12-21 PROCEDURE — 25010000002 CEFAZOLIN IN DEXTROSE 2-4 GM/100ML-% SOLUTION: Performed by: ORTHOPAEDIC SURGERY

## 2022-12-21 PROCEDURE — 25010000002 FENTANYL CITRATE (PF) 50 MCG/ML SOLUTION: Performed by: NURSE ANESTHETIST, CERTIFIED REGISTERED

## 2022-12-21 RX ORDER — ACETAMINOPHEN 500 MG
1000 TABLET ORAL ONCE
Status: COMPLETED | OUTPATIENT
Start: 2022-12-21 | End: 2022-12-21

## 2022-12-21 RX ORDER — FENTANYL CITRATE 50 UG/ML
INJECTION, SOLUTION INTRAMUSCULAR; INTRAVENOUS AS NEEDED
Status: DISCONTINUED | OUTPATIENT
Start: 2022-12-21 | End: 2022-12-21 | Stop reason: SURG

## 2022-12-21 RX ORDER — SODIUM CHLORIDE, SODIUM LACTATE, POTASSIUM CHLORIDE, CALCIUM CHLORIDE 600; 310; 30; 20 MG/100ML; MG/100ML; MG/100ML; MG/100ML
9 INJECTION, SOLUTION INTRAVENOUS CONTINUOUS PRN
Status: DISCONTINUED | OUTPATIENT
Start: 2022-12-21 | End: 2022-12-21 | Stop reason: HOSPADM

## 2022-12-21 RX ORDER — HYDROCODONE BITARTRATE AND ACETAMINOPHEN 7.5; 325 MG/1; MG/1
1 TABLET ORAL ONCE AS NEEDED
Status: DISCONTINUED | OUTPATIENT
Start: 2022-12-21 | End: 2022-12-21 | Stop reason: HOSPADM

## 2022-12-21 RX ORDER — LIDOCAINE HYDROCHLORIDE 20 MG/ML
INJECTION, SOLUTION EPIDURAL; INFILTRATION; INTRACAUDAL; PERINEURAL AS NEEDED
Status: DISCONTINUED | OUTPATIENT
Start: 2022-12-21 | End: 2022-12-21 | Stop reason: SURG

## 2022-12-21 RX ORDER — BUPIVACAINE HYDROCHLORIDE 5 MG/ML
INJECTION, SOLUTION EPIDURAL; INTRACAUDAL AS NEEDED
Status: DISCONTINUED | OUTPATIENT
Start: 2022-12-21 | End: 2022-12-21 | Stop reason: HOSPADM

## 2022-12-21 RX ORDER — CEFAZOLIN SODIUM IN 0.9 % NACL 3 G/100 ML
3 INTRAVENOUS SOLUTION, PIGGYBACK (ML) INTRAVENOUS ONCE
Status: DISCONTINUED | OUTPATIENT
Start: 2022-12-21 | End: 2022-12-21 | Stop reason: DRUGHIGH

## 2022-12-21 RX ORDER — MIDAZOLAM HYDROCHLORIDE 1 MG/ML
1 INJECTION INTRAMUSCULAR; INTRAVENOUS ONCE
Status: COMPLETED | OUTPATIENT
Start: 2022-12-21 | End: 2022-12-21

## 2022-12-21 RX ORDER — HYDROCODONE BITARTRATE AND ACETAMINOPHEN 7.5; 325 MG/1; MG/1
1 TABLET ORAL EVERY 4 HOURS PRN
Qty: 20 TABLET | Refills: 0 | Status: SHIPPED | OUTPATIENT
Start: 2022-12-21

## 2022-12-21 RX ORDER — MIDAZOLAM HYDROCHLORIDE 1 MG/ML
2 INJECTION INTRAMUSCULAR; INTRAVENOUS ONCE
Status: DISCONTINUED | OUTPATIENT
Start: 2022-12-21 | End: 2022-12-21

## 2022-12-21 RX ORDER — CEFAZOLIN SODIUM 2 G/100ML
2 INJECTION, SOLUTION INTRAVENOUS ONCE
Status: COMPLETED | OUTPATIENT
Start: 2022-12-21 | End: 2022-12-21

## 2022-12-21 RX ADMIN — ACETAMINOPHEN 1000 MG: 500 TABLET ORAL at 06:48

## 2022-12-21 RX ADMIN — LIDOCAINE HYDROCHLORIDE 50 MG: 20 INJECTION, SOLUTION EPIDURAL; INFILTRATION; INTRACAUDAL; PERINEURAL at 07:23

## 2022-12-21 RX ADMIN — MIDAZOLAM HYDROCHLORIDE 1 MG: 2 INJECTION, SOLUTION INTRAMUSCULAR; INTRAVENOUS at 06:48

## 2022-12-21 RX ADMIN — PROPOFOL 100 MCG/KG/MIN: 10 INJECTION, EMULSION INTRAVENOUS at 07:22

## 2022-12-21 RX ADMIN — FENTANYL CITRATE 50 MCG: 50 INJECTION, SOLUTION INTRAMUSCULAR; INTRAVENOUS at 07:22

## 2022-12-21 RX ADMIN — SODIUM CHLORIDE, POTASSIUM CHLORIDE, SODIUM LACTATE AND CALCIUM CHLORIDE 9 ML/HR: 600; 310; 30; 20 INJECTION, SOLUTION INTRAVENOUS at 06:48

## 2022-12-21 RX ADMIN — CEFAZOLIN SODIUM 2 G: 2 INJECTION, SOLUTION INTRAVENOUS at 07:22

## 2022-12-21 NOTE — ANESTHESIA POSTPROCEDURE EVALUATION
Patient: Tatyana Shea    Procedure Summary     Date: 12/21/22 Room / Location: [facility] OR  / [facility]    Anesthesia Start: 0715 Anesthesia Stop: 0753    Procedure: LEFT THUMB FINGER TRIGGER RELEASE AND LEFT THUMB CYST EXCISION (Left: Fingers) Diagnosis:       Trigger finger of left thumb      (Trigger finger of left thumb [M65.312])    Surgeons: Inés Rashid MD Provider: Mick Molina MD    Anesthesia Type: general ASA Status: 2          Anesthesia Type: general    Vitals  Vitals Value Taken Time   /76 12/21/22 0825   Temp 36.3 °C (97.3 °F) 12/21/22 0752   Pulse 66 12/21/22 0825   Resp 12 12/21/22 0752   SpO2 99 % 12/21/22 0825           Post Anesthesia Care and Evaluation    Patient location during evaluation: bedside  Patient participation: complete - patient participated  Level of consciousness: awake and alert  Pain management: adequate    Airway patency: patent  Anesthetic complications: No anesthetic complications  PONV Status: none  Cardiovascular status: acceptable  Respiratory status: acceptable  Hydration status: acceptable    Comments: An Anesthesiologist personally participated in the most demanding procedures (including induction and emergence if applicable) in the anesthesia plan, monitored the course of anesthesia administration at frequent intervals and remained physically present and available for immediate diagnosis and treatment of emergencies.

## 2022-12-21 NOTE — OP NOTE
FINGER TRIGGER RELEASE  Procedure Report    Patient Name:  Tatyana Shea  YOB: 1955    Date of Surgery:  12/21/2022     Indications:  locking/catching of finger, failed conservative care    Pre-op Diagnosis:   Trigger finger of left thumb [M65.312]       Post-Op Diagnosis Codes:     * Trigger finger of left thumb [M65.312]    Procedure/CPT® Codes:      Procedure(s):  LEFT THUMB FINGER TRIGGER RELEASE AND LEFT THUMB CYST EXCISION    Staff:  Surgeon(s):  Inés Rashid MD    Assistant: Kaila Kimbrough    Anesthesia: Choice    Estimated Blood Loss: none    Implants:    Nothing was implanted during the procedure    Specimen:          None        Findings: triggering resolved    Complications: none    Description of Procedure: The patient was brought to the operating room where the patient underwent Dheeraj block anesthesia. The patient was prepped and draped in a sterile fashion. An incision was made directly over the A1 pulley of the finger. This was dissected down. The A1 pulley was identified. This was then opened using a 15 blade, then completed using tenotomy scissors. Tendon was checked. There was no tendinous damage.  There was hypertrophic tissue around the pulley that was the mass.  This was excised.  This was then thoroughly irrigated. Closed using 4-0 nylon. Half percent plain Marcaine was injected. A sterile dressing was applied. Tourniquet deflated. Taken to the recovery room in stable condition. No complications.    Assistant: Kaila Kimbrough  was responsible for performing the following activities: Retraction, Suction, Irrigation and Placing Dressing and their skilled assistance was necessary for the success of this case.    Inés Rashid MD     Date: 12/21/2022  Time: 07:48 EST

## 2022-12-21 NOTE — DISCHARGE INSTRUCTIONS
DISCHARGE INSTRUCTIONS  Trigger finger release      For your surgery you had:      Local anesthesia  Monitored anesthesia care    You may experience dizziness, drowsiness, or lightheadedness for several hours following surgery.  Do not stay alone today or tonight.  Limit your activity for 24 hours.  Resume your diet slowly.    You should not drive or operate machinery, drink alcohol, or sign legally binding documents for 24 hours or while you are taking pain medication.  Elevate arm so that the hand and wrist are above the level of the heart. Elevate affected arm on a pillow when resting.   Use ice to affected area for 48-72 hours. Apply 20 minutes on - 20 minutes off. Do NOT apply directly to skin.  Exercise fingers frequently by making a full fist and fully straightening the fingers. This will help prevent swelling and stiffness.  Do NOT do any heavy lifting, pulling or strenuous activities using the affected hand.     [] Remove gauze in  saturday    .  [] Do NOT submerge in water.  [] Keep incision area clean and dry.  [] You may shower saturday     .  NOTIFY YOUR DOCTOR IF YOU EXPERIENCE ANY OF THE FOLLOWING:  Temperature greater than 101 degrees Fahrenheit  Shaking Chills  Redness or excessive drainage from incision  Nausea, vomiting and/or pain that is not controlled by prescribed medications  Increase in bleeding or bleeding that is excessive  Unable to urinate in 6 hours after surgery  If unable to reach your doctor, please go to the closest Emergency Room  Last dose of pain medication was given at:   .  You should see   for follow-up care   on   .  Phone number:      SPECIAL INSTRUCTIONS:               I have read and received the above instructions.

## 2022-12-21 NOTE — H&P
"Chief Complaint  No chief complaint on file.     Subjective      Tatyana Shea presents to [facility] OR for an evaluation of left hand. Patient states pain at the palm of the thumb, she feels snapping of the thumb as well with motion. She also has a mass on the thumb. One morning she had difficulty with bending the thumb. This has been ongoing for some time. Symptoms progressively worsened with time.     No Known Allergies     Social History     Socioeconomic History   • Marital status: Single   Tobacco Use   • Smoking status: Never   • Smokeless tobacco: Never   Vaping Use   • Vaping Use: Never used   Substance and Sexual Activity   • Alcohol use: Not Currently     Comment: Glass of wine a couple times a year   • Drug use: Never   • Sexual activity: Defer     Birth control/protection: None        Review of Systems     Objective   Vital Signs:   /79 (BP Location: Left arm)   Pulse 84   Temp 98.6 °F (37 °C) (Temporal)   Resp 18   Ht 167.6 cm (66\")   Wt 98.9 kg (218 lb 0.6 oz)   SpO2 96%   BMI 35.19 kg/m²       Physical Exam  Constitutional:       Appearance: Normal appearance. Patient is well-developed and normal weight.   HENT:      Head: Normocephalic.      Right Ear: Hearing and external ear normal.      Left Ear: Hearing and external ear normal.      Nose: Nose normal.   Eyes:      Conjunctiva/sclera: Conjunctivae normal.   Cardiovascular:      Rate and Rhythm: Normal rate.   Pulmonary:      Effort: Pulmonary effort is normal.      Breath sounds: No wheezing or rales.   Abdominal:      Palpations: Abdomen is soft.      Tenderness: There is no abdominal tenderness.   Musculoskeletal:      Cervical back: Normal range of motion.   Skin:     Findings: No rash.   Neurological:      Mental Status: Patient is alert and oriented to person, place, and time.   Psychiatric:         Mood and Affect: Mood and affect normal.         Judgment: Judgment normal.       Ortho Exam      LEFT HAND: Tender " A1 pulley. Swelling of A1 pulley. Positive triggering of the left thumb. Sensation grossly intact. Neurovascular intact.  Mass about 1 cm in size on the dorsum of the thumb. Skin intact. Full wrist flexion and extension. Intact thumb motion with pain.     Procedures      Imaging Results (Most Recent)     None           Result Review :     X-Ray Report:  left hand pain  X-Ray  Indication: Evaluation of left hand pain   AP and Lateral view(s)  Findings: Mild interphalangeal osteoarthritis. No fractures.   Prior studies available for comparison: no            Assessment and Plan     Diagnoses and all orders for this visit:    1. Trigger finger of left thumb  -     ceFAZolin in dextrose (ANCEF) IVPB solution 2 g  -     Discontinue: ceFAZolin in Sodium Chloride (ANCEF) IVPB solution 3 g    Other orders  -     Follow Anesthesia Guidelines / Protocol; Standing  -     Nerve Block; Standing  -     Verify NPO Status; Standing  -     SCD (Sequential Compression Device) Place on Patient in Pre-Op; Standing  -     POC Glucose Once; Standing  -     Clip Operative Site; Standing  -     Obtain Informed Consent (If Not Done Inpatient or PAT); Standing  -     Instructions on coughing, deep breathing, and incentive spirometry.; Standing  -     Follow Anesthesia Guidelines / Protocol  -     Nerve Block  -     Verify NPO Status  -     SCD (Sequential Compression Device) Place on Patient in Pre-Op  -     POC Glucose Once  -     Clip Operative Site  -     Obtain Informed Consent (If Not Done Inpatient or PAT)  -     Instructions on coughing, deep breathing, and incentive spirometry.  -     Instructions on coughing, deep breathing, and incentive spirometry.  -     Instructions on coughing, deep breathing, and incentive spirometry.  -     Instructions on coughing, deep breathing, and incentive spirometry.  -     Instructions on coughing, deep breathing, and incentive spirometry.        Discussed trigger thumb release versus injection.  Patient wishes to proceed with trigger thumb release.     Discussed surgery., Risks/benefits discussed with patient including, but not limited to: infection, bleeding, neurovascular damage, re-rupture, aesthetic deformity, need for further surgery, and death. and Surgery pamphlet given.    Follow Up     Post-operatively.       I have personally performed the services described in this document as scribed by the above individual and it is both accurate and complete. Inés Rashid MD 12/21/22

## 2022-12-21 NOTE — ANESTHESIA PREPROCEDURE EVALUATION
Anesthesia Evaluation     Patient summary reviewed and Nursing notes reviewed   no history of anesthetic complications:  NPO Solid Status: > 8 hours  NPO Liquid Status: > 2 hours           Airway   Mallampati: II  TM distance: >3 FB  Neck ROM: full  No difficulty expected  Dental      Pulmonary - normal exam    breath sounds clear to auscultation  (+) pulmonary embolism, sleep apnea,   Cardiovascular - normal exam  Exercise tolerance: good (4-7 METS)    Rhythm: regular  Rate: normal    (+) hypertension,       Neuro/Psych  (+) headaches, syncope,    GI/Hepatic/Renal/Endo    (+) obesity,  hiatal hernia, GERD well controlled, PUD,      Musculoskeletal (-) negative ROS    Abdominal    Substance History - negative use     OB/GYN negative ob/gyn ROS         Other - negative ROS       ROS/Med Hx Other: PAT Nursing Notes unavailable.                   Anesthesia Plan    ASA 2     general     (Total IV Anesthesia    Patient understands anesthesia not responsible for dental damage.  )  intravenous induction     Anesthetic plan, risks, benefits, and alternatives have been provided, discussed and informed consent has been obtained with: patient.  Pre-procedure education provided  Plan discussed with CRNA.        CODE STATUS:

## 2022-12-27 ENCOUNTER — OFFICE VISIT (OUTPATIENT)
Dept: ONCOLOGY | Facility: HOSPITAL | Age: 67
End: 2022-12-27

## 2022-12-27 VITALS
SYSTOLIC BLOOD PRESSURE: 164 MMHG | RESPIRATION RATE: 18 BRPM | TEMPERATURE: 98.6 F | HEART RATE: 83 BPM | DIASTOLIC BLOOD PRESSURE: 76 MMHG | OXYGEN SATURATION: 97 % | WEIGHT: 218.48 LBS | BODY MASS INDEX: 35.26 KG/M2

## 2022-12-27 DIAGNOSIS — D50.9 IRON DEFICIENCY ANEMIA, UNSPECIFIED IRON DEFICIENCY ANEMIA TYPE: Primary | ICD-10-CM

## 2022-12-27 PROCEDURE — G0463 HOSPITAL OUTPT CLINIC VISIT: HCPCS | Performed by: INTERNAL MEDICINE

## 2022-12-27 PROCEDURE — 99213 OFFICE O/P EST LOW 20 MIN: CPT | Performed by: INTERNAL MEDICINE

## 2022-12-27 NOTE — PROGRESS NOTES
Chief Complaint  Anemia    John Aguilera, MERLE Aguilera, John, MERLE        Subjective          Tatyana SURJIT Shea presents to [facility] for anemia    Anemia        Ms. Shea presents for follow up. She continues to have frontal headaches. She occasionally takes Tylenol for this. She denies any bleeding or dark stool. She does have fatigue. She is hoping to keep iron levels normal.     She was given recent Ferrlecit infusions x 8 finishing these on 7/5/2022.     8/21/22: Labs shows iron studies, ferritin are normalized now. Hemoglobin is in the normal range. Folate and B-12 were normal previously. Prior GI evaluation by Dr. Foster showed large HH, gastritis. Polyp was removed.     10/21/22: EGD by Dr. Dunbar: Mild Schatzki ring, dilated, Z-line irregular, non-bleeding erosive gastropathy, normal duodenum. Patient started on Pantoprazole 40 mg    12/19/22: Ferritin 107, iron sat 33%, hgb 13.3, MCV normal.         Oncology/Hematology History Overview Note   HEME/ONC DX/RX/INVESTIGATIONS  DX:   Anemia, neutropenia, iron and B12 deficiency     RX:   Oral B12, started around 4/2021  Ferrous sulfate BID started around 5/2021. Changed to QOD on 6/21/2021    INVESTIGATIONS:   8/6/2020, EGD: focal intestinal metaplasia consistent with Li's esophagus, no dysplasia identified.  Fundic gland polyp.  Colonoscopy: fragments of tubular adenoma.  Colonic mucosa with lymphoid aggregates.  Rectal polyp, fragments of hyperplastic polyp.    4/9/21, B12 = 191  5/27/21  B12 = 2000, iron 44 TIBC 368 sat 12%         Review of Systems   Constitutional: Positive for fatigue.   HENT: Negative.    Eyes: Negative.    Respiratory: Negative.    Cardiovascular: Negative.    Gastrointestinal: Negative.    Endocrine: Negative.    Genitourinary: Negative.    Musculoskeletal: Negative.    Allergic/Immunologic: Negative.    Neurological: Positive for headache.   Hematological: Negative.     Psychiatric/Behavioral: Negative.    All other systems reviewed and are negative.      Current Outpatient Medications on File Prior to Visit   Medication Sig Dispense Refill   • atorvastatin (LIPITOR) 10 MG tablet Take 1 tablet by mouth Daily. (Patient taking differently: Take 10 mg by mouth Every Night.) 90 tablet 1   • Cholecalciferol 125 MCG (5000 UT) tablet Take 5,000 Units by mouth Daily. LAST DOSE 12/16/22     • famotidine (PEPCID) 20 MG tablet Take 1 tablet by mouth 2 (Two) Times a Day. (Patient taking differently: Take 20 mg by mouth Every Night.) 180 tablet 1   • furosemide (Lasix) 20 MG tablet Take 1 tablet by mouth 2 (Two) Times a Day. (Patient taking differently: Take 20 mg by mouth Daily. INST PER ANESTHESIA PROTOCOL) 180 tablet 1   • HYDROcodone-acetaminophen (NORCO) 7.5-325 MG per tablet Take 1 tablet by mouth Every 4 (Four) Hours As Needed for Moderate Pain (Pain). 20 tablet 0   • Krill Oil (Omega-3) 500 MG capsule Take 500 mg by mouth Daily. LAST DOSE 12/16/22     • losartan (Cozaar) 50 MG tablet Take 1 tablet by mouth 2 (Two) Times a Day. (Patient taking differently: Take 50 mg by mouth Daily. INST PER ANETHESIA PROTOCOL) 180 tablet 1   • multivitamin with minerals tablet tablet Take 1 tablet by mouth Daily. LAST DOSE 12/16/22     • pantoprazole (PROTONIX) 40 MG EC tablet Take 1 tablet by mouth Daily. (Patient taking differently: Take 40 mg by mouth Every Night.) 30 tablet 2   • potassium chloride 10 MEQ CR tablet Take 1 tablet by mouth Daily. 90 tablet 1     No current facility-administered medications on file prior to visit.       No Known Allergies  Past Medical History:   Diagnosis Date   • Acid reflux    • Diverticulitis     NO CURRENT ISSUES   • Diverticulosis 15 yrs ago    Currently having some mild pain in lower left   • Headache 2021   • Hiatal hernia    • HTN (hypertension)    • Iron deficiency anemia     NO CURRENT S/S, HAD IRON INFUSIONS IN SUMMER 22   • Obesity In my 40s    Eat for  temporary comfort   • Peptic ulceration In my 40s    I had a bleeding ulcer due to my lifestyle at that time/NO CURRENT ISSUES   • Pulmonary embolism (HCC) 1975    Due to birth control pills, I was told NONE CURRENT     Past Surgical History:   Procedure Laterality Date   • CHOLECYSTECTOMY     • COLONOSCOPY      2020 with     • COLONOSCOPY N/A 07/29/2021    Procedure: COLONOSCOPY with polypectomy/snare;  Surgeon: Ruel Foster MD;  Location: [facility] ENDOSCOPY;  Service: Gastroenterology;  Laterality: N/A;  colon polyp   • COSMETIC SURGERY  2013    Removed bags under and above my eyes   • ENDOSCOPY N/A 07/28/2021    Procedure: ESOPHAGOGASTRODUODENOSCOPY with bxs and cold snares;  Surgeon: Ruel Foster MD;  Location: [facility] ENDOSCOPY;  Service: Gastroenterology;  Laterality: N/A;  hiatal hernia  gastric polyps   • TRIGGER FINGER RELEASE Left 12/21/2022    Procedure: LEFT THUMB FINGER TRIGGER RELEASE AND LEFT THUMB CYST EXCISION;  Surgeon: Inés Rashid MD;  Location: [facility] OR [facility];  Service: Orthopedics;  Laterality: Left;   • UPPER GASTROINTESTINAL ENDOSCOPY  07/28/2021    Dr. Foster     Social History     Socioeconomic History   • Marital status: Single   Tobacco Use   • Smoking status: Never   • Smokeless tobacco: Never   Vaping Use   • Vaping Use: Never used   Substance and Sexual Activity   • Alcohol use: Not Currently     Comment: Glass of wine a couple times a year   • Drug use: Never   • Sexual activity: Defer     Birth control/protection: None     Family History   Problem Relation Age of Onset   • Heart failure Mother    • Heart disease Mother         Had a heart attack, then congestive heart failure, dead in one month at 82.   • Hyperlipidemia Mother    • Developmental Disability Son         Auditory processing disorder, anxiety, language and learning disorder in math.   • Atrial fibrillation Other         UNSPECIFIED   • Colon cancer Neg Hx    • Colon polyps Neg Hx    • Crohn's  disease Neg Hx    • Irritable bowel syndrome Neg Hx    • Ulcerative colitis Neg Hx    • Malig Hyperthermia Neg Hx      Immunization History   Administered Date(s) Administered   • COVID-19 (MODERNA) 1st, 2nd, 3rd Dose Only 02/03/2021, 02/04/2021, 03/03/2021, 03/04/2021   • COVID-19 (PFIZER) BIVALENT BOOSTER 12+YRS 11/26/2022   • COVID-19 (PFIZER) PURPLE CAP 11/22/2021   • Fluzone High-Dose 65+yrs 10/07/2021, 10/10/2022   • Influenza, Unspecified 10/13/2020   • Pneumococcal Conjugate 13-Valent (PCV13) 10/01/2019   • Shingrix 10/01/2019, 04/01/2020   • Tdap 07/26/2021       Objective   Physical Exam  Vitals and nursing note reviewed.   Constitutional:       Appearance: She is obese.   HENT:      Nose: Nose normal.      Mouth/Throat:      Mouth: Mucous membranes are moist.   Eyes:      Conjunctiva/sclera: Conjunctivae normal.   Pulmonary:      Effort: Pulmonary effort is normal.   Musculoskeletal:         General: Normal range of motion.      Cervical back: Normal range of motion.   Neurological:      General: No focal deficit present.      Mental Status: She is alert and oriented to person, place, and time.   Psychiatric:         Mood and Affect: Mood normal.         Thought Content: Thought content normal.         Judgment: Judgment normal.         Vitals:    12/27/22 0915   BP: 164/76   Pulse: 83   Resp: 18   Temp: 98.6 °F (37 °C)   TempSrc: Temporal   SpO2: 97%   Weight: 99.1 kg (218 lb 7.6 oz)   PainSc: 0-No pain               ECOG: (0) Fully Active - Able to Carry On All Pre-disease Performance Without Restriction  Fall Risk Assessment was completed, and patient is at low risk for falls.  PHQ-9 Total Score:         The patient is  experiencing fatigue. Fatigue score: 5    PT/OT Functional Screening: PT fx screen: No needs identified  Speech Functional Screening: Speech fx screen: No needs identified  Rehab to be ordered: Rehab to be ordered: No needs identified        Result Review :   The following data was  reviewed by: Agustin Riddle MD on 12/27/2022:  Lab Results   Component Value Date    HGB 13.3 12/19/2022    HCT 37.3 12/19/2022    MCV 93.3 12/19/2022     12/19/2022    WBC 4.17 12/19/2022    NEUTROABS 2.42 12/19/2022    LYMPHSABS 1.39 12/19/2022    MONOSABS 0.30 12/19/2022    EOSABS 0.05 12/19/2022    BASOSABS 0.01 12/19/2022     Lab Results   Component Value Date    GLUCOSE 87 08/12/2022    BUN 14 08/12/2022    CREATININE 0.68 08/12/2022     08/12/2022    K 3.6 08/12/2022     08/12/2022    CO2 26.8 08/12/2022    CALCIUM 9.6 08/12/2022    PROTEINTOT 7.2 08/12/2022    ALBUMIN 4.30 08/12/2022    BILITOT 0.3 08/12/2022    ALKPHOS 64 08/12/2022    AST 14 08/12/2022    ALT 13 08/12/2022     Labs personally reviewed. Ferritin, iron sat normal. Hgb normal. MCV normal    10/21/22 EGD report reviewed. Non-bleeding erosive gastropathy seen. Mild schatzki ring dilated.        Assessment and Plan    Diagnoses and all orders for this visit:    1. Iron deficiency anemia, unspecified iron deficiency anemia type (Primary)  -     CBC & Differential; Future  -     Ferritin; Future  -     Iron Profile; Future    History of iron deficiency anemia. Received 8 Ferrlecit infusions and tolerated well finishing on 7/5/22. Has adequate iron stores now and hemoglobin remains normalized. EGD on 10/21/22 with erosive gastropathy, non-bleeding. Patient now on PPI daily, recommend to continue. Repeat iron studies in 6 months.      Recheck labs in 6 months with repeat CBC, iron levels.       I spent 25 minutes caring for Tatyana on this date of service. This time includes time spent by me in the following activities:preparing for the visit, reviewing tests, obtaining and/or reviewing a separately obtained history, performing a medically appropriate examination and/or evaluation , counseling and educating the patient/family/caregiver, ordering medications, tests, or procedures, referring and communicating with other health  care professionals , documenting information in the medical record and independently interpreting results and communicating that information with the patient/family/caregiver    Patient was given instructions and counseling regarding her condition or for health maintenance advice. Please see specific information pulled into the AVS if appropriate.

## 2022-12-29 DIAGNOSIS — I10 ESSENTIAL HYPERTENSION: ICD-10-CM

## 2022-12-29 RX ORDER — LOSARTAN POTASSIUM 50 MG/1
50 TABLET ORAL 2 TIMES DAILY
Qty: 180 TABLET | Refills: 1 | Status: SHIPPED | OUTPATIENT
Start: 2022-12-29

## 2022-12-29 RX ORDER — FUROSEMIDE 20 MG/1
20 TABLET ORAL 2 TIMES DAILY
Qty: 180 TABLET | Refills: 1 | Status: SHIPPED | OUTPATIENT
Start: 2022-12-29

## 2022-12-29 NOTE — TELEPHONE ENCOUNTER
Caller: Shea Tatyana W    Relationship: Self    Best call back number: [phone]    Requested Prescriptions:   Requested Prescriptions     Pending Prescriptions Disp Refills   • furosemide (Lasix) 20 MG tablet 180 tablet 1     Sig: Take 1 tablet by mouth 2 (Two) Times a Day.   • losartan (Cozaar) 50 MG tablet 180 tablet 1     Sig: Take 1 tablet by mouth 2 (Two) Times a Day.        Pharmacy where request should be sent: [facility] [id number] AT [facility] [address] - [phone] [facility] [phone] FX     Additional details provided by patient: PATIENT'S SCRIPTS ARE EXPIRING. SHE WOULD LIKE NEW SCRIPTS SENT TO THE PHARMACY. HER BLOOD PRESSURE IS STILL NOT WHERE IT SHOULD BE AND SHE EXPLAINED THAT IT WAS DISCUSSED WITH THE PROVIDER ABOUT TAKING TWO TABLETS OF THE MEDICATION. SHE WOULD LIKE TO KNOW IF THE DIRECTIONS NEED TO BE UPDATED AS WELL.    Does the patient have less than a 3 day supply:  [x] Yes  [] No    Would you like a call back once the refill request has been completed: [x] Yes [] No    If the office needs to give you a call back, can they leave a voicemail: [x] Yes [] No    Camelia Garsia Rep   12/29/22 08:47 EST

## 2023-01-05 ENCOUNTER — OFFICE VISIT (OUTPATIENT)
Dept: ORTHOPEDIC SURGERY | Facility: CLINIC | Age: 68
End: 2023-01-05
Payer: COMMERCIAL

## 2023-01-05 VITALS — WEIGHT: 214 LBS | BODY MASS INDEX: 34.39 KG/M2 | HEIGHT: 66 IN

## 2023-01-05 DIAGNOSIS — Z47.89 AFTERCARE FOLLOWING SURGERY OF THE MUSCULOSKELETAL SYSTEM: Primary | ICD-10-CM

## 2023-01-05 PROCEDURE — 99024 POSTOP FOLLOW-UP VISIT: CPT | Performed by: PHYSICIAN ASSISTANT

## 2023-01-05 NOTE — PATIENT INSTRUCTIONS
Sutures removed in office and Steri-Strips applied.  Patient educated on incision care.  Please keep incision clean and dry.  Do not soak or submerge in water until incision is fully healed.  Do not apply creams or lotions over the incision.  Please allow Steri-Strips to fall off on their own within 7 to 10 days.    Continue icing as needed to help with pain and swelling.  Ice up to 3 or 4 times daily for no longer than 15 to 20 minutes at a time.    Advised on gentle hand ROM. No heavy lifting, pushing, or pulling.     Follow-up in 4 weeks. Please call with questions or concerns.

## 2023-01-05 NOTE — PROGRESS NOTES
Chief Complaint  Follow-up of the Left Thumb    Subjective      Tatyana Shea presents to [facility] for follow-up of left thumb trigger finger release and excision of left thumb cyst performed on 12/21/2022 by Dr. Rashid.  Patient presents today reporting concern over her incision.  States \"I do not like the way it looks\".  States \"it was not sutured right\".  Reports she is unable to tell if she has had further triggering of her thumb because \"I cannot bend my thumb because of the incision\".    Objective   No Known Allergies    Vital Signs:   Ht 167.6 cm (66\")   Wt 97.1 kg (214 lb)   BMI 34.54 kg/m²       Physical Exam    Constitutional: Awake, alert. Well nourished appearance.    Integumentary: Warm, dry, intact. No obvious rashes.    HENT: Atraumatic, normocephalic.   Respiratory: Non labored respirations .   Cardiovascular: Intact peripheral pulses.    Psychiatric: Normal mood and affect. A&O X3    Ortho Exam  Right hand: Surgical incision visualized overlying proximal thumb joint at palmar aspect.  Sutures were removed in office.  Patient does have evidence of superficial wound dehiscence without any surrounding erythema, warmth, or drainage.  Limited flexion of the thumb.  Sensation is intact.  Distal neurovascular intact.  Patient is able to flex and extend the wrist.    Imaging Results (Most Recent)     None                    Assessment and Plan   Problem List Items Addressed This Visit    None  Visit Diagnoses     Aftercare following L thumb TF release and cyst excision    -  Primary          Follow Up   Return in about 4 weeks (around 2/2/2023).  Educated on risk of smoking. Discussed options for smoking cessation.    Patient Instructions   Sutures removed in office and Steri-Strips applied.  Patient educated on incision care.  Please keep incision clean and dry.  Do not soak or submerge in water until incision is fully healed.  Do not apply creams or lotions over the  incision.  Please allow Steri-Strips to fall off on their own within 7 to 10 days.    Continue icing as needed to help with pain and swelling.  Ice up to 3 or 4 times daily for no longer than 15 to 20 minutes at a time.    Advised on gentle hand ROM. No heavy lifting, pushing, or pulling.     Follow-up in 4 weeks. Please call with questions or concerns.      Patient was given instructions and counseling regarding her condition or for health maintenance advice. Please see specific information pulled into the AVS if appropriate.

## 2023-01-23 RX ORDER — PANTOPRAZOLE SODIUM 40 MG/1
40 TABLET, DELAYED RELEASE ORAL NIGHTLY
Qty: 90 TABLET | Refills: 3 | Status: SHIPPED | OUTPATIENT
Start: 2023-01-23

## 2023-03-20 ENCOUNTER — TRANSCRIBE ORDERS (OUTPATIENT)
Dept: ADMINISTRATIVE | Facility: HOSPITAL | Age: 68
End: 2023-03-20
Payer: COMMERCIAL

## 2023-03-20 DIAGNOSIS — Z12.31 ENCOUNTER FOR SCREENING MAMMOGRAM FOR MALIGNANT NEOPLASM OF BREAST: Primary | ICD-10-CM

## 2023-03-20 DIAGNOSIS — Z92.89 HISTORY OF MAMMOGRAPHY, SCREENING: Primary | ICD-10-CM

## 2023-05-18 ENCOUNTER — TELEPHONE (OUTPATIENT)
Dept: FAMILY MEDICINE CLINIC | Facility: CLINIC | Age: 68
End: 2023-05-18
Payer: COMMERCIAL

## 2023-05-18 NOTE — TELEPHONE ENCOUNTER
Caller: Tatyana Shea    Relationship: Self    Best call back number: [phone]    What orders are you requesting (i.e. lab or imaging): BLOOD WORK    In what timeframe would the patient need to come in: BEFORE APPOINTMENT 6.7.2023    Where will you receive your lab/imaging services: [facility]     Additional notes: PLEASE CALL WHEN ORDERS ARE ACTIVE.

## 2023-05-18 NOTE — TELEPHONE ENCOUNTER
Informed pt that there are labs placed for her to get done before her appointment, she voiced understanding.

## 2023-06-01 ENCOUNTER — TELEPHONE (OUTPATIENT)
Dept: FAMILY MEDICINE CLINIC | Facility: CLINIC | Age: 68
End: 2023-06-01

## 2023-06-01 NOTE — TELEPHONE ENCOUNTER
Caller: Tatyana Shea    Relationship: Self    Best call back number: [phone]    What is the best time to reach you: ANY     Who are you requesting to speak with (clinical staff, provider,  specific staff member): CLINICAL     What was the call regarding: PATIENT REQUESTING CLINICAL STAFF GIVE HER A CALL AND GO OVER ALL THE ORDERS [facility] IS WANTING HER TO HAVE DONE. PLEASE ADVISE.

## 2023-06-02 ENCOUNTER — LAB (OUTPATIENT)
Dept: LAB | Facility: HOSPITAL | Age: 68
End: 2023-06-02
Payer: COMMERCIAL

## 2023-06-02 ENCOUNTER — TELEMEDICINE (OUTPATIENT)
Dept: GASTROENTEROLOGY | Facility: CLINIC | Age: 68
End: 2023-06-02

## 2023-06-02 DIAGNOSIS — K22.2 SCHATZKI'S RING OF DISTAL ESOPHAGUS: Chronic | ICD-10-CM

## 2023-06-02 DIAGNOSIS — I10 PRIMARY HYPERTENSION: ICD-10-CM

## 2023-06-02 DIAGNOSIS — D50.9 IRON DEFICIENCY ANEMIA, UNSPECIFIED IRON DEFICIENCY ANEMIA TYPE: ICD-10-CM

## 2023-06-02 DIAGNOSIS — Z12.11 COLON CANCER SCREENING: ICD-10-CM

## 2023-06-02 DIAGNOSIS — K59.00 CONSTIPATION, UNSPECIFIED CONSTIPATION TYPE: Chronic | ICD-10-CM

## 2023-06-02 DIAGNOSIS — K44.9 HIATAL HERNIA: Chronic | ICD-10-CM

## 2023-06-02 DIAGNOSIS — K21.9 GASTROESOPHAGEAL REFLUX DISEASE, UNSPECIFIED WHETHER ESOPHAGITIS PRESENT: Primary | Chronic | ICD-10-CM

## 2023-06-02 LAB
ALBUMIN SERPL-MCNC: 4.2 G/DL (ref 3.5–5.2)
ALBUMIN/GLOB SERPL: 1.7 G/DL
ALP SERPL-CCNC: 69 U/L (ref 39–117)
ALT SERPL W P-5'-P-CCNC: 21 U/L (ref 1–33)
ANION GAP SERPL CALCULATED.3IONS-SCNC: 7.5 MMOL/L (ref 5–15)
AST SERPL-CCNC: 15 U/L (ref 1–32)
BACTERIA UR QL AUTO: NORMAL /HPF
BASOPHILS # BLD AUTO: 0.01 10*3/MM3 (ref 0–0.2)
BASOPHILS NFR BLD AUTO: 0.2 % (ref 0–1.5)
BILIRUB SERPL-MCNC: 0.4 MG/DL (ref 0–1.2)
BILIRUB UR QL STRIP: NEGATIVE
BUN SERPL-MCNC: 9 MG/DL (ref 8–23)
BUN/CREAT SERPL: 12.3 (ref 7–25)
CALCIUM SPEC-SCNC: 9.8 MG/DL (ref 8.6–10.5)
CHLORIDE SERPL-SCNC: 105 MMOL/L (ref 98–107)
CHOLEST SERPL-MCNC: 183 MG/DL (ref 0–200)
CLARITY UR: CLEAR
CO2 SERPL-SCNC: 28.5 MMOL/L (ref 22–29)
COLOR UR: YELLOW
CREAT SERPL-MCNC: 0.73 MG/DL (ref 0.57–1)
DEPRECATED RDW RBC AUTO: 40.9 FL (ref 37–54)
EGFRCR SERPLBLD CKD-EPI 2021: 89.2 ML/MIN/1.73
EOSINOPHIL # BLD AUTO: 0.13 10*3/MM3 (ref 0–0.4)
EOSINOPHIL NFR BLD AUTO: 2.7 % (ref 0.3–6.2)
ERYTHROCYTE [DISTWIDTH] IN BLOOD BY AUTOMATED COUNT: 12.3 % (ref 12.3–15.4)
FERRITIN SERPL-MCNC: 90.2 NG/ML (ref 13–150)
GLOBULIN UR ELPH-MCNC: 2.5 GM/DL
GLUCOSE SERPL-MCNC: 105 MG/DL (ref 65–99)
GLUCOSE UR STRIP-MCNC: NEGATIVE MG/DL
HCT VFR BLD AUTO: 37.2 % (ref 34–46.6)
HDLC SERPL QL: 3.98
HDLC SERPL-MCNC: 46 MG/DL (ref 40–60)
HGB BLD-MCNC: 13.5 G/DL (ref 12–15.9)
HGB UR QL STRIP.AUTO: NEGATIVE
HYALINE CASTS UR QL AUTO: NORMAL /LPF
IMM GRANULOCYTES # BLD AUTO: 0.01 10*3/MM3 (ref 0–0.05)
IMM GRANULOCYTES NFR BLD AUTO: 0.2 % (ref 0–0.5)
IRON 24H UR-MRATE: 103 MCG/DL (ref 37–145)
IRON SATN MFR SERPL: 33 % (ref 20–50)
KETONES UR QL STRIP: NEGATIVE
LDLC SERPL CALC-MCNC: 103 MG/DL (ref 0–100)
LEUKOCYTE ESTERASE UR QL STRIP.AUTO: ABNORMAL
LYMPHOCYTES # BLD AUTO: 1.85 10*3/MM3 (ref 0.7–3.1)
LYMPHOCYTES NFR BLD AUTO: 38 % (ref 19.6–45.3)
MCH RBC QN AUTO: 33.3 PG (ref 26.6–33)
MCHC RBC AUTO-ENTMCNC: 36.3 G/DL (ref 31.5–35.7)
MCV RBC AUTO: 91.9 FL (ref 79–97)
MONOCYTES # BLD AUTO: 0.36 10*3/MM3 (ref 0.1–0.9)
MONOCYTES NFR BLD AUTO: 7.4 % (ref 5–12)
NEUTROPHILS NFR BLD AUTO: 2.51 10*3/MM3 (ref 1.7–7)
NEUTROPHILS NFR BLD AUTO: 51.5 % (ref 42.7–76)
NITRITE UR QL STRIP: NEGATIVE
NRBC BLD AUTO-RTO: 0 /100 WBC (ref 0–0.2)
PH UR STRIP.AUTO: 8 [PH] (ref 5–8)
PLATELET # BLD AUTO: 193 10*3/MM3 (ref 140–450)
PMV BLD AUTO: 9.3 FL (ref 6–12)
POTASSIUM SERPL-SCNC: 3.8 MMOL/L (ref 3.5–5.2)
PROT SERPL-MCNC: 6.7 G/DL (ref 6–8.5)
PROT UR QL STRIP: ABNORMAL
RBC # BLD AUTO: 4.05 10*6/MM3 (ref 3.77–5.28)
RBC # UR STRIP: NORMAL /HPF
REF LAB TEST METHOD: NORMAL
SODIUM SERPL-SCNC: 141 MMOL/L (ref 136–145)
SP GR UR STRIP: 1.02 (ref 1–1.03)
SQUAMOUS #/AREA URNS HPF: NORMAL /HPF
STARCH GRANULES URNS QL MICRO: NORMAL /HPF
TIBC SERPL-MCNC: 311 MCG/DL (ref 298–536)
TRANSFERRIN SERPL-MCNC: 209 MG/DL (ref 200–360)
TRIGL SERPL-MCNC: 196 MG/DL (ref 0–150)
UROBILINOGEN UR QL STRIP: ABNORMAL
VLDLC SERPL-MCNC: 34 MG/DL (ref 5–40)
WBC # UR STRIP: NORMAL /HPF
WBC NRBC COR # BLD: 4.87 10*3/MM3 (ref 3.4–10.8)

## 2023-06-02 PROCEDURE — 81001 URINALYSIS AUTO W/SCOPE: CPT

## 2023-06-02 PROCEDURE — 82728 ASSAY OF FERRITIN: CPT

## 2023-06-02 PROCEDURE — 83540 ASSAY OF IRON: CPT

## 2023-06-02 PROCEDURE — 80061 LIPID PANEL: CPT

## 2023-06-02 PROCEDURE — 84466 ASSAY OF TRANSFERRIN: CPT

## 2023-06-02 PROCEDURE — 36415 COLL VENOUS BLD VENIPUNCTURE: CPT

## 2023-06-02 PROCEDURE — 80053 COMPREHEN METABOLIC PANEL: CPT

## 2023-06-02 PROCEDURE — 85025 COMPLETE CBC W/AUTO DIFF WBC: CPT

## 2023-06-02 PROCEDURE — 99214 OFFICE O/P EST MOD 30 MIN: CPT | Performed by: NURSE PRACTITIONER

## 2023-06-02 NOTE — PATIENT INSTRUCTIONS
1.  For GERD and hiatal hernia continue pantoprazole 40 mg and famotidine 20 mg once daily about 30 minutes before your evening meal.  This regimen seems to work well for you and we will continue this.    2.  You may continue to use Gaviscon on a as needed basis.    3.  For intermittent constipation we recommend either use of a stool softener daily such as Colace.  If you would rather use a fiber supplement we recommend Citrucel, Metamucil, or Benefiber.  A fiber tablet such as FiberCon would also be acceptable.  You may use the generic version of any of the above-mentioned fiber supplements.  If taking a fiber tablet we recommend starting off with 1 tablet daily with a full 8 ounce glass of water.  If starting with a powder fiber supplement we recommend you start off with 1 serving per day.  You can adjust your dosing based upon how your bowel habits respond.    4.  Next colonoscopy due at a 5-year interval, July 2026 and we will place a recall for this procedure.    5.  Plan for 1 year telemedicine follow-up visit for reassessment of symptoms and medication refills, or sooner should your symptoms worsen or fail to improve.

## 2023-06-02 NOTE — PROGRESS NOTES
Chief Complaint   Patient presents with   • Follow-up     Follow-up GERD, constipation, hiatal hernia         History of Present Illness  67-year-old female presents today for telemedicine follow-up.  She was last seen demonstrating 8 cm hiatal hernia, erosive gastropathy and a mild Schatzki's ring that was traversed and dilated.    At her last office visit she was reporting worsening heartburn and epigastric discomfort.  In office on 12/20/2022.  She is a history of iron deficiency anemia, nausea, bloating, hiatal hernia, and colon polyps.  Last EGD on 10/21/2022 revealed an 8 cm hiatal hernia, erosive gastropathy, and a mild Schatzki's ring that was traversed and dilated.  At her last office visit she was reporting worsening heartburn and reflux.  We had recommended that she continue pantoprazole but change the timing of her medication 1 hour before her evening meal and to avoid antireflux precautions and utilize Gaviscon as needed.    She takes famotidine in conjunction with pantoprazole around the time that she eats. Symptoms are well controlled as long as she takes both medications together. She takes Gaviscon about 3 x per month. She denies any nausea, vomiting, or dysphagia.     She reports that most of the time her BMs are normal. She occasionally she will have constipation a couple of times per month. She strains at times and stool can be hard. She takes a stool softener when she becomes constipated. Last colonoscopy was performed on 7/29/2021 revealing 1 benign 3 mm polyp in the colon. She denies any family history of colon cancer. Next colonoscopy due at a 5 yr interval due to history of adenomatous polyps in the colon.     You have chosen to receive care through a telehealth visit.  Do you consent to use a video/audio connection for your medical care today? Yes    Physical Exam  Constitutional:       General: She is not in acute distress.     Appearance: She is well-developed.   Pulmonary:      Effort:  No respiratory distress.   Skin:     Coloration: Skin is not pale.          Review of Systems   Constitutional: Negative for fever and unexpected weight change.   HENT: Negative for trouble swallowing.    Cardiovascular: Negative for chest pain.   Gastrointestinal: Positive for constipation. Negative for abdominal distention, abdominal pain, anal bleeding, blood in stool, diarrhea, nausea, rectal pain and vomiting.      Result Review :      Office Visit with Richa Soler APRN (12/20/2022)  ENDOSCOPY, INT (10/21/2022)  SCANNED PATHOLOGY (10/21/2022)    Assessment and Plan    Diagnoses and all orders for this visit:    1. Gastroesophageal reflux disease, unspecified whether esophagitis present (Primary)    2. Hiatal hernia  Comments:  8 cm    3. Schatzki's ring of distal esophagus    4. Constipation, unspecified constipation type    5. Colon cancer screening           Patient Instructions   1.  For GERD and hiatal hernia continue pantoprazole 40 mg and famotidine 20 mg once daily about 30 minutes before your evening meal.  This regimen seems to work well for you and we will continue this.    2.  You may continue to use Gaviscon on a as needed basis.    3.  For intermittent constipation we recommend either use of a stool softener daily such as Colace.  If you would rather use a fiber supplement we recommend Citrucel, Metamucil, or Benefiber.  A fiber tablet such as FiberCon would also be acceptable.  You may use the generic version of any of the above-mentioned fiber supplements.  If taking a fiber tablet we recommend starting off with 1 tablet daily with a full 8 ounce glass of water.  If starting with a powder fiber supplement we recommend you start off with 1 serving per day.  You can adjust your dosing based upon how your bowel habits respond.    4.  Next colonoscopy due at a 5-year interval, July 2026 and we will place a recall for this procedure.    5.  Plan for 1 year telemedicine follow-up visit for  reassessment of symptoms and medication refills, or sooner should your symptoms worsen or fail to improve.     Discussion:    Patient's previous scope results and pathology were reviewed with her today to her follow-up visit.  For GERD and hiatal hernia patient to continue the pantoprazole 40 mg and famotidine 20 mg daily at the same time about 30 minutes to 1 hour before her evening meal as this regimen seems to have worked the best for her.  She may continue to use Gaviscon as needed.    Firm and constipation we have recommended either use of a daily stool softener or fiber supplement as listed above.  Next colonoscopy due at a 5-year interval, July 2026.  We have scheduled an annual telemedicine follow-up visit with the patient for reassessment of symptoms medication refills.  Patient verbalized understand above plan of care and is in agreement.  All questions answered and support provided.  EMR Dragon/Transcription Disclaimer:  This document has been Dictated utilizing Dragon dictation.

## 2023-06-07 ENCOUNTER — OFFICE VISIT (OUTPATIENT)
Dept: FAMILY MEDICINE CLINIC | Facility: CLINIC | Age: 68
End: 2023-06-07
Payer: COMMERCIAL

## 2023-06-07 VITALS
HEART RATE: 81 BPM | BODY MASS INDEX: 34.1 KG/M2 | DIASTOLIC BLOOD PRESSURE: 86 MMHG | WEIGHT: 212.2 LBS | SYSTOLIC BLOOD PRESSURE: 162 MMHG | OXYGEN SATURATION: 96 % | RESPIRATION RATE: 16 BRPM | HEIGHT: 66 IN | TEMPERATURE: 98.1 F

## 2023-06-07 DIAGNOSIS — D50.9 IRON DEFICIENCY ANEMIA, UNSPECIFIED IRON DEFICIENCY ANEMIA TYPE: ICD-10-CM

## 2023-06-07 DIAGNOSIS — R73.01 IMPAIRED FASTING GLUCOSE: ICD-10-CM

## 2023-06-07 DIAGNOSIS — E78.2 MIXED HYPERLIPIDEMIA: ICD-10-CM

## 2023-06-07 DIAGNOSIS — Z78.0 POST-MENOPAUSE: Primary | ICD-10-CM

## 2023-06-07 DIAGNOSIS — E87.6 HYPOKALEMIA: ICD-10-CM

## 2023-06-07 DIAGNOSIS — I10 ESSENTIAL HYPERTENSION: ICD-10-CM

## 2023-06-07 RX ORDER — METOPROLOL SUCCINATE 25 MG/1
25 TABLET, EXTENDED RELEASE ORAL DAILY
Qty: 90 TABLET | Refills: 1 | Status: SHIPPED | OUTPATIENT
Start: 2023-06-07

## 2023-06-07 RX ORDER — POTASSIUM CHLORIDE 750 MG/1
10 TABLET, FILM COATED, EXTENDED RELEASE ORAL DAILY
Qty: 90 TABLET | Refills: 1 | Status: SHIPPED | OUTPATIENT
Start: 2023-06-07

## 2023-06-07 RX ORDER — ATORVASTATIN CALCIUM 10 MG/1
10 TABLET, FILM COATED ORAL NIGHTLY
Qty: 90 TABLET | Refills: 1 | Status: SHIPPED | OUTPATIENT
Start: 2023-06-07

## 2023-06-07 RX ORDER — FUROSEMIDE 20 MG/1
20 TABLET ORAL 2 TIMES DAILY
Qty: 180 TABLET | Refills: 1 | Status: SHIPPED | OUTPATIENT
Start: 2023-06-07

## 2023-06-07 RX ORDER — LOSARTAN POTASSIUM 50 MG/1
50 TABLET ORAL 2 TIMES DAILY
Qty: 180 TABLET | Refills: 1 | Status: SHIPPED | OUTPATIENT
Start: 2023-06-07

## 2023-06-07 NOTE — PROGRESS NOTES
Chief Complaint  Headache (Almost constant), Dizziness, and Hypertension (Went to the dentist it was 188/96 Monday - has been dizzy with it)    Subjective        Tatyana Shea presents to [facility]  History of Present Illness  Blood pressures have been elevated and was 188/96.  Wonders if could be reason for headaches.  50 minutes later she didn't get below 95.  Is elevated today  Headache  Dizziness  Associated symptoms include headaches.   Hypertension  Associated symptoms include headaches.     The following portions of the patient's history were personally reviewed and updated as appropriate: allergies, current medications, past medical history, past surgical history, past family history, and past social history.     Body mass index is 34.27 kg/m².    BMI is >= 30 and <35. (Class 1 Obesity). The following options were offered after discussion;: weight loss educational material (shared in after visit summary)      Past History:    Medical History: has a past medical history of Acid reflux, Diverticulitis, Diverticulosis (15 yrs ago), Headache (2021), Hiatal hernia, HTN (hypertension), Iron deficiency anemia, Low back pain, Obesity (In my 40s), Peptic ulceration (In my 40s), and Pulmonary embolism (1975).     Surgical History: has a past surgical history that includes Cholecystectomy; Colonoscopy; Colonoscopy (N/A, 07/29/2021); Upper gastrointestinal endoscopy (07/28/2021); Esophagogastroduodenoscopy (N/A, 07/28/2021); Cosmetic surgery (2013); and Trigger finger release (Left, 12/21/2022).     Family History: family history includes Atrial fibrillation in an other family member; Developmental Disability in her son; Heart disease in her mother; Heart failure in her mother; Hyperlipidemia in her mother.     Social History: reports that she has never smoked. She has never used smokeless tobacco. She reports that she does not currently use alcohol. She reports that she does not use  drugs.    Allergies: Patient has no known allergies.          Current Outpatient Medications:     atorvastatin (LIPITOR) 10 MG tablet, Take 1 tablet by mouth Every Night., Disp: 90 tablet, Rfl: 1    Cholecalciferol 125 MCG (5000 UT) tablet, Take 1 tablet by mouth Daily. LAST DOSE 12/16/22, Disp: , Rfl:     famotidine (PEPCID) 20 MG tablet, Take 1 tablet by mouth 2 (Two) Times a Day. (Patient taking differently: Take 1 tablet by mouth At Night As Needed.), Disp: 180 tablet, Rfl: 1    furosemide (Lasix) 20 MG tablet, Take 1 tablet by mouth 2 (Two) Times a Day., Disp: 180 tablet, Rfl: 1    HYDROcodone-acetaminophen (NORCO) 7.5-325 MG per tablet, Take 1 tablet by mouth Every 4 (Four) Hours As Needed for Moderate Pain (Pain)., Disp: 20 tablet, Rfl: 0    Krill Oil (Omega-3) 500 MG capsule, Take 500 mg by mouth Daily. LAST DOSE 12/16/22, Disp: , Rfl:     losartan (Cozaar) 50 MG tablet, Take 1 tablet by mouth 2 (Two) Times a Day., Disp: 180 tablet, Rfl: 1    multivitamin with minerals tablet tablet, Take 1 tablet by mouth Daily. LAST DOSE 12/16/22, Disp: , Rfl:     pantoprazole (PROTONIX) 40 MG EC tablet, Take 1 tablet by mouth Every Night., Disp: 90 tablet, Rfl: 3    potassium chloride 10 MEQ CR tablet, Take 1 tablet by mouth Daily., Disp: 90 tablet, Rfl: 1    metoprolol succinate XL (Toprol XL) 25 MG 24 hr tablet, Take 1 tablet by mouth Daily., Disp: 90 tablet, Rfl: 1    Medications Discontinued During This Encounter   Medication Reason    atorvastatin (LIPITOR) 10 MG tablet Reorder    potassium chloride 10 MEQ CR tablet Reorder    furosemide (Lasix) 20 MG tablet Reorder    losartan (Cozaar) 50 MG tablet Reorder         Review of Systems   Neurological:  Positive for dizziness.      Objective         Vitals:    06/07/23 0759 06/07/23 0903   BP: 164/86 162/86   BP Location: Right arm Right arm   Patient Position: Sitting Sitting   Cuff Size: Large Adult Adult   Pulse: 81    Resp: 16    Temp: 98.1 °F (36.7 °C)    TempSrc:  "Temporal    SpO2: 96%    Weight: 96.3 kg (212 lb 3.2 oz)    Height: 167.6 cm (65.98\")      Body mass index is 34.27 kg/m².         Physical Exam        Result Review :               Assessment and Plan     Diagnoses and all orders for this visit:    1. Post-menopause (Primary)  -     DEXA Bone Density Axial; Future    2. Mixed hyperlipidemia  -     atorvastatin (LIPITOR) 10 MG tablet; Take 1 tablet by mouth Every Night.  Dispense: 90 tablet; Refill: 1    3. Essential hypertension  -     furosemide (Lasix) 20 MG tablet; Take 1 tablet by mouth 2 (Two) Times a Day.  Dispense: 180 tablet; Refill: 1  -     losartan (Cozaar) 50 MG tablet; Take 1 tablet by mouth 2 (Two) Times a Day.  Dispense: 180 tablet; Refill: 1  -     Lipid Panel With / Chol / HDL Ratio; Future  -     Comprehensive Metabolic Panel; Future  -     CBC (No Diff); Future  -     Urinalysis With Culture If Indicated -; Future  -     metoprolol succinate XL (Toprol XL) 25 MG 24 hr tablet; Take 1 tablet by mouth Daily.  Dispense: 90 tablet; Refill: 1    4. Hypokalemia  -     potassium chloride 10 MEQ CR tablet; Take 1 tablet by mouth Daily.  Dispense: 90 tablet; Refill: 1    5. Iron deficiency anemia, unspecified iron deficiency anemia type  -     Ferritin; Future  -     Iron Profile; Future    6. Impaired fasting glucose  -     Hemoglobin A1c; Future              Follow Up     Return in about 6 months (around 12/7/2023).    Patient was given instructions and counseling regarding her condition or for health maintenance advice. Please see specific information pulled into the AVS if appropriate.       Answers submitted by the patient for this visit:  Primary Reason for Visit (Submitted on 6/6/2023)  What is the primary reason for your visit?: High Blood Pressure    "

## 2023-06-15 ENCOUNTER — OFFICE VISIT (OUTPATIENT)
Dept: CARDIOLOGY | Facility: CLINIC | Age: 68
End: 2023-06-15
Payer: COMMERCIAL

## 2023-06-15 VITALS
BODY MASS INDEX: 36.52 KG/M2 | HEART RATE: 64 BPM | WEIGHT: 219.2 LBS | HEIGHT: 65 IN | DIASTOLIC BLOOD PRESSURE: 80 MMHG | SYSTOLIC BLOOD PRESSURE: 155 MMHG

## 2023-06-15 DIAGNOSIS — R00.2 PALPITATIONS: ICD-10-CM

## 2023-06-15 DIAGNOSIS — I35.1 NONRHEUMATIC AORTIC VALVE INSUFFICIENCY: ICD-10-CM

## 2023-06-15 DIAGNOSIS — R42 POSTURAL DIZZINESS: ICD-10-CM

## 2023-06-15 DIAGNOSIS — E78.2 MIXED HYPERLIPIDEMIA: ICD-10-CM

## 2023-06-15 DIAGNOSIS — I10 PRIMARY HYPERTENSION: Primary | ICD-10-CM

## 2023-06-15 PROCEDURE — 99204 OFFICE O/P NEW MOD 45 MIN: CPT | Performed by: INTERNAL MEDICINE

## 2023-06-15 RX ORDER — AMLODIPINE BESYLATE 5 MG/1
5 TABLET ORAL DAILY
Qty: 90 TABLET | Refills: 3 | Status: SHIPPED | OUTPATIENT
Start: 2023-06-15

## 2023-06-15 NOTE — PROGRESS NOTES
Chief Complaint  Hypertension and Establish Care    Subjective        Tatyana Shea presents to [facility]  History of present illness:    Patient is a 67-year-old female with past medical history significant for hypertension and hyperlipidemia.  She states that she is noted palpitations.  It occurs about 1 time per week.  It last for a few seconds at a time.  Apparently in the past she was diagnosed with PVCs.  She also notes dizziness that can occur just sitting still.  She does feel like the room is spinning.  She apparently was worked up by a neurologist with carotids and MRI of the head.  She does get nausea with it.  She does not have a regular exercise program.  She states her blood pressure has been running elevated.  She denies any tobacco or alcohol use.  Mother  of 6 congestive heart failure.  Father had atrial fibrillation.      Past Medical History:   Diagnosis Date    Acid reflux     Arrhythmia     Diverticulitis     NO CURRENT ISSUES    Diverticulosis 15 yrs ago    Currently having some mild pain in lower left    Headache     Hiatal hernia     HTN (hypertension)     Hyperlipidemia     Iron deficiency anemia     NO CURRENT S/S, HAD IRON INFUSIONS IN SUMMER 20    Low back pain     Obesity In my 40s    Eat for temporary comfort    Peptic ulceration In my 40s    I had a bleeding ulcer due to my lifestyle at that time/NO CURRENT ISSUES    Pulmonary embolism     Due to birth control pills, I was told NONE CURRENT         Past Surgical History:   Procedure Laterality Date    CHOLECYSTECTOMY      COLONOSCOPY       with      COLONOSCOPY N/A 2021    Procedure: COLONOSCOPY with polypectomy/snare;  Surgeon: Ruel Foster MD;  Location: [facility] ENDOSCOPY;  Service: Gastroenterology;  Laterality: N/A;  colon polyp    COSMETIC SURGERY      Removed bags under and above my eyes    ENDOSCOPY N/A 2021    Procedure:  ESOPHAGOGASTRODUODENOSCOPY with bxs and cold snares;  Surgeon: Ruel Foster MD;  Location: [facility] ENDOSCOPY;  Service: Gastroenterology;  Laterality: N/A;  hiatal hernia  gastric polyps    TRIGGER FINGER RELEASE Left 12/21/2022    Procedure: LEFT THUMB FINGER TRIGGER RELEASE AND LEFT THUMB CYST EXCISION;  Surgeon: Inés Rashid MD;  Location: [facility] OR [facility];  Service: Orthopedics;  Laterality: Left;    UPPER GASTROINTESTINAL ENDOSCOPY  07/28/2021    Dr. Foster          Social History     Socioeconomic History    Marital status: Single   Tobacco Use    Smoking status: Never    Smokeless tobacco: Never   Vaping Use    Vaping Use: Never used   Substance and Sexual Activity    Alcohol use: Not Currently     Comment: Glass of wine a couple times a year    Drug use: Never    Sexual activity: Not Currently     Birth control/protection: None         Family History   Problem Relation Age of Onset    Heart failure Mother     Heart disease Mother         Had a heart attack, then congestive heart failure, dead in one month at 82.    Hyperlipidemia Mother     Hypertension Mother     Developmental Disability Son         Auditory processing disorder, anxiety, language and learning disorder in math.    Atrial fibrillation Other         UNSPECIFIED    Colon cancer Neg Hx     Colon polyps Neg Hx     Crohn's disease Neg Hx     Irritable bowel syndrome Neg Hx     Ulcerative colitis Neg Hx     Malig Hyperthermia Neg Hx           No Known Allergies         Current Outpatient Medications:     atorvastatin (LIPITOR) 10 MG tablet, Take 1 tablet by mouth Every Night., Disp: 90 tablet, Rfl: 1    Cholecalciferol 125 MCG (5000 UT) tablet, Take 1 tablet by mouth Daily. LAST DOSE 12/16/22, Disp: , Rfl:     furosemide (Lasix) 20 MG tablet, Take 1 tablet by mouth 2 (Two) Times a Day., Disp: 180 tablet, Rfl: 1    Krill Oil (Omega-3) 500 MG capsule, Take 500 mg by mouth Daily. LAST DOSE 12/16/22, Disp: , Rfl:     losartan (Cozaar) 50 MG  "tablet, Take 1 tablet by mouth 2 (Two) Times a Day., Disp: 180 tablet, Rfl: 1    multivitamin with minerals tablet tablet, Take 1 tablet by mouth Daily. LAST DOSE 12/16/22, Disp: , Rfl:     pantoprazole (PROTONIX) 40 MG EC tablet, Take 1 tablet by mouth Every Night., Disp: 90 tablet, Rfl: 3    potassium chloride 10 MEQ CR tablet, Take 1 tablet by mouth Daily., Disp: 90 tablet, Rfl: 1    amLODIPine (NORVASC) 5 MG tablet, Take 1 tablet by mouth Daily., Disp: 90 tablet, Rfl: 3      ROS:  Cardiac review of systems is positive for palpitations and dizziness.    Objective     /80   Pulse 64   Ht 165.1 cm (65\")   Wt 99.4 kg (219 lb 3.2 oz)   BMI 36.48 kg/m²       General Appearance:   well developed  well nourished  HENT:   oropharynx moist  lips not cyanotic  Respiratory:  no respiratory distress  normal breath sounds  no rales  Cardiovascular:  no jugular venous distention  regular rhythm  S1 normal, S2 normal  no S3, no S4   no murmur  no rub, no thrill  No carotid bruit  pedal pulses normal  lower extremity edema: none    Musculoskeletal:  no clubbing of fingers.   normocephalic, head atraumatic  Skin:   warm, dry  Psychiatric:  judgement and insight appropriate  normal mood and affect    ECHO:  Results for orders placed during the hospital encounter of 07/26/21    Adult Transthoracic Echo Complete With Contrast if Necessary Per Protocol    Interpretation Summary  · Estimated right ventricular systolic pressure from tricuspid regurgitation is normal (<35 mmHg).  · Left ventricular diastolic function is consistent with (grade I) impaired relaxation.  · Left ventricular ejection fraction appears to be 61 - 65%.    STRESS:    CATH:  No results found for this or any previous visit.    BMP:     Glucose   Date Value Ref Range Status   06/02/2023 105 (H) 65 - 99 mg/dL Final     BUN   Date Value Ref Range Status   06/02/2023 9 8 - 23 mg/dL Final     Creatinine   Date Value Ref Range Status   06/02/2023 0.73 0.57 - " 1.00 mg/dL Final     Sodium   Date Value Ref Range Status   06/02/2023 141 136 - 145 mmol/L Final     Potassium   Date Value Ref Range Status   06/02/2023 3.8 3.5 - 5.2 mmol/L Final     Chloride   Date Value Ref Range Status   06/02/2023 105 98 - 107 mmol/L Final     CO2   Date Value Ref Range Status   06/02/2023 28.5 22.0 - 29.0 mmol/L Final     Calcium   Date Value Ref Range Status   06/02/2023 9.8 8.6 - 10.5 mg/dL Final     BUN/Creatinine Ratio   Date Value Ref Range Status   06/02/2023 12.3 7.0 - 25.0 Final     Anion Gap   Date Value Ref Range Status   06/02/2023 7.5 5.0 - 15.0 mmol/L Final     eGFR   Date Value Ref Range Status   06/02/2023 89.2 >60.0 mL/min/1.73 Final     LIPIDS:  Total Cholesterol   Date Value Ref Range Status   06/02/2023 183 0 - 200 mg/dL Final     Triglycerides   Date Value Ref Range Status   06/02/2023 196 (H) 0 - 150 mg/dL Final     HDL Cholesterol   Date Value Ref Range Status   06/02/2023 46 40 - 60 mg/dL Final     LDL Cholesterol    Date Value Ref Range Status   06/02/2023 103 (H) 0 - 100 mg/dL Final     VLDL Cholesterol   Date Value Ref Range Status   06/02/2023 34 5 - 40 mg/dL Final         Procedures             ASSESSMENT:  Diagnoses and all orders for this visit:    1. Primary hypertension (Primary)    2. Mixed hyperlipidemia    3. Nonrheumatic aortic valve insufficiency  -     Adult Transthoracic Echo Complete W/ Cont if Necessary Per Protocol; Future  -     Cardiac Event Monitor; Future    4. Palpitations    5. Postural dizziness    Other orders  -     amLODIPine (NORVASC) 5 MG tablet; Take 1 tablet by mouth Daily.  Dispense: 90 tablet; Refill: 3         PLAN:    1.  Patient's dizziness sounds most consistent with vertigo.  I did tell her to consider seeing an ENT doctor.  2.  For patient's palpitations she is in agreement to place a 2-week event recorder.  She wants to wait till after a trip that she is going on.  We will bring her back in 2 weeks for repeat blood pressure  check and also put a monitor on at that time.  3.  I asked the patient to repeat an echocardiogram as the one 2 years ago showed mild to moderate aortic insufficiency.  4.  Will discontinue patient's metoprolol and start amlodipine 5 mg p.o. daily.  5.  We will continue to follow closely.  6.  Continue the Lipitor.  Patient had cholesterol checked 6/2/2023 with , HDL 46, and triglycerides 196.  These are under good control.      Return in about 2 months (around 8/15/2023) for miguel mcknight.     Patient was given instructions and counseling regarding her condition or for health maintenance advice. Please see specific information pulled into the AVS if appropriate.         Atul Perrin MD   6/15/2023  12:48 EDT   Low Risk (score 7-11)

## 2023-06-16 ENCOUNTER — TELEPHONE (OUTPATIENT)
Dept: FAMILY MEDICINE CLINIC | Facility: CLINIC | Age: 68
End: 2023-06-16

## 2023-06-16 ENCOUNTER — PATIENT ROUNDING (BHMG ONLY) (OUTPATIENT)
Dept: CARDIOLOGY | Facility: CLINIC | Age: 68
End: 2023-06-16
Payer: COMMERCIAL

## 2023-06-16 NOTE — TELEPHONE ENCOUNTER
Caller: Tatyana Shea    Relationship: Self    Best call back number: [phone]     What is the best time to reach you: ANY    Who are you requesting to speak with (clinical staff, provider,  specific staff member): CLINICAL    What was the call regarding: PATIENT WOULD LIKE TO DISCUSS HER NEW MEDIATION PRESCRIBED BY HER CARDIOLOGY WITH MERLE ARMIJO OR CLEMENT.

## 2023-06-16 NOTE — TELEPHONE ENCOUNTER
Pt is concerned about the medication reactions with the medication that was given to her from cardiology.  He changed toprol to amlodipine,  she also stated they discussed losartan with the hctz, you had  them due to frequent urination.  She is willing to go back if you feel it is best for her.  Please advise.

## 2023-06-16 NOTE — TELEPHONE ENCOUNTER
Start with half a tab to see how she does with it and any swelling.  Then if not having issues can increase to a full tablet.  Have her journal her blood pressures and let me know next week how they are.  I prescribe amlodipine so she should be okay taking.

## 2023-06-16 NOTE — TELEPHONE ENCOUNTER
Left detailed message letting pt know instructions on medication and to check her blood pressures and let us know how she is doing next week.

## 2023-07-25 ENCOUNTER — TELEPHONE (OUTPATIENT)
Dept: CARDIOLOGY | Facility: CLINIC | Age: 68
End: 2023-07-25
Payer: COMMERCIAL

## 2023-07-25 NOTE — TELEPHONE ENCOUNTER
----- Message from MERLE Medina sent at 7/24/2023 11:46 AM EDT -----  Echo demonstrates an ejection fraction of 56 to 60%.  Mild to early moderate aortic insufficiency.  Otherwise no concerning abnormalities.

## 2023-07-26 NOTE — TELEPHONE ENCOUNTER
JESUS patient. Went over results. Patient reports her stamina has been reduced- over the past two years. Patient verbalized understanding and appreciation.

## 2023-07-31 ENCOUNTER — HOSPITAL ENCOUNTER (OUTPATIENT)
Dept: BONE DENSITY | Facility: HOSPITAL | Age: 68
Discharge: HOME | End: 2023-07-31
Payer: COMMERCIAL

## 2023-07-31 ENCOUNTER — HOSPITAL ENCOUNTER (OUTPATIENT)
Dept: MAMMOGRAPHY | Facility: HOSPITAL | Age: 68
Discharge: HOME | End: 2023-07-31
Payer: COMMERCIAL

## 2023-07-31 DIAGNOSIS — Z78.0 POST-MENOPAUSE: ICD-10-CM

## 2023-07-31 DIAGNOSIS — R92.8 ABNORMAL MAMMOGRAM OF LEFT BREAST: Primary | ICD-10-CM

## 2023-07-31 DIAGNOSIS — Z12.31 ENCOUNTER FOR SCREENING MAMMOGRAM FOR MALIGNANT NEOPLASM OF BREAST: ICD-10-CM

## 2023-07-31 PROCEDURE — 77067 SCR MAMMO BI INCL CAD: CPT

## 2023-07-31 PROCEDURE — 77063 BREAST TOMOSYNTHESIS BI: CPT

## 2023-07-31 PROCEDURE — 77080 DXA BONE DENSITY AXIAL: CPT

## 2023-08-16 ENCOUNTER — HOSPITAL ENCOUNTER (OUTPATIENT)
Dept: ULTRASOUND IMAGING | Facility: HOSPITAL | Age: 68
Discharge: HOME | End: 2023-08-16
Payer: COMMERCIAL

## 2023-08-16 ENCOUNTER — HOSPITAL ENCOUNTER (OUTPATIENT)
Dept: MAMMOGRAPHY | Facility: HOSPITAL | Age: 68
Discharge: HOME | End: 2023-08-16
Payer: COMMERCIAL

## 2023-08-16 DIAGNOSIS — R92.8 ABNORMAL MAMMOGRAM OF LEFT BREAST: ICD-10-CM

## 2023-08-16 DIAGNOSIS — R92.8 ABNORMAL MAMMOGRAM OF LEFT BREAST: Primary | ICD-10-CM

## 2023-08-16 PROCEDURE — G0279 TOMOSYNTHESIS, MAMMO: HCPCS

## 2023-08-16 PROCEDURE — 76642 ULTRASOUND BREAST LIMITED: CPT

## 2023-08-16 PROCEDURE — 77065 DX MAMMO INCL CAD UNI: CPT

## 2023-08-28 ENCOUNTER — HOSPITAL ENCOUNTER (OUTPATIENT)
Dept: MAMMOGRAPHY | Facility: HOSPITAL | Age: 68
Discharge: HOME | End: 2023-08-28
Payer: COMMERCIAL

## 2023-08-28 ENCOUNTER — PATIENT OUTREACH (OUTPATIENT)
Dept: ONCOLOGY | Facility: HOSPITAL | Age: 68
End: 2023-08-28

## 2023-08-28 DIAGNOSIS — R92.8 ABNORMAL MAMMOGRAM OF LEFT BREAST: ICD-10-CM

## 2023-08-28 DIAGNOSIS — N63.20 LARGE MASS OF LEFT BREAST: ICD-10-CM

## 2023-08-28 PROCEDURE — 88342 IMHCHEM/IMCYTCHM 1ST ANTB: CPT | Performed by: NURSE PRACTITIONER

## 2023-08-28 PROCEDURE — 76098 X-RAY EXAM SURGICAL SPECIMEN: CPT

## 2023-08-28 PROCEDURE — 88360 TUMOR IMMUNOHISTOCHEM/MANUAL: CPT | Performed by: NURSE PRACTITIONER

## 2023-08-28 PROCEDURE — 0 LIDOCAINE 1 % SOLUTION: Performed by: NURSE PRACTITIONER

## 2023-08-28 PROCEDURE — 88341 IMHCHEM/IMCYTCHM EA ADD ANTB: CPT | Performed by: NURSE PRACTITIONER

## 2023-08-28 PROCEDURE — 88305 TISSUE EXAM BY PATHOLOGIST: CPT | Performed by: NURSE PRACTITIONER

## 2023-08-28 PROCEDURE — C1819 TISSUE LOCALIZATION-EXCISION: HCPCS

## 2023-08-28 PROCEDURE — A4648 IMPLANTABLE TISSUE MARKER: HCPCS

## 2023-08-28 RX ORDER — LIDOCAINE HYDROCHLORIDE 10 MG/ML
10 INJECTION, SOLUTION INFILTRATION; PERINEURAL ONCE
Status: COMPLETED | OUTPATIENT
Start: 2023-08-28 | End: 2023-08-28

## 2023-08-28 RX ORDER — LIDOCAINE HYDROCHLORIDE AND EPINEPHRINE 10; 10 MG/ML; UG/ML
10 INJECTION, SOLUTION INFILTRATION; PERINEURAL ONCE
Status: COMPLETED | OUTPATIENT
Start: 2023-08-28 | End: 2023-08-28

## 2023-08-28 RX ADMIN — LIDOCAINE HYDROCHLORIDE,EPINEPHRINE BITARTRATE 20 ML: 10; .01 INJECTION, SOLUTION INFILTRATION; PERINEURAL at 14:46

## 2023-08-28 RX ADMIN — LIDOCAINE HYDROCHLORIDE 10 ML: 10 INJECTION, SOLUTION INFILTRATION; PERINEURAL at 14:46

## 2023-08-29 ENCOUNTER — PATIENT OUTREACH (OUTPATIENT)
Dept: ONCOLOGY | Facility: HOSPITAL | Age: 68
End: 2023-08-29
Payer: COMMERCIAL

## 2023-08-31 ENCOUNTER — OFFICE VISIT (OUTPATIENT)
Dept: FAMILY MEDICINE CLINIC | Facility: CLINIC | Age: 68
End: 2023-08-31
Payer: COMMERCIAL

## 2023-08-31 ENCOUNTER — TELEPHONE (OUTPATIENT)
Dept: FAMILY MEDICINE CLINIC | Facility: CLINIC | Age: 68
End: 2023-08-31

## 2023-08-31 VITALS
SYSTOLIC BLOOD PRESSURE: 164 MMHG | DIASTOLIC BLOOD PRESSURE: 78 MMHG | HEIGHT: 65 IN | RESPIRATION RATE: 16 BRPM | OXYGEN SATURATION: 98 % | WEIGHT: 209.4 LBS | HEART RATE: 88 BPM | TEMPERATURE: 96.5 F | BODY MASS INDEX: 34.89 KG/M2

## 2023-08-31 DIAGNOSIS — C50.912 LOBULAR CARCINOMA OF LEFT BREAST: Primary | ICD-10-CM

## 2023-08-31 LAB
CYTO UR: NORMAL
LAB AP CASE REPORT: NORMAL
LAB AP CLINICAL INFORMATION: NORMAL
LAB AP SPECIAL STAINS: NORMAL
LAB AP SYNOPTIC CHECKLIST: NORMAL
PATH REPORT.FINAL DX SPEC: NORMAL
PATH REPORT.GROSS SPEC: NORMAL

## 2023-08-31 PROCEDURE — 99212 OFFICE O/P EST SF 10 MIN: CPT | Performed by: NURSE PRACTITIONER

## 2023-08-31 NOTE — PROGRESS NOTES
Had patient come in the office just to discuss the pathology that was finished today.  I discussed some of the results with her.  Discussed where she would like to seek treatment.  She states she would like to go to [place] since she has a son who lives there.

## 2023-09-01 ENCOUNTER — PATIENT OUTREACH (OUTPATIENT)
Dept: ONCOLOGY | Facility: HOSPITAL | Age: 68
End: 2023-09-01
Payer: COMMERCIAL

## 2023-09-12 LAB
CYTO UR: NORMAL
LAB AP CASE REPORT: NORMAL
LAB AP CLINICAL INFORMATION: NORMAL
LAB AP SPECIAL STAINS: NORMAL
LAB AP SYNOPTIC CHECKLIST: NORMAL
PATH REPORT.ADDENDUM SPEC: NORMAL
PATH REPORT.FINAL DX SPEC: NORMAL
PATH REPORT.GROSS SPEC: NORMAL

## 2023-09-26 ENCOUNTER — OFFICE VISIT (OUTPATIENT)
Dept: CARDIOLOGY | Facility: CLINIC | Age: 68
End: 2023-09-26
Payer: COMMERCIAL

## 2023-09-26 VITALS
WEIGHT: 210 LBS | BODY MASS INDEX: 34.99 KG/M2 | SYSTOLIC BLOOD PRESSURE: 162 MMHG | DIASTOLIC BLOOD PRESSURE: 88 MMHG | HEIGHT: 65 IN | HEART RATE: 73 BPM

## 2023-09-26 DIAGNOSIS — R00.2 PALPITATIONS: ICD-10-CM

## 2023-09-26 DIAGNOSIS — E78.2 MIXED HYPERLIPIDEMIA: ICD-10-CM

## 2023-09-26 DIAGNOSIS — I10 PRIMARY HYPERTENSION: Primary | ICD-10-CM

## 2023-09-26 PROCEDURE — 99214 OFFICE O/P EST MOD 30 MIN: CPT | Performed by: INTERNAL MEDICINE

## 2023-09-27 NOTE — PROGRESS NOTES
Chief Complaint  Hypertension    Subjective        Tatyana Shea presents to [facility] CARDIOLOGY  History of present illness:    Patient states since seeing us last they did diagnose her with breast cancer.  She still notes occasional palpitation.  She does note headaches that have been going on for a couple years.  She was seen by a neurologist and no definitive diagnosis was made.  She states her blood pressure has been running under good control but she rushed to get here and has headache at this time.      Past Medical History:   Diagnosis Date    Acid reflux     Arrhythmia 2013    Diverticulitis     NO CURRENT ISSUES    Diverticulosis 15 yrs ago    Currently having some mild pain in lower left    Headache 2021    Hiatal hernia     HTN (hypertension)     Hyperlipidemia 2010    Iron deficiency anemia     NO CURRENT S/S, HAD IRON INFUSIONS IN SUMMER 22    Low back pain     Obesity In my 40s    Eat for temporary comfort    Peptic ulceration In my 40s    I had a bleeding ulcer due to my lifestyle at that time/NO CURRENT ISSUES    Pulmonary embolism 1975    Due to birth control pills, I was told NONE CURRENT         Past Surgical History:   Procedure Laterality Date    CHOLECYSTECTOMY      COLONOSCOPY      2020 with      COLONOSCOPY N/A 07/29/2021    Procedure: COLONOSCOPY with polypectomy/snare;  Surgeon: Ruel Foster MD;  Location: [facility] ENDOSCOPY;  Service: Gastroenterology;  Laterality: N/A;  colon polyp    COSMETIC SURGERY  2013    Removed bags under and above my eyes    ENDOSCOPY N/A 07/28/2021    Procedure: ESOPHAGOGASTRODUODENOSCOPY with bxs and cold snares;  Surgeon: Ruel Foster MD;  Location: [facility] ENDOSCOPY;  Service: Gastroenterology;  Laterality: N/A;  hiatal hernia  gastric polyps    TRIGGER FINGER RELEASE Left 12/21/2022    Procedure: LEFT THUMB FINGER TRIGGER RELEASE AND LEFT THUMB CYST EXCISION;  Surgeon: Inés Rashid MD;  [place] [facility] [place] [facility] [place]   Service: Orthopedics;  Laterality: Left;    UPPER GASTROINTESTINAL ENDOSCOPY  07/28/2021    Dr. Foster          Social History     Socioeconomic History    Marital status: Single   Tobacco Use    Smoking status: Never    Smokeless tobacco: Never   Vaping Use    Vaping Use: Never used   Substance and Sexual Activity    Alcohol use: Not Currently     Comment: Glass of wine a couple times a year    Drug use: Never    Sexual activity: Not Currently     Birth control/protection: None         Family History   Problem Relation Age of Onset    Heart failure Mother     Heart disease Mother         Had a heart attack, then congestive heart failure, dead in one month at 82.    Hyperlipidemia Mother     Hypertension Mother     Developmental Disability Son         Auditory processing disorder, anxiety, language and learning disorder in math.    Atrial fibrillation Other         UNSPECIFIED    Colon cancer Neg Hx     Colon polyps Neg Hx     Crohn's disease Neg Hx     Irritable bowel syndrome Neg Hx     Ulcerative colitis Neg Hx     Malig Hyperthermia Neg Hx           No Known Allergies         Current Outpatient Medications:     amLODIPine (NORVASC) 5 MG tablet, Take 1 tablet by mouth Daily., Disp: 90 tablet, Rfl: 3    atorvastatin (LIPITOR) 10 MG tablet, Take 1 tablet by mouth Every Night., Disp: 90 tablet, Rfl: 1    Cholecalciferol 125 MCG (5000 UT) tablet, Take 1 tablet by mouth Daily. LAST DOSE 12/16/22, Disp: , Rfl:     Diclofenac Sodium (VOLTAREN) 1 % gel gel, Apply 4 g topically to the appropriate area as directed 4 (Four) Times a Day., Disp: 100 g, Rfl: 3    furosemide (Lasix) 20 MG tablet, Take 1 tablet by mouth 2 (Two) Times a Day., Disp: 180 tablet, Rfl: 1    Krill Oil (Omega-3) 500 MG capsule, Take 500 mg by mouth Daily. LAST DOSE 12/16/22, Disp: , Rfl:     losartan (Cozaar) 50 MG tablet, Take 1 tablet by mouth 2 (Two) Times a Day., Disp: 180 tablet, Rfl: 1    multivitamin with minerals tablet tablet, Take 1 tablet by  "mouth Daily. LAST DOSE 12/16/22, Disp: , Rfl:     pantoprazole (PROTONIX) 40 MG EC tablet, Take 1 tablet by mouth Every Night., Disp: 90 tablet, Rfl: 3    potassium chloride 10 MEQ CR tablet, Take 1 tablet by mouth Daily., Disp: 90 tablet, Rfl: 1      ROS:  Cardiac review of systems positive for palpitations    Objective     /88   Pulse 73   Ht 165.1 cm (65\")   Wt 95.3 kg (210 lb)   BMI 34.95 kg/m²       General Appearance:   well developed  well nourished  HENT:   oropharynx moist  lips not cyanotic  Respiratory:  no respiratory distress  normal breath sounds  no rales  Cardiovascular:  no jugular venous distention  regular rhythm  S1 normal, S2 normal  no S3, no S4   no murmur  no rub, no thrill  No carotid bruit  pedal pulses normal  lower extremity edema: none    Musculoskeletal:  no clubbing of fingers.   normocephalic, head atraumatic  Skin:   warm, dry  Psychiatric:  judgement and insight appropriate  normal mood and affect    ECHO:  Results for orders placed during the hospital encounter of 07/20/23    Adult Transthoracic Echo Complete W/ Cont if Necessary Per Protocol    Interpretation Summary    Left ventricular ejection fraction appears to be 56 - 60%.  No obvious regional wall motion abnormalities noted.    Left ventricular diastolic function is consistent with (grade I) impaired relaxation and age.    The left atrial cavity is mildly dilated.    Mild to early moderate aortic insufficiency.    STRESS:    CATH:  No results found for this or any previous visit.    BMP:     Glucose   Date Value Ref Range Status   06/02/2023 105 (H) 65 - 99 mg/dL Final     BUN   Date Value Ref Range Status   06/02/2023 9 8 - 23 mg/dL Final     Creatinine   Date Value Ref Range Status   06/02/2023 0.73 0.57 - 1.00 mg/dL Final     Sodium   Date Value Ref Range Status   06/02/2023 141 136 - 145 mmol/L Final     Potassium   Date Value Ref Range Status   06/02/2023 3.8 3.5 - 5.2 mmol/L Final     Chloride   Date Value " Ref Range Status   06/02/2023 105 98 - 107 mmol/L Final     CO2   Date Value Ref Range Status   06/02/2023 28.5 22.0 - 29.0 mmol/L Final     Calcium   Date Value Ref Range Status   06/02/2023 9.8 8.6 - 10.5 mg/dL Final     BUN/Creatinine Ratio   Date Value Ref Range Status   06/02/2023 12.3 7.0 - 25.0 Final     Anion Gap   Date Value Ref Range Status   06/02/2023 7.5 5.0 - 15.0 mmol/L Final     eGFR   Date Value Ref Range Status   06/02/2023 89.2 >60.0 mL/min/1.73 Final     LIPIDS:  Total Cholesterol   Date Value Ref Range Status   06/02/2023 183 0 - 200 mg/dL Final     Triglycerides   Date Value Ref Range Status   06/02/2023 196 (H) 0 - 150 mg/dL Final     HDL Cholesterol   Date Value Ref Range Status   06/02/2023 46 40 - 60 mg/dL Final     LDL Cholesterol    Date Value Ref Range Status   06/02/2023 103 (H) 0 - 100 mg/dL Final     VLDL Cholesterol   Date Value Ref Range Status   06/02/2023 34 5 - 40 mg/dL Final         Procedures             ASSESSMENT:  Diagnoses and all orders for this visit:    1. Primary hypertension (Primary)    2. Mixed hyperlipidemia    3. Palpitations         PLAN:    1.  We will monitor patient's blood pressure.  If needed can go up on the amlodipine.  She states that she rushed to get here and is having a current headache.  2.  Patient was worked up for headache and no definitive diagnosis was made.  I did talk to her about possibly getting a second opinion from a different neurologist.  3.  Continue the Lipitor.  4.  Patient had a monitor on for 7-1/2 days that showed rare PAC.  She was diagnosed with PVCs in the past.  I did tell her about the benign nature of these.  Patient had echocardiogram 7/20/2023 that showed normal ejection fraction and mild to early aortic insufficiency.  We will consider repeating an echocardiogram 7/2024 to keep an eye on her aortic insufficiency.  5.  Patient is following up with [facility] for her recently diagnosed breast  cancer.      Return in about 3 months (around 12/26/2023).     Patient was given instructions and counseling regarding her condition or for health maintenance advice. Please see specific information pulled into the AVS if appropriate.         Atul Perrin MD   9/27/2023  19:22 EDT

## 2023-10-04 ENCOUNTER — HOSPITAL ENCOUNTER (OUTPATIENT)
Dept: OTHER | Facility: HOSPITAL | Age: 68
Discharge: HOME | End: 2023-10-04

## 2023-10-20 ENCOUNTER — HOSPITAL ENCOUNTER (OUTPATIENT)
Dept: OTHER | Facility: HOSPITAL | Age: 68
Discharge: HOME | End: 2023-10-20

## 2023-11-03 ENCOUNTER — HOSPITAL ENCOUNTER (OUTPATIENT)
Dept: OTHER | Facility: HOSPITAL | Age: 68
Discharge: HOME | End: 2023-11-03

## 2023-11-06 ENCOUNTER — LAB (OUTPATIENT)
Dept: LAB | Facility: HOSPITAL | Age: 68
End: 2023-11-06
Payer: COMMERCIAL

## 2023-11-06 ENCOUNTER — OFFICE VISIT (OUTPATIENT)
Dept: FAMILY MEDICINE CLINIC | Facility: CLINIC | Age: 68
End: 2023-11-06
Payer: COMMERCIAL

## 2023-11-06 VITALS
TEMPERATURE: 97 F | HEIGHT: 65 IN | DIASTOLIC BLOOD PRESSURE: 80 MMHG | RESPIRATION RATE: 16 BRPM | HEART RATE: 78 BPM | WEIGHT: 202.4 LBS | OXYGEN SATURATION: 99 % | SYSTOLIC BLOOD PRESSURE: 134 MMHG | BODY MASS INDEX: 33.72 KG/M2

## 2023-11-06 DIAGNOSIS — J34.89 SINUS PRESSURE: ICD-10-CM

## 2023-11-06 DIAGNOSIS — R53.83 FATIGUE, UNSPECIFIED TYPE: Primary | ICD-10-CM

## 2023-11-06 DIAGNOSIS — R53.83 FATIGUE, UNSPECIFIED TYPE: ICD-10-CM

## 2023-11-06 LAB
BASOPHILS # BLD AUTO: 0.02 10*3/MM3 (ref 0–0.2)
BASOPHILS NFR BLD AUTO: 0.4 % (ref 0–1.5)
DEPRECATED RDW RBC AUTO: 45.5 FL (ref 37–54)
EOSINOPHIL # BLD AUTO: 0.05 10*3/MM3 (ref 0–0.4)
EOSINOPHIL NFR BLD AUTO: 1 % (ref 0.3–6.2)
ERYTHROCYTE [DISTWIDTH] IN BLOOD BY AUTOMATED COUNT: 12.8 % (ref 12.3–15.4)
HCT VFR BLD AUTO: 40.5 % (ref 34–46.6)
HGB BLD-MCNC: 13.7 G/DL (ref 12–15.9)
IMM GRANULOCYTES # BLD AUTO: 0.01 10*3/MM3 (ref 0–0.05)
IMM GRANULOCYTES NFR BLD AUTO: 0.2 % (ref 0–0.5)
LYMPHOCYTES # BLD AUTO: 1.66 10*3/MM3 (ref 0.7–3.1)
LYMPHOCYTES NFR BLD AUTO: 32 % (ref 19.6–45.3)
MCH RBC QN AUTO: 32.8 PG (ref 26.6–33)
MCHC RBC AUTO-ENTMCNC: 33.8 G/DL (ref 31.5–35.7)
MCV RBC AUTO: 96.9 FL (ref 79–97)
MONOCYTES # BLD AUTO: 0.42 10*3/MM3 (ref 0.1–0.9)
MONOCYTES NFR BLD AUTO: 8.1 % (ref 5–12)
NEUTROPHILS NFR BLD AUTO: 3.03 10*3/MM3 (ref 1.7–7)
NEUTROPHILS NFR BLD AUTO: 58.3 % (ref 42.7–76)
NRBC BLD AUTO-RTO: 0 /100 WBC (ref 0–0.2)
PLATELET # BLD AUTO: 235 10*3/MM3 (ref 140–450)
PMV BLD AUTO: 9.2 FL (ref 6–12)
RBC # BLD AUTO: 4.18 10*6/MM3 (ref 3.77–5.28)
WBC NRBC COR # BLD: 5.19 10*3/MM3 (ref 3.4–10.8)

## 2023-11-06 PROCEDURE — 36415 COLL VENOUS BLD VENIPUNCTURE: CPT

## 2023-11-06 PROCEDURE — 86644 CMV ANTIBODY: CPT

## 2023-11-06 PROCEDURE — 86645 CMV ANTIBODY IGM: CPT

## 2023-11-06 PROCEDURE — 99213 OFFICE O/P EST LOW 20 MIN: CPT | Performed by: NURSE PRACTITIONER

## 2023-11-06 PROCEDURE — 86664 EPSTEIN-BARR NUCLEAR ANTIGEN: CPT

## 2023-11-06 PROCEDURE — 86663 EPSTEIN-BARR ANTIBODY: CPT

## 2023-11-06 PROCEDURE — 85025 COMPLETE CBC W/AUTO DIFF WBC: CPT

## 2023-11-06 PROCEDURE — 86665 EPSTEIN-BARR CAPSID VCA: CPT

## 2023-11-06 RX ORDER — LORATADINE 10 MG/1
10 TABLET ORAL DAILY
Qty: 30 TABLET | Refills: 0 | Status: SHIPPED | OUTPATIENT
Start: 2023-11-06

## 2023-11-06 NOTE — PROGRESS NOTES
Chief Complaint  Sore Throat (Went to  10/07/23 was given antibiotics, feels like there is water in her ears, balance is off as well for 3 years) and Medication Problem (Pt wants to know how often she should take her potassium)    Subjective        Tatyana Shea presents to [facility] FAM[facility]  History of Present Illness  Sore throat frontal headache and   Sore Throat   Pertinent negatives include no coughing or shortness of breath.       The following portions of the patient's history were personally reviewed and updated as appropriate: allergies, current medications, past medical history, past surgical history, past family history, and past social history.     Body mass index is 33.68 kg/m².           Past History:    Medical History: has a past medical history of Acid reflux, Arrhythmia (2013), Diverticulitis, Diverticulosis (15 yrs ago), Headache (2021), Hiatal hernia, HTN (hypertension), Hyperlipidemia (2010), Iron deficiency anemia, Low back pain, Obesity (In my 40s), Obstructive sleep apnea (07/02/2021), Peptic ulceration (In my 40s), and Pulmonary embolism (1975).     Surgical History: has a past surgical history that includes Cholecystectomy; Colonoscopy; Colonoscopy (N/A, 07/29/2021); Upper gastrointestinal endoscopy (07/28/2021); Esophagogastroduodenoscopy (N/A, 07/28/2021); Cosmetic surgery (2013); Trigger finger release (Left, 12/21/2022); and Mastectomy partial / lumpectomy (Left).     Family History: family history includes Atrial fibrillation in an other family member; Developmental Disability in her son; Heart disease in her mother; Heart failure in her mother; Hyperlipidemia in her mother; Hypertension in her mother.     Social History: reports that she has never smoked. She has never used smokeless tobacco. She reports that she does not currently use alcohol. She reports that she does not use drugs.    Allergies: Patient has no known allergies.          Current  "Outpatient Medications:     amLODIPine (NORVASC) 5 MG tablet, Take 1 tablet by mouth Daily., Disp: 90 tablet, Rfl: 3    atorvastatin (LIPITOR) 10 MG tablet, Take 1 tablet by mouth Every Night., Disp: 90 tablet, Rfl: 1    Cholecalciferol 125 MCG (5000 UT) tablet, Take 1 tablet by mouth Daily. LAST DOSE 12/16/22, Disp: , Rfl:     furosemide (Lasix) 20 MG tablet, Take 1 tablet by mouth 2 (Two) Times a Day., Disp: 180 tablet, Rfl: 1    Krill Oil (Omega-3) 500 MG capsule, Take 500 mg by mouth Daily. LAST DOSE 12/16/22, Disp: , Rfl:     losartan (Cozaar) 50 MG tablet, Take 1 tablet by mouth 2 (Two) Times a Day., Disp: 180 tablet, Rfl: 1    multivitamin with minerals tablet tablet, Take 1 tablet by mouth Daily. LAST DOSE 12/16/22, Disp: , Rfl:     pantoprazole (PROTONIX) 40 MG EC tablet, Take 1 tablet by mouth Every Night., Disp: 90 tablet, Rfl: 3    potassium chloride 10 MEQ CR tablet, Take 1 tablet by mouth Daily., Disp: 90 tablet, Rfl: 1    loratadine (Claritin) 10 MG tablet, Take 1 tablet by mouth Daily., Disp: 30 tablet, Rfl: 0    Medications Discontinued During This Encounter   Medication Reason    Diclofenac Sodium (VOLTAREN) 1 % gel gel *Therapy completed    amoxicillin-clavulanate (AUGMENTIN) 875-125 MG per tablet *Therapy completed    methylPREDNISolone (MEDROL) 4 MG dose pack *Therapy completed         Review of Systems   Constitutional:  Negative for fever.   HENT:  Positive for sore throat.    Respiratory:  Negative for cough and shortness of breath.    Cardiovascular:  Negative for chest pain, palpitations and leg swelling.   Neurological:  Negative for dizziness, weakness, numbness and headache.        Objective         Vitals:    11/06/23 1016   BP: 134/80   BP Location: Right arm   Patient Position: Sitting   Cuff Size: Adult   Pulse: 78   Resp: 16   Temp: 97 °F (36.1 °C)   TempSrc: Temporal   SpO2: 99%   Weight: 91.8 kg (202 lb 6.4 oz)   Height: 165.1 cm (65\")     Body mass index is 33.68 kg/m². "         Physical Exam  Vitals reviewed.   Constitutional:       Appearance: Normal appearance. She is well-developed.   HENT:      Head: Normocephalic and atraumatic.      Mouth/Throat:      Pharynx: No oropharyngeal exudate.   Eyes:      Conjunctiva/sclera: Conjunctivae normal.      Pupils: Pupils are equal, round, and reactive to light.   Cardiovascular:      Rate and Rhythm: Normal rate and regular rhythm.      Heart sounds: Normal heart sounds. No murmur heard.     No friction rub. No gallop.   Pulmonary:      Effort: Pulmonary effort is normal.      Breath sounds: Normal breath sounds. No wheezing or rhonchi.   Skin:     General: Skin is warm and dry.   Neurological:      Mental Status: She is alert and oriented to person, place, and time.   Psychiatric:         Mood and Affect: Mood and affect normal.         Behavior: Behavior normal.         Thought Content: Thought content normal.         Judgment: Judgment normal.             Result Review :               Assessment and Plan     Diagnoses and all orders for this visit:    1. Fatigue, unspecified type (Primary)  -     Therese-Barr Virus VCA, IgM; Future  -     Therese-Barr Virus Early Antigen Antibody, IgG; Future  -     Therese-Barr Virus Nuclear Antigen Antibody, IgG; Future  -     CMV IgG IgM; Future  -     CBC & Differential; Future    2. Sinus pressure  -     loratadine (Claritin) 10 MG tablet; Take 1 tablet by mouth Daily.  Dispense: 30 tablet; Refill: 0      Spent 45 minutes with patient in counseling and education as well as evaluation.        Follow Up     No follow-ups on file.    Patient was given instructions and counseling regarding her condition or for health maintenance advice. Please see specific information pulled into the AVS if appropriate.

## 2023-11-08 LAB
CMV IGG SERPL IA-ACNC: 5.3 U/ML (ref 0–0.59)
CMV IGM SERPL IA-ACNC: <30 AU/ML (ref 0–29.9)
EBV EA-D IGG SER-ACNC: <9 U/ML (ref 0–8.9)
EBV NA IGG SER IA-ACNC: 212 U/ML (ref 0–17.9)
EBV VCA IGM SER IA-ACNC: <36 U/ML (ref 0–35.9)

## 2023-11-22 ENCOUNTER — OFFICE VISIT (OUTPATIENT)
Dept: FAMILY MEDICINE CLINIC | Facility: CLINIC | Age: 68
End: 2023-11-22
Payer: COMMERCIAL

## 2023-11-22 VITALS
DIASTOLIC BLOOD PRESSURE: 74 MMHG | TEMPERATURE: 97.8 F | BODY MASS INDEX: 32.59 KG/M2 | HEIGHT: 66 IN | OXYGEN SATURATION: 98 % | HEART RATE: 89 BPM | WEIGHT: 202.8 LBS | SYSTOLIC BLOOD PRESSURE: 124 MMHG

## 2023-11-22 DIAGNOSIS — J02.9 SORE THROAT: ICD-10-CM

## 2023-11-22 DIAGNOSIS — J34.89 SINUS PRESSURE: Primary | ICD-10-CM

## 2023-11-22 PROCEDURE — 99213 OFFICE O/P EST LOW 20 MIN: CPT | Performed by: NURSE PRACTITIONER

## 2023-11-22 NOTE — PROGRESS NOTES
Chief Complaint  Sore Throat (Sore throat x2 months, along with tinnitus. She went to  this past weekend and was referred to ENT. Appointment is not until February and she would like to be in sooner. )    Subjective        Tatyana Shea presents to [facility] FAMILY MEDICINE  History of Present Illness  Sore throat  and sinus pressure for 8 weeks. Antibiotics hasn't helped.  Sore Throat   Pertinent negatives include no coughing or shortness of breath.       The following portions of the patient's history were personally reviewed and updated as appropriate: allergies, current medications, past medical history, past surgical history, past family history, and past social history.     Body mass index is 32.73 kg/m².           Past History:    Medical History: has a past medical history of Acid reflux, Arrhythmia (2013), Breast cancer, Diverticulitis, Diverticulosis (15 yrs ago), Headache (2021), Hiatal hernia, HTN (hypertension), Hyperlipidemia (2010), Iron deficiency anemia, Low back pain, Obesity (In my 40s), Obstructive sleep apnea (07/02/2021), Peptic ulceration (In my 40s), and Pulmonary embolism (1975).     Surgical History: has a past surgical history that includes Cholecystectomy; Colonoscopy; Colonoscopy (N/A, 07/29/2021); Upper gastrointestinal endoscopy (07/28/2021); Esophagogastroduodenoscopy (N/A, 07/28/2021); Cosmetic surgery (2013); Trigger finger release (Left, 12/21/2022); Mastectomy partial / lumpectomy (Left); and Breast lumpectomy.     Family History: family history includes Atrial fibrillation in an other family member; Developmental Disability in her son; Heart disease in her mother; Heart failure in her mother; Hyperlipidemia in her mother; Hypertension in her mother.     Social History: reports that she has never smoked. She has never been exposed to tobacco smoke. She has never used smokeless tobacco. She reports that she does not currently use alcohol. She reports that she  does not use drugs.    Allergies: Patient has no known allergies.          Current Outpatient Medications:     amLODIPine (NORVASC) 5 MG tablet, Take 1 tablet by mouth Daily., Disp: 90 tablet, Rfl: 3    atorvastatin (LIPITOR) 10 MG tablet, Take 1 tablet by mouth Every Night., Disp: 90 tablet, Rfl: 1    Cholecalciferol 125 MCG (5000 UT) tablet, Take 1 tablet by mouth Daily. LAST DOSE 12/16/22, Disp: , Rfl:     fluticasone (FLONASE) 50 MCG/ACT nasal spray, 2 sprays into the nostril(s) as directed by provider Daily., Disp: 11.12 mL, Rfl: 0    furosemide (Lasix) 20 MG tablet, Take 1 tablet by mouth 2 (Two) Times a Day., Disp: 180 tablet, Rfl: 1    Krill Oil (Omega-3) 500 MG capsule, Take 500 mg by mouth Daily. LAST DOSE 12/16/22, Disp: , Rfl:     loratadine (Claritin) 10 MG tablet, Take 1 tablet by mouth Daily., Disp: 30 tablet, Rfl: 0    losartan (Cozaar) 50 MG tablet, Take 1 tablet by mouth 2 (Two) Times a Day., Disp: 180 tablet, Rfl: 1    methylPREDNISolone (MEDROL) 4 MG dose pack, Take as directed on package instructions., Disp: 21 tablet, Rfl: 0    multivitamin with minerals tablet tablet, Take 1 tablet by mouth Daily. LAST DOSE 12/16/22, Disp: , Rfl:     pantoprazole (PROTONIX) 40 MG EC tablet, Take 1 tablet by mouth Every Night., Disp: 90 tablet, Rfl: 3    potassium chloride 10 MEQ CR tablet, Take 1 tablet by mouth Daily., Disp: 90 tablet, Rfl: 1    azithromycin (Zithromax Z-Chalino) 250 MG tablet, Take 2 tablets by mouth on day 1, then 1 tablet daily on days 2-5, Disp: 6 tablet, Rfl: 0    There are no discontinued medications.      Review of Systems   Constitutional:  Negative for fever.   HENT:  Positive for sore throat.    Respiratory:  Negative for cough and shortness of breath.    Cardiovascular:  Negative for chest pain, palpitations and leg swelling.   Neurological:  Negative for dizziness, weakness, numbness and headache.        Objective         Vitals:    11/22/23 1000   BP: 124/74   BP Location: Right arm  "  Patient Position: Sitting   Cuff Size: Adult   Pulse: 89   Temp: 97.8 °F (36.6 °C)   TempSrc: Temporal   SpO2: 98%   Weight: 92 kg (202 lb 12.8 oz)   Height: 167.6 cm (66\")     Body mass index is 32.73 kg/m².         Physical Exam  Vitals reviewed.   Constitutional:       Appearance: Normal appearance. She is well-developed.   HENT:      Head: Normocephalic and atraumatic.      Mouth/Throat:      Pharynx: No oropharyngeal exudate.   Eyes:      Conjunctiva/sclera: Conjunctivae normal.      Pupils: Pupils are equal, round, and reactive to light.   Cardiovascular:      Rate and Rhythm: Normal rate and regular rhythm.      Heart sounds: Normal heart sounds. No murmur heard.     No friction rub. No gallop.   Pulmonary:      Effort: Pulmonary effort is normal.      Breath sounds: Normal breath sounds. No wheezing or rhonchi.   Skin:     General: Skin is warm and dry.   Neurological:      Mental Status: She is alert and oriented to person, place, and time.   Psychiatric:         Mood and Affect: Mood and affect normal.         Behavior: Behavior normal.         Thought Content: Thought content normal.         Judgment: Judgment normal.             Result Review :               Assessment and Plan     Diagnoses and all orders for this visit:    1. Sinus pressure (Primary)  -     CT Sinus Without Contrast; Future  -     Ambulatory Referral to ENT (Otolaryngology)    2. Sore throat  -     CT Soft Tissue Neck Without Contrast; Future  -     Ambulatory Referral to ENT (Otolaryngology)              Follow Up     Return for Next scheduled follow up.    Patient was given instructions and counseling regarding her condition or for health maintenance advice. Please see specific information pulled into the AVS if appropriate.         "

## 2023-12-04 DIAGNOSIS — E78.2 MIXED HYPERLIPIDEMIA: ICD-10-CM

## 2023-12-04 RX ORDER — ATORVASTATIN CALCIUM 10 MG/1
10 TABLET, FILM COATED ORAL NIGHTLY
Qty: 90 TABLET | Refills: 1 | Status: SHIPPED | OUTPATIENT
Start: 2023-12-04

## 2023-12-08 ENCOUNTER — HOSPITAL ENCOUNTER (OUTPATIENT)
Dept: CT IMAGING | Facility: HOSPITAL | Age: 68
Discharge: HOME | End: 2023-12-08
Admitting: NURSE PRACTITIONER
Payer: COMMERCIAL

## 2023-12-08 DIAGNOSIS — J34.89 SINUS PRESSURE: ICD-10-CM

## 2023-12-08 DIAGNOSIS — J02.9 SORE THROAT: ICD-10-CM

## 2023-12-08 PROCEDURE — 70486 CT MAXILLOFACIAL W/O DYE: CPT

## 2023-12-08 PROCEDURE — 70490 CT SOFT TISSUE NECK W/O DYE: CPT

## 2023-12-09 DIAGNOSIS — I10 ESSENTIAL HYPERTENSION: ICD-10-CM

## 2023-12-11 RX ORDER — LOSARTAN POTASSIUM 50 MG/1
50 TABLET ORAL 2 TIMES DAILY
Qty: 180 TABLET | Refills: 1 | Status: SHIPPED | OUTPATIENT
Start: 2023-12-11

## 2023-12-19 ENCOUNTER — TELEPHONE (OUTPATIENT)
Dept: ONCOLOGY | Facility: HOSPITAL | Age: 68
End: 2023-12-19
Payer: COMMERCIAL

## 2023-12-22 ENCOUNTER — TELEPHONE (OUTPATIENT)
Dept: ONCOLOGY | Facility: HOSPITAL | Age: 68
End: 2023-12-22
Payer: COMMERCIAL

## 2023-12-26 ENCOUNTER — LAB (OUTPATIENT)
Dept: LAB | Facility: HOSPITAL | Age: 68
End: 2023-12-26
Payer: COMMERCIAL

## 2023-12-26 DIAGNOSIS — R73.01 IMPAIRED FASTING GLUCOSE: ICD-10-CM

## 2023-12-26 DIAGNOSIS — I10 ESSENTIAL HYPERTENSION: ICD-10-CM

## 2023-12-26 DIAGNOSIS — D50.9 IRON DEFICIENCY ANEMIA, UNSPECIFIED IRON DEFICIENCY ANEMIA TYPE: ICD-10-CM

## 2023-12-26 LAB
ALBUMIN SERPL-MCNC: 4.1 G/DL (ref 3.5–5.2)
ALBUMIN/GLOB SERPL: 1.6 G/DL
ALP SERPL-CCNC: 68 U/L (ref 39–117)
ALT SERPL W P-5'-P-CCNC: 14 U/L (ref 1–33)
ANION GAP SERPL CALCULATED.3IONS-SCNC: 9 MMOL/L (ref 5–15)
AST SERPL-CCNC: 12 U/L (ref 1–32)
BASOPHILS # BLD AUTO: 0.02 10*3/MM3 (ref 0–0.2)
BASOPHILS NFR BLD AUTO: 0.3 % (ref 0–1.5)
BILIRUB SERPL-MCNC: 0.3 MG/DL (ref 0–1.2)
BILIRUB UR QL STRIP: NEGATIVE
BUN SERPL-MCNC: 10 MG/DL (ref 8–23)
BUN/CREAT SERPL: 15.2 (ref 7–25)
CALCIUM SPEC-SCNC: 9.2 MG/DL (ref 8.6–10.5)
CHLORIDE SERPL-SCNC: 104 MMOL/L (ref 98–107)
CHOLEST SERPL-MCNC: 181 MG/DL (ref 0–200)
CLARITY UR: ABNORMAL
CO2 SERPL-SCNC: 28 MMOL/L (ref 22–29)
COLOR UR: YELLOW
CREAT SERPL-MCNC: 0.66 MG/DL (ref 0.57–1)
DEPRECATED RDW RBC AUTO: 41.9 FL (ref 37–54)
EGFRCR SERPLBLD CKD-EPI 2021: 94.5 ML/MIN/1.73
EOSINOPHIL # BLD AUTO: 0.1 10*3/MM3 (ref 0–0.4)
EOSINOPHIL NFR BLD AUTO: 1.7 % (ref 0.3–6.2)
ERYTHROCYTE [DISTWIDTH] IN BLOOD BY AUTOMATED COUNT: 12.2 % (ref 12.3–15.4)
FERRITIN SERPL-MCNC: 45.3 NG/ML (ref 13–150)
FOLATE SERPL-MCNC: 17.1 NG/ML (ref 4.78–24.2)
GLOBULIN UR ELPH-MCNC: 2.6 GM/DL
GLUCOSE SERPL-MCNC: 119 MG/DL (ref 65–99)
GLUCOSE UR STRIP-MCNC: NEGATIVE MG/DL
HBA1C MFR BLD: 5.3 % (ref 4.8–5.6)
HCT VFR BLD AUTO: 35.2 % (ref 34–46.6)
HDLC SERPL QL: 3.77
HDLC SERPL-MCNC: 48 MG/DL (ref 40–60)
HGB BLD-MCNC: 12.1 G/DL (ref 12–15.9)
HGB UR QL STRIP.AUTO: NEGATIVE
HOLD SPECIMEN: NORMAL
IMM GRANULOCYTES # BLD AUTO: 0.02 10*3/MM3 (ref 0–0.05)
IMM GRANULOCYTES NFR BLD AUTO: 0.3 % (ref 0–0.5)
IRON 24H UR-MRATE: 91 MCG/DL (ref 37–145)
IRON SATN MFR SERPL: 28 % (ref 20–50)
KETONES UR QL STRIP: NEGATIVE
LDLC SERPL CALC-MCNC: 108 MG/DL (ref 0–100)
LEUKOCYTE ESTERASE UR QL STRIP.AUTO: NEGATIVE
LYMPHOCYTES # BLD AUTO: 2 10*3/MM3 (ref 0.7–3.1)
LYMPHOCYTES NFR BLD AUTO: 33.8 % (ref 19.6–45.3)
MCH RBC QN AUTO: 32.4 PG (ref 26.6–33)
MCHC RBC AUTO-ENTMCNC: 34.4 G/DL (ref 31.5–35.7)
MCV RBC AUTO: 94.4 FL (ref 79–97)
MONOCYTES # BLD AUTO: 0.51 10*3/MM3 (ref 0.1–0.9)
MONOCYTES NFR BLD AUTO: 8.6 % (ref 5–12)
NEUTROPHILS NFR BLD AUTO: 3.26 10*3/MM3 (ref 1.7–7)
NEUTROPHILS NFR BLD AUTO: 55.3 % (ref 42.7–76)
NITRITE UR QL STRIP: NEGATIVE
NRBC BLD AUTO-RTO: 0 /100 WBC (ref 0–0.2)
PH UR STRIP.AUTO: 7.5 [PH] (ref 5–8)
PLATELET # BLD AUTO: 217 10*3/MM3 (ref 140–450)
PMV BLD AUTO: 9.3 FL (ref 6–12)
POTASSIUM SERPL-SCNC: 3.5 MMOL/L (ref 3.5–5.2)
PROT SERPL-MCNC: 6.7 G/DL (ref 6–8.5)
PROT UR QL STRIP: NEGATIVE
RBC # BLD AUTO: 3.73 10*6/MM3 (ref 3.77–5.28)
SODIUM SERPL-SCNC: 141 MMOL/L (ref 136–145)
SP GR UR STRIP: 1.02 (ref 1–1.03)
TIBC SERPL-MCNC: 322 MCG/DL (ref 298–536)
TRANSFERRIN SERPL-MCNC: 216 MG/DL (ref 200–360)
TRIGL SERPL-MCNC: 140 MG/DL (ref 0–150)
UROBILINOGEN UR QL STRIP: ABNORMAL
VIT B12 BLD-MCNC: 298 PG/ML (ref 211–946)
VLDLC SERPL-MCNC: 25 MG/DL (ref 5–40)
WBC NRBC COR # BLD AUTO: 5.91 10*3/MM3 (ref 3.4–10.8)

## 2023-12-26 PROCEDURE — 36415 COLL VENOUS BLD VENIPUNCTURE: CPT

## 2023-12-26 PROCEDURE — 80053 COMPREHEN METABOLIC PANEL: CPT

## 2023-12-26 PROCEDURE — 85025 COMPLETE CBC W/AUTO DIFF WBC: CPT

## 2023-12-26 PROCEDURE — 81003 URINALYSIS AUTO W/O SCOPE: CPT

## 2023-12-26 PROCEDURE — 82607 VITAMIN B-12: CPT

## 2023-12-26 PROCEDURE — 80061 LIPID PANEL: CPT

## 2023-12-26 PROCEDURE — 82728 ASSAY OF FERRITIN: CPT

## 2023-12-26 PROCEDURE — 82746 ASSAY OF FOLIC ACID SERUM: CPT

## 2023-12-26 PROCEDURE — 84466 ASSAY OF TRANSFERRIN: CPT

## 2023-12-26 PROCEDURE — 83540 ASSAY OF IRON: CPT

## 2023-12-26 PROCEDURE — 83036 HEMOGLOBIN GLYCOSYLATED A1C: CPT

## 2023-12-28 ENCOUNTER — OFFICE VISIT (OUTPATIENT)
Dept: ONCOLOGY | Facility: HOSPITAL | Age: 68
End: 2023-12-28
Payer: COMMERCIAL

## 2023-12-28 ENCOUNTER — TELEPHONE (OUTPATIENT)
Dept: ONCOLOGY | Facility: HOSPITAL | Age: 68
End: 2023-12-28

## 2023-12-28 VITALS
BODY MASS INDEX: 32.93 KG/M2 | OXYGEN SATURATION: 99 % | DIASTOLIC BLOOD PRESSURE: 72 MMHG | TEMPERATURE: 97.8 F | HEART RATE: 84 BPM | RESPIRATION RATE: 18 BRPM | SYSTOLIC BLOOD PRESSURE: 164 MMHG | WEIGHT: 204 LBS

## 2023-12-28 DIAGNOSIS — C50.912 LOBULAR BREAST CANCER, LEFT: ICD-10-CM

## 2023-12-28 DIAGNOSIS — D50.9 IRON DEFICIENCY ANEMIA, UNSPECIFIED IRON DEFICIENCY ANEMIA TYPE: ICD-10-CM

## 2023-12-28 DIAGNOSIS — M54.50 BILATERAL LOW BACK PAIN WITHOUT SCIATICA, UNSPECIFIED CHRONICITY: Primary | ICD-10-CM

## 2023-12-28 PROBLEM — C50.412 MALIGNANT NEOPLASM OF UPPER-OUTER QUADRANT OF LEFT BREAST IN FEMALE, ESTROGEN RECEPTOR POSITIVE: Status: ACTIVE | Noted: 2023-12-28

## 2023-12-28 PROBLEM — Z17.0 MALIGNANT NEOPLASM OF UPPER-OUTER QUADRANT OF LEFT BREAST IN FEMALE, ESTROGEN RECEPTOR POSITIVE: Status: ACTIVE | Noted: 2023-12-28

## 2023-12-28 PROCEDURE — G0463 HOSPITAL OUTPT CLINIC VISIT: HCPCS | Performed by: INTERNAL MEDICINE

## 2023-12-28 RX ORDER — OXYCODONE HYDROCHLORIDE 5 MG/1
5 TABLET ORAL EVERY 6 HOURS PRN
COMMUNITY
Start: 2023-12-20

## 2023-12-28 NOTE — PROGRESS NOTES
Chief Complaint  Iron deficiency anemia, unspecified iron deficiency anemia     John Aguilera, MERLE Aguilera, John, MERLE        Subjective          Tatyana SURJIT Shea presents to [facility] for anemia    Anemia    Ms. Shea presents for follow up. She was diagnosed with breast cancer in August. Received lumpectomy 11/3/23 at  (Dr. Maddi Ureña) with positive margin, 21 mm. ER/OH positive, HER2 negative. Re-resection 12/20/23 was negative and sentinel nodes (3) were all negative. She has recovered well from the surgery. Does have some soreness in her axilla on the left.  She continues to have frontal headaches and dizziness. She also has low back pain. She is worried about metastasis. She has follow up upcoming with surgery at .     Hematology History    She was given recent Ferrlecit infusions x 8 finishing these on 7/5/2022.     8/21/22: Labs shows iron studies, ferritin are normalized now. Hemoglobin is in the normal range. Folate and B-12 were normal previously. Prior GI evaluation by Dr. Foster showed large HH, gastritis. Polyp was removed.     10/21/22: EGD by Dr. Dunbar: Mild Schatzki ring, dilated, Z-line irregular, non-bleeding erosive gastropathy, normal duodenum. Patient started on Pantoprazole 40 mg.    12/19/22: Ferritin 107, iron sat 33%, hgb 13.3, MCV normal.     6/2/23: Ferritin 90, iron sat 33%, TIBC 311, Hgb 13.5    12/26/23: Hgb 12.1, MCV 94.4, WBC 5.91, plt 217, Ferritin 45, iron sat 28%, TIBC 322, B12 298      Oncology/Hematology History Overview Note   8/28/23: Left breast biopsy: invasive lobular, grade 2 overall, HER2 1= by IHC (negative), ER and OH both 100% positive    10/4/23: Breast MRI: There is 15 mm enhancing irregular mass in the upper outer left breast, 12 cm from the nipple, with associated postbiopsy hematoma biopsy-proven malignancy. There are no other suspicious enhancing masses or areas of nonmass enhancement.     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          Malignant neoplasm of upper-outer quadrant of left breast in female, estrogen receptor positive   12/28/2023 Initial Diagnosis    Malignant neoplasm of upper-outer quadrant of left breast in female, estrogen receptor positive         Review of Systems   Constitutional:  Positive for fatigue.   HENT: Negative.     Eyes: Negative.    Respiratory: Negative.     Cardiovascular: Negative.    Gastrointestinal: Negative.    Endocrine: Negative.    Genitourinary: Negative.  Positive for breast pain (breast discomfort).   Musculoskeletal: Negative.    Allergic/Immunologic: Negative.    Neurological:  Negative for headache.   Hematological: Negative.    Psychiatric/Behavioral: Negative.     All other systems reviewed and are negative.    Current Outpatient Medications on File Prior to Visit   Medication Sig Dispense Refill    oxyCODONE (ROXICODONE) 5 MG immediate release tablet Take 1 tablet by mouth Every 6 (Six) Hours As Needed.      amLODIPine (NORVASC) 5 MG tablet Take 1 tablet by mouth Daily. 90 tablet 3    atorvastatin (LIPITOR) 10 MG tablet TAKE ONE TABLET BY MOUTH ONCE NIGHTLY 90 tablet 1    azithromycin (Zithromax Z-Chalino) 250 MG tablet Take 2 tablets by mouth on day 1, then 1 tablet daily on days 2-5 6 tablet 0    Cholecalciferol 125 MCG (5000 UT) tablet Take 1 tablet by mouth Daily. LAST DOSE 12/16/22      fluticasone (FLONASE) 50 MCG/ACT nasal spray 2 sprays into the nostril(s) as directed by provider Daily. 11.12 mL 0    furosemide (Lasix) 20 MG tablet Take 1 tablet by mouth 2 (Two) Times a Day. 180 tablet 1    Krill Oil (Omega-3) 500 MG capsule Take 500 mg by mouth Daily. LAST DOSE 12/16/22      loratadine (Claritin) 10 MG tablet Take 1 tablet by mouth Daily. 30 tablet 0    losartan (COZAAR) 50 MG  tablet TAKE 1 TABLET BY MOUTH TWICE A  tablet 1    methylPREDNISolone (MEDROL) 4 MG dose pack Take as directed on package instructions. 21 tablet 0    multivitamin with minerals tablet tablet Take 1 tablet by mouth Daily. LAST DOSE 12/16/22      pantoprazole (PROTONIX) 40 MG EC tablet Take 1 tablet by mouth Every Night. 90 tablet 3    potassium chloride 10 MEQ CR tablet Take 1 tablet by mouth Daily. 90 tablet 1     No current facility-administered medications on file prior to visit.       No Known Allergies  Past Medical History:   Diagnosis Date    Acid reflux     Arrhythmia 2013    Breast cancer     Diverticulitis     NO CURRENT ISSUES    Diverticulosis 15 yrs ago    Currently having some mild pain in lower left    Headache 2021    Hiatal hernia     HTN (hypertension)     Hyperlipidemia 2010    Iron deficiency anemia     NO CURRENT S/S, HAD IRON INFUSIONS IN SUMMER 22    Low back pain     Obesity In my 40s    Eat for temporary comfort    Obstructive sleep apnea 07/02/2021    Peptic ulceration In my 40s    I had a bleeding ulcer due to my lifestyle at that time/NO CURRENT ISSUES    Pulmonary embolism 1975    Due to birth control pills, I was told NONE CURRENT     Past Surgical History:   Procedure Laterality Date    BREAST LUMPECTOMY      CHOLECYSTECTOMY      COLONOSCOPY      2020 with      COLONOSCOPY N/A 07/29/2021    Procedure: COLONOSCOPY with polypectomy/snare;  Surgeon: Ruel Foster MD;  Location: [facility];  Service: Gastroenterology;  Laterality: N/A;  colon polyp    COSMETIC SURGERY  2013    Removed bags under and above my eyes    ENDOSCOPY N/A 07/28/2021    Procedure: ESOPHAGOGASTRODUODENOSCOPY with bxs and cold snares;  Surgeon: Ruel Foster MD;  Location: [facility] END[facility];  Service: Gastroenterology;  Laterality: N/A;  hiatal hernia  gastric polyps    MASTECTOMY PARTIAL / LUMPECTOMY Left     TRIGGER FINGER RELEASE Left 12/21/2022    Procedure: LEFT THUMB FINGER  TRIGGER RELEASE AND LEFT THUMB CYST EXCISION;  Surgeon: Inés Rashid MD;  [place] [facility] [place] [facility] [place]  Service: [place]opedics;  Laterality: Left;    UPPER GASTROINTESTINAL ENDOSCOPY  07/28/2021    Dr. Foster     Social History     Socioeconomic History    Marital status: Single   Tobacco Use    Smoking status: Never     Passive exposure: Never    Smokeless tobacco: Never   Vaping Use    Vaping Use: Never used   Substance and Sexual Activity    Alcohol use: Not Currently     Comment: Glass of wine a couple times a year    Drug use: Never    Sexual activity: Not Currently     Birth control/protection: None     Family History   Problem Relation Age of Onset    Heart failure Mother     Heart disease Mother         Had a heart attack, then congestive heart failure, dead in one month at 82.    Hyperlipidemia Mother     Hypertension Mother     Developmental Disability Son         Auditory processing disorder, anxiety, language and learning disorder in math.    Atrial fibrillation Other         UNSPECIFIED    Colon cancer Neg Hx     Colon polyps Neg Hx     Crohn's disease Neg Hx     Irritable bowel syndrome Neg Hx     Ulcerative colitis Neg Hx     Malig Hyperthermia Neg Hx      Immunization History   Administered Date(s) Administered    ABRYSVO (RSV, 60+ or pregnant women 32-36 wks) 11/10/2023    COVID-19 (MODERNA) 1st,2nd,3rd Dose Monovalent 02/03/2021, 02/04/2021, 03/03/2021, 03/04/2021    COVID-19 (PFIZER) BIVALENT 12+YRS 11/26/2022    COVID-19 (PFIZER) Purple Cap Monovalent 11/22/2021    Fluzone High-Dose 65+yrs 10/07/2021, 10/10/2022, 11/10/2023    Influenza, Unspecified 10/13/2020    Pneumococcal Conjugate 13-Valent (PCV13) 10/01/2019    Shingrix 10/01/2019, 04/01/2020    Tdap 07/26/2021       Objective   Physical Exam  Vitals and nursing note reviewed.   Constitutional:       Appearance: She is obese.   HENT:      Head: Normocephalic and atraumatic.      Nose: Nose normal.      Mouth/Throat:      Mouth: Mucous  membranes are moist.   Eyes:      Conjunctiva/sclera: Conjunctivae normal.   Pulmonary:      Effort: Pulmonary effort is normal.   Musculoskeletal:         General: Normal range of motion.      Cervical back: Normal range of motion.   Neurological:      General: No focal deficit present.      Mental Status: She is alert and oriented to person, place, and time.   Psychiatric:         Mood and Affect: Mood normal.         Thought Content: Thought content normal.         Judgment: Judgment normal.       Vitals:    12/28/23 0904   BP: 164/72   Pulse: 84   Resp: 18   Temp: 97.8 °F (36.6 °C)   TempSrc: Temporal   SpO2: 99%   Weight: 92.5 kg (204 lb)   PainSc:   2   PainLoc: Breast                 ECOG: (0) Fully Active - Able to Carry On All Pre-disease Performance Without Restriction  Fall Risk Assessment was completed, and patient is at low risk for falls.  PHQ-9 Total Score:         The patient is  experiencing fatigue. Fatigue score: 5    PT/OT Functional Screening: PT fx screen: No needs identified  Speech Functional Screening: Speech fx screen: No needs identified  Rehab to be ordered: Rehab to be ordered: No needs identified        Result Review :   The following data was reviewed by: Agustin Riddle MD on 12/28/23:  Lab Results   Component Value Date    HGB 12.1 12/26/2023    HCT 35.2 12/26/2023    MCV 94.4 12/26/2023     12/26/2023    WBC 5.91 12/26/2023    NEUTROABS 3.26 12/26/2023    LYMPHSABS 2.00 12/26/2023    MONOSABS 0.51 12/26/2023    EOSABS 0.10 12/26/2023    BASOSABS 0.02 12/26/2023     Lab Results   Component Value Date    GLUCOSE 119 (H) 12/26/2023    BUN 10 12/26/2023    CREATININE 0.66 12/26/2023     12/26/2023    K 3.5 12/26/2023     12/26/2023    CO2 28.0 12/26/2023    CALCIUM 9.2 12/26/2023    PROTEINTOT 6.7 12/26/2023    ALBUMIN 4.1 12/26/2023    BILITOT 0.3 12/26/2023    ALKPHOS 68 12/26/2023    AST 12 12/26/2023    ALT 14 12/26/2023     Labs personally reviewed.  Ferritin, iron sat normal. Hgb normal. MCV normal. All stable    Pathology report personally reviewed    10/21/22 EGD report reviewed. Non-bleeding erosive gastropathy seen. Mild schatzki ring dilated.        Assessment and Plan    Diagnoses and all orders for this visit:    1. Invasive ductal carcinoma of left breast (Primary)  -     MRI Brain With & Without Contrast; Future  -     NM PET/CT Skull Base to Mid Thigh; Future  -     Ambulatory Referral to Radiation Oncology  -     Ambulatory Referral to Lymphedema Clinic    2. Bilateral low back pain without sciatica, unspecified chronicity  -     NM PET/CT Skull Base to Mid Thigh; Future        Left ER/AR breast cancer  Required re-resection 12/20/23 with sentinel node b iopsy due to positive margins 11/2023 lumpectomy. 21 mm. 100% ER and AR positive, HER2 negativ (1+ by IHC). Pathologic stage pT2N0 (sn). Recovered well from surgeries. Discussed that due to stage, low likelihood of metastasis, however patient has persistent daily headaches as well as bone pain in lower back. For these reasons will order baseline MRI brain and PET scan.  Refer to rad onc for adjuvant radiation. As hormone receptor positive and post-menopausal, recommendation will be for 5 years adjuvant hormone blocker with aromatase inhibitor. Will discuss further and start at next visit in 3 weeks for image review.       History of iron deficiency anemia. As of 12/26/23: still has adequate iron stores now and hemoglobin remains normalized. EGD on 10/21/22 with erosive gastropathy, non-bleeding; likely source of iron loss. Patient now on PPI daily, recommend to continue. Repeat iron studies in 6 months.      This is an acute or chronic illness that poses a threat to life or bodily function. The above treatment plan involves a high risk of complications and/or mortality of patient management.    The patient was seen and examined. Work by the provider also included review and/or ordering of lab tests,  review and/or ordering of radiology tests, review and/or ordering of medicine tests, discussion with other physicians or providers, independent review of data, obtaining old records, review/summation of old records, and/or other review.     I have reviewed the family history, social history, and past medical history for this patient. Previous information and data has been reviewed and updated as needed. I have reviewed and verified the chief complaint, history, and other documentation. The patient was interviewed and examined at the bedside and the chart reviewed. The previous observations, recommendations, and conclusions were reviewed including those of other providers.     The plan was discussed with the patient and/or family. The patient was given time to ask questions and these questions were answered. At the conclusion of their visit they had no additional questions or concerns and all questions were answered to their satisfaction.    Follow up: 3 weeks     I spent 45 minutes caring for Tatyana on this date of service. This time includes time spent by me in the following activities:preparing for the visit, reviewing tests, obtaining and/or reviewing a separately obtained history, performing a medically appropriate examination and/or evaluation , counseling and educating the patient/family/caregiver, ordering medications, tests, or procedures, referring and communicating with other health care professionals , documenting information in the medical record and independently interpreting results and communicating that information with the patient/family/caregiver    Patient was given instructions and counseling regarding her condition or for health maintenance advice. Please see specific information pulled into the AVS if appropriate.

## 2023-12-28 NOTE — TELEPHONE ENCOUNTER
Caller: Tatyana Shea    Relationship: Self    Best call back number: [phone]     What is the best time to reach you: ASAP    Who are you requesting to speak with (clinical staff, provider,  specific staff member): CLINICAL    What was the call regarding: PLEASE CALL PT TO DISCUSS HER AFTER VISIT SUMMARY, SHE SAYS THERE ARE DISCREPANCIES ABOUT HER DIAGNOSIS.  SHE SAYS IT HAS THE WRONG TYPE OF CANCER ON THERE.

## 2023-12-29 RX ORDER — PANTOPRAZOLE SODIUM 40 MG/1
40 TABLET, DELAYED RELEASE ORAL NIGHTLY
Qty: 90 TABLET | Refills: 3 | Status: SHIPPED | OUTPATIENT
Start: 2023-12-29

## 2024-01-04 ENCOUNTER — HOSPITAL ENCOUNTER (OUTPATIENT)
Dept: PET IMAGING | Facility: HOSPITAL | Age: 69
Discharge: HOME | End: 2024-01-04
Payer: COMMERCIAL

## 2024-01-04 DIAGNOSIS — M54.50 BILATERAL LOW BACK PAIN WITHOUT SCIATICA, UNSPECIFIED CHRONICITY: ICD-10-CM

## 2024-01-04 DIAGNOSIS — C50.912 LOBULAR BREAST CANCER, LEFT: ICD-10-CM

## 2024-01-04 PROCEDURE — 0 FLUDEOXYGLUCOSE F18 SOLUTION: Performed by: INTERNAL MEDICINE

## 2024-01-04 PROCEDURE — A9552 F18 FDG: HCPCS | Performed by: INTERNAL MEDICINE

## 2024-01-04 PROCEDURE — 78815 PET IMAGE W/CT SKULL-THIGH: CPT

## 2024-01-04 RX ADMIN — FLUDEOXYGLUCOSE F 18 1 DOSE: 200 INJECTION, SOLUTION INTRAVENOUS at 08:09

## 2024-01-08 ENCOUNTER — CONSULT (OUTPATIENT)
Dept: RADIATION ONCOLOGY | Facility: HOSPITAL | Age: 69
End: 2024-01-08
Payer: COMMERCIAL

## 2024-01-08 VITALS
TEMPERATURE: 97.6 F | SYSTOLIC BLOOD PRESSURE: 166 MMHG | OXYGEN SATURATION: 100 % | WEIGHT: 209.88 LBS | RESPIRATION RATE: 16 BRPM | DIASTOLIC BLOOD PRESSURE: 78 MMHG | HEART RATE: 80 BPM | BODY MASS INDEX: 33.88 KG/M2

## 2024-01-08 DIAGNOSIS — Z17.0 MALIGNANT NEOPLASM OF UPPER-OUTER QUADRANT OF LEFT BREAST IN FEMALE, ESTROGEN RECEPTOR POSITIVE: Primary | ICD-10-CM

## 2024-01-08 DIAGNOSIS — C50.412 MALIGNANT NEOPLASM OF UPPER-OUTER QUADRANT OF LEFT BREAST IN FEMALE, ESTROGEN RECEPTOR POSITIVE: Primary | ICD-10-CM

## 2024-01-08 PROCEDURE — G0463 HOSPITAL OUTPT CLINIC VISIT: HCPCS | Performed by: RADIOLOGY

## 2024-01-11 ENCOUNTER — HOSPITAL ENCOUNTER (OUTPATIENT)
Dept: MRI IMAGING | Facility: HOSPITAL | Age: 69
Discharge: HOME | End: 2024-01-11
Admitting: INTERNAL MEDICINE
Payer: COMMERCIAL

## 2024-01-11 DIAGNOSIS — C50.912 LOBULAR BREAST CANCER, LEFT: ICD-10-CM

## 2024-01-11 PROCEDURE — 0 GADOBENATE DIMEGLUMINE 529 MG/ML SOLUTION: Performed by: INTERNAL MEDICINE

## 2024-01-11 PROCEDURE — 70553 MRI BRAIN STEM W/O & W/DYE: CPT

## 2024-01-11 PROCEDURE — A9577 INJ MULTIHANCE: HCPCS | Performed by: INTERNAL MEDICINE

## 2024-01-11 RX ADMIN — GADOBENATE DIMEGLUMINE 20 ML: 529 INJECTION, SOLUTION INTRAVENOUS at 08:46

## 2024-01-16 ENCOUNTER — OFFICE VISIT (OUTPATIENT)
Dept: ONCOLOGY | Facility: HOSPITAL | Age: 69
End: 2024-01-16
Payer: COMMERCIAL

## 2024-01-16 VITALS
WEIGHT: 202.16 LBS | TEMPERATURE: 98 F | HEIGHT: 66 IN | BODY MASS INDEX: 32.49 KG/M2 | DIASTOLIC BLOOD PRESSURE: 70 MMHG | RESPIRATION RATE: 18 BRPM | SYSTOLIC BLOOD PRESSURE: 151 MMHG | OXYGEN SATURATION: 100 % | HEART RATE: 100 BPM

## 2024-01-16 DIAGNOSIS — C50.412 MALIGNANT NEOPLASM OF UPPER-OUTER QUADRANT OF LEFT BREAST IN FEMALE, ESTROGEN RECEPTOR POSITIVE: ICD-10-CM

## 2024-01-16 DIAGNOSIS — R51.9 INTRACTABLE HEADACHE, UNSPECIFIED CHRONICITY PATTERN, UNSPECIFIED HEADACHE TYPE: Primary | ICD-10-CM

## 2024-01-16 DIAGNOSIS — Z17.0 MALIGNANT NEOPLASM OF UPPER-OUTER QUADRANT OF LEFT BREAST IN FEMALE, ESTROGEN RECEPTOR POSITIVE: ICD-10-CM

## 2024-01-16 PROCEDURE — G0463 HOSPITAL OUTPT CLINIC VISIT: HCPCS | Performed by: INTERNAL MEDICINE

## 2024-01-16 RX ORDER — BUTALBITAL, ACETAMINOPHEN AND CAFFEINE 50; 325; 40 MG/1; MG/1; MG/1
1 TABLET ORAL EVERY 4 HOURS PRN
Qty: 30 TABLET | Refills: 1 | Status: SHIPPED | OUTPATIENT
Start: 2024-01-16

## 2024-01-16 NOTE — PROGRESS NOTES
Chief Complaint   Invasive ductal carcinoma of left breast    Willy, John, MERLE Aguilera, John, APRN        Subjective          Tatyanamanuela Shea presents to [facility] for anemia    Anemia  Symptoms include light-headedness.     Ms. Shea presents for follow up. She was diagnosed with breast cancer in August. Received lumpectomy 11/3/23 at  (Dr. Maddi Ureña) with positive margin, 21 mm. ER/KS positive, HER2 negative. Re-resection 12/20/23 was negative and sentinel nodes (3) were all negative. She has recovered well from the surgery. She does have seroma but this is improving. She has seen  surgery as follow up with good report. She is due to see radiation oncology soon. She continues to have frequent headaches and dizzy spells. She takes tylenol as needed. She also reports worse bone pain in last 10 days or so.     Hematology History    She was given recent Ferrlecit infusions x 8 finishing these on 7/5/2022.     8/21/22: Labs shows iron studies, ferritin are normalized now. Hemoglobin is in the normal range. Folate and B-12 were normal previously. Prior GI evaluation by Dr. Foster showed large HH, gastritis. Polyp was removed.     10/21/22: EGD by Dr. Dunbar: Mild Schatzki ring, dilated, Z-line irregular, non-bleeding erosive gastropathy, normal duodenum. Patient started on Pantoprazole 40 mg.    12/19/22: Ferritin 107, iron sat 33%, hgb 13.3, MCV normal.     6/2/23: Ferritin 90, iron sat 33%, TIBC 311, Hgb 13.5    12/26/23: Hgb 12.1, MCV 94.4, WBC 5.91, plt 217, Ferritin 45, iron sat 28%, TIBC 322, B12 298        Oncology/Hematology History Overview Note   8/28/23: Left breast biopsy: invasive lobular, grade 2 overall, HER2 1= by IHC (negative), ER and KS both 100% positive    10/4/23: Breast MRI: There is 15 mm enhancing irregular mass in the upper outer left breast, 12 cm from the nipple, with associated postbiopsy hematoma biopsy-proven malignancy. There are no other  suspicious enhancing masses or areas of nonmass enhancement.     11/3/23: Left breast lumpectomy: invasive mixed ductal and lobular carcinoma with atypical lobular hyperplasia, 21 mm, margin present, pT2    12/20/23: Re-resection: no invasive carcinoma seen, 0/3 sentinel nodes positive, pN0.     1/4/24: NM PET: Left breast seroma, no metastatic disease seen.     1/11/24: MRI brain negative         Malignant neoplasm of upper-outer quadrant of left breast in female, estrogen receptor positive   12/28/2023 Initial Diagnosis    Malignant neoplasm of upper-outer quadrant of left breast in female, estrogen receptor positive         Review of Systems   Constitutional:  Positive for fatigue.   HENT: Negative.     Eyes: Negative.    Respiratory: Negative.     Cardiovascular: Negative.    Gastrointestinal: Negative.    Endocrine: Negative.    Genitourinary: Negative.  Positive for breast pain (breast discomfort).   Musculoskeletal:  Positive for back pain.   Allergic/Immunologic: Negative.    Neurological:  Positive for dizziness, light-headedness and headache (pt complains headaches have got worse over the last week).   Hematological: Negative.    Psychiatric/Behavioral: Negative.     All other systems reviewed and are negative.      Current Outpatient Medications on File Prior to Visit   Medication Sig Dispense Refill    amLODIPine (NORVASC) 5 MG tablet Take 1 tablet by mouth Daily. 90 tablet 3    atorvastatin (LIPITOR) 10 MG tablet TAKE ONE TABLET BY MOUTH ONCE NIGHTLY 90 tablet 1    Cholecalciferol 125 MCG (5000 UT) tablet Take 1 tablet by mouth Daily. LAST DOSE 12/16/22      fluticasone (FLONASE) 50 MCG/ACT nasal spray 2 sprays into the nostril(s) as directed by provider Daily. 11.12 mL 0    furosemide (Lasix) 20 MG tablet Take 1 tablet by mouth 2 (Two) Times a Day. 180 tablet 1    Krill Oil (Omega-3) 500 MG capsule Take 500 mg by mouth Daily. LAST DOSE 12/16/22      loratadine (Claritin) 10 MG tablet Take 1 tablet by  mouth Daily. (Patient not taking: Reported on 1/8/2024) 30 tablet 0    losartan (COZAAR) 50 MG tablet TAKE 1 TABLET BY MOUTH TWICE A  tablet 1    multivitamin with minerals tablet tablet Take 1 tablet by mouth Daily. LAST DOSE 12/16/22      oxyCODONE (ROXICODONE) 5 MG immediate release tablet Take 1 tablet by mouth Every 6 (Six) Hours As Needed. (Patient not taking: Reported on 1/8/2024)      pantoprazole (PROTONIX) 40 MG EC tablet TAKE ONE TABLET BY MOUTH ONCE NIGHTLY 90 tablet 3    potassium chloride 10 MEQ CR tablet Take 1 tablet by mouth Daily. 90 tablet 1     No current facility-administered medications on file prior to visit.       No Known Allergies  Past Medical History:   Diagnosis Date    Acid reflux     Arrhythmia 2013    Breast cancer     Diverticulitis     NO CURRENT ISSUES    Diverticulosis 15 yrs ago    Currently having some mild pain in lower left    Headache 2021    Hiatal hernia     HTN (hypertension)     Hyperlipidemia 2010    Iron deficiency anemia     NO CURRENT S/S, HAD IRON INFUSIONS IN SUMMER 22    Low back pain     Obesity In my 40s    Eat for temporary comfort    Obstructive sleep apnea 07/02/2021    Peptic ulceration In my 40s    I had a bleeding ulcer due to my lifestyle at that time/NO CURRENT ISSUES    Pulmonary embolism 1975    Due to birth control pills, I was told NONE CURRENT     Past Surgical History:   Procedure Laterality Date    BREAST LUMPECTOMY      CHOLECYSTECTOMY      COLONOSCOPY      2020 with      COLONOSCOPY N/A 07/29/2021    Procedure: COLONOSCOPY with polypectomy/snare;  Surgeon: Ruel Foster MD;  Location: [facility] ENDOSCOPY;  Service: Gastroenterology;  Laterality: N/A;  colon polyp    COSMETIC SURGERY  2013    Removed bags under and above my eyes    ENDOSCOPY N/A 07/28/2021    Procedure: ESOPHAGOGASTRODUODENOSCOPY with bxs and cold snares;  Surgeon: Ruel Foster MD;  Location: [facility] ENDOSCOPY;  Service: Gastroenterology;  Laterality:  N/A;  hiatal hernia  gastric polyps    MASTECTOMY PARTIAL / LUMPECTOMY Left     TRIGGER FINGER RELEASE Left 12/21/2022    Procedure: LEFT THUMB FINGER TRIGGER RELEASE AND LEFT THUMB CYST EXCISION;  Surgeon: Inés Rashid MD;  [place] [facility] [place] [facility]  Service: Orthopedics;  Laterality: Left;    UPPER GASTROINTESTINAL ENDOSCOPY  07/28/2021    Dr. Foster     Social History     Socioeconomic History    Marital status: Single   Tobacco Use    Smoking status: Never     Passive exposure: Never    Smokeless tobacco: Never   Vaping Use    Vaping Use: Never used   Substance and Sexual Activity    Alcohol use: Not Currently     Comment: Glass of wine a couple times a year    Drug use: Never    Sexual activity: Not Currently     Birth control/protection: None     Family History   Problem Relation Age of Onset    Heart failure Mother     Heart disease Mother         Had a heart attack, then congestive heart failure, dead in one month at 82.    Hyperlipidemia Mother     Hypertension Mother     Developmental Disability Son         Auditory processing disorder, anxiety, language and learning disorder in math.    Atrial fibrillation Other         UNSPECIFIED    Colon cancer Neg Hx     Colon polyps Neg Hx     Crohn's disease Neg Hx     Irritable bowel syndrome Neg Hx     Ulcerative colitis Neg Hx     Malig Hyperthermia Neg Hx      Immunization History   Administered Date(s) Administered    ABRYSVO (RSV, 60+ or pregnant women 32-36 wks) 11/10/2023    COVID-19 (MODERNA) 1st,2nd,3rd Dose Monovalent 02/03/2021, 02/04/2021, 03/03/2021, 03/04/2021    COVID-19 (PFIZER) BIVALENT 12+YRS 11/26/2022    COVID-19 (PFIZER) Purple Cap Monovalent 11/22/2021    Fluzone High-Dose 65+yrs 10/07/2021, 10/10/2022, 11/10/2023    Influenza, Unspecified 10/13/2020    Pneumococcal Conjugate 13-Valent (PCV13) 10/01/2019    Shingrix 10/01/2019, 04/01/2020    Tdap 07/26/2021       Objective   Physical Exam  Well appearing patient in no acute distress on  "RA  Anicteric sclerae, no rash on exposed skin  Respirations non-labored  Awake, alert, and oriented x 4. Speech intact. No gross neurologic deficit  Appropriate mood and affect    Vitals:    01/16/24 1522   Weight: 91.7 kg (202 lb 2.6 oz)   Height: 167.6 cm (65.98\")   PainSc:   7   PainLoc: Generalized       ECOG score: 0         ECOG: (0) Fully Active - Able to Carry On All Pre-disease Performance Without Restriction  Fall Risk Assessment was completed, and patient is at low risk for falls.  PHQ-9 Total Score: 0       The patient is  experiencing fatigue. Fatigue score: 5    PT/OT Functional Screening: PT fx screen: No needs identified  Speech Functional Screening: Speech fx screen: No needs identified  Rehab to be ordered: Rehab to be ordered: No needs identified        Result Review :   The following data was reviewed by: Agustin Riddle MD on 01/16/24:  Lab Results   Component Value Date    HGB 12.1 12/26/2023    HCT 35.2 12/26/2023    MCV 94.4 12/26/2023     12/26/2023    WBC 5.91 12/26/2023    NEUTROABS 3.26 12/26/2023    LYMPHSABS 2.00 12/26/2023    MONOSABS 0.51 12/26/2023    EOSABS 0.10 12/26/2023    BASOSABS 0.02 12/26/2023     Lab Results   Component Value Date    GLUCOSE 119 (H) 12/26/2023    BUN 10 12/26/2023    CREATININE 0.66 12/26/2023     12/26/2023    K 3.5 12/26/2023     12/26/2023    CO2 28.0 12/26/2023    CALCIUM 9.2 12/26/2023    PROTEINTOT 6.7 12/26/2023    ALBUMIN 4.1 12/26/2023    BILITOT 0.3 12/26/2023    ALKPHOS 68 12/26/2023    AST 12 12/26/2023    ALT 14 12/26/2023     Labs personally reviewed. Ferritin, iron sat normal. Hgb normal. MCV normal. All stable    Pathology report personally reviewed    PET personally reviewed and no metastatic disease seen per my read    NM PET/CT Skull Base to Mid Thigh    Result Date: 1/5/2024    1. Patient is status post left breast lumpectomy.  2. There is a fluid collection in the left breast with nondependent gas measuring up to " about 7.8 centimeters.  This could reflect seroma although with presence of gas, infection not excluded.  Consider ultrasound and/or aspiration. 3. Mild stranding in the left breast with mild metabolic activity most likely postoperative change and scarring.  No well-defined nodule is evident.  Correlate with history. 4. Trace left pleural effusion. 5. Patient reportedly has back pain.  Consider follow-up MRI for sensitive evaluation.  No hypermetabolic osseous lesions seen.  6. Other findings as above.     ALF JACKSON MD       Electronically Signed and Approved By: ALF JACKSON MD on 1/05/2024 at 12:24             NM Tumor Localization Whole Body Planar Single Day    Result Date: 12/20/2023  Preoperative intradermal injections for sentinel lymph node surgery. CRITICAL RESULT: No. COMMUNICATION: Per this written report. Preliminary report signed by Serafin Alcantar DO on 12/20/2023 1:25 PM By electronically signing this report, I, the attending physician, attest that I have personally reviewed the images/data for the above examination(s) and agree with the final edited report. Drafted by Serafin Alcantar DO on 12/20/2023 1:24 PM Final report signed by Niall Carter MD on 12/20/2023 2:33 PM           Assessment and Plan    Diagnoses and all orders for this visit:    1. Intractable headache, unspecified chronicity pattern, unspecified headache type (Primary)  -     butalbital-acetaminophen-caffeine (FIORICET, ESGIC) -40 MG per tablet; Take 1 tablet by mouth Every 4 (Four) Hours As Needed for Headache.  Dispense: 30 tablet; Refill: 1    2. Malignant neoplasm of upper-outer quadrant of left breast in female, estrogen receptor positive        Left ER/IA breast cancer  Required re-resection 12/20/23 with sentinel node b iopsy due to positive margins 11/2023 lumpectomy. 21 mm. 100% ER and IA positive, HER2 negativ (1+ by IHC). Pathologic stage pT2N0 (sn). Recovered well from surgeries. Baseline MRI brain  performed due to headaches and this was negative. PET scan also performed and negative for metastatic disease. Results discussed with patient.  Refer to rad onc for adjuvant radiation. As hormone receptor positive and post-menopausal, recommendation will be for 5 years adjuvant hormone blocker with aromatase inhibitor. Awaiting oncotype score prior to starting anastrozole. Will see back in 3 weeks.  Anastrozole 1 mg daily to be recommended.   7/2023 DEXA normal, repeat due 7/2025. Recommend Vit D/Ca.    Chronic headaches  Unclear etiology. MRI brain negative Patient taking PRN tylenol. Could have tension component. Will provide PRN Fioricet but counseled on its use. May benefit from neurology referral.  Can consider propanolol/topimax in future.     History of iron deficiency anemia. As of 12/26/23: still has adequate iron stores now and hemoglobin remains normalized. EGD on 10/21/22 with erosive gastropathy, non-bleeding; likely source of iron loss. Patient now on PPI daily, recommend to continue. Repeat iron studies in 6 months.      This is an acute or chronic illness that poses a threat to life or bodily function. The above treatment plan involves a high risk of complications and/or mortality of patient management.        Follow up: 3 weeks     I spent 30 minutes caring for Tatyana on this date of service. This time includes time spent by me in the following activities:preparing for the visit, reviewing tests, obtaining and/or reviewing a separately obtained history, performing a medically appropriate examination and/or evaluation , counseling and educating the patient/family/caregiver, ordering medications, tests, or procedures, referring and communicating with other health care professionals , documenting information in the medical record and independently interpreting results and communicating that information with the patient/family/caregiver    Patient was given instructions and counseling regarding her  condition or for health maintenance advice. Please see specific information pulled into the AVS if appropriate.

## 2024-01-17 DIAGNOSIS — I10 ESSENTIAL HYPERTENSION: ICD-10-CM

## 2024-01-17 RX ORDER — FUROSEMIDE 20 MG/1
20 TABLET ORAL 2 TIMES DAILY
Qty: 180 TABLET | Refills: 1 | Status: SHIPPED | OUTPATIENT
Start: 2024-01-17

## 2024-01-28 ENCOUNTER — APPOINTMENT (OUTPATIENT)
Facility: HOSPITAL | Age: 69
End: 2024-01-28
Payer: COMMERCIAL

## 2024-01-28 ENCOUNTER — HOSPITAL ENCOUNTER (EMERGENCY)
Facility: HOSPITAL | Age: 69
Discharge: HOME | End: 2024-01-28
Attending: EMERGENCY MEDICINE | Admitting: EMERGENCY MEDICINE
Payer: COMMERCIAL

## 2024-01-28 VITALS
SYSTOLIC BLOOD PRESSURE: 130 MMHG | HEIGHT: 66 IN | BODY MASS INDEX: 33.09 KG/M2 | OXYGEN SATURATION: 98 % | RESPIRATION RATE: 18 BRPM | HEART RATE: 100 BPM | DIASTOLIC BLOOD PRESSURE: 83 MMHG | TEMPERATURE: 98 F | WEIGHT: 205.91 LBS

## 2024-01-28 DIAGNOSIS — I82.4Z2 LOWER LEG DVT (DEEP VENOUS THROMBOEMBOLISM), ACUTE, LEFT: Primary | ICD-10-CM

## 2024-01-28 LAB
ALBUMIN SERPL-MCNC: 3.5 G/DL (ref 3.5–5.2)
ALBUMIN/GLOB SERPL: 0.9 G/DL
ALP SERPL-CCNC: 102 U/L (ref 39–117)
ALT SERPL W P-5'-P-CCNC: 19 U/L (ref 1–33)
ANION GAP SERPL CALCULATED.3IONS-SCNC: 10.5 MMOL/L (ref 5–15)
AST SERPL-CCNC: 18 U/L (ref 1–32)
BASOPHILS # BLD AUTO: 0.03 10*3/MM3 (ref 0–0.2)
BASOPHILS NFR BLD AUTO: 0.5 % (ref 0–1.5)
BH CV LOW VAS LEFT PERONEAL VESSEL: 1
BH CV LOW VAS LEFT POPLITEAL SPONT: 1
BH CV LOW VAS LEFT POSTERIOR TIBIAL VESSEL: 1
BH CV LOWER VASCULAR LEFT COMMON FEMORAL AUGMENT: NORMAL
BH CV LOWER VASCULAR LEFT COMMON FEMORAL COMPETENT: NORMAL
BH CV LOWER VASCULAR LEFT COMMON FEMORAL COMPRESS: NORMAL
BH CV LOWER VASCULAR LEFT COMMON FEMORAL PHASIC: NORMAL
BH CV LOWER VASCULAR LEFT COMMON FEMORAL SPONT: NORMAL
BH CV LOWER VASCULAR LEFT DISTAL FEMORAL COMPRESS: NORMAL
BH CV LOWER VASCULAR LEFT GASTRONEMIUS COMPRESS: NORMAL
BH CV LOWER VASCULAR LEFT GREATER SAPH AK AUGMENT: NORMAL
BH CV LOWER VASCULAR LEFT GREATER SAPH AK COMPETENT: NORMAL
BH CV LOWER VASCULAR LEFT GREATER SAPH AK COMPRESS: NORMAL
BH CV LOWER VASCULAR LEFT GREATER SAPH AK PHASIC: NORMAL
BH CV LOWER VASCULAR LEFT GREATER SAPH AK SPONT: NORMAL
BH CV LOWER VASCULAR LEFT GREATER SAPH BK COMPRESS: NORMAL
BH CV LOWER VASCULAR LEFT LESSER SAPH COMPRESS: NORMAL
BH CV LOWER VASCULAR LEFT MID FEMORAL AUGMENT: NORMAL
BH CV LOWER VASCULAR LEFT MID FEMORAL COMPETENT: NORMAL
BH CV LOWER VASCULAR LEFT MID FEMORAL COMPRESS: NORMAL
BH CV LOWER VASCULAR LEFT MID FEMORAL PHASIC: NORMAL
BH CV LOWER VASCULAR LEFT MID FEMORAL SPONT: NORMAL
BH CV LOWER VASCULAR LEFT PERONEAL AUGMENT: NORMAL
BH CV LOWER VASCULAR LEFT PERONEAL COMPETENT: NORMAL
BH CV LOWER VASCULAR LEFT PERONEAL COMPRESS: NORMAL
BH CV LOWER VASCULAR LEFT PERONEAL PHASIC: NORMAL
BH CV LOWER VASCULAR LEFT PERONEAL SPONT: NORMAL
BH CV LOWER VASCULAR LEFT POPLITEAL AUGMENT: NORMAL
BH CV LOWER VASCULAR LEFT POPLITEAL COMPRESS: NORMAL
BH CV LOWER VASCULAR LEFT POPLITEAL PHASIC: NORMAL
BH CV LOWER VASCULAR LEFT POPLITEAL SPONT: NORMAL
BH CV LOWER VASCULAR LEFT POSTERIOR TIBIAL AUGMENT: NORMAL
BH CV LOWER VASCULAR LEFT POSTERIOR TIBIAL COMPETENT: NORMAL
BH CV LOWER VASCULAR LEFT POSTERIOR TIBIAL COMPRESS: NORMAL
BH CV LOWER VASCULAR LEFT POSTERIOR TIBIAL PHASIC: NORMAL
BH CV LOWER VASCULAR LEFT POSTERIOR TIBIAL SPONT: NORMAL
BH CV LOWER VASCULAR LEFT PROXIMAL FEMORAL COMPRESS: NORMAL
BH CV LOWER VASCULAR RIGHT COMMON FEMORAL AUGMENT: NORMAL
BH CV LOWER VASCULAR RIGHT COMMON FEMORAL COMPETENT: NORMAL
BH CV LOWER VASCULAR RIGHT COMMON FEMORAL COMPRESS: NORMAL
BH CV LOWER VASCULAR RIGHT COMMON FEMORAL PHASIC: NORMAL
BH CV LOWER VASCULAR RIGHT COMMON FEMORAL SPONT: NORMAL
BH CV VAS PRELIMINARY FINDINGS SCRIPTING: 1
BILIRUB SERPL-MCNC: 0.2 MG/DL (ref 0–1.2)
BUN SERPL-MCNC: 8 MG/DL (ref 8–23)
BUN/CREAT SERPL: 11.3 (ref 7–25)
CALCIUM SPEC-SCNC: 9.8 MG/DL (ref 8.6–10.5)
CHLORIDE SERPL-SCNC: 98 MMOL/L (ref 98–107)
CO2 SERPL-SCNC: 27.5 MMOL/L (ref 22–29)
CREAT SERPL-MCNC: 0.71 MG/DL (ref 0.57–1)
DEPRECATED RDW RBC AUTO: 43.2 FL (ref 37–54)
EGFRCR SERPLBLD CKD-EPI 2021: 91.6 ML/MIN/1.73
EOSINOPHIL # BLD AUTO: 0.05 10*3/MM3 (ref 0–0.4)
EOSINOPHIL NFR BLD AUTO: 0.8 % (ref 0.3–6.2)
ERYTHROCYTE [DISTWIDTH] IN BLOOD BY AUTOMATED COUNT: 12.6 % (ref 12.3–15.4)
GLOBULIN UR ELPH-MCNC: 3.7 GM/DL
GLUCOSE SERPL-MCNC: 114 MG/DL (ref 65–99)
HCT VFR BLD AUTO: 34.9 % (ref 34–46.6)
HGB BLD-MCNC: 11.5 G/DL (ref 12–15.9)
HOLD SPECIMEN: NORMAL
HOLD SPECIMEN: NORMAL
IMM GRANULOCYTES # BLD AUTO: 0.02 10*3/MM3 (ref 0–0.05)
IMM GRANULOCYTES NFR BLD AUTO: 0.3 % (ref 0–0.5)
LYMPHOCYTES # BLD AUTO: 1.47 10*3/MM3 (ref 0.7–3.1)
LYMPHOCYTES NFR BLD AUTO: 24.1 % (ref 19.6–45.3)
MCH RBC QN AUTO: 31.1 PG (ref 26.6–33)
MCHC RBC AUTO-ENTMCNC: 33 G/DL (ref 31.5–35.7)
MCV RBC AUTO: 94.3 FL (ref 79–97)
MONOCYTES # BLD AUTO: 0.49 10*3/MM3 (ref 0.1–0.9)
MONOCYTES NFR BLD AUTO: 8 % (ref 5–12)
NEUTROPHILS NFR BLD AUTO: 4.03 10*3/MM3 (ref 1.7–7)
NEUTROPHILS NFR BLD AUTO: 66.3 % (ref 42.7–76)
NRBC BLD AUTO-RTO: 0 /100 WBC (ref 0–0.2)
PLATELET # BLD AUTO: 266 10*3/MM3 (ref 140–450)
PMV BLD AUTO: 8.9 FL (ref 6–12)
POTASSIUM SERPL-SCNC: 3.5 MMOL/L (ref 3.5–5.2)
PROT SERPL-MCNC: 7.2 G/DL (ref 6–8.5)
RBC # BLD AUTO: 3.7 10*6/MM3 (ref 3.77–5.28)
SODIUM SERPL-SCNC: 136 MMOL/L (ref 136–145)
WBC NRBC COR # BLD AUTO: 6.09 10*3/MM3 (ref 3.4–10.8)
WHOLE BLOOD HOLD COAG: NORMAL
WHOLE BLOOD HOLD SPECIMEN: NORMAL

## 2024-01-28 PROCEDURE — 93971 EXTREMITY STUDY: CPT

## 2024-01-28 PROCEDURE — 80053 COMPREHEN METABOLIC PANEL: CPT | Performed by: EMERGENCY MEDICINE

## 2024-01-28 PROCEDURE — 99284 EMERGENCY DEPT VISIT MOD MDM: CPT

## 2024-01-28 PROCEDURE — 36415 COLL VENOUS BLD VENIPUNCTURE: CPT

## 2024-01-28 PROCEDURE — 93971 EXTREMITY STUDY: CPT | Performed by: SURGERY

## 2024-01-28 PROCEDURE — 85025 COMPLETE CBC W/AUTO DIFF WBC: CPT | Performed by: EMERGENCY MEDICINE

## 2024-01-28 RX ADMIN — RIVAROXABAN 15 MG: 15 TABLET, FILM COATED ORAL at 14:54

## 2024-01-28 NOTE — ED PROVIDER NOTES
Time: 2:25 PM EST  Date of encounter:  1/28/2024  Independent Historian/Clinical History and Information was obtained by:   Patient  Chief Complaint: Left leg pain and swelling    History is limited by: N/A    History of Present Illness:  Patient is a 68 y.o. year old female who presents to the emergency department for evaluation of left lower extremity pain and swelling.  Patient states that on Thursday she started having some cramping in her left lower extremity predominant in the calf area.  Patient noted on Friday she had some swelling involving her left lower leg.  She states that Friday night it was even worse.  Patient is also complaining of pain especially with weightbearing in the middle of her left calf.  Patient denies any chest pain.  She denies any shortness of breath.  Patient is concerned for the possibility of a blood clot.  Patient reports that she had a PE back in 1975 that was attributed to birth control pills.    HPI    Patient Care Team  Primary Care Provider: John Aguilera APRN    Past Medical History:     No Known Allergies  Past Medical History:   Diagnosis Date    Acid reflux     Arrhythmia 2013    Breast cancer     Diverticulitis     NO CURRENT ISSUES    Diverticulosis 15 yrs ago    Currently having some mild pain in lower left    Headache 2021    Hiatal hernia     HTN (hypertension)     Hyperlipidemia 2010    Iron deficiency anemia     NO CURRENT S/S, HAD IRON INFUSIONS IN SUMMER 22    Low back pain     Obesity In my 40s    Eat for temporary comfort    Obstructive sleep apnea 07/02/2021    Peptic ulceration In my 40s    I had a bleeding ulcer due to my lifestyle at that time/NO CURRENT ISSUES    Pulmonary embolism 1975    Due to birth control pills, I was told NONE CURRENT     Past Surgical History:   Procedure Laterality Date    BREAST LUMPECTOMY      CHOLECYSTECTOMY      COLONOSCOPY      2020 with      COLONOSCOPY N/A 07/29/2021    Procedure: COLONOSCOPY with  polypectomy/snare;  Surgeon: Ruel Foster MD;  Location: [facility] ENDOSCOPY;  Service: Gastroenterology;  Laterality: N/A;  colon polyp    COSMETIC SURGERY  2013    Removed bags under and above my eyes    ENDOSCOPY N/A 07/28/2021    Procedure: ESOPHAGOGASTRODUODENOSCOPY with bxs and cold snares;  Surgeon: Ruel Foster MD;  Location: [facility] ENDOSCOPY;  Service: Gastroenterology;  Laterality: N/A;  hiatal hernia  gastric polyps    MASTECTOMY PARTIAL / LUMPECTOMY Left     TRIGGER FINGER RELEASE Left 12/21/2022    Procedure: LEFT THUMB FINGER TRIGGER RELEASE AND LEFT THUMB CYST EXCISION;  Surgeon: Inés Rashid MD;  Location: [facility] OR [facility];  Service: Orthopedics;  Laterality: Left;    UPPER GASTROINTESTINAL ENDOSCOPY  07/28/2021    Dr. Foster     Family History   Problem Relation Age of Onset    Heart failure Mother     Heart disease Mother         Had a heart attack, then congestive heart failure, dead in one month at 82.    Hyperlipidemia Mother     Hypertension Mother     Developmental Disability Son         Auditory processing disorder, anxiety, language and learning disorder in math.    Atrial fibrillation Other         UNSPECIFIED    Colon cancer Neg Hx     Colon polyps Neg Hx     Crohn's disease Neg Hx     Irritable bowel syndrome Neg Hx     Ulcerative colitis Neg Hx     Malig Hyperthermia Neg Hx        Home Medications:  Prior to Admission medications    Medication Sig Start Date End Date Taking? Authorizing Provider   amLODIPine (NORVASC) 5 MG tablet Take 1 tablet by mouth Daily. 6/15/23   Atul Perrin MD   atorvastatin (LIPITOR) 10 MG tablet TAKE ONE TABLET BY MOUTH ONCE NIGHTLY 12/4/23   John Aguilera APRN   butalbital-acetaminophen-caffeine (FIORICET, ESGIC) -40 MG per tablet Take 1 tablet by mouth Every 4 (Four) Hours As Needed for Headache. 1/16/24   Agustin Riddle MD   Cholecalciferol 125 MCG (5000 UT) tablet Take 1 tablet by mouth Daily. LAST DOSE 12/16/22     Mac Witt MD   fluticasone (FLONASE) 50 MCG/ACT nasal spray 2 sprays into the nostril(s) as directed by provider Daily. 11/18/23   Quyen Manuel APRN   furosemide (LASIX) 20 MG tablet TAKE 1 TABLET BY MOUTH TWICE A DAY 1/17/24   John Aguilera APRN   Krill Oil (Omega-3) 500 MG capsule Take 500 mg by mouth Daily. LAST DOSE 12/16/22    Mac Witt MD   loratadine (Claritin) 10 MG tablet Take 1 tablet by mouth Daily.  Patient not taking: Reported on 1/8/2024 11/6/23   John Aguilera APRN   losartan (COZAAR) 50 MG tablet TAKE 1 TABLET BY MOUTH TWICE A DAY 12/11/23   John Aguilera APRN   multivitamin with minerals tablet tablet Take 1 tablet by mouth Daily. LAST DOSE 12/16/22    Mac Witt MD   pantoprazole (PROTONIX) 40 MG EC tablet TAKE ONE TABLET BY MOUTH ONCE NIGHTLY 12/29/23   Richa Soler APRN   potassium chloride 10 MEQ CR tablet Take 1 tablet by mouth Daily. 6/7/23   John Aguilera APRN   oxyCODONE (ROXICODONE) 5 MG immediate release tablet Take 1 tablet by mouth Every 6 (Six) Hours As Needed.  Patient not taking: Reported on 1/8/2024 12/20/23 1/28/24  Mac Witt MD        Social History:   Social History     Tobacco Use    Smoking status: Never     Passive exposure: Never    Smokeless tobacco: Never   Vaping Use    Vaping Use: Never used   Substance Use Topics    Alcohol use: Not Currently     Comment: Glass of wine a couple times a year    Drug use: Never         Review of Systems:  Review of Systems   Constitutional:  Negative for chills and fever.   HENT:  Negative for congestion, ear pain and sore throat.    Eyes:  Negative for pain.   Respiratory:  Negative for cough, chest tightness and shortness of breath.    Cardiovascular:  Negative for chest pain.   Gastrointestinal:  Negative for abdominal pain, diarrhea, nausea and vomiting.   Genitourinary:  Negative for flank pain and hematuria.   Musculoskeletal:  Negative for joint swelling.        Left  "lower extremity swelling and calf tenderness   Skin:  Negative for pallor.   Neurological:  Negative for seizures and headaches.   All other systems reviewed and are negative.       Physical Exam:  /83 (BP Location: Right arm, Patient Position: Lying)   Pulse 100   Temp 98 °F (36.7 °C) (Oral)   Resp 18   Ht 167.6 cm (66\")   Wt 93.4 kg (205 lb 14.6 oz)   LMP  (LMP Unknown)   SpO2 98%   BMI 33.23 kg/m²     Physical Exam    Vital signs were reviewed under triage note.  General appearance - Patient appears well-developed and well-nourished.  Patient is in no acute distress.  Head - Normocephalic, atraumatic.  Pupils - Equal, round, reactive to light.  Extraocular muscles are intact.  Conjunctiva is clear.  Nasal - Normal inspection.  No evidence of trauma or epistaxis.  Tympanic membranes - Gray, intact without erythema or retractions.  Oral mucosa - Pink and moist without lesions or erythema.  Uvula is midline.  Chest wall - Atraumatic.  Chest wall is nontender.  There are no vesicular rashes noted.  Neck - Supple.  Trachea was midline.  There is no palpable lymphadenopathy or thyromegaly.  There are no meningeal signs  Lungs - Clear to auscultation and percussion bilaterally.  Heart - Regular rate and rhythm without any murmurs, clicks, or gallops.  Abdomen - Soft.  Bowel sounds are present.  There is no palpable tenderness.  There is no rebound, guarding, or rigidity.  There are no palpable masses.  There are no pulsatile masses.  Back - Spine is straight and midline.  There is no CVA tenderness.  Extremities - Intact x4 with full range of motion.  Patient has tenderness with palpation of the left calf.  Patient has obvious asymmetrical swelling of the left lower leg predominantly in the tib-fib region.  There is no palpable edema.  Pulses are intact x4 and equal.  Neurologic - Patient is awake, alert, and oriented x3.  Cranial nerves II through XII are grossly intact.  Motor and sensory functions " grossly intact.  Cerebellar function was normal.  Integument - There are no rashes.  There are no petechia or purpura lesions noted.  There are no vesicular lesions noted.          Procedures:  Procedures      Medical Decision Making:      Comorbidities that affect care:    Diverticulitis, hiatal hernia, hypertension, GERD, iron deficiency anemia, pulmonary embolism in 1975 attributed to birth control pills, peptic ulcer, hyperlipidemia, obstructive sleep apnea, breast cancer    External Notes reviewed:    Previous Clinic Note: Urgent care visit from earlier today with Dr. Wall was reviewed by me.      The following orders were placed and all results were independently analyzed by me:  Orders Placed This Encounter   Procedures    [facility] Draw    Comprehensive Metabolic Panel    CBC Auto Differential    NPO Diet NPO Type: Strict NPO    Undress & Gown    CBC & Differential    Green Top (Gel)    Lavender Top    Gold Top - SST    Light Blue Top       Medications Given in the Emergency Department:  Medications   rivaroxaban (XARELTO) tablet 15 mg (has no administration in time range)        ED Course:     The patient was seen and evaluated in the ED by me.  The above history and physical examination was performed as documented.  Diagnostic data was obtained.  Results reviewed.  Patient was found to be positive for DVT.  Patient be started on Xarelto.  I will write her for the first 21 days which will be 50 mg twice daily.  I advised her to follow-up with her PCP, John Aguilera before the 21st day at which time MsAkash Willy can write her for her 20 mg daily dose starting on day #22.  I explained the patient that this is her second blood clot and she will most likely require long-term anticoagulation.    Labs:    Lab Results (last 24 hours)       Procedure Component Value Units Date/Time    CBC & Differential [[id number]]  (Abnormal) Collected: 01/28/24 1112    Specimen: Blood Updated: 01/28/24 1122    Narrative:      The  following orders were created for panel order CBC & Differential.  Procedure                               Abnormality         Status                     ---------                               -----------         ------                     CBC Auto Differential[[id number]]        Abnormal            Final result                 Please view results for these tests on the individual orders.    Comprehensive Metabolic Panel [[id number]]  (Abnormal) Collected: 01/28/24 1112    Specimen: Blood Updated: 01/28/24 1137     Glucose 114 mg/dL      BUN 8 mg/dL      Creatinine 0.71 mg/dL      Sodium 136 mmol/L      Potassium 3.5 mmol/L      Chloride 98 mmol/L      CO2 27.5 mmol/L      Calcium 9.8 mg/dL      Total Protein 7.2 g/dL      Albumin 3.5 g/dL      ALT (SGPT) 19 U/L      AST (SGOT) 18 U/L      Alkaline Phosphatase 102 U/L      Total Bilirubin 0.2 mg/dL      Globulin 3.7 gm/dL      A/G Ratio 0.9 g/dL      BUN/Creatinine Ratio 11.3     Anion Gap 10.5 mmol/L      eGFR 91.6 mL/min/1.73     Narrative:      GFR Normal >60  Chronic Kidney Disease <60  Kidney Failure <15      CBC Auto Differential [[id number]]  (Abnormal) Collected: 01/28/24 1112    Specimen: Blood Updated: 01/28/24 1122     WBC 6.09 10*3/mm3      RBC 3.70 10*6/mm3      Hemoglobin 11.5 g/dL      Hematocrit 34.9 %      MCV 94.3 fL      MCH 31.1 pg      MCHC 33.0 g/dL      RDW 12.6 %      RDW-SD 43.2 fl      MPV 8.9 fL      Platelets 266 10*3/mm3      Neutrophil % 66.3 %      Lymphocyte % 24.1 %      Monocyte % 8.0 %      Eosinophil % 0.8 %      Basophil % 0.5 %      Immature Grans % 0.3 %      Neutrophils, Absolute 4.03 10*3/mm3      Lymphocytes, Absolute 1.47 10*3/mm3      Monocytes, Absolute 0.49 10*3/mm3      Eosinophils, Absolute 0.05 10*3/mm3      Basophils, Absolute 0.03 10*3/mm3      Immature Grans, Absolute 0.02 10*3/mm3      nRBC 0.0 /100 WBC              Imaging:    No Radiology Exams Resulted Within Past 24 Hours      Differential Diagnosis and  Discussion:    Extremity Pain: Differential diagnosis includes but is not limited to soft tissue sprain, tendonitis, tendon injury, dislocation, fracture, deep vein thrombosis, arterial insufficiency, osteoarthritis, bursitis, and ligamentous damage.    All labs were reviewed and interpreted by me.  Ultrasound impression was interpreted by me.     MDM     Amount and/or Complexity of Data Reviewed  Clinical lab tests: reviewed             Patient Care Considerations:    NARCOTICS: I considered prescribing opiate pain medication as an outpatient, however opioid pain medication is not indicated for the treatment of this condition.      Consultants/Shared Management Plan:    None    Social Determinants of Health:    Patient is independent, reliable, and has access to care.       Disposition and Care Coordination:    Discharged: I considered escalation of care by admitting this patient to the hospital, however the patient has improved and is suitable and  stable for discharge.    I have explained the patient´s condition, diagnoses and treatment plan based on the information available to me at this time. I have answered questions and addressed any concerns. The patient has a good  understanding of the patient´s diagnosis, condition, and treatment plan as can be expected at this point. The vital signs have been stable. The patient´s condition is stable and appropriate for discharge from the emergency department.      The patient will pursue further outpatient evaluation with the primary care physician or other designated or consulting physician as outlined in the discharge instructions. They are agreeable to this plan of care and follow-up instructions have been explained in detail. The patient has received these instructions in written format and have expressed an understanding of the discharge instructions. The patient is aware that any significant change in condition or worsening of symptoms should prompt an immediate  return to this or the closest emergency department or call to 911.  I have explained discharge medications and the need for follow up with the patient/caretakers. This was also printed in the discharge instructions. Patient was discharged with the following medications and follow up:      Medication List        New Prescriptions      rivaroxaban 15 MG tablet  Commonly known as: XARELTO  Take 1 tablet by mouth 2 (Two) Times a Day for 41 doses.               Where to Get Your Medications        These medications were sent to [facility] [id number] - [facility] - [address] AT [facility] [address] - [phone]  - [phone]   [address], [place] [address]      Phone: [phone]   rivaroxaban 15 MG tablet      John Aguilera, APRN  [address]  [address]  [place] [address]  [phone]    In 2 weeks        Final diagnoses:   Lower leg DVT (deep venous thromboembolism), acute, left        ED Disposition       ED Disposition   Discharge    Condition   Stable    Comment   --               This medical record created using voice recognition software.             Julio Nagel DO  02/02/24 1134

## 2024-01-28 NOTE — DISCHARGE INSTRUCTIONS
Activity as tolerated.  Avoid strenuous activities.  Start your Xarelto prescription tonight.  Once this prescription is fully taken then you will need a new prescription for a new dose that we will then take once a day.  You need to see your PCP for this prescription.  Follow-up with your PCP in 2 weeks.  Return to the ER for development of chest pain, shortness of breath, or any other concerns issues that may arise.

## 2024-02-09 ENCOUNTER — OFFICE VISIT (OUTPATIENT)
Dept: ONCOLOGY | Facility: HOSPITAL | Age: 69
End: 2024-02-09
Payer: COMMERCIAL

## 2024-02-09 VITALS
BODY MASS INDEX: 33.02 KG/M2 | OXYGEN SATURATION: 99 % | HEART RATE: 88 BPM | DIASTOLIC BLOOD PRESSURE: 84 MMHG | WEIGHT: 204.6 LBS | SYSTOLIC BLOOD PRESSURE: 168 MMHG | TEMPERATURE: 97.4 F | RESPIRATION RATE: 18 BRPM

## 2024-02-09 DIAGNOSIS — C50.412 MALIGNANT NEOPLASM OF UPPER-OUTER QUADRANT OF LEFT BREAST IN FEMALE, ESTROGEN RECEPTOR POSITIVE: Primary | ICD-10-CM

## 2024-02-09 DIAGNOSIS — R51.9 CHRONIC NONINTRACTABLE HEADACHE, UNSPECIFIED HEADACHE TYPE: ICD-10-CM

## 2024-02-09 DIAGNOSIS — G89.29 CHRONIC NONINTRACTABLE HEADACHE, UNSPECIFIED HEADACHE TYPE: ICD-10-CM

## 2024-02-09 DIAGNOSIS — Z17.0 MALIGNANT NEOPLASM OF UPPER-OUTER QUADRANT OF LEFT BREAST IN FEMALE, ESTROGEN RECEPTOR POSITIVE: Primary | ICD-10-CM

## 2024-02-09 PROCEDURE — G0463 HOSPITAL OUTPT CLINIC VISIT: HCPCS | Performed by: INTERNAL MEDICINE

## 2024-02-09 RX ORDER — ANASTROZOLE 1 MG/1
1 TABLET ORAL DAILY
COMMUNITY
Start: 2024-01-26 | End: 2024-07-24

## 2024-02-09 RX ORDER — ANTACID TABLETS 500 MG/1
1000 TABLET, CHEWABLE ORAL
COMMUNITY
Start: 2024-01-26

## 2024-02-09 NOTE — PROGRESS NOTES
Chief Complaint  Invasive ductal carcinoma of left breast    Willy, John, MERLE Aguilera, John, APRN        Subjective          Tatyanamaycol Shea presents to [facility] for anemia    Anemia  Symptoms include light-headedness.     Ms. Shea presents for follow up. She was diagnosed with breast cancer in August. Received lumpectomy 11/3/23 at  (Dr. Maddi Ureña) with positive margin, 21 mm. ER/NE positive, HER2 negative. Re-resection 12/20/23 was negative and sentinel nodes (3) were all negative. She has recovered well from the surgery. Oncotype of 7 so she is happy about this and not needing chemotherapy. She has seen med onc at  who prescribed anastrozole. She has yet to start. She has yet to decide on radiation. She is worried about the benefit and the risks. Worried about risk to her heart and worried about her tolerance of it. She has already met with rad onc.     Hematology History    She was given recent Ferrlecit infusions x 8 finishing these on 7/5/2022.     8/21/22: Labs shows iron studies, ferritin are normalized now. Hemoglobin is in the normal range. Folate and B-12 were normal previously. Prior GI evaluation by Dr. Foster showed large HH, gastritis. Polyp was removed.     10/21/22: EGD by Dr. Dunbar: Mild Schatzki ring, dilated, Z-line irregular, non-bleeding erosive gastropathy, normal duodenum. Patient started on Pantoprazole 40 mg.    12/19/22: Ferritin 107, iron sat 33%, hgb 13.3, MCV normal.     6/2/23: Ferritin 90, iron sat 33%, TIBC 311, Hgb 13.5    12/26/23: Hgb 12.1, MCV 94.4, WBC 5.91, plt 217, Ferritin 45, iron sat 28%, TIBC 322, B12 298          Oncology/Hematology History Overview Note   8/28/23: Left breast biopsy: invasive lobular, grade 2 overall, HER2 1= by IHC (negative), ER and NE both 100% positive    10/4/23: Breast MRI: There is 15 mm enhancing irregular mass in the upper outer left breast, 12 cm from the nipple, with associated postbiopsy  hematoma biopsy-proven malignancy. There are no other suspicious enhancing masses or areas of nonmass enhancement.     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 Oncotype 7 so chemotherapy not recommended    12/20/23: Re-resection: no invasive carcinoma seen, 0/3 sentinel nodes positive, pN0.     1/4/24: NM PET: Left breast seroma, no metastatic disease seen.     1/11/24: MRI brain negative    2/9/24: Plan to start anastrozole 1 mg daily         Malignant neoplasm of upper-outer quadrant of left breast in female, estrogen receptor positive   12/28/2023 Initial Diagnosis    Malignant neoplasm of upper-outer quadrant of left breast in female, estrogen receptor positive     1/16/2024 Cancer Staged    Staging form: Breast, AJCC 8th Edition  - Pathologic: Stage IA (pT2, pN0, cM0, G1, ER+, AR+, HER2-) - Signed by Agustin Riddle MD on 1/16/2024         Review of Systems   Constitutional:  Positive for fatigue.   HENT: Negative.     Eyes: Negative.    Respiratory: Negative.     Cardiovascular: Negative.    Gastrointestinal: Negative.    Endocrine: Negative.    Genitourinary: Negative.  Positive for breast pain (breast discomfort).   Musculoskeletal:  Positive for back pain.   Allergic/Immunologic: Negative.    Neurological:  Positive for dizziness, light-headedness and headache (pt complains headaches have got worse over the last week).   Hematological: Negative.    Psychiatric/Behavioral: Negative.     All other systems reviewed and are negative.      Current Outpatient Medications on File Prior to Visit   Medication Sig Dispense Refill    anastrozole (ARIMIDEX) 1 MG tablet Take 1 tablet by mouth Daily.      Calcium Carbonate 500 MG chewable tablet Chew 1,000 mg.      amLODIPine (NORVASC) 5 MG tablet Take 1 tablet by mouth Daily. 90 tablet 3    atorvastatin (LIPITOR) 10 MG tablet TAKE ONE TABLET BY MOUTH ONCE NIGHTLY  90 tablet 1    butalbital-acetaminophen-caffeine (FIORICET, ESGIC) -40 MG per tablet Take 1 tablet by mouth Every 4 (Four) Hours As Needed for Headache. 30 tablet 1    Cholecalciferol 125 MCG (5000 UT) tablet Take 1 tablet by mouth Daily. LAST DOSE 12/16/22      fluticasone (FLONASE) 50 MCG/ACT nasal spray 2 sprays into the nostril(s) as directed by provider Daily. 11.12 mL 0    furosemide (LASIX) 20 MG tablet TAKE 1 TABLET BY MOUTH TWICE A  tablet 1    Krill Oil (Omega-3) 500 MG capsule Take 500 mg by mouth Daily. LAST DOSE 12/16/22      loratadine (Claritin) 10 MG tablet Take 1 tablet by mouth Daily. (Patient not taking: Reported on 1/8/2024) 30 tablet 0    losartan (COZAAR) 50 MG tablet TAKE 1 TABLET BY MOUTH TWICE A  tablet 1    multivitamin with minerals tablet tablet Take 1 tablet by mouth Daily. LAST DOSE 12/16/22      pantoprazole (PROTONIX) 40 MG EC tablet TAKE ONE TABLET BY MOUTH ONCE NIGHTLY 90 tablet 3    potassium chloride 10 MEQ CR tablet Take 1 tablet by mouth Daily. 90 tablet 1    rivaroxaban (XARELTO) 15 MG tablet Take 1 tablet by mouth 2 (Two) Times a Day for 41 doses. 41 tablet 0     No current facility-administered medications on file prior to visit.       No Known Allergies  Past Medical History:   Diagnosis Date    Acid reflux     Arrhythmia 2013    Breast cancer     Diverticulitis     NO CURRENT ISSUES    Diverticulosis 15 yrs ago    Currently having some mild pain in lower left    Headache 2021    Hiatal hernia     HTN (hypertension)     Hyperlipidemia 2010    Iron deficiency anemia     NO CURRENT S/S, HAD IRON INFUSIONS IN SUMMER 22    Low back pain     Obesity In my 40s    Eat for temporary comfort    Obstructive sleep apnea 07/02/2021    Peptic ulceration In my 40s    I had a bleeding ulcer due to my lifestyle at that time/NO CURRENT ISSUES    Pulmonary embolism 1975    Due to birth control pills, I was told NONE CURRENT     Past Surgical History:   Procedure Laterality  Date    BREAST LUMPECTOMY      CHOLECYSTECTOMY      COLONOSCOPY      2020 with      COLONOSCOPY N/A 07/29/2021    Procedure: COLONOSCOPY with polypectomy/snare;  Surgeon: Ruel Foster MD;  Location: [facility];  Service: Gastroenterology;  Laterality: N/A;  colon polyp    COSMETIC SURGERY  2013    Removed bags under and above my eyes    ENDOSCOPY N/A 07/28/2021    Procedure: ESOPHAGOGASTRODUODENOSCOPY with bxs and cold snares;  Surgeon: Ruel Foster MD;  Location: [facility] ENDOSCOPY;  Service: Gastroenterology;  Laterality: N/A;  hiatal hernia  gastric polyps    MASTECTOMY PARTIAL / LUMPECTOMY Left     TRIGGER FINGER RELEASE Left 12/21/2022    Procedure: LEFT THUMB FINGER TRIGGER RELEASE AND LEFT THUMB CYST EXCISION;  Surgeon: Inés Rashid MD;  Location: [facility];  Service: Orthopedics;  Laterality: Left;    UPPER GASTROINTESTINAL ENDOSCOPY  07/28/2021    Dr. Foster     Social History     Socioeconomic History    Marital status: Single   Tobacco Use    Smoking status: Never     Passive exposure: Never    Smokeless tobacco: Never   Vaping Use    Vaping Use: Never used   Substance and Sexual Activity    Alcohol use: Not Currently     Comment: Glass of wine a couple times a year    Drug use: Never    Sexual activity: Not Currently     Birth control/protection: None     Family History   Problem Relation Age of Onset    Heart failure Mother     Heart disease Mother         Had a heart attack, then congestive heart failure, dead in one month at 82.    Hyperlipidemia Mother     Hypertension Mother     Developmental Disability Son         Auditory processing disorder, anxiety, language and learning disorder in math.    Atrial fibrillation Other         UNSPECIFIED    Colon cancer Neg Hx     Colon polyps Neg Hx     Crohn's disease Neg Hx     Irritable bowel syndrome Neg Hx     Ulcerative colitis Neg Hx     Malig Hyperthermia Neg Hx      Immunization History   Administered Date(s)  Administered    ABRYSVO (RSV, 60+ or pregnant women 32-36 wks) 11/10/2023    COVID-19 (MODERNA) 1st,2nd,3rd Dose Monovalent 02/03/2021, 02/04/2021, 03/03/2021, 03/04/2021    COVID-19 (PFIZER) BIVALENT 12+YRS 11/26/2022    COVID-19 (PFIZER) Purple Cap Monovalent 11/22/2021    Fluzone High-Dose 65+yrs 10/07/2021, 10/10/2022, 11/10/2023    Influenza, Unspecified 10/13/2020    Pneumococcal Conjugate 13-Valent (PCV13) 10/01/2019    Shingrix 10/01/2019, 04/01/2020    Tdap 07/26/2021       Objective   Physical Exam  Well appearing patient in no acute distress on RA  Anicteric sclerae, no rash on exposed skin  Respirations non-labored  Awake, alert, and oriented x 4. Speech intact. No gross neurologic deficit  Appropriate mood and affect    Vitals:    02/09/24 1533   Weight: 92.8 kg (204 lb 9.6 oz)   PainSc: 0-No pain       ECOG score: 0         ECOG: (0) Fully Active - Able to Carry On All Pre-disease Performance Without Restriction  Fall Risk Assessment was completed, and patient is at low risk for falls.  PHQ-9 Total Score:         The patient is  experiencing fatigue. Fatigue score: 5    PT/OT Functional Screening: PT fx screen: No needs identified  Speech Functional Screening: Speech fx screen: No needs identified  Rehab to be ordered: Rehab to be ordered: No needs identified        Result Review :   The following data was reviewed by: Agustin Riddle MD on 02/09/24:  Lab Results   Component Value Date    HGB 11.5 (L) 01/28/2024    HCT 34.9 01/28/2024    MCV 94.3 01/28/2024     01/28/2024    WBC 6.09 01/28/2024    NEUTROABS 4.03 01/28/2024    LYMPHSABS 1.47 01/28/2024    MONOSABS 0.49 01/28/2024    EOSABS 0.05 01/28/2024    BASOSABS 0.03 01/28/2024     Lab Results   Component Value Date    GLUCOSE 114 (H) 01/28/2024    BUN 8 01/28/2024    CREATININE 0.71 01/28/2024     01/28/2024    K 3.5 01/28/2024    CL 98 01/28/2024    CO2 27.5 01/28/2024    CALCIUM 9.8 01/28/2024    PROTEINTOT 7.2 01/28/2024     ALBUMIN 3.5 01/28/2024    BILITOT 0.2 01/28/2024    ALKPHOS 102 01/28/2024    AST 18 01/28/2024    ALT 19 01/28/2024     Labs personally reviewed. Ferritin, iron sat normal. Hgb normal. MCV normal. All stable    Pathology report personally reviewed    PET personally reviewed and no metastatic disease seen per my read    Med onc note at [facility] personally reviewed      No radiology results for the last 30 days.          Assessment and Plan    Diagnoses and all orders for this visit:    1. Malignant neoplasm of upper-outer quadrant of left breast in female, estrogen receptor positive (Primary)    2. Chronic nonintractable headache, unspecified headache type          Left ER/NH breast cancer  Required re-resection 12/20/23 with sentinel node b iopsy due to positive margins 11/2023 lumpectomy. 21 mm. 100% ER and NH positive, HER2 negativ (1+ by IHC). Pathologic stage pT2N0 (sn). Recovered well from surgeries. Baseline MRI brain performed due to headaches and this was negative. PET scan also performed and negative for metastatic disease. Results discussed with patient.  Refer to rad onc for adjuvant radiation as she is T2. Patient has not decided about proceeding with radiation or not. As hormone receptor positive and post-menopausal, recommendation will be for 5 years adjuvant hormone blocker with aromatase inhibitor. Oncotype score of 7, so chemotherapy not recommended. Anastrozole 1 mg daily has been prescribed (per medical oncology at [facility]) but not yet started. Patient plans to start as of 2/9/24.  7/2023 DEXA normal, repeat due 7/2025. Recommend Vit D/Ca.    Chronic headaches  Unclear etiology. MRI brain negative Patient taking PRN tylenol. Could have tension component. Will provide PRN Fioricet but counseled on its use. May benefit from neurology referral.  Can consider propanolol/topimax in future.     History of iron deficiency anemia. As of 12/26/23: still has adequate iron stores now. Hgb slightly  low as of 1/28/24. EGD on 10/21/22 with erosive gastropathy, non-bleeding; likely source of iron loss. Patient now on PPI daily, recommend to continue. Repeat iron studies due in June.       This is an acute or chronic illness that poses a threat to life or bodily function. The above treatment plan involves a high risk of complications and/or mortality of patient management.        Follow up: 1 month    I spent 45 minutes caring for Tatyana on this date of service. This time includes time spent by me in the following activities:preparing for the visit, reviewing tests, obtaining and/or reviewing a separately obtained history, performing a medically appropriate examination and/or evaluation , counseling and educating the patient/family/caregiver, ordering medications, tests, or procedures, referring and communicating with other health care professionals , documenting information in the medical record and independently interpreting results and communicating that information with the patient/family/caregiver    Patient was given instructions and counseling regarding her condition or for health maintenance advice. Please see specific information pulled into the AVS if appropriate.

## 2024-02-12 ENCOUNTER — TELEPHONE (OUTPATIENT)
Dept: ONCOLOGY | Facility: HOSPITAL | Age: 69
End: 2024-02-12
Payer: COMMERCIAL

## 2024-02-12 ENCOUNTER — OFFICE VISIT (OUTPATIENT)
Dept: FAMILY MEDICINE CLINIC | Facility: CLINIC | Age: 69
End: 2024-02-12
Payer: COMMERCIAL

## 2024-02-12 VITALS
TEMPERATURE: 96.9 F | HEART RATE: 93 BPM | DIASTOLIC BLOOD PRESSURE: 80 MMHG | WEIGHT: 198.8 LBS | RESPIRATION RATE: 16 BRPM | HEIGHT: 66 IN | SYSTOLIC BLOOD PRESSURE: 132 MMHG | OXYGEN SATURATION: 98 % | BODY MASS INDEX: 31.95 KG/M2

## 2024-02-12 DIAGNOSIS — I10 PRIMARY HYPERTENSION: ICD-10-CM

## 2024-02-12 DIAGNOSIS — I82.402 ACUTE DEEP VEIN THROMBOSIS (DVT) OF LEFT LOWER EXTREMITY, UNSPECIFIED VEIN: Primary | ICD-10-CM

## 2024-02-12 DIAGNOSIS — R73.09 ELEVATED GLUCOSE: ICD-10-CM

## 2024-02-12 PROCEDURE — 99215 OFFICE O/P EST HI 40 MIN: CPT | Performed by: NURSE PRACTITIONER

## 2024-02-23 ENCOUNTER — OFFICE VISIT (OUTPATIENT)
Dept: RADIATION ONCOLOGY | Facility: HOSPITAL | Age: 69
End: 2024-02-23
Payer: COMMERCIAL

## 2024-02-23 VITALS
WEIGHT: 202.38 LBS | DIASTOLIC BLOOD PRESSURE: 91 MMHG | TEMPERATURE: 98.6 F | HEART RATE: 83 BPM | BODY MASS INDEX: 32.68 KG/M2 | OXYGEN SATURATION: 100 % | SYSTOLIC BLOOD PRESSURE: 163 MMHG | RESPIRATION RATE: 16 BRPM

## 2024-02-23 DIAGNOSIS — C50.412 MALIGNANT NEOPLASM OF UPPER-OUTER QUADRANT OF LEFT BREAST IN FEMALE, ESTROGEN RECEPTOR POSITIVE: Primary | ICD-10-CM

## 2024-02-23 DIAGNOSIS — Z17.0 MALIGNANT NEOPLASM OF UPPER-OUTER QUADRANT OF LEFT BREAST IN FEMALE, ESTROGEN RECEPTOR POSITIVE: Primary | ICD-10-CM

## 2024-02-23 PROCEDURE — G0463 HOSPITAL OUTPT CLINIC VISIT: HCPCS | Performed by: RADIOLOGY

## 2024-02-23 NOTE — PROGRESS NOTES
Follow Up Office Visit      Encounter Date: 02/23/2024   Patient Name: Tatyana Shea  YOB: 1955   Medical Record Number: [id number]   Primary Diagnosis: Malignant neoplasm of upper-outer quadrant of left breast in female, estrogen receptor positive [C50.412, Z17.0]   Cancer Staging: Cancer Staging   Malignant neoplasm of upper-outer quadrant of left breast in female, estrogen receptor positive  Staging form: Breast, AJCC 8th Edition  - Pathologic: Stage IA (pT2, pN0, cM0, G1, ER+, CA+, HER2-) - Signed by Agustin Riddle MD on 1/16/2024        Chief Complaint:    Chief Complaint   Patient presents with    Follow-up    Breast Cancer       History of Present Illness: Tatyana Shea returns for follow-up to initiate external beam radiotherapy.  She she has not been advised to undergo systemic therapy.    Subjective      Review of Systems: Review of Systems   Constitutional:  Positive for appetite change (Decreased), fatigue (10/10) and unexpected weight change (Down 7lbs since consultation but states that she has cut back on her sweets.).   HENT:  Positive for tinnitus (Ongoing x9 months). Negative for hearing loss, sore throat and trouble swallowing.    Eyes:  Negative for visual disturbance (Wears glasses).   Respiratory:  Negative for cough, chest tightness and shortness of breath.    Cardiovascular:  Positive for leg swelling (Noted in left leg, has 2 blood clots, on Xarelto- managed by pcp.). Negative for chest pain and palpitations.        Aortic leak     Gastrointestinal:  Positive for constipation (Last bowel movement 3 days ago, takes metamucil daily and laxatives prn.). Negative for abdominal distention, abdominal pain, anal bleeding, blood in stool, diarrhea, nausea and vomiting.        Colonoscopy 2021     Genitourinary:  Positive for frequency and urgency. Negative for difficulty urinating and dysuria.   Musculoskeletal:  Positive for back pain (Ongoing x1 year.) and joint  "swelling (Noted in left ankle, due to 2 blood clots.). Negative for arthralgias and gait problem.   Skin:  Negative for rash.        Swelling noted to left breast, states it's a fluid \"bulge\"   Neurological:  Positive for dizziness (Almost daily, has balance issues, ongoing x3 years.) and headaches (wakes with headache and goes to bed with headache, dialy for 3 years.).   Psychiatric/Behavioral:  Positive for sleep disturbance (Wakes throughout the night, ongoing). The patient is nervous/anxious.        The following portions of the patient's history were reviewed and updated as appropriate: allergies, current medications, past family history, past medical history, past social history, past surgical history and problem list.    Medications:     Current Outpatient Medications:     amLODIPine (NORVASC) 5 MG tablet, Take 1 tablet by mouth Daily., Disp: 90 tablet, Rfl: 3    anastrozole (ARIMIDEX) 1 MG tablet, Take 1 tablet by mouth Daily., Disp: , Rfl:     atorvastatin (LIPITOR) 10 MG tablet, TAKE ONE TABLET BY MOUTH ONCE NIGHTLY, Disp: 90 tablet, Rfl: 1    Calcium Carbonate 500 MG chewable tablet, Chew 1,000 mg., Disp: , Rfl:     Cholecalciferol 125 MCG (5000 UT) tablet, Take 1 tablet by mouth Daily. LAST DOSE 12/16/22, Disp: , Rfl:     furosemide (LASIX) 20 MG tablet, TAKE 1 TABLET BY MOUTH TWICE A DAY (Patient taking differently: Take 1 tablet by mouth Daily.), Disp: 180 tablet, Rfl: 1    Krill Oil (Omega-3) 500 MG capsule, Take 500 mg by mouth Daily. LAST DOSE 12/16/22, Disp: , Rfl:     losartan (COZAAR) 50 MG tablet, TAKE 1 TABLET BY MOUTH TWICE A DAY, Disp: 180 tablet, Rfl: 1    multivitamin with minerals tablet tablet, Take 1 tablet by mouth Daily. LAST DOSE 12/16/22, Disp: , Rfl:     pantoprazole (PROTONIX) 40 MG EC tablet, TAKE ONE TABLET BY MOUTH ONCE NIGHTLY, Disp: 90 tablet, Rfl: 3    potassium chloride 10 MEQ CR tablet, Take 1 tablet by mouth Daily., Disp: 90 tablet, Rfl: 1    rivaroxaban (XARELTO) 20 MG " tablet, Take 1 tablet by mouth Daily., Disp: 90 tablet, Rfl: 1    butalbital-acetaminophen-caffeine (FIORICET, ESGIC) -40 MG per tablet, Take 1 tablet by mouth Every 4 (Four) Hours As Needed for Headache. (Patient not taking: Reported on 2/23/2024), Disp: 30 tablet, Rfl: 1    Allergies:   No Known Allergies    ECOG: (0) Fully active, able to carry on all predisease performance without restriction  Quality of Life: 100 - Full Activity     Objective     Physical Exam:   Vital Signs:   Vitals:    02/23/24 1510   BP: 163/91   Pulse: 83   Resp: 16   Temp: 98.6 °F (37 °C)   TempSrc: Temporal   SpO2: 100%   Weight: 91.8 kg (202 lb 6.1 oz)   PainSc:   1   PainLoc: Head  Comment: headache, ongoing     Body mass index is 32.68 kg/m².     Physical Exam  Constitutional:       General: She is not in acute distress.     Appearance: Normal appearance. She is not ill-appearing, toxic-appearing or diaphoretic.   Pulmonary:      Effort: Pulmonary effort is normal. No respiratory distress.   Skin:     General: Skin is warm and dry.   Neurological:      General: No focal deficit present.      Mental Status: She is alert and oriented to person, place, and time.      Cranial Nerves: No cranial nerve deficit.      Gait: Gait normal.   Psychiatric:         Mood and Affect: Mood normal.         Behavior: Behavior normal.         Judgment: Judgment normal.       Tatyana Shea reports a pain score of 1.  Given her pain assessment as noted, treatment options were discussed and the following options were decided upon as a follow-up plan to address the patient's pain: continuation of current treatment plan for pain.     Radiographs: NM PET/CT Skull Base to Mid Thigh    Result Date: 1/5/2024    1. Patient is status post left breast lumpectomy.  2. There is a fluid collection in the left breast with nondependent gas measuring up to about 7.8 centimeters.  This could reflect seroma although with presence of gas, infection not excluded.   Consider ultrasound and/or aspiration. 3. Mild stranding in the left breast with mild metabolic activity most likely postoperative change and scarring.  No well-defined nodule is evident.  Correlate with history. 4. Trace left pleural effusion. 5. Patient reportedly has back pain.  Consider follow-up MRI for sensitive evaluation.  No hypermetabolic osseous lesions seen.  6. Other findings as above.     ALF JACKSON MD       Electronically Signed and Approved By: ALF JACKSON MD on 1/05/2024 at 12:24                 Assessment / Plan      Assessment/Plan:   Tatyana Shea is a 68-year-old female with initial cT1c cN0 cM0 invasive lobular carcinoma, grade 2, ER positive/ID positive, HER2/jossie negative. She has pT2 pN0 invasive lobular/invasive ductal carcinoma of the left breast status post lumpectomy on 11/3/2023 with positive margin appreciated and subsequent repeat resection with sentinel lymph node biopsy performed at that time on 12/20/2023. ECOG 0    We once again discussed the rationale for adjuvant external beam radiotherapy.  We discussed the risks, potential benefits and alternatives to radiotherapy.  Ms. Shea is agreeable to my recommendation and will undergo CT simulation for treatment planning purposes.      Misael Munoz MD  Radiation Oncology  [facility]    This document has been signed by Misael Munoz MD on March 29, 2024 17:22 EDT

## 2024-03-01 ENCOUNTER — HOSPITAL ENCOUNTER (OUTPATIENT)
Dept: RADIATION ONCOLOGY | Facility: HOSPITAL | Age: 69
Setting detail: RADIATION/ONCOLOGY SERIES
End: 2024-03-01
Payer: COMMERCIAL

## 2024-03-07 ENCOUNTER — HOSPITAL ENCOUNTER (OUTPATIENT)
Dept: RADIATION ONCOLOGY | Facility: HOSPITAL | Age: 69
Discharge: HOME | End: 2024-03-07

## 2024-03-07 DIAGNOSIS — C50.412 PRIMARY MALIGNANT NEOPLASM OF UPPER OUTER QUADRANT OF LEFT FEMALE BREAST: ICD-10-CM

## 2024-03-07 PROCEDURE — 77332 RADIATION TREATMENT AID(S): CPT | Performed by: RADIOLOGY

## 2024-03-07 PROCEDURE — 77290 THER RAD SIMULAJ FIELD CPLX: CPT | Performed by: RADIOLOGY

## 2024-03-07 PROCEDURE — 77263 THER RADIOLOGY TX PLNG CPLX: CPT | Performed by: RADIOLOGY

## 2024-03-14 ENCOUNTER — HOSPITAL ENCOUNTER (OUTPATIENT)
Dept: RADIATION ONCOLOGY | Facility: HOSPITAL | Age: 69
Discharge: HOME | End: 2024-03-14

## 2024-03-14 LAB
RAD ONC ARIA COURSE ID: NORMAL
RAD ONC ARIA COURSE INTENT: NORMAL
RAD ONC ARIA COURSE LAST TREATMENT DATE: NORMAL
RAD ONC ARIA COURSE START DATE: NORMAL
RAD ONC ARIA COURSE TREATMENT ELAPSED DAYS: 0
RAD ONC ARIA FIRST TREATMENT DATE: NORMAL
RAD ONC ARIA PLAN FRACTIONS TREATED TO DATE: 1
RAD ONC ARIA PLAN ID: NORMAL
RAD ONC ARIA PLAN PRESCRIBED DOSE PER FRACTION: 2.66 GY
RAD ONC ARIA PLAN PRIMARY REFERENCE POINT: NORMAL
RAD ONC ARIA PLAN TOTAL FRACTIONS PRESCRIBED: 16
RAD ONC ARIA PLAN TOTAL PRESCRIBED DOSE: 4256 CGY
RAD ONC ARIA REFERENCE POINT DOSAGE GIVEN TO DATE: 2.66 GY
RAD ONC ARIA REFERENCE POINT ID: NORMAL
RAD ONC ARIA REFERENCE POINT SESSION DOSAGE GIVEN: 2.66 GY

## 2024-03-14 PROCEDURE — 77334 RADIATION TREATMENT AID(S): CPT | Performed by: RADIOLOGY

## 2024-03-14 PROCEDURE — 77295 3-D RADIOTHERAPY PLAN: CPT | Performed by: RADIOLOGY

## 2024-03-14 PROCEDURE — 77300 RADIATION THERAPY DOSE PLAN: CPT | Performed by: RADIOLOGY

## 2024-03-14 PROCEDURE — 77280 THER RAD SIMULAJ FIELD SMPL: CPT | Performed by: RADIOLOGY

## 2024-03-14 PROCEDURE — 77412 RADIATION TX DELIVERY LVL 3: CPT | Performed by: RADIOLOGY

## 2024-03-14 PROCEDURE — 77417 THER RADIOLOGY PORT IMAGE(S): CPT | Performed by: RADIOLOGY

## 2024-03-15 ENCOUNTER — HOSPITAL ENCOUNTER (OUTPATIENT)
Dept: RADIATION ONCOLOGY | Facility: HOSPITAL | Age: 69
Discharge: HOME | End: 2024-03-15

## 2024-03-15 ENCOUNTER — DOCUMENTATION (OUTPATIENT)
Dept: RADIATION ONCOLOGY | Facility: HOSPITAL | Age: 69
End: 2024-03-15
Payer: COMMERCIAL

## 2024-03-15 LAB
RAD ONC ARIA COURSE ID: NORMAL
RAD ONC ARIA COURSE INTENT: NORMAL
RAD ONC ARIA COURSE LAST TREATMENT DATE: NORMAL
RAD ONC ARIA COURSE START DATE: NORMAL
RAD ONC ARIA COURSE TREATMENT ELAPSED DAYS: 1
RAD ONC ARIA FIRST TREATMENT DATE: NORMAL
RAD ONC ARIA PLAN FRACTIONS TREATED TO DATE: 2
RAD ONC ARIA PLAN ID: NORMAL
RAD ONC ARIA PLAN PRESCRIBED DOSE PER FRACTION: 2.66 GY
RAD ONC ARIA PLAN PRIMARY REFERENCE POINT: NORMAL
RAD ONC ARIA PLAN TOTAL FRACTIONS PRESCRIBED: 16
RAD ONC ARIA PLAN TOTAL PRESCRIBED DOSE: 4256 CGY
RAD ONC ARIA REFERENCE POINT DOSAGE GIVEN TO DATE: 5.32 GY
RAD ONC ARIA REFERENCE POINT ID: NORMAL
RAD ONC ARIA REFERENCE POINT SESSION DOSAGE GIVEN: 2.66 GY

## 2024-03-15 PROCEDURE — 77412 RADIATION TX DELIVERY LVL 3: CPT | Performed by: RADIOLOGY

## 2024-03-15 NOTE — PROGRESS NOTES
Reason for Referral: Rounding with new patients at RAD ONC    Diagnosis: Breast cancer    Distress Score: no value on her score but indicated concerns for fatigue, fear, and finances.     Location of Distress Screening: Rad Onc    PHQ Score: 9 Patient denied any mental health treatment. Patient denied any suicidal or homicidal ideations.    Current Treatment Plan: adjuvant radiation therapy    Previous Cancer TX: This is patient's first cancer dx.     Mental Status: Patient was very pleasant and easy to engage. Patient was alone at her visit. Patient seemed to be oriented x 4. Patient seemed to have her cognitive and memory intact. 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 Patient stated she is concerned about the medical bills and understood that her Medicare A was not being billed for any of her care. OSW was given permission to copy her medicare card and email a copy of it to the billing dept. to have on file. Patient stated she has no history of being treated for depression or anxiety. Patient denied any suicidal or homicidal ideations. OSW provided emotional support by actively listening, empathizing, normalizing, and validating patient's thoughts and feelings.     Mental Health Treatment: Patient denied any mental health treatment. Patient denied any suicidal or homicidal ideations.    Substance Use/Tobacco Use: Patient stated she has no tobacco, alcohol, or illegal substance use. Patient stated she used to drink wine but is on medications that it is not safe to consume alcohol with thus patient no longer has a drink occasionally.    Spirituality: Patient described herself as very spiritual but not an avid [employer] att[employer].    Hobbies: Patient stated she enjoys reading, arts and theater, and music.    Support systems: Patient stated she has two sons and teachers/coworkers as her primary support  system.    Residential status/Who lives in the home: Patient reported she lives in her home with her youngest son.     Transportation: Patient stated she drives herself to treatment.     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 Patient stated she is concerned for the cost of her care. Patient disclosed she had Medicare A although it is not on file. OSW made a copy of her card and will have it scanned.     Home Care Needs: Patient stated she has no in home care needs.     Advanced Directive/Living Will: None on file    Insurance: Medicare A and [facility] PPO     Resources/Referrals: OSW provided patient with her business card and an overview of oncology social work services. Patient did not identify any barriers to care or unmet needs. OSW will provide the billing dept. with patient's Medicare A information for billing purposes. Patient expressed appreciation for meeting with OSW today. OSW will continue to provide supports at patient's request.

## 2024-03-18 ENCOUNTER — TELEPHONE (OUTPATIENT)
Dept: ONCOLOGY | Facility: HOSPITAL | Age: 69
End: 2024-03-18
Payer: COMMERCIAL

## 2024-03-18 ENCOUNTER — HOSPITAL ENCOUNTER (OUTPATIENT)
Dept: RADIATION ONCOLOGY | Facility: HOSPITAL | Age: 69
Discharge: HOME | End: 2024-03-18
Payer: COMMERCIAL

## 2024-03-18 LAB
RAD ONC ARIA COURSE ID: NORMAL
RAD ONC ARIA COURSE INTENT: NORMAL
RAD ONC ARIA COURSE LAST TREATMENT DATE: NORMAL
RAD ONC ARIA COURSE START DATE: NORMAL
RAD ONC ARIA COURSE TREATMENT ELAPSED DAYS: 4
RAD ONC ARIA FIRST TREATMENT DATE: NORMAL
RAD ONC ARIA PLAN FRACTIONS TREATED TO DATE: 3
RAD ONC ARIA PLAN ID: NORMAL
RAD ONC ARIA PLAN PRESCRIBED DOSE PER FRACTION: 2.66 GY
RAD ONC ARIA PLAN PRIMARY REFERENCE POINT: NORMAL
RAD ONC ARIA PLAN TOTAL FRACTIONS PRESCRIBED: 16
RAD ONC ARIA PLAN TOTAL PRESCRIBED DOSE: 4256 CGY
RAD ONC ARIA REFERENCE POINT DOSAGE GIVEN TO DATE: 7.98 GY
RAD ONC ARIA REFERENCE POINT ID: NORMAL
RAD ONC ARIA REFERENCE POINT SESSION DOSAGE GIVEN: 2.66 GY

## 2024-03-18 PROCEDURE — 77412 RADIATION TX DELIVERY LVL 3: CPT | Performed by: RADIOLOGY

## 2024-03-18 NOTE — TELEPHONE ENCOUNTER
Caller: Tatyana Shea    Relationship to patient: Self    Best call back number: [phone]    Chief complaint: PT CALLED TO RESCHEDULE     Type of visit: FOLLOW UP    Requested date: THE FIRST WEEK OF APRIL      If rescheduling, when is the original appointment: 3-14-24

## 2024-03-19 ENCOUNTER — HOSPITAL ENCOUNTER (OUTPATIENT)
Dept: RADIATION ONCOLOGY | Facility: HOSPITAL | Age: 69
Discharge: HOME | End: 2024-03-19

## 2024-03-19 VITALS
DIASTOLIC BLOOD PRESSURE: 79 MMHG | OXYGEN SATURATION: 100 % | SYSTOLIC BLOOD PRESSURE: 155 MMHG | RESPIRATION RATE: 12 BRPM | WEIGHT: 199.08 LBS | BODY MASS INDEX: 32.15 KG/M2 | HEART RATE: 74 BPM | TEMPERATURE: 98.3 F

## 2024-03-19 DIAGNOSIS — Z17.0 MALIGNANT NEOPLASM OF UPPER-OUTER QUADRANT OF LEFT BREAST IN FEMALE, ESTROGEN RECEPTOR POSITIVE: Primary | ICD-10-CM

## 2024-03-19 DIAGNOSIS — C50.412 MALIGNANT NEOPLASM OF UPPER-OUTER QUADRANT OF LEFT BREAST IN FEMALE, ESTROGEN RECEPTOR POSITIVE: Primary | ICD-10-CM

## 2024-03-19 LAB
RAD ONC ARIA COURSE ID: NORMAL
RAD ONC ARIA COURSE INTENT: NORMAL
RAD ONC ARIA COURSE LAST TREATMENT DATE: NORMAL
RAD ONC ARIA COURSE START DATE: NORMAL
RAD ONC ARIA COURSE TREATMENT ELAPSED DAYS: 5
RAD ONC ARIA FIRST TREATMENT DATE: NORMAL
RAD ONC ARIA PLAN FRACTIONS TREATED TO DATE: 4
RAD ONC ARIA PLAN ID: NORMAL
RAD ONC ARIA PLAN PRESCRIBED DOSE PER FRACTION: 2.66 GY
RAD ONC ARIA PLAN PRIMARY REFERENCE POINT: NORMAL
RAD ONC ARIA PLAN TOTAL FRACTIONS PRESCRIBED: 16
RAD ONC ARIA PLAN TOTAL PRESCRIBED DOSE: 4256 CGY
RAD ONC ARIA REFERENCE POINT DOSAGE GIVEN TO DATE: 10.64 GY
RAD ONC ARIA REFERENCE POINT ID: NORMAL
RAD ONC ARIA REFERENCE POINT SESSION DOSAGE GIVEN: 2.66 GY

## 2024-03-19 PROCEDURE — 77412 RADIATION TX DELIVERY LVL 3: CPT | Performed by: RADIOLOGY

## 2024-03-19 NOTE — PROGRESS NOTES
On Treatment Visit       Patient: Tatyana Shea   YOB: 1955   Medical Record Number: [id number]     Date of Visit  March 19, 2024   Primary Diagnosis:Malignant neoplasm of upper-outer quadrant of left breast in female, estrogen receptor positive [C50.412, Z17.0]  Cancer Staging: Cancer Staging   Malignant neoplasm of upper-outer quadrant of left breast in female, estrogen receptor positive  Staging form: Breast, AJCC 8th Edition  - Pathologic: Stage IA (pT2, pN0, cM0, G1, ER+, IA+, HER2-) - Signed by Agustin Riddle MD on 1/16/2024         was seen today for an on treatment visit.  She is receiving radiation therapy to the left breast. She  has received 1064 cGy in 4 fractions out of a planned dose of 4256 cGy in 16 fractions.    Today on exam the patient is tolerating radiation therapy well and has no new disease or treatment-related complaints.                                           Review of Systems:   Review of Systems   Constitutional:  Positive for fatigue (6/10) and unexpected weight change (Down some weight since consultation but states that she has cut back on her sweets.). Negative for appetite change.   HENT:  Positive for tinnitus (Ongoing x9 months). Negative for hearing loss, sore throat and trouble swallowing.    Eyes:  Negative for visual disturbance (Wears glasses).   Respiratory:  Negative for cough, chest tightness, shortness of breath and wheezing.    Cardiovascular:  Positive for leg swelling (Noted in left leg, has 2 blood clots, on Xarelto- managed by pcp.). Negative for chest pain and palpitations.        Aortic leak     Gastrointestinal:  Positive for constipation (Takes metamucil daily and laxatives prn, last bowel movement yesterday.). Negative for abdominal distention, abdominal pain, anal bleeding, blood in stool, diarrhea, nausea and vomiting.        Colonoscopy 2021     Genitourinary:  Positive for frequency and urgency. Negative for difficulty  "urinating and dysuria.   Musculoskeletal:  Positive for back pain (Ongoing x1 year.) and joint swelling (Noted in left ankle, due to 2 blood clots.). Negative for arthralgias and gait problem.   Skin:  Negative for color change and rash.        Swelling noted to left breast, states it's a \"bulge\", this is unchanged.     Neurological:  Positive for dizziness (Almost daily, has balance issues, ongoing x3 years.) and headaches (wakes with headache and goes to bed with headache, daily for 3 years.). Negative for weakness.   Psychiatric/Behavioral:  Positive for sleep disturbance (Wakes throughout the night, ongoing).        Vitals:     Vitals:    03/19/24 1531   BP: 155/79   Pulse: 74   Resp: 12   Temp: 98.3 °F (36.8 °C)   SpO2: 100%       Weight:   Wt Readings from Last 3 Encounters:   03/19/24 90.3 kg (199 lb 1.2 oz)   02/23/24 91.8 kg (202 lb 6.1 oz)   02/12/24 90.2 kg (198 lb 12.8 oz)      Pain:    Pain Score    03/19/24 1531   PainSc: 0-No pain         Physical Exam:  Gen: WD/WN; NAD  HEENT: MMM  Trachea: midline  Chest: symmetric  Resp: normal respiratory effort  Extr: warm, well-perfused  Neuro: awake and alert; no aphasia or neglect    Plan: I have reviewed treatment setup notes, checked and approved the daily guidance images.  I reviewed dose delivery, treatment parameters and deemed them appropriate. We plan to continue radiation therapy as prescribed.          Radiation Oncology    Electronically signed by Misael Munoz MD 3/19/2024  16:02 EDT    "

## 2024-03-20 ENCOUNTER — HOSPITAL ENCOUNTER (OUTPATIENT)
Dept: RADIATION ONCOLOGY | Facility: HOSPITAL | Age: 69
Discharge: HOME | End: 2024-03-20

## 2024-03-20 LAB
RAD ONC ARIA COURSE ID: NORMAL
RAD ONC ARIA COURSE INTENT: NORMAL
RAD ONC ARIA COURSE LAST TREATMENT DATE: NORMAL
RAD ONC ARIA COURSE START DATE: NORMAL
RAD ONC ARIA COURSE TREATMENT ELAPSED DAYS: 6
RAD ONC ARIA FIRST TREATMENT DATE: NORMAL
RAD ONC ARIA PLAN FRACTIONS TREATED TO DATE: 5
RAD ONC ARIA PLAN ID: NORMAL
RAD ONC ARIA PLAN PRESCRIBED DOSE PER FRACTION: 2.66 GY
RAD ONC ARIA PLAN PRIMARY REFERENCE POINT: NORMAL
RAD ONC ARIA PLAN TOTAL FRACTIONS PRESCRIBED: 16
RAD ONC ARIA PLAN TOTAL PRESCRIBED DOSE: 4256 CGY
RAD ONC ARIA REFERENCE POINT DOSAGE GIVEN TO DATE: 13.3 GY
RAD ONC ARIA REFERENCE POINT ID: NORMAL
RAD ONC ARIA REFERENCE POINT SESSION DOSAGE GIVEN: 2.66 GY

## 2024-03-20 PROCEDURE — 77412 RADIATION TX DELIVERY LVL 3: CPT | Performed by: RADIOLOGY

## 2024-03-21 ENCOUNTER — HOSPITAL ENCOUNTER (OUTPATIENT)
Dept: RADIATION ONCOLOGY | Facility: HOSPITAL | Age: 69
Discharge: HOME | End: 2024-03-21

## 2024-03-21 LAB
RAD ONC ARIA COURSE ID: NORMAL
RAD ONC ARIA COURSE INTENT: NORMAL
RAD ONC ARIA COURSE LAST TREATMENT DATE: NORMAL
RAD ONC ARIA COURSE START DATE: NORMAL
RAD ONC ARIA COURSE TREATMENT ELAPSED DAYS: 7
RAD ONC ARIA FIRST TREATMENT DATE: NORMAL
RAD ONC ARIA PLAN FRACTIONS TREATED TO DATE: 6
RAD ONC ARIA PLAN ID: NORMAL
RAD ONC ARIA PLAN PRESCRIBED DOSE PER FRACTION: 2.66 GY
RAD ONC ARIA PLAN PRIMARY REFERENCE POINT: NORMAL
RAD ONC ARIA PLAN TOTAL FRACTIONS PRESCRIBED: 16
RAD ONC ARIA PLAN TOTAL PRESCRIBED DOSE: 4256 CGY
RAD ONC ARIA REFERENCE POINT DOSAGE GIVEN TO DATE: 15.96 GY
RAD ONC ARIA REFERENCE POINT ID: NORMAL
RAD ONC ARIA REFERENCE POINT SESSION DOSAGE GIVEN: 2.66 GY

## 2024-03-21 PROCEDURE — 77417 THER RADIOLOGY PORT IMAGE(S): CPT | Performed by: RADIOLOGY

## 2024-03-21 PROCEDURE — 77412 RADIATION TX DELIVERY LVL 3: CPT | Performed by: RADIOLOGY

## 2024-03-22 ENCOUNTER — HOSPITAL ENCOUNTER (OUTPATIENT)
Dept: RADIATION ONCOLOGY | Facility: HOSPITAL | Age: 69
Discharge: HOME | End: 2024-03-22

## 2024-03-22 LAB
RAD ONC ARIA COURSE ID: NORMAL
RAD ONC ARIA COURSE INTENT: NORMAL
RAD ONC ARIA COURSE LAST TREATMENT DATE: NORMAL
RAD ONC ARIA COURSE START DATE: NORMAL
RAD ONC ARIA COURSE TREATMENT ELAPSED DAYS: 8
RAD ONC ARIA FIRST TREATMENT DATE: NORMAL
RAD ONC ARIA PLAN FRACTIONS TREATED TO DATE: 7
RAD ONC ARIA PLAN ID: NORMAL
RAD ONC ARIA PLAN PRESCRIBED DOSE PER FRACTION: 2.66 GY
RAD ONC ARIA PLAN PRIMARY REFERENCE POINT: NORMAL
RAD ONC ARIA PLAN TOTAL FRACTIONS PRESCRIBED: 16
RAD ONC ARIA PLAN TOTAL PRESCRIBED DOSE: 4256 CGY
RAD ONC ARIA REFERENCE POINT DOSAGE GIVEN TO DATE: 18.62 GY
RAD ONC ARIA REFERENCE POINT ID: NORMAL
RAD ONC ARIA REFERENCE POINT SESSION DOSAGE GIVEN: 2.66 GY

## 2024-03-22 PROCEDURE — 77412 RADIATION TX DELIVERY LVL 3: CPT | Performed by: RADIOLOGY

## 2024-03-25 ENCOUNTER — HOSPITAL ENCOUNTER (OUTPATIENT)
Dept: RADIATION ONCOLOGY | Facility: HOSPITAL | Age: 69
Discharge: HOME | End: 2024-03-25
Payer: COMMERCIAL

## 2024-03-25 LAB
RAD ONC ARIA COURSE ID: NORMAL
RAD ONC ARIA COURSE INTENT: NORMAL
RAD ONC ARIA COURSE LAST TREATMENT DATE: NORMAL
RAD ONC ARIA COURSE START DATE: NORMAL
RAD ONC ARIA COURSE TREATMENT ELAPSED DAYS: 11
RAD ONC ARIA FIRST TREATMENT DATE: NORMAL
RAD ONC ARIA PLAN FRACTIONS TREATED TO DATE: 8
RAD ONC ARIA PLAN ID: NORMAL
RAD ONC ARIA PLAN PRESCRIBED DOSE PER FRACTION: 2.66 GY
RAD ONC ARIA PLAN PRIMARY REFERENCE POINT: NORMAL
RAD ONC ARIA PLAN TOTAL FRACTIONS PRESCRIBED: 16
RAD ONC ARIA PLAN TOTAL PRESCRIBED DOSE: 4256 CGY
RAD ONC ARIA REFERENCE POINT DOSAGE GIVEN TO DATE: 21.28 GY
RAD ONC ARIA REFERENCE POINT ID: NORMAL
RAD ONC ARIA REFERENCE POINT SESSION DOSAGE GIVEN: 2.66 GY

## 2024-03-25 PROCEDURE — 77412 RADIATION TX DELIVERY LVL 3: CPT | Performed by: RADIOLOGY

## 2024-03-26 ENCOUNTER — HOSPITAL ENCOUNTER (OUTPATIENT)
Dept: RADIATION ONCOLOGY | Facility: HOSPITAL | Age: 69
Discharge: HOME | End: 2024-03-26

## 2024-03-26 LAB
RAD ONC ARIA COURSE ID: NORMAL
RAD ONC ARIA COURSE INTENT: NORMAL
RAD ONC ARIA COURSE LAST TREATMENT DATE: NORMAL
RAD ONC ARIA COURSE START DATE: NORMAL
RAD ONC ARIA COURSE TREATMENT ELAPSED DAYS: 12
RAD ONC ARIA FIRST TREATMENT DATE: NORMAL
RAD ONC ARIA PLAN FRACTIONS TREATED TO DATE: 9
RAD ONC ARIA PLAN ID: NORMAL
RAD ONC ARIA PLAN PRESCRIBED DOSE PER FRACTION: 2.66 GY
RAD ONC ARIA PLAN PRIMARY REFERENCE POINT: NORMAL
RAD ONC ARIA PLAN TOTAL FRACTIONS PRESCRIBED: 16
RAD ONC ARIA PLAN TOTAL PRESCRIBED DOSE: 4256 CGY
RAD ONC ARIA REFERENCE POINT DOSAGE GIVEN TO DATE: 23.94 GY
RAD ONC ARIA REFERENCE POINT ID: NORMAL
RAD ONC ARIA REFERENCE POINT SESSION DOSAGE GIVEN: 2.66 GY

## 2024-03-26 PROCEDURE — 77412 RADIATION TX DELIVERY LVL 3: CPT | Performed by: RADIOLOGY

## 2024-03-27 ENCOUNTER — HOSPITAL ENCOUNTER (OUTPATIENT)
Dept: RADIATION ONCOLOGY | Facility: HOSPITAL | Age: 69
Discharge: HOME | End: 2024-03-27

## 2024-03-27 VITALS
OXYGEN SATURATION: 97 % | DIASTOLIC BLOOD PRESSURE: 82 MMHG | TEMPERATURE: 98.3 F | SYSTOLIC BLOOD PRESSURE: 170 MMHG | BODY MASS INDEX: 32.5 KG/M2 | WEIGHT: 201.28 LBS | RESPIRATION RATE: 12 BRPM | HEART RATE: 70 BPM

## 2024-03-27 DIAGNOSIS — C50.412 MALIGNANT NEOPLASM OF UPPER-OUTER QUADRANT OF LEFT BREAST IN FEMALE, ESTROGEN RECEPTOR POSITIVE: Primary | ICD-10-CM

## 2024-03-27 DIAGNOSIS — Z17.0 MALIGNANT NEOPLASM OF UPPER-OUTER QUADRANT OF LEFT BREAST IN FEMALE, ESTROGEN RECEPTOR POSITIVE: Primary | ICD-10-CM

## 2024-03-27 LAB
RAD ONC ARIA COURSE ID: NORMAL
RAD ONC ARIA COURSE INTENT: NORMAL
RAD ONC ARIA COURSE LAST TREATMENT DATE: NORMAL
RAD ONC ARIA COURSE START DATE: NORMAL
RAD ONC ARIA COURSE TREATMENT ELAPSED DAYS: 13
RAD ONC ARIA FIRST TREATMENT DATE: NORMAL
RAD ONC ARIA PLAN FRACTIONS TREATED TO DATE: 10
RAD ONC ARIA PLAN ID: NORMAL
RAD ONC ARIA PLAN PRESCRIBED DOSE PER FRACTION: 2.66 GY
RAD ONC ARIA PLAN PRIMARY REFERENCE POINT: NORMAL
RAD ONC ARIA PLAN TOTAL FRACTIONS PRESCRIBED: 16
RAD ONC ARIA PLAN TOTAL PRESCRIBED DOSE: 4256 CGY
RAD ONC ARIA REFERENCE POINT DOSAGE GIVEN TO DATE: 26.6 GY
RAD ONC ARIA REFERENCE POINT ID: NORMAL
RAD ONC ARIA REFERENCE POINT SESSION DOSAGE GIVEN: 2.66 GY

## 2024-03-27 PROCEDURE — 77336 RADIATION PHYSICS CONSULT: CPT | Performed by: RADIOLOGY

## 2024-03-27 PROCEDURE — 77412 RADIATION TX DELIVERY LVL 3: CPT | Performed by: RADIOLOGY

## 2024-03-27 NOTE — PROGRESS NOTES
On Treatment Visit       Patient: Tatyana Shea   YOB: 1955   Medical Record Number: [id number]     Date of Visit  March 27, 2024   Primary Diagnosis:Malignant neoplasm of upper-outer quadrant of left breast in female, estrogen receptor positive [C50.412, Z17.0]  Cancer Staging: Cancer Staging   Malignant neoplasm of upper-outer quadrant of left breast in female, estrogen receptor positive  Staging form: Breast, AJCC 8th Edition  - Pathologic: Stage IA (pT2, pN0, cM0, G1, ER+, [place]+, HER2-) - Signed by Agustin Riddle MD on 1/16/2024         was seen today for an on treatment visit.  She is receiving radiation therapy to the left breast. She  has received 2660 cGy in 10 fractions out of a planned dose of 4256 cGy in 16 fractions.    Today on exam the patient is tolerating radiation therapy well and has no new disease or treatment-related complaints.                                           Review of Systems:   Review of Systems   Constitutional:  Positive for fatigue (6-7/10) and unexpected weight change (Up 2lbs from last week.). Negative for activity change, appetite change, chills and fever.   HENT:  Positive for tinnitus (Ongoing x9 months). Negative for hearing loss, sore throat and trouble swallowing.    Eyes:  Negative for visual disturbance (Wears glasses).   Respiratory:  Negative for cough, chest tightness, shortness of breath and wheezing.    Cardiovascular:  Positive for leg swelling (Noted in left leg, has 2 blood clots, on Xarelto- managed by pcp.). Negative for chest pain and palpitations.        Aortic leak     Gastrointestinal:  Positive for constipation (Takes metamucil daily and laxatives prn, last bowel movement yesterday.). Negative for abdominal distention, abdominal pain, anal bleeding, blood in stool, diarrhea, nausea and vomiting.        Colonoscopy 2021     Genitourinary:  Positive for frequency and urgency. Negative for difficulty urinating, dysuria and  "hematuria.   Musculoskeletal:  Positive for arthralgias (Right leg pain/knee that is newer for patient, noted x1 month.  Pain comes and goes.), back pain (Ongoing x1 year.) and joint swelling (Noted in left ankle, due to 2 blood clots.). Negative for gait problem.   Skin:  Positive for color change (Redness noted to breast.). Negative for rash.        Swelling noted to left breast, states it's a \"bulge\", this is unchanged.     Neurological:  Positive for dizziness (Almost daily, has balance issues, ongoing x3 years.) and headaches (wakes with headache and goes to bed with headache, daily for 3 years.). Negative for weakness.   Psychiatric/Behavioral:  Positive for sleep disturbance (Wakes throughout the night, ongoing).        Vitals:     Vitals:    03/27/24 1528   BP: 170/82   Pulse: 70   Resp: 12   Temp: 98.3 °F (36.8 °C)   SpO2: 97%       Weight:   Wt Readings from Last 3 Encounters:   03/27/24 91.3 kg (201 lb 4.5 oz)   03/19/24 90.3 kg (199 lb 1.2 oz)   02/23/24 91.8 kg (202 lb 6.1 oz)      Pain:    Pain Score    03/27/24 1528   PainSc:   3   PainLoc: Breast  Comment: Left         Physical Exam:  Gen: WD/WN; NAD  HEENT: MMM  Trachea: midline  Chest: symmetric  Resp: normal respiratory effort  Extr: warm, well-perfused  Neuro: awake and alert; no aphasia or neglect    Plan: I have reviewed treatment setup notes, checked and approved the daily guidance images.  I reviewed dose delivery, treatment parameters and deemed them appropriate. We plan to continue radiation therapy as prescribed.          Radiation Oncology    Electronically signed by Misael Munoz MD 3/27/2024  16:09 EDT    "

## 2024-03-28 ENCOUNTER — HOSPITAL ENCOUNTER (OUTPATIENT)
Dept: RADIATION ONCOLOGY | Facility: HOSPITAL | Age: 69
Discharge: HOME | End: 2024-03-28

## 2024-03-28 LAB
RAD ONC ARIA COURSE ID: NORMAL
RAD ONC ARIA COURSE INTENT: NORMAL
RAD ONC ARIA COURSE LAST TREATMENT DATE: NORMAL
RAD ONC ARIA COURSE START DATE: NORMAL
RAD ONC ARIA COURSE TREATMENT ELAPSED DAYS: 14
RAD ONC ARIA FIRST TREATMENT DATE: NORMAL
RAD ONC ARIA PLAN FRACTIONS TREATED TO DATE: 11
RAD ONC ARIA PLAN ID: NORMAL
RAD ONC ARIA PLAN PRESCRIBED DOSE PER FRACTION: 2.66 GY
RAD ONC ARIA PLAN PRIMARY REFERENCE POINT: NORMAL
RAD ONC ARIA PLAN TOTAL FRACTIONS PRESCRIBED: 16
RAD ONC ARIA PLAN TOTAL PRESCRIBED DOSE: 4256 CGY
RAD ONC ARIA REFERENCE POINT DOSAGE GIVEN TO DATE: 29.26 GY
RAD ONC ARIA REFERENCE POINT ID: NORMAL
RAD ONC ARIA REFERENCE POINT SESSION DOSAGE GIVEN: 2.66 GY

## 2024-03-28 PROCEDURE — 77417 THER RADIOLOGY PORT IMAGE(S): CPT | Performed by: RADIOLOGY

## 2024-03-28 PROCEDURE — 77412 RADIATION TX DELIVERY LVL 3: CPT | Performed by: RADIOLOGY

## 2024-03-29 ENCOUNTER — HOSPITAL ENCOUNTER (OUTPATIENT)
Dept: RADIATION ONCOLOGY | Facility: HOSPITAL | Age: 69
Discharge: HOME | End: 2024-03-29
Payer: COMMERCIAL

## 2024-03-29 LAB
RAD ONC ARIA COURSE ID: NORMAL
RAD ONC ARIA COURSE INTENT: NORMAL
RAD ONC ARIA COURSE LAST TREATMENT DATE: NORMAL
RAD ONC ARIA COURSE START DATE: NORMAL
RAD ONC ARIA COURSE TREATMENT ELAPSED DAYS: 15
RAD ONC ARIA FIRST TREATMENT DATE: NORMAL
RAD ONC ARIA PLAN FRACTIONS TREATED TO DATE: 12
RAD ONC ARIA PLAN ID: NORMAL
RAD ONC ARIA PLAN PRESCRIBED DOSE PER FRACTION: 2.66 GY
RAD ONC ARIA PLAN PRIMARY REFERENCE POINT: NORMAL
RAD ONC ARIA PLAN TOTAL FRACTIONS PRESCRIBED: 16
RAD ONC ARIA PLAN TOTAL PRESCRIBED DOSE: 4256 CGY
RAD ONC ARIA REFERENCE POINT DOSAGE GIVEN TO DATE: 31.92 GY
RAD ONC ARIA REFERENCE POINT ID: NORMAL
RAD ONC ARIA REFERENCE POINT SESSION DOSAGE GIVEN: 2.66 GY

## 2024-03-29 PROCEDURE — 77412 RADIATION TX DELIVERY LVL 3: CPT | Performed by: RADIOLOGY

## 2024-04-01 ENCOUNTER — HOSPITAL ENCOUNTER (OUTPATIENT)
Dept: RADIATION ONCOLOGY | Facility: HOSPITAL | Age: 69
Setting detail: RADIATION/ONCOLOGY SERIES
End: 2024-04-01
Payer: COMMERCIAL

## 2024-04-01 ENCOUNTER — HOSPITAL ENCOUNTER (OUTPATIENT)
Dept: RADIATION ONCOLOGY | Facility: HOSPITAL | Age: 69
Discharge: HOME | End: 2024-04-01
Payer: COMMERCIAL

## 2024-04-01 LAB
RAD ONC ARIA COURSE ID: NORMAL
RAD ONC ARIA COURSE INTENT: NORMAL
RAD ONC ARIA COURSE LAST TREATMENT DATE: NORMAL
RAD ONC ARIA COURSE START DATE: NORMAL
RAD ONC ARIA COURSE TREATMENT ELAPSED DAYS: 18
RAD ONC ARIA FIRST TREATMENT DATE: NORMAL
RAD ONC ARIA PLAN FRACTIONS TREATED TO DATE: 13
RAD ONC ARIA PLAN ID: NORMAL
RAD ONC ARIA PLAN PRESCRIBED DOSE PER FRACTION: 2.66 GY
RAD ONC ARIA PLAN PRIMARY REFERENCE POINT: NORMAL
RAD ONC ARIA PLAN TOTAL FRACTIONS PRESCRIBED: 16
RAD ONC ARIA PLAN TOTAL PRESCRIBED DOSE: 4256 CGY
RAD ONC ARIA REFERENCE POINT DOSAGE GIVEN TO DATE: 34.58 GY
RAD ONC ARIA REFERENCE POINT ID: NORMAL
RAD ONC ARIA REFERENCE POINT SESSION DOSAGE GIVEN: 2.66 GY

## 2024-04-01 PROCEDURE — 77412 RADIATION TX DELIVERY LVL 3: CPT | Performed by: RADIOLOGY

## 2024-04-02 ENCOUNTER — HOSPITAL ENCOUNTER (OUTPATIENT)
Dept: RADIATION ONCOLOGY | Facility: HOSPITAL | Age: 69
Discharge: HOME | End: 2024-04-02

## 2024-04-02 LAB
RAD ONC ARIA COURSE ID: NORMAL
RAD ONC ARIA COURSE INTENT: NORMAL
RAD ONC ARIA COURSE LAST TREATMENT DATE: NORMAL
RAD ONC ARIA COURSE START DATE: NORMAL
RAD ONC ARIA COURSE TREATMENT ELAPSED DAYS: 19
RAD ONC ARIA FIRST TREATMENT DATE: NORMAL
RAD ONC ARIA PLAN FRACTIONS TREATED TO DATE: 14
RAD ONC ARIA PLAN ID: NORMAL
RAD ONC ARIA PLAN PRESCRIBED DOSE PER FRACTION: 2.66 GY
RAD ONC ARIA PLAN PRIMARY REFERENCE POINT: NORMAL
RAD ONC ARIA PLAN TOTAL FRACTIONS PRESCRIBED: 16
RAD ONC ARIA PLAN TOTAL PRESCRIBED DOSE: 4256 CGY
RAD ONC ARIA REFERENCE POINT DOSAGE GIVEN TO DATE: 37.24 GY
RAD ONC ARIA REFERENCE POINT ID: NORMAL
RAD ONC ARIA REFERENCE POINT SESSION DOSAGE GIVEN: 2.66 GY

## 2024-04-02 PROCEDURE — 77412 RADIATION TX DELIVERY LVL 3: CPT | Performed by: RADIOLOGY

## 2024-04-03 ENCOUNTER — HOSPITAL ENCOUNTER (OUTPATIENT)
Dept: RADIATION ONCOLOGY | Facility: HOSPITAL | Age: 69
Discharge: HOME | End: 2024-04-03

## 2024-04-03 VITALS
RESPIRATION RATE: 16 BRPM | DIASTOLIC BLOOD PRESSURE: 79 MMHG | HEART RATE: 81 BPM | WEIGHT: 196.87 LBS | BODY MASS INDEX: 31.79 KG/M2 | SYSTOLIC BLOOD PRESSURE: 144 MMHG | OXYGEN SATURATION: 92 % | TEMPERATURE: 98.4 F

## 2024-04-03 DIAGNOSIS — Z17.0 MALIGNANT NEOPLASM OF UPPER-OUTER QUADRANT OF LEFT BREAST IN FEMALE, ESTROGEN RECEPTOR POSITIVE: Primary | ICD-10-CM

## 2024-04-03 DIAGNOSIS — C50.412 MALIGNANT NEOPLASM OF UPPER-OUTER QUADRANT OF LEFT BREAST IN FEMALE, ESTROGEN RECEPTOR POSITIVE: Primary | ICD-10-CM

## 2024-04-03 LAB
RAD ONC ARIA COURSE ID: NORMAL
RAD ONC ARIA COURSE INTENT: NORMAL
RAD ONC ARIA COURSE LAST TREATMENT DATE: NORMAL
RAD ONC ARIA COURSE START DATE: NORMAL
RAD ONC ARIA COURSE TREATMENT ELAPSED DAYS: 20
RAD ONC ARIA FIRST TREATMENT DATE: NORMAL
RAD ONC ARIA PLAN FRACTIONS TREATED TO DATE: 15
RAD ONC ARIA PLAN ID: NORMAL
RAD ONC ARIA PLAN PRESCRIBED DOSE PER FRACTION: 2.66 GY
RAD ONC ARIA PLAN PRIMARY REFERENCE POINT: NORMAL
RAD ONC ARIA PLAN TOTAL FRACTIONS PRESCRIBED: 16
RAD ONC ARIA PLAN TOTAL PRESCRIBED DOSE: 4256 CGY
RAD ONC ARIA REFERENCE POINT DOSAGE GIVEN TO DATE: 39.9 GY
RAD ONC ARIA REFERENCE POINT ID: NORMAL
RAD ONC ARIA REFERENCE POINT SESSION DOSAGE GIVEN: 2.66 GY

## 2024-04-03 PROCEDURE — 77412 RADIATION TX DELIVERY LVL 3: CPT | Performed by: RADIOLOGY

## 2024-04-03 PROCEDURE — 77336 RADIATION PHYSICS CONSULT: CPT | Performed by: RADIOLOGY

## 2024-04-03 NOTE — PROGRESS NOTES
On Treatment Visit       Patient: Tatyana Shea   YOB: 1955   Medical Record Number: [id number]     Date of Visit  April 3, 2024   Primary Diagnosis:Malignant neoplasm of upper-outer quadrant of left breast in female, estrogen receptor positive [C50.412, Z17.0]  Cancer Staging: Cancer Staging   Stage IA (pT2, pN0, cM0, G1, ER+, [place]+, HER2-)       was seen today for an on treatment visit.  She is receiving radiation therapy to the left breast.  She  has received 3990 cGy in 15 fractions out of a planned dose of 4256 cGy in 16 fractions.     Today on exam the patient is tolerating radiation therapy as expected..  She is complains of skin reddening and nipple irritation.                                            Review of Systems:   Review of Systems   Constitutional:  Positive for fatigue (4/10) and unexpected weight change (5lb wt loss in the last week). Negative for appetite change.   HENT:  Negative for ear pain and hearing loss.    Respiratory:  Negative for cough and shortness of breath.    Gastrointestinal:  Negative for constipation, diarrhea and nausea.   Endocrine:        Hot flashes     Genitourinary:  Negative for difficulty urinating, dysuria, frequency and urgency.   Musculoskeletal:  Positive for arthralgias and back pain.   Skin:  Positive for color change (mosit desquamation, mild erythmea).   Neurological:  Positive for dizziness (ongoing) and headaches (ongoing).   Psychiatric/Behavioral:  Positive for sleep disturbance (difficulty falling and staying asleep).        Vitals:     Vitals:    04/03/24 1537   BP: 144/79   Pulse: 81   Resp: 16   Temp: 98.4 °F (36.9 °C)   SpO2: 92%       Weight:   Wt Readings from Last 3 Encounters:   04/03/24 89.3 kg (196 lb 13.9 oz)   03/27/24 91.3 kg (201 lb 4.5 oz)   03/19/24 90.3 kg (199 lb 1.2 oz)      Pain:    Pain Score    04/03/24 1537   PainSc: 0-No pain         Physical Exam:  Physical Exam  Constitutional:       General: She is not in  acute distress.  Pulmonary:      Effort: Pulmonary effort is normal.   Skin:     Findings: Erythema (left breast, dry irritated nipple,) present.   Neurological:      Mental Status: She is alert.           Plan: I have reviewed treatment setup notes, checked and approved the daily guidance images.  I reviewed dose delivery, treatment parameters and deemed them appropriate. We plan to continue radiation therapy as prescribed.  Discussed skin care.      Erin Blackburn MD  Radiation Oncology   Electronically signed 4/3/2024  16:22 EDT

## 2024-04-04 ENCOUNTER — HOSPITAL ENCOUNTER (OUTPATIENT)
Dept: RADIATION ONCOLOGY | Facility: HOSPITAL | Age: 69
Discharge: HOME | End: 2024-04-04

## 2024-04-04 LAB
RAD ONC ARIA COURSE ID: NORMAL
RAD ONC ARIA COURSE INTENT: NORMAL
RAD ONC ARIA COURSE LAST TREATMENT DATE: NORMAL
RAD ONC ARIA COURSE START DATE: NORMAL
RAD ONC ARIA COURSE TREATMENT ELAPSED DAYS: 21
RAD ONC ARIA FIRST TREATMENT DATE: NORMAL
RAD ONC ARIA PLAN FRACTIONS TREATED TO DATE: 16
RAD ONC ARIA PLAN ID: NORMAL
RAD ONC ARIA PLAN PRESCRIBED DOSE PER FRACTION: 2.66 GY
RAD ONC ARIA PLAN PRIMARY REFERENCE POINT: NORMAL
RAD ONC ARIA PLAN TOTAL FRACTIONS PRESCRIBED: 16
RAD ONC ARIA PLAN TOTAL PRESCRIBED DOSE: 4256 CGY
RAD ONC ARIA REFERENCE POINT DOSAGE GIVEN TO DATE: 42.56 GY
RAD ONC ARIA REFERENCE POINT ID: NORMAL
RAD ONC ARIA REFERENCE POINT SESSION DOSAGE GIVEN: 2.66 GY

## 2024-04-04 PROCEDURE — 77412 RADIATION TX DELIVERY LVL 3: CPT | Performed by: NURSE PRACTITIONER

## 2024-04-15 ENCOUNTER — PATIENT OUTREACH (OUTPATIENT)
Dept: ONCOLOGY | Facility: HOSPITAL | Age: 69
End: 2024-04-15
Payer: COMMERCIAL

## 2024-04-18 ENCOUNTER — OFFICE VISIT (OUTPATIENT)
Dept: ONCOLOGY | Facility: HOSPITAL | Age: 69
End: 2024-04-18
Payer: COMMERCIAL

## 2024-04-18 VITALS
WEIGHT: 199.4 LBS | TEMPERATURE: 97.5 F | OXYGEN SATURATION: 100 % | BODY MASS INDEX: 32.2 KG/M2 | HEART RATE: 73 BPM | RESPIRATION RATE: 18 BRPM | SYSTOLIC BLOOD PRESSURE: 173 MMHG | DIASTOLIC BLOOD PRESSURE: 76 MMHG

## 2024-04-18 DIAGNOSIS — D50.9 IRON DEFICIENCY ANEMIA, UNSPECIFIED IRON DEFICIENCY ANEMIA TYPE: Primary | ICD-10-CM

## 2024-04-18 DIAGNOSIS — C50.412 MALIGNANT NEOPLASM OF UPPER-OUTER QUADRANT OF LEFT BREAST IN FEMALE, ESTROGEN RECEPTOR POSITIVE: ICD-10-CM

## 2024-04-18 DIAGNOSIS — Z17.0 MALIGNANT NEOPLASM OF UPPER-OUTER QUADRANT OF LEFT BREAST IN FEMALE, ESTROGEN RECEPTOR POSITIVE: ICD-10-CM

## 2024-04-18 PROCEDURE — G0463 HOSPITAL OUTPT CLINIC VISIT: HCPCS | Performed by: INTERNAL MEDICINE

## 2024-04-18 NOTE — PROGRESS NOTES
Patient contacted clinic today stating that her skin after treatment is reddened and rashy.  Patient states that she has one area that is blistered but the area is flattened.  She states that she started using hydrocortisone cream yesterday and her skin is slightly better.  She stated that after starting the hydrocortisone, she quit using aquaphor.  I spoke to MERLE Coleman regarding patients symptoms.  Maddi recommended patient start using Domeboro soaks bid, once skin is dry, apply silvadene to open area(if there are any) and then aquaphor.  Informed patient of Maddi's instruction of the Dome[place] soak, silvadene and aquaphor.  Domeboro soak education sheet sent to patient, informed patient that Domeboro soaks are an OTC med.  Script sent to patients preferred pharmacy for Silvadene.  Instructed patient to contact our office tomorrow if skin is not improving or worsens.  Otherwise, I will call her on Monday to check in.  Patient verbalized understanding.

## 2024-04-18 NOTE — PROGRESS NOTES
Chief Complaint  Malignant neoplasm of upper-outer quadrant of left breast i    John Aguilera, John Morse, MERLE EDYD Monse presents to [facility] for anemia    Anemia  There has been no bruising/bleeding easily, fever, light-headedness, palpitations or weight loss.     Ms. Shea presents for follow up. She was diagnosed with breast cancer in August. Received lumpectomy 11/3/23 at  (Dr. Maddi Ureña) with positive margin, 21 mm. ER/MA positive, HER2 negative. Re-resection 12/20/23 was negative and sentinel nodes (3) were all negative. She has recovered well from the surgery. Oncotype of 7. Chemo not recommended. She has started anastrozole as of 2/9/24 and tolerating well. Has had two or so episodes of joint pain that resolved with tylenol. She completed radiation as of 4/4. She tolerated well but has recently developed rash of her skin. She got prescription of silvadene from radiation today that she has yet to start. No increase in hot flashes. Has labs with PCP beginning of May.     Hematology History    She was given recent Ferrlecit infusions x 8 finishing these on 7/5/2022.     8/21/22: Labs shows iron studies, ferritin are normalized now. Hemoglobin is in the normal range. Folate and B-12 were normal previously. Prior GI evaluation by Dr. Foster showed large HH, gastritis. Polyp was removed.     10/21/22: EGD by Dr. Dunbar: Mild Schatzki ring, dilated, Z-line irregular, non-bleeding erosive gastropathy, normal duodenum. Patient started on Pantoprazole 40 mg.    12/19/22: Ferritin 107, iron sat 33%, hgb 13.3, MCV normal.     6/2/23: Ferritin 90, iron sat 33%, TIBC 311, Hgb 13.5    12/26/23: Hgb 12.1, MCV 94.4, WBC 5.91, plt 217, Ferritin 45, iron sat 28%, TIBC 322, B12 298          Oncology/Hematology History Overview Note   8/28/23: Left breast biopsy: invasive lobular, grade 2 overall, HER2 1= by IHC (negative), ER and MA both  100% positive    10/4/23: Breast MRI: There is 15 mm enhancing irregular mass in the upper outer left breast, 12 cm from the nipple, with associated postbiopsy hematoma biopsy-proven malignancy. There are no other suspicious enhancing masses or areas of nonmass enhancement.     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 Oncotype 7 so chemotherapy not recommended    12/20/23: Re-resection: no invasive carcinoma seen, 0/3 sentinel nodes positive, pN0.     1/4/24: NM PET: Left breast seroma, no metastatic disease seen.     1/11/24: MRI brain negative    2/9/24: Plan to start anastrozole 1 mg daily         Malignant neoplasm of upper-outer quadrant of left breast in female, estrogen receptor positive   12/28/2023 Initial Diagnosis    Malignant neoplasm of upper-outer quadrant of left breast in female, estrogen receptor positive     1/16/2024 Cancer Staged    Staging form: Breast, AJCC 8th Edition  - Pathologic: Stage IA (pT2, pN0, cM0, G1, ER+, DC+, HER2-) - Signed by Agustin Riddle MD on 1/16/2024     Primary malignant neoplasm of upper outer quadrant of left female breast   3/7/2024 Initial Diagnosis    Primary malignant neoplasm of upper outer quadrant of left female breast     3/7/2024 -  Radiation    RADIATION THERAPY Treatment Details (Noted on 3/7/2024)  Site: Left Breast  Technique: 3D CRT  Goal: No goal specified  Planned Treatment Start Date: No planned start date specified         Review of Systems   Constitutional:  Positive for fatigue. Negative for appetite change, diaphoresis, fever, unexpected weight gain and unexpected weight loss.   HENT: Negative.  Negative for hearing loss, sore throat and voice change.    Eyes: Negative.  Negative for blurred vision, double vision, pain, redness and visual disturbance.   Respiratory: Negative.  Negative for cough, shortness of breath and wheezing.     Cardiovascular: Negative.  Negative for chest pain, palpitations and leg swelling.   Gastrointestinal: Negative.    Endocrine: Negative.  Negative for cold intolerance, heat intolerance, polydipsia and polyuria.   Genitourinary: Negative.  Negative for breast pain, decreased urine volume, difficulty urinating, frequency and urinary incontinence.   Musculoskeletal:  Positive for back pain. Negative for arthralgias, joint swelling and myalgias.   Skin:  Negative for color change, rash, skin lesions and wound.   Allergic/Immunologic: Negative.    Neurological:  Positive for headache. Negative for dizziness, seizures, light-headedness and numbness.   Hematological: Negative.  Negative for adenopathy. Does not bruise/bleed easily.   Psychiatric/Behavioral: Negative.  Negative for depressed mood. The patient is not nervous/anxious.    All other systems reviewed and are negative.      Current Outpatient Medications on File Prior to Visit   Medication Sig Dispense Refill    amLODIPine (NORVASC) 5 MG tablet Take 1 tablet by mouth Daily. 90 tablet 3    anastrozole (ARIMIDEX) 1 MG tablet Take 1 tablet by mouth Daily.      atorvastatin (LIPITOR) 10 MG tablet TAKE ONE TABLET BY MOUTH ONCE NIGHTLY 90 tablet 1    butalbital-acetaminophen-caffeine (FIORICET, ESGIC) -40 MG per tablet Take 1 tablet by mouth Every 4 (Four) Hours As Needed for Headache. (Patient not taking: Reported on 2/23/2024) 30 tablet 1    Calcium Carbonate 500 MG chewable tablet Chew 1,000 mg.      Cholecalciferol 125 MCG (5000 UT) tablet Take 1 tablet by mouth Daily. LAST DOSE 12/16/22      furosemide (LASIX) 20 MG tablet TAKE 1 TABLET BY MOUTH TWICE A DAY (Patient taking differently: Take 1 tablet by mouth Daily.) 180 tablet 1    Krill Oil (Omega-3) 500 MG capsule Take 500 mg by mouth Daily. LAST DOSE 12/16/22      losartan (COZAAR) 50 MG tablet TAKE 1 TABLET BY MOUTH TWICE A  tablet 1    multivitamin with minerals tablet tablet Take 1 tablet by  mouth Daily. LAST DOSE 12/16/22      pantoprazole (PROTONIX) 40 MG EC tablet TAKE ONE TABLET BY MOUTH ONCE NIGHTLY 90 tablet 3    potassium chloride 10 MEQ CR tablet Take 1 tablet by mouth Daily. 90 tablet 1    rivaroxaban (XARELTO) 20 MG tablet Take 1 tablet by mouth Daily. 90 tablet 1     No current facility-administered medications on file prior to visit.       No Known Allergies  Past Medical History:   Diagnosis Date    Acid reflux     Arrhythmia 2013    Breast cancer     Diverticulitis     NO CURRENT ISSUES    Diverticulosis 15 yrs ago    Currently having some mild pain in lower left    Headache 2021    Hiatal hernia     HTN (hypertension)     Hyperlipidemia 2010    Iron deficiency anemia     NO CURRENT S/S, HAD IRON INFUSIONS IN SUMMER 22    Low back pain     Obesity In my 40s    Eat for temporary comfort    Obstructive sleep apnea 07/02/2021    Peptic ulceration In my 40s    I had a bleeding ulcer due to my lifestyle at that time/NO CURRENT ISSUES    Pulmonary embolism 1975    Due to birth control pills, I was told NONE CURRENT     Past Surgical History:   Procedure Laterality Date    BREAST LUMPECTOMY      CHOLECYSTECTOMY      COLONOSCOPY      2020 with      COLONOSCOPY N/A 07/29/2021    Procedure: COLONOSCOPY with polypectomy/snare;  Surgeon: Reul Foster MD;  Location: [facility];  Service: Gastroenterology;  Laterality: N/A;  colon polyp    COSMETIC SURGERY  2013    Removed bags under and above my eyes    ENDOSCOPY N/A 07/28/2021    Procedure: ESOPHAGOGASTRODUODENOSCOPY with bxs and cold snares;  Surgeon: Ruel Foster MD;  Location: [facility];  Service: Gastroenterology;  Laterality: N/A;  hiatal hernia  gastric polyps    MASTECTOMY PARTIAL / LUMPECTOMY Left     Had one in Nove and Dec    TRIGGER FINGER RELEASE Left 12/21/2022    Procedure: LEFT THUMB FINGER TRIGGER RELEASE AND LEFT THUMB CYST EXCISION;  Surgeon: Inés Rashid MD;  Location: [facility];   Service: Orthopedics;  Laterality: Left;    UPPER GASTROINTESTINAL ENDOSCOPY  07/28/2021    Dr. Foster     Social History     Socioeconomic History    Marital status: Single   Tobacco Use    Smoking status: Never     Passive exposure: Never    Smokeless tobacco: Never   Vaping Use    Vaping status: Never Used   Substance and Sexual Activity    Alcohol use: Not Currently     Comment: Glass of wine a couple times a year    Drug use: Never    Sexual activity: Not Currently     Birth control/protection: None     Family History   Problem Relation Age of Onset    Heart failure Mother     Heart disease Mother         Had a heart attack, then congestive heart failure, dead in one month at 82.    Hyperlipidemia Mother     Hypertension Mother     Developmental Disability Son         Auditory processing disorder, anxiety, language and learning disorder in math.    Atrial fibrillation Other         UNSPECIFIED    Colon cancer Neg Hx     Colon polyps Neg Hx     Crohn's disease Neg Hx     Irritable bowel syndrome Neg Hx     Ulcerative colitis Neg Hx     Malig Hyperthermia Neg Hx      Immunization History   Administered Date(s) Administered    ABRYSVO (RSV, 60+ or pregnant women 32-36 wks) 11/10/2023    COVID-19 (MODERNA) 1st,2nd,3rd Dose Monovalent 02/03/2021, 02/04/2021, 03/03/2021, 03/04/2021    COVID-19 (PFIZER) BIVALENT 12+YRS 11/26/2022    COVID-19 (PFIZER) Purple Cap Monovalent 11/22/2021    Fluzone High-Dose 65+yrs 10/07/2021, 10/10/2022, 11/10/2023    Influenza, Unspecified 10/13/2020    Pneumococcal Conjugate 13-Valent (PCV13) 10/01/2019    Shingrix 10/01/2019, 04/01/2020    Tdap 07/26/2021       Objective   Physical Exam  Well appearing patient in no acute distress on RA  Anicteric sclerae, no rash on exposed skin  + erythematous rash left side of chest  Respirations non-labored  Awake, alert, and oriented x 4. Speech intact. No gross neurologic deficit  Appropriate mood and affect    Vitals:    04/18/24 1528   BP:  173/76   Pulse: 73   Resp: 18   Temp: 97.5 °F (36.4 °C)   TempSrc: Temporal   SpO2: 100%   Weight: 90.4 kg (199 lb 6.4 oz)   PainSc: 0-No pain                   ECOG: (0) Fully Active - Able to Carry On All Pre-disease Performance Without Restriction  Fall Risk Assessment was completed, and patient is at low risk for falls.  PHQ-9 Total Score:         The patient is  experiencing fatigue. Fatigue score: 5    PT/OT Functional Screening: PT fx screen: No needs identified  Speech Functional Screening: Speech fx screen: No needs identified  Rehab to be ordered: Rehab to be ordered: No needs identified        Result Review :   The following data was reviewed by: Agustin Riddle MD on 04/18/24:  Lab Results   Component Value Date    HGB 11.5 (L) 01/28/2024    HCT 34.9 01/28/2024    MCV 94.3 01/28/2024     01/28/2024    WBC 6.09 01/28/2024    NEUTROABS 4.03 01/28/2024    LYMPHSABS 1.47 01/28/2024    MONOSABS 0.49 01/28/2024    EOSABS 0.05 01/28/2024    BASOSABS 0.03 01/28/2024     Lab Results   Component Value Date    GLUCOSE 114 (H) 01/28/2024    BUN 8 01/28/2024    CREATININE 0.71 01/28/2024     01/28/2024    K 3.5 01/28/2024    CL 98 01/28/2024    CO2 27.5 01/28/2024    CALCIUM 9.8 01/28/2024    PROTEINTOT 7.2 01/28/2024    ALBUMIN 3.5 01/28/2024    BILITOT 0.2 01/28/2024    ALKPHOS 102 01/28/2024    AST 18 01/28/2024    ALT 19 01/28/2024     Labs personally reviewed. Ferritin, iron sat normal. Hgb normal. MCV normal. All stable    Rad onc note personally reviewed      No radiology results for the last 30 days.          Assessment and Plan    Diagnoses and all orders for this visit:    1. Iron deficiency anemia, unspecified iron deficiency anemia type (Primary)  -     Iron Profile; Future  -     Ferritin; Future    2. Malignant neoplasm of upper-outer quadrant of left breast in female, estrogen receptor positive        Left ER/KS breast cancer  Required re-resection 12/20/23 with sentinel node b iopsy  due to positive margins 11/2023 lumpectomy. 21 mm. 100% ER and FL positive, HER2 negativ (1+ by IHC). Pathologic stage pT2N0 (sn). Recovered well from surgeries. Baseline MRI brain performed due to headaches and this was negative. PET scan also performed and negative for metastatic disease. Completed XRT 4/4/24.  As hormone receptor positive and post-menopausal, recommendation will be for 5 years adjuvant hormone blocker with aromatase inhibitor. Oncotype score of 7, so chemotherapy not recommended. Anastrozole 1 mg daily started as of 2/9/24. Tolerating well. Continue. RTC 3 months for toxicity check. 7/2023 DEXA normal, repeat due 7/2025. Recommend Vit D/Ca.    Chronic headaches  Unclear etiology. MRI brain negative. Patient taking PRN tylenol. Could have tension component.  PRN Fioricet added but stopped after 1 dose. May benefit from neurology referral. Can consider propanolol/topimax in future.     History of iron deficiency anemia. As of 12/26/23: still has adequate iron stores now. Hgb slightly low as of 1/28/24. EGD on 10/21/22 with erosive gastropathy, non-bleeding; likely source of iron loss. Patient now on PPI daily, recommend to continue. Repeat iron studies in May with her other labs from PCP.      This is an acute or chronic illness that poses a threat to life or bodily function. The above treatment plan involves a high risk of complications and/or mortality of patient management.        Follow up: 3-4 months    I spent 20 minutes caring for Tatyana on this date of service. This time includes time spent by me in the following activities:preparing for the visit, reviewing tests, obtaining and/or reviewing a separately obtained history, performing a medically appropriate examination and/or evaluation , counseling and educating the patient/family/caregiver, ordering medications, tests, or procedures, referring and communicating with other health care professionals , documenting information in the medical  record and independently interpreting results and communicating that information with the patient/family/caregiver    Patient was given instructions and counseling regarding her condition or for health maintenance advice. Please see specific information pulled into the AVS if appropriate.

## 2024-04-22 ENCOUNTER — DOCUMENTATION (OUTPATIENT)
Dept: RADIATION ONCOLOGY | Facility: HOSPITAL | Age: 69
End: 2024-04-22
Payer: COMMERCIAL

## 2024-04-22 NOTE — PROGRESS NOTES
Contacted Ms. Shea to check in after using [place] soaks and aquaphor.  Patient states that her skin is slightly improved.  She states that the redness and itching have both improved.  She denies any openings.  I asked if patient would like to move her follow up sooner, she denied the need.  Instructed patient that if she had any changes, questions or concerns to reach out to our office.  She verbalized understanding.

## 2024-05-01 ENCOUNTER — LAB (OUTPATIENT)
Dept: LAB | Facility: HOSPITAL | Age: 69
End: 2024-05-01
Payer: COMMERCIAL

## 2024-05-01 DIAGNOSIS — R73.09 ELEVATED GLUCOSE: ICD-10-CM

## 2024-05-01 DIAGNOSIS — D50.9 IRON DEFICIENCY ANEMIA, UNSPECIFIED IRON DEFICIENCY ANEMIA TYPE: ICD-10-CM

## 2024-05-01 DIAGNOSIS — I10 PRIMARY HYPERTENSION: ICD-10-CM

## 2024-05-01 LAB
ALBUMIN SERPL-MCNC: 4.1 G/DL (ref 3.5–5.2)
ALBUMIN/GLOB SERPL: 1.6 G/DL
ALP SERPL-CCNC: 66 U/L (ref 39–117)
ALT SERPL W P-5'-P-CCNC: 13 U/L (ref 1–33)
ANION GAP SERPL CALCULATED.3IONS-SCNC: 9.5 MMOL/L (ref 5–15)
AST SERPL-CCNC: 17 U/L (ref 1–32)
BASOPHILS # BLD AUTO: 0.01 10*3/MM3 (ref 0–0.2)
BASOPHILS NFR BLD AUTO: 0.3 % (ref 0–1.5)
BILIRUB SERPL-MCNC: 0.4 MG/DL (ref 0–1.2)
BILIRUB UR QL STRIP: NEGATIVE
BUN SERPL-MCNC: 10 MG/DL (ref 8–23)
BUN/CREAT SERPL: 13.7 (ref 7–25)
CALCIUM SPEC-SCNC: 9.8 MG/DL (ref 8.6–10.5)
CHLORIDE SERPL-SCNC: 103 MMOL/L (ref 98–107)
CHOLEST SERPL-MCNC: 179 MG/DL (ref 0–200)
CLARITY UR: CLEAR
CO2 SERPL-SCNC: 30.5 MMOL/L (ref 22–29)
COLOR UR: YELLOW
CREAT SERPL-MCNC: 0.73 MG/DL (ref 0.57–1)
DEPRECATED RDW RBC AUTO: 48.2 FL (ref 37–54)
EGFRCR SERPLBLD CKD-EPI 2021: 88.6 ML/MIN/1.73
EOSINOPHIL # BLD AUTO: 0.06 10*3/MM3 (ref 0–0.4)
EOSINOPHIL NFR BLD AUTO: 1.7 % (ref 0.3–6.2)
ERYTHROCYTE [DISTWIDTH] IN BLOOD BY AUTOMATED COUNT: 13.5 % (ref 12.3–15.4)
FERRITIN SERPL-MCNC: 37.34 NG/ML (ref 13–150)
GLOBULIN UR ELPH-MCNC: 2.6 GM/DL
GLUCOSE SERPL-MCNC: 107 MG/DL (ref 65–99)
GLUCOSE UR STRIP-MCNC: NEGATIVE MG/DL
HBA1C MFR BLD: 5.4 % (ref 4.8–5.6)
HCT VFR BLD AUTO: 36.4 % (ref 34–46.6)
HDLC SERPL QL: 3.51
HDLC SERPL-MCNC: 51 MG/DL (ref 40–60)
HGB BLD-MCNC: 12.5 G/DL (ref 12–15.9)
HGB UR QL STRIP.AUTO: NEGATIVE
HOLD SPECIMEN: NORMAL
IMM GRANULOCYTES # BLD AUTO: 0.01 10*3/MM3 (ref 0–0.05)
IMM GRANULOCYTES NFR BLD AUTO: 0.3 % (ref 0–0.5)
IRON 24H UR-MRATE: 70 MCG/DL (ref 37–145)
IRON SATN MFR SERPL: 21 % (ref 20–50)
KETONES UR QL STRIP: NEGATIVE
LDLC SERPL CALC-MCNC: 99 MG/DL (ref 0–100)
LEUKOCYTE ESTERASE UR QL STRIP.AUTO: NEGATIVE
LYMPHOCYTES # BLD AUTO: 0.99 10*3/MM3 (ref 0.7–3.1)
LYMPHOCYTES NFR BLD AUTO: 28.4 % (ref 19.6–45.3)
MCH RBC QN AUTO: 33.3 PG (ref 26.6–33)
MCHC RBC AUTO-ENTMCNC: 34.3 G/DL (ref 31.5–35.7)
MCV RBC AUTO: 97.1 FL (ref 79–97)
MONOCYTES # BLD AUTO: 0.31 10*3/MM3 (ref 0.1–0.9)
MONOCYTES NFR BLD AUTO: 8.9 % (ref 5–12)
NEUTROPHILS NFR BLD AUTO: 2.11 10*3/MM3 (ref 1.7–7)
NEUTROPHILS NFR BLD AUTO: 60.4 % (ref 42.7–76)
NITRITE UR QL STRIP: NEGATIVE
NRBC BLD AUTO-RTO: 0 /100 WBC (ref 0–0.2)
PH UR STRIP.AUTO: 8 [PH] (ref 5–8)
PLATELET # BLD AUTO: 194 10*3/MM3 (ref 140–450)
PMV BLD AUTO: 9 FL (ref 6–12)
POTASSIUM SERPL-SCNC: 3.6 MMOL/L (ref 3.5–5.2)
PROT SERPL-MCNC: 6.7 G/DL (ref 6–8.5)
PROT UR QL STRIP: NEGATIVE
RBC # BLD AUTO: 3.75 10*6/MM3 (ref 3.77–5.28)
SODIUM SERPL-SCNC: 143 MMOL/L (ref 136–145)
SP GR UR STRIP: 1.01 (ref 1–1.03)
TIBC SERPL-MCNC: 331 MCG/DL (ref 298–536)
TRANSFERRIN SERPL-MCNC: 222 MG/DL (ref 200–360)
TRIGL SERPL-MCNC: 165 MG/DL (ref 0–150)
UROBILINOGEN UR QL STRIP: NORMAL
VLDLC SERPL-MCNC: 29 MG/DL (ref 5–40)
WBC NRBC COR # BLD AUTO: 3.49 10*3/MM3 (ref 3.4–10.8)

## 2024-05-01 PROCEDURE — 36415 COLL VENOUS BLD VENIPUNCTURE: CPT

## 2024-05-01 PROCEDURE — 83540 ASSAY OF IRON: CPT

## 2024-05-01 PROCEDURE — 84466 ASSAY OF TRANSFERRIN: CPT

## 2024-05-01 PROCEDURE — 80053 COMPREHEN METABOLIC PANEL: CPT

## 2024-05-01 PROCEDURE — 81003 URINALYSIS AUTO W/O SCOPE: CPT

## 2024-05-01 PROCEDURE — 80061 LIPID PANEL: CPT

## 2024-05-01 PROCEDURE — 83036 HEMOGLOBIN GLYCOSYLATED A1C: CPT

## 2024-05-01 PROCEDURE — 82728 ASSAY OF FERRITIN: CPT

## 2024-05-01 PROCEDURE — 85025 COMPLETE CBC W/AUTO DIFF WBC: CPT

## 2024-05-03 ENCOUNTER — OFFICE VISIT (OUTPATIENT)
Dept: FAMILY MEDICINE CLINIC | Facility: CLINIC | Age: 69
End: 2024-05-03
Payer: COMMERCIAL

## 2024-05-03 VITALS
SYSTOLIC BLOOD PRESSURE: 130 MMHG | HEIGHT: 66 IN | OXYGEN SATURATION: 98 % | TEMPERATURE: 98.3 F | DIASTOLIC BLOOD PRESSURE: 80 MMHG | HEART RATE: 86 BPM | RESPIRATION RATE: 16 BRPM | WEIGHT: 197.2 LBS | BODY MASS INDEX: 31.69 KG/M2

## 2024-05-03 DIAGNOSIS — E66.09 CLASS 1 OBESITY DUE TO EXCESS CALORIES WITH SERIOUS COMORBIDITY AND BODY MASS INDEX (BMI) OF 31.0 TO 31.9 IN ADULT: ICD-10-CM

## 2024-05-03 DIAGNOSIS — I10 ESSENTIAL HYPERTENSION: ICD-10-CM

## 2024-05-03 DIAGNOSIS — Z79.899 MEDICATION MANAGEMENT: Primary | ICD-10-CM

## 2024-05-03 DIAGNOSIS — E78.2 MIXED HYPERLIPIDEMIA: ICD-10-CM

## 2024-05-03 DIAGNOSIS — E87.6 HYPOKALEMIA: ICD-10-CM

## 2024-05-03 LAB
AMPHET+METHAMPHET UR QL: NEGATIVE
AMPHETAMINE INTERNAL CONTROL: NORMAL
AMPHETAMINES UR QL: NEGATIVE
BARBITURATE INTERNAL CONTROL: NORMAL
BARBITURATES UR QL SCN: NEGATIVE
BENZODIAZ UR QL SCN: NEGATIVE
BENZODIAZEPINE INTERNAL CONTROL: NORMAL
BUPRENORPHINE INTERNAL CONTROL: NORMAL
BUPRENORPHINE SERPL-MCNC: NEGATIVE NG/ML
CANNABINOIDS SERPL QL: NEGATIVE
COCAINE INTERNAL CONTROL: NORMAL
COCAINE UR QL: NEGATIVE
EXPIRATION DATE: NORMAL
Lab: NORMAL
MDMA (ECSTASY) INTERNAL CONTROL: NORMAL
MDMA UR QL SCN: NEGATIVE
METHADONE INTERNAL CONTROL: NORMAL
METHADONE UR QL SCN: NEGATIVE
METHAMPHETAMINE INTERNAL CONTROL: NORMAL
MORPHINE INTERNAL CONTROL: NORMAL
MORPHINE/OPIATES SCREEN, URINE: NEGATIVE
OXYCODONE INTERNAL CONTROL: NORMAL
OXYCODONE UR QL SCN: NEGATIVE
PCP UR QL SCN: NEGATIVE
PHENCYCLIDINE INTERNAL CONTROL: NORMAL
THC INTERNAL CONTROL: NORMAL

## 2024-05-03 PROCEDURE — 99214 OFFICE O/P EST MOD 30 MIN: CPT | Performed by: NURSE PRACTITIONER

## 2024-05-03 PROCEDURE — 80305 DRUG TEST PRSMV DIR OPT OBS: CPT | Performed by: NURSE PRACTITIONER

## 2024-05-03 RX ORDER — FUROSEMIDE 20 MG/1
20 TABLET ORAL DAILY
Qty: 90 TABLET | Refills: 1 | Status: SHIPPED | OUTPATIENT
Start: 2024-05-03

## 2024-05-03 RX ORDER — POTASSIUM CHLORIDE 750 MG/1
10 TABLET, FILM COATED, EXTENDED RELEASE ORAL DAILY
Qty: 90 TABLET | Refills: 1 | Status: SHIPPED | OUTPATIENT
Start: 2024-05-03

## 2024-05-03 RX ORDER — ATORVASTATIN CALCIUM 10 MG/1
10 TABLET, FILM COATED ORAL NIGHTLY
Qty: 90 TABLET | Refills: 1 | Status: SHIPPED | OUTPATIENT
Start: 2024-05-03

## 2024-05-03 RX ORDER — PHENTERMINE HYDROCHLORIDE 37.5 MG/1
37.5 TABLET ORAL
Qty: 30 TABLET | Refills: 0 | Status: SHIPPED | OUTPATIENT
Start: 2024-05-03

## 2024-05-03 RX ORDER — LOSARTAN POTASSIUM 50 MG/1
50 TABLET ORAL 2 TIMES DAILY
Qty: 180 TABLET | Refills: 1 | Status: SHIPPED | OUTPATIENT
Start: 2024-05-03

## 2024-05-03 NOTE — PROGRESS NOTES
Chief Complaint  Hypertension (Dr Braden - should she go back to see him), DVT (In .  Needs U/S in July - concerned about stopping the Xerelto, had a DVT at 22 - but was on B/C.  Dad was on blood thinner, when he was lowered he had a heart attack and ), Labs Only (Results of labs - discuss sugar), Breast Cancer (Had radiation and is done), Hyperlipidemia, Headache, and Heartburn    Subjective        Tatyana Shea presents to [facility]  History of Present Illness  Hypertension:  doing well on medication. 130/80.      Still has a DVT she is scheduled to go in July to recheck for the DVT in her left leg.  However, she did have a DVT in her 30s but they contributed that to birth control.  She has not had 1 since.    Would like to do something for weight.  She understands that she is decreased most the sugar in her diet.  And that weight plays a part in cancer recurrence.  She is would like to be on some medication that might help to lower her chances by losing weight.  Has been staying active has changed her diet and she has not lost any weight.    She has finished her radiation for her cancer.  We reviewed her labs today.  Hypertension  Associated symptoms include headaches. Pertinent negatives include no chest pain, palpitations or shortness of breath.   Hyperlipidemia  Pertinent negatives include no chest pain or shortness of breath.   Headache  Heartburn  She reports no chest pain or no coughing.       The following portions of the patient's history were personally reviewed and updated as appropriate: allergies, current medications, past medical history, past surgical history, past family history, and past social history.     Body mass index is 31.84 kg/m².           Past History:    Medical History: has a past medical history of Acid reflux, Arrhythmia (), Breast cancer, Diverticulitis, Diverticulosis (15 yrs ago), Headache (), Hiatal hernia, HTN (hypertension),  Hyperlipidemia (2010), Iron deficiency anemia, Low back pain, Obesity (In my 40s), Obstructive sleep apnea (07/02/2021), Peptic ulceration (In my 40s), and Pulmonary embolism (1975).     Surgical History: has a past surgical history that includes Cholecystectomy; Colonoscopy; Colonoscopy (N/A, 07/29/2021); Upper gastrointestinal endoscopy (07/28/2021); Esophagogastroduodenoscopy (N/A, 07/28/2021); Cosmetic surgery (2013); Trigger finger release (Left, 12/21/2022); Mastectomy partial / lumpectomy (Left); and Breast lumpectomy.     Family History: family history includes Atrial fibrillation in an other family member; Developmental Disability in her son; Heart disease in her mother; Heart failure in her mother; Hyperlipidemia in her mother; Hypertension in her mother.     Social History: reports that she has never smoked. She has never been exposed to tobacco smoke. She has never used smokeless tobacco. She reports that she does not currently use alcohol. She reports that she does not use drugs.    Allergies: Patient has no known allergies.          Current Outpatient Medications:     amLODIPine (NORVASC) 5 MG tablet, Take 1 tablet by mouth Daily., Disp: 90 tablet, Rfl: 3    anastrozole (ARIMIDEX) 1 MG tablet, Take 1 tablet by mouth Daily., Disp: , Rfl:     atorvastatin (LIPITOR) 10 MG tablet, Take 1 tablet by mouth Every Night., Disp: 90 tablet, Rfl: 1    Calcium Carbonate 500 MG chewable tablet, Chew 1,000 mg., Disp: , Rfl:     Cholecalciferol 125 MCG (5000 UT) tablet, Take 1 tablet by mouth Daily. LAST DOSE 12/16/22, Disp: , Rfl:     furosemide (LASIX) 20 MG tablet, Take 1 tablet by mouth Daily., Disp: 90 tablet, Rfl: 1    Krill Oil (Omega-3) 500 MG capsule, Take 500 mg by mouth Daily. LAST DOSE 12/16/22, Disp: , Rfl:     losartan (COZAAR) 50 MG tablet, Take 1 tablet by mouth 2 (Two) Times a Day., Disp: 180 tablet, Rfl: 1    multivitamin with minerals tablet tablet, Take 1 tablet by mouth Daily. LAST DOSE 12/16/22,  "Disp: , Rfl:     pantoprazole (PROTONIX) 40 MG EC tablet, TAKE ONE TABLET BY MOUTH ONCE NIGHTLY, Disp: 90 tablet, Rfl: 3    potassium chloride 10 MEQ CR tablet, Take 1 tablet by mouth Daily., Disp: 90 tablet, Rfl: 1    rivaroxaban (XARELTO) 20 MG tablet, Take 1 tablet by mouth Daily., Disp: 90 tablet, Rfl: 1    silver sulfadiazine (SILVADENE, SSD) 1 % cream, Apply 1 Application topically to the appropriate area as directed 2 (Two) Times a Day., Disp: 400 g, Rfl: 0    butalbital-acetaminophen-caffeine (FIORICET, ESGIC) -40 MG per tablet, Take 1 tablet by mouth Every 4 (Four) Hours As Needed for Headache. (Patient not taking: Reported on 2/23/2024), Disp: 30 tablet, Rfl: 1    phentermine (ADIPEX-P) 37.5 MG tablet, Take 1 tablet by mouth Every Morning Before Breakfast., Disp: 30 tablet, Rfl: 0    Medications Discontinued During This Encounter   Medication Reason    potassium chloride 10 MEQ CR tablet Reorder    atorvastatin (LIPITOR) 10 MG tablet Reorder    losartan (COZAAR) 50 MG tablet Reorder    furosemide (LASIX) 20 MG tablet Reorder         Review of Systems   Constitutional:  Negative for fever.   Respiratory:  Negative for cough and shortness of breath.    Cardiovascular:  Negative for chest pain, palpitations and leg swelling.   Neurological:  Negative for dizziness, weakness, numbness and headache.        Objective         Vitals:    05/03/24 0754   BP: 130/80   BP Location: Right arm   Patient Position: Sitting   Cuff Size: Adult   Pulse: 86   Resp: 16   Temp: 98.3 °F (36.8 °C)   TempSrc: Temporal   SpO2: 98%   Weight: 89.4 kg (197 lb 3.2 oz)   Height: 167.6 cm (65.98\")     Body mass index is 31.84 kg/m².         Physical Exam  Vitals reviewed.   Constitutional:       Appearance: Normal appearance. She is well-developed.   HENT:      Head: Normocephalic and atraumatic.      Mouth/Throat:      Pharynx: No oropharyngeal exudate.   Eyes:      Conjunctiva/sclera: Conjunctivae normal.      Pupils: Pupils are " equal, round, and reactive to light.   Cardiovascular:      Rate and Rhythm: Normal rate and regular rhythm.      Heart sounds: Normal heart sounds. No murmur heard.     No friction rub. No gallop.   Pulmonary:      Effort: Pulmonary effort is normal.      Breath sounds: Normal breath sounds. No wheezing or rhonchi.   Skin:     General: Skin is warm and dry.   Neurological:      Mental Status: She is alert and oriented to person, place, and time.   Psychiatric:         Mood and Affect: Mood and affect normal.         Behavior: Behavior normal.         Thought Content: Thought content normal.         Judgment: Judgment normal.             Result Review :               Assessment and Plan     Diagnoses and all orders for this visit:    1. Medication management (Primary)  -     POC Medline 12 Panel Urine Drug Screen    2. Essential hypertension  -     furosemide (LASIX) 20 MG tablet; Take 1 tablet by mouth Daily.  Dispense: 90 tablet; Refill: 1  -     losartan (COZAAR) 50 MG tablet; Take 1 tablet by mouth 2 (Two) Times a Day.  Dispense: 180 tablet; Refill: 1  -     phentermine (ADIPEX-P) 37.5 MG tablet; Take 1 tablet by mouth Every Morning Before Breakfast.  Dispense: 30 tablet; Refill: 0    3. Mixed hyperlipidemia  -     atorvastatin (LIPITOR) 10 MG tablet; Take 1 tablet by mouth Every Night.  Dispense: 90 tablet; Refill: 1  -     phentermine (ADIPEX-P) 37.5 MG tablet; Take 1 tablet by mouth Every Morning Before Breakfast.  Dispense: 30 tablet; Refill: 0    4. Hypokalemia  -     potassium chloride 10 MEQ CR tablet; Take 1 tablet by mouth Daily.  Dispense: 90 tablet; Refill: 1    5. Class 1 obesity due to excess calories with serious comorbidity and body mass index (BMI) of 31.0 to 31.9 in adult  -     phentermine (ADIPEX-P) 37.5 MG tablet; Take 1 tablet by mouth Every Morning Before Breakfast.  Dispense: 30 tablet; Refill: 0              Follow Up     Return in about 1 month (around 6/3/2024).    Patient was given  instructions and counseling regarding her condition or for health maintenance advice. Please see specific information pulled into the AVS if appropriate.

## 2024-05-21 NOTE — PROGRESS NOTES
Follow Up Office Visit      Encounter Date: 05/23/2024   Patient Name: Tatyana Shea  YOB: 1955   Medical Record Number: [id number]   Primary Diagnosis: Malignant neoplasm of upper-outer quadrant of left breast in female, estrogen receptor positive [C50.412, Z17.0]     Cancer Staging   Malignant neoplasm of upper-outer quadrant of left breast in female, estrogen receptor positive  Staging form: Breast, AJCC 8th Edition  - Pathologic: Stage IA (pT2, pN0, cM0, G1, ER+, MO+, HER2-) - Signed by Agustin Riddle MD on 1/16/2024    Radiation Completion Date:  4/4/2024 left breast     Chief Complaint:    Chief Complaint   Patient presents with    Follow-up    Breast Cancer       Oncology/Hematology History Overview Note   8/28/23: Left breast biopsy: invasive lobular, grade 2 overall, HER2 1= by IHC (negative), ER and MO both 100% positive    10/4/23: Breast MRI: There is 15 mm enhancing irregular mass in the upper outer left breast, 12 cm from the nipple, with associated postbiopsy hematoma biopsy-proven malignancy. There are no other suspicious enhancing masses or areas of nonmass enhancement.     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 Oncotype 7 so chemotherapy not recommended    12/20/23: Re-resection: no invasive carcinoma seen, 0/3 sentinel nodes positive, pN0.     1/4/24: NM PET: Left breast seroma, no metastatic disease seen.     1/11/24: MRI brain negative    2/9/24: Plan to start anastrozole 1 mg daily    4/4/24: Radiation XRT completed         Malignant neoplasm of upper-outer quadrant of left breast in female, estrogen receptor positive   12/28/2023 Initial Diagnosis    Malignant neoplasm of upper-outer quadrant of left breast in female, estrogen receptor positive     1/16/2024 Cancer Staged    Staging form: Breast, AJCC 8th Edition  - Pathologic: Stage IA (pT2, pN0, cM0, G1, ER+,  OK+, HER2-) - Signed by Agustin Riddle MD on 1/16/2024     3/14/2024 - 4/4/2024 Radiation    Radiation OncologyTreatment Course:  Tatyana Shea received 4256 cGy in 16 fractions to the left breast.      Primary malignant neoplasm of upper outer quadrant of left female breast   3/7/2024 Initial Diagnosis    Primary malignant neoplasm of upper outer quadrant of left female breast         History of Present Illness: Tatyana Shea is a 68 y.o. female who returns to [facility] for short interval follow-up after completing external beam radiotherapy on 4/4/24. She reports feeling well overall with no treatment related concerns. She has no breast related concerns today. Denies breast pain or tenderness, palpable masses, nipple changes or nipple discharge, limited range of motion in upper extremity. She followed up with Dr. Riddle on 4/18/24; note reviewed. She is taking anastrozole and tolerating well. She does experience occasional hot flashes which she reports is tolerable. She has not been referred to Lymphedema Therapy for surveillance. She has had a few pound intentional weight loss.      Subjective      Review of Systems: Review of Systems   Constitutional:  Positive for fatigue (Ongoing). Negative for activity change, appetite change, chills, fever and unexpected weight change (Has recently been watching her weight, her recent weight loss is intentional.).   HENT:  Negative for hearing loss, sore throat and trouble swallowing.    Eyes:  Negative for visual disturbance.   Respiratory:  Negative for cough, chest tightness, shortness of breath and wheezing.    Cardiovascular:  Negative for chest pain, palpitations and leg swelling.   Gastrointestinal:  Positive for constipation (Ongoing, takes stool softeners or miralax daily.). Negative for abdominal distention, abdominal pain, anal bleeding, blood in stool, diarrhea, nausea and vomiting.   Endocrine: Positive for heat intolerance (Hot flashes,  occasionally).   Genitourinary:  Positive for frequency (Ongoing). Negative for difficulty urinating, dysuria, hematuria and urgency.   Musculoskeletal:  Positive for back pain (Ongoing) and joint swelling (Noted in ankles, ongoing). Negative for arthralgias and gait problem.   Skin:  Negative for color change, rash and wound.   Neurological:  Positive for dizziness (ongoing, noted even with rest.), weakness (Generalized, believes to be r/t age.  Ongoing) and headaches (Daily, mild, over eyes, wakes with, ongoing x4 years.).   Psychiatric/Behavioral:  Positive for sleep disturbance (difficulty falling and staying asleep).        The following portions of the patient's history were reviewed and updated as appropriate: allergies, current medications, past family history, past medical history, past social history, past surgical history and problem list.    Medications:     Current Outpatient Medications:     amLODIPine (NORVASC) 5 MG tablet, Take 1 tablet by mouth Daily., Disp: 90 tablet, Rfl: 3    anastrozole (ARIMIDEX) 1 MG tablet, Take 1 tablet by mouth Daily., Disp: , Rfl:     atorvastatin (LIPITOR) 10 MG tablet, Take 1 tablet by mouth Every Night., Disp: 90 tablet, Rfl: 1    Calcium Carbonate 500 MG chewable tablet, Chew 1,000 mg 2 (Two) Times a Day., Disp: , Rfl:     Cholecalciferol 125 MCG (5000 UT) tablet, Take 1 tablet by mouth Daily. LAST DOSE 12/16/22, Disp: , Rfl:     furosemide (LASIX) 20 MG tablet, Take 1 tablet by mouth Daily., Disp: 90 tablet, Rfl: 1    Krill Oil (Omega-3) 500 MG capsule, Take 500 mg by mouth Daily. LAST DOSE 12/16/22, Disp: , Rfl:     losartan (COZAAR) 50 MG tablet, Take 1 tablet by mouth 2 (Two) Times a Day., Disp: 180 tablet, Rfl: 1    multivitamin with minerals tablet tablet, Take 1 tablet by mouth Daily. LAST DOSE 12/16/22, Disp: , Rfl:     NON FORMULARY, Take 1 tablet by mouth Every Other Day. Stool softener, Disp: , Rfl:     pantoprazole (PROTONIX) 40 MG EC tablet, TAKE ONE  TABLET BY MOUTH ONCE NIGHTLY, Disp: 90 tablet, Rfl: 3    Polyethylene Glycol 3350 (MIRALAX PO), Take  by mouth Every Other Day., Disp: , Rfl:     potassium chloride 10 MEQ CR tablet, Take 1 tablet by mouth Daily., Disp: 90 tablet, Rfl: 1    rivaroxaban (XARELTO) 20 MG tablet, Take 1 tablet by mouth Daily., Disp: 90 tablet, Rfl: 1    Allergies:   No Known Allergies    Patient Smoking History:   Social History     Tobacco Use   Smoking Status Never    Passive exposure: Never   Smokeless Tobacco Never       Measures:  PHQ-9 Total Score: 4   Quality of Life: 100 - Full Activity   ECOG score: 0  ECOG: (0) Fully active, able to carry on all predisease performance without restriction  Pain: (on a scale of 0-10)   Pain Score    05/23/24 0757   PainSc: 0-No pain       Objective     Physical Exam:   Vital Signs:   Vitals:    05/23/24 0757   BP: 135/80   Pulse: 82   Resp: 12   Temp: 98 °F (36.7 °C)   TempSrc: Temporal   SpO2: 100%   Weight: 85.9 kg (189 lb 6 oz)   PainSc: 0-No pain     Body mass index is 30.58 kg/m².   Wt Readings from Last 3 Encounters:   05/23/24 85.9 kg (189 lb 6 oz)   05/03/24 89.4 kg (197 lb 3.2 oz)   04/18/24 90.4 kg (199 lb 6.4 oz)       Physical Exam  Vitals reviewed.   Constitutional:       General: She is not in acute distress.     Appearance: Normal appearance. She is normal weight. She is not ill-appearing.   HENT:      Head: Normocephalic and atraumatic.      Mouth/Throat:      Mouth: Mucous membranes are moist.      Pharynx: Oropharynx is clear. No oropharyngeal exudate or posterior oropharyngeal erythema.   Eyes:      Conjunctiva/sclera: Conjunctivae normal.      Pupils: Pupils are equal, round, and reactive to light.   Cardiovascular:      Rate and Rhythm: Normal rate and regular rhythm.      Pulses: Normal pulses.      Heart sounds: Normal heart sounds.   Pulmonary:      Effort: Pulmonary effort is normal. No respiratory distress.      Breath sounds: Normal breath sounds.   Chest:   Breasts:      Right: No swelling, bleeding, inverted nipple, mass, nipple discharge, skin change or tenderness.      Left: Skin change (faint hyperpigmentation consistent with recent XRT) present. No swelling, bleeding, inverted nipple, mass, nipple discharge or tenderness.       Musculoskeletal:         General: Normal range of motion.      Cervical back: Normal range of motion.      Comments: Full ROM BUE    Lymphadenopathy:      Upper Body:      Right upper body: No supraclavicular or axillary adenopathy.      Left upper body: No supraclavicular or axillary adenopathy.   Skin:     General: Skin is warm and dry.   Neurological:      General: No focal deficit present.      Mental Status: She is alert and oriented to person, place, and time. Mental status is at baseline.   Psychiatric:         Mood and Affect: Mood normal.         Behavior: Behavior normal.       Result Review: I independently reviewed the following data. The pertinent findings are as above in the HPI.  -- Surgical Pathology Exam (12/20/2023 14:34)   -- Surgical Pathology Exam (11/03/2023 10:40)   -- Tissue Pathology Exam (08/28/2023 14:33)   -- Oncotype DX recurrence score on 11/3/23 reviewed via Fibroblast Everywhere  -- MRI Brain With & Without Contrast (01/11/2024 08:49)   -- NM PET/CT Skull Base to Mid Thigh (01/04/2024 08:58)     Pathology:   Lab Results   Component Value Date    CLININFO  08/28/2023     Breast mass      FINALDX  08/28/2023     Left breast,  1 o'clock position, 15 cm from nipple, stereotactic-guided core biopsy:  - Invasive lobular carcinoma  - Histologic grade ([place] Histologic Score):    - Glandular (Acinar)/Tubular Differentiation:  Score 3  - Nuclear Pleomorphism:  Score 2  - Mitotic Rate:  Score 1  - Overall Grade:  Grade 2   - Size of largest focus of invasion:  6 mm   - Breast biomarker testing:    - See synoptic checklist    REMARKS: The above positive (malignant) diagnosis was reported to John BLOOM at 10:00  AM on 8/31/23 by and Ignacia Palafox RN at 9:57 AM on 8/31/23 by Dr. Gunn via [employer] Secure Chat.        SYNOPTIC  11/03/2023     INVASIVE CARCINOMA OF THE BREAST: Resection  INVASIVE CARCINOMA OF THE BREAST: COMPLETE EXCISION - All Specimens  8th Edition - Protocol posted: 3/22/2023    SPECIMEN     Procedure:    Excision (less than total mastectomy)      Specimen Laterality:    Left     TUMOR     Histologic Type:    Invasive carcinoma with mixed ductal and lobular features      Histologic Grade ([place] Histologic Score):           Glandular (Acinar) / Tubular Differentiation:    Score 2        Nuclear Pleomorphism:    Score 2        Mitotic Rate:    Score 1        Overall Grade:    Grade 1 (scores of 3, 4 or 5)      Tumor Size:    Greatest dimension of largest invasive focus (Millimeters): 21 mm     Tumor Focality:    Single focus of invasive carcinoma      Ductal Carcinoma In Situ (DCIS):    Not identified      Lobular Carcinoma In Situ (LCIS):    Not identified      Lymphatic and / or Vascular Invasion:    Not identified      Treatment Effect in the Breast:    No known presurgical therapy     MARGINS     Margin Status for Invasive Carcinoma:    Invasive carcinoma present at margin        Margin(s) Involved by Invasive Carcinoma:    Anterior: multiple foci at margin on main specimen; however, unclear if the extended medial margin (specimen B) covers this area        Distance from Invasive Carcinoma to Posterior Margin:    Less than: 1 mm       Distance from Invasive Carcinoma to Inferior Margin:    Less than: 1 mm       Distance from Invasive Carcinoma to Medial Margin:    Less than: 2 mm     Margin Comment:    for the inferior and posterior margins, unclear if these are covered by the extended medial margin (specimen B)     REGIONAL LYMPH NODES     Regional Lymph Node Status:    Not applicable (no regional lymph nodes submitted or found)     pTNM CLASSIFICATION (AJCC 8th Edition)     Reporting of pT, pN, and  (when applicable) pM categories is based on information available to the pathologist at the time the report is issued. As per the AJCC (Chapter 1, 8th Ed.) it is the managing physician’s responsibility to establish the final pathologic stage based upon all pertinent information, including but potentially not limited to this pathology report.     pT Category:    pT2      pN Category:    pN not assigned (no nodes submitted or found)     SPECIAL STUDIES     Estrogen Receptor (ER) Status:    Positive (greater than 10% of cells demonstrate nuclear positivity)        Percentage of Cells with Nuclear Positivity:    >95 %     Progesterone Receptor (PgR) Status:    Positive        Percentage of Cells with Nuclear Positivity:    >95 %     HER2 (by immunohistochemistry):    Negative (Score 1+)      Testing Performed on Case Number:    [id number]      Imaging: No radiology results for the last 90 days.     Labs:   WBC   Date Value Ref Range Status   05/01/2024 3.49 3.40 - 10.80 10*3/mm3 Final     Hemoglobin   Date Value Ref Range Status   05/01/2024 12.5 12.0 - 15.9 g/dL Final     Hematocrit   Date Value Ref Range Status   05/01/2024 36.4 34.0 - 46.6 % Final     Platelets   Date Value Ref Range Status   05/01/2024 194 140 - 450 10*3/mm3 Final     Creatinine   Date Value Ref Range Status   05/01/2024 0.73 0.57 - 1.00 mg/dL Final     BUN   Date Value Ref Range Status   05/01/2024 10 8 - 23 mg/dL Final     eGFR   Date Value Ref Range Status   05/01/2024 88.6 >60.0 mL/min/1.73 Final     TSH   Date Value Ref Range Status   01/17/2022 2.090 0.270 - 4.200 uIU/mL Final     Assessment / Plan      Impression: Tatyana Shea is a pleasant 68 y.o. female with initial cT1c cN0 cM0 invasive lobular carcinoma of the left breast, grade 2, ER/[place] positive, HER2 negative with Ki-67 of 8%. Oncotype DX score of 7 so chemotherapy not recommended. She is status post left lumpectomy on 11/3/2023 with pathology revealing invasive mixed ductal and lobular  carcinoma with atypical lobular hyperplasia.The tumor measured 21 mm and was present at the margin. Repeat resection with SNLB was performed on 12/20/2023 with no invasive carcinoma identified; pathology pT2 pN0. A total of 3 sentinel lymph nodes were removed and none contained metastatic disease. She is now status post external beam radiotherapy to the left breast, completed on 4/4/2024. Received 4256 cGy in 16 fractions. She is doing well overall and remains without evidence of disease clinically. She is taking anastrozole and tolerating well. She has not been referred to Lymphedema Clinic for the surveillance program. Discussed this recommendation and explained that they also help monitor for fibrosis changes within the breast. Referral placed today.     Chronic headaches: baseline MRI brain performed on 1/11/24 due to headaches and was negative.     Assessment/Plan:   Diagnoses and all orders for this visit:    1. Malignant neoplasm of upper-outer quadrant of left breast in female, estrogen receptor positive (Primary)  -     Ambulatory Referral to Lymphedema Clinic    2. Status post radiation therapy within four to twelve weeks    3. Encounter for follow-up examination after completed treatment for malignant neoplasm    4. Use of anastrozole (Arimidex)    5. At risk for lymphedema  -     Ambulatory Referral to Lymphedema Clinic    6. Scar condition and fibrosis of skin  -     Ambulatory Referral to Lymphedema Clinic       Follow Up:   Return for follow-up in 6 months.   Discussed recommendation per NCCN guidelines for waiting at least 6-months after completion of radiotherapy to begin annual mammogram surveillance.   Referral to Lymphedema Clinic for surveillance.  Follow-up with Dr. Riddle on 7/25/24 and continue anastrozole as prescribed.     Return in about 6 months (around 11/23/2024) for Office Visit.  Tatyana Shea was encouraged to contact me in the interim with any questions or concerns regarding her  care.      MERLE Mathews  Radiation Oncology  [facility]    This document has been signed by MERLE Coleman on May 23, 2024 09:08 EDT

## 2024-05-23 ENCOUNTER — OFFICE VISIT (OUTPATIENT)
Dept: RADIATION ONCOLOGY | Facility: HOSPITAL | Age: 69
End: 2024-05-23
Payer: COMMERCIAL

## 2024-05-23 VITALS
DIASTOLIC BLOOD PRESSURE: 80 MMHG | SYSTOLIC BLOOD PRESSURE: 135 MMHG | RESPIRATION RATE: 12 BRPM | WEIGHT: 189.38 LBS | HEART RATE: 82 BPM | OXYGEN SATURATION: 100 % | BODY MASS INDEX: 30.58 KG/M2 | TEMPERATURE: 98 F

## 2024-05-23 DIAGNOSIS — C50.412 MALIGNANT NEOPLASM OF UPPER-OUTER QUADRANT OF LEFT BREAST IN FEMALE, ESTROGEN RECEPTOR POSITIVE: Primary | ICD-10-CM

## 2024-05-23 DIAGNOSIS — L90.5 SCAR CONDITION AND FIBROSIS OF SKIN: ICD-10-CM

## 2024-05-23 DIAGNOSIS — Z79.811 USE OF ANASTROZOLE (ARIMIDEX): ICD-10-CM

## 2024-05-23 DIAGNOSIS — Z17.0 MALIGNANT NEOPLASM OF UPPER-OUTER QUADRANT OF LEFT BREAST IN FEMALE, ESTROGEN RECEPTOR POSITIVE: Primary | ICD-10-CM

## 2024-05-23 DIAGNOSIS — Z08 ENCOUNTER FOR FOLLOW-UP EXAMINATION AFTER COMPLETED TREATMENT FOR MALIGNANT NEOPLASM: ICD-10-CM

## 2024-05-23 DIAGNOSIS — Z91.89 AT RISK FOR LYMPHEDEMA: ICD-10-CM

## 2024-05-23 DIAGNOSIS — Z92.3 STATUS POST RADIATION THERAPY WITHIN FOUR TO TWELVE WEEKS: ICD-10-CM

## 2024-05-23 PROCEDURE — G0463 HOSPITAL OUTPT CLINIC VISIT: HCPCS | Performed by: NURSE PRACTITIONER

## 2024-05-28 RX ORDER — AMLODIPINE BESYLATE 5 MG/1
5 TABLET ORAL DAILY
Qty: 90 TABLET | Refills: 3 | Status: SHIPPED | OUTPATIENT
Start: 2024-05-28

## 2024-06-05 ENCOUNTER — OFFICE VISIT (OUTPATIENT)
Dept: FAMILY MEDICINE CLINIC | Facility: CLINIC | Age: 69
End: 2024-06-05
Payer: COMMERCIAL

## 2024-06-05 VITALS
SYSTOLIC BLOOD PRESSURE: 150 MMHG | WEIGHT: 186.6 LBS | HEART RATE: 92 BPM | HEIGHT: 66 IN | TEMPERATURE: 97.4 F | OXYGEN SATURATION: 98 % | BODY MASS INDEX: 29.99 KG/M2 | DIASTOLIC BLOOD PRESSURE: 78 MMHG | RESPIRATION RATE: 16 BRPM

## 2024-06-05 DIAGNOSIS — E66.09 CLASS 1 OBESITY DUE TO EXCESS CALORIES WITH SERIOUS COMORBIDITY AND BODY MASS INDEX (BMI) OF 31.0 TO 31.9 IN ADULT: Primary | ICD-10-CM

## 2024-06-05 PROCEDURE — 99213 OFFICE O/P EST LOW 20 MIN: CPT | Performed by: NURSE PRACTITIONER

## 2024-06-05 RX ORDER — SEMAGLUTIDE 0.25 MG/.5ML
0.25 INJECTION, SOLUTION SUBCUTANEOUS WEEKLY
Qty: 2 ML | Refills: 0 | Status: SHIPPED | OUTPATIENT
Start: 2024-06-05

## 2024-06-05 NOTE — PROGRESS NOTES
Chief Complaint  Obesity    Subjective        Tatyana Shea presents to [facility]  History of Present Illness  Obesity:  has lost 11 pounds.  Has lost over 25 pounds.    Phentermine caused headaches.  So couldn't take.       The following portions of the patient's history were personally reviewed and updated as appropriate: allergies, current medications, past medical history, past surgical history, past family history, and past social history.     Body mass index is 30.13 kg/m².           Past History:    Medical History: has a past medical history of Acid reflux, Arrhythmia (2013), Breast cancer, Diverticulitis, Diverticulosis (15 yrs ago), Headache (2021), Hiatal hernia, HTN (hypertension), Hyperlipidemia (2010), Iron deficiency anemia, Low back pain, Obesity (In my 40s), Obstructive sleep apnea (07/02/2021), Peptic ulceration (In my 40s), and Pulmonary embolism (1975).     Surgical History: has a past surgical history that includes Cholecystectomy; Colonoscopy; Colonoscopy (N/A, 07/29/2021); Upper gastrointestinal endoscopy (07/28/2021); Esophagogastroduodenoscopy (N/A, 07/28/2021); Cosmetic surgery (2013); Trigger finger release (Left, 12/21/2022); Mastectomy partial / lumpectomy (Left); and Breast lumpectomy.     Family History: family history includes Atrial fibrillation in an other family member; Developmental Disability in her son; Heart disease in her mother; Heart failure in her mother; Hyperlipidemia in her mother; Hypertension in her mother.     Social History: reports that she has never smoked. She has never been exposed to tobacco smoke. She has never used smokeless tobacco. She reports that she does not currently use alcohol. She reports that she does not use drugs.    Allergies: Patient has no known allergies.          Current Outpatient Medications:     amLODIPine (NORVASC) 5 MG tablet, TAKE 1 TABLET BY MOUTH DAILY, Disp: 90 tablet, Rfl: 3    anastrozole  (ARIMIDEX) 1 MG tablet, Take 1 tablet by mouth Daily., Disp: , Rfl:     atorvastatin (LIPITOR) 10 MG tablet, Take 1 tablet by mouth Every Night., Disp: 90 tablet, Rfl: 1    Calcium Carbonate 500 MG chewable tablet, Chew 1,000 mg 2 (Two) Times a Day., Disp: , Rfl:     Cholecalciferol 125 MCG (5000 UT) tablet, Take 1 tablet by mouth Daily. LAST DOSE 12/16/22, Disp: , Rfl:     furosemide (LASIX) 20 MG tablet, Take 1 tablet by mouth Daily., Disp: 90 tablet, Rfl: 1    Krill Oil (Omega-3) 500 MG capsule, Take 500 mg by mouth Daily. LAST DOSE 12/16/22, Disp: , Rfl:     losartan (COZAAR) 50 MG tablet, Take 1 tablet by mouth 2 (Two) Times a Day., Disp: 180 tablet, Rfl: 1    multivitamin with minerals tablet tablet, Take 1 tablet by mouth Daily. LAST DOSE 12/16/22, Disp: , Rfl:     NON FORMULARY, Take 1 tablet by mouth Every Other Day. Stool softener, Disp: , Rfl:     pantoprazole (PROTONIX) 40 MG EC tablet, TAKE ONE TABLET BY MOUTH ONCE NIGHTLY, Disp: 90 tablet, Rfl: 3    Polyethylene Glycol 3350 (MIRALAX PO), Take  by mouth Every Other Day., Disp: , Rfl:     potassium chloride 10 MEQ CR tablet, Take 1 tablet by mouth Daily., Disp: 90 tablet, Rfl: 1    rivaroxaban (XARELTO) 20 MG tablet, Take 1 tablet by mouth Daily., Disp: 90 tablet, Rfl: 1    Semaglutide-Weight Management (Wegovy) 0.25 MG/0.5ML solution auto-injector, Inject 0.5 mL under the skin into the appropriate area as directed 1 (One) Time Per Week., Disp: 2 mL, Rfl: 0    There are no discontinued medications.      Review of Systems   Constitutional:  Negative for fever.   Respiratory:  Negative for cough and shortness of breath.    Cardiovascular:  Negative for chest pain, palpitations and leg swelling.   Neurological:  Negative for dizziness, weakness, numbness and headache.        Objective         Vitals:    06/05/24 0839   BP: 150/78   BP Location: Right arm   Patient Position: Sitting   Cuff Size: Adult   Pulse: 92   Resp: 16   Temp: 97.4 °F (36.3 °C)  "  TempSrc: Temporal   SpO2: 98%   Weight: 84.6 kg (186 lb 9.6 oz)   Height: 167.6 cm (65.98\")     Body mass index is 30.13 kg/m².         Physical Exam  Vitals reviewed.   Constitutional:       Appearance: Normal appearance. She is well-developed.   HENT:      Head: Normocephalic and atraumatic.      Mouth/Throat:      Pharynx: No oropharyngeal exudate.   Eyes:      Conjunctiva/sclera: Conjunctivae normal.      Pupils: Pupils are equal, round, and reactive to light.   Cardiovascular:      Rate and Rhythm: Normal rate and regular rhythm.      Heart sounds: Normal heart sounds. No murmur heard.     No friction rub. No gallop.   Pulmonary:      Effort: Pulmonary effort is normal.      Breath sounds: Normal breath sounds. No wheezing or rhonchi.   Skin:     General: Skin is warm and dry.   Neurological:      Mental Status: She is alert and oriented to person, place, and time.   Psychiatric:         Mood and Affect: Mood and affect normal.         Behavior: Behavior normal.         Thought Content: Thought content normal.         Judgment: Judgment normal.             Result Review :               Assessment and Plan     Diagnoses and all orders for this visit:    1. Class 1 obesity due to excess calories with serious comorbidity and body mass index (BMI) of 31.0 to 31.9 in adult (Primary)  -     Semaglutide-Weight Management (Wegovy) 0.25 MG/0.5ML solution auto-injector; Inject 0.5 mL under the skin into the appropriate area as directed 1 (One) Time Per Week.  Dispense: 2 mL; Refill: 0              Follow Up     Return in about 3 months (around 9/5/2024).    Patient was given instructions and counseling regarding her condition or for health maintenance advice. Please see specific information pulled into the AVS if appropriate.         "

## 2024-06-11 ENCOUNTER — TELEPHONE (OUTPATIENT)
Dept: ONCOLOGY | Facility: HOSPITAL | Age: 69
End: 2024-06-11
Payer: COMMERCIAL

## 2024-06-11 NOTE — TELEPHONE ENCOUNTER
LAURO, MARILOU AND [employer] MESSAGE STATING APPT MOVED FROM 7/25 TO 8/6 WITH DR. CRUZ

## 2024-06-24 ENCOUNTER — TELEPHONE (OUTPATIENT)
Dept: CARDIOLOGY | Facility: CLINIC | Age: 69
End: 2024-06-24

## 2024-06-24 NOTE — TELEPHONE ENCOUNTER
Caller: Tatyana Shea    Relationship to patient: Self    Best call back number: [phone]    Chief complaint: FOLLOW UP FOR AORTIC LEAK    Type of visit: FOLLOW UP    Requested date:      Additional notes:LAST VISIT 9-26-24 WONDERING IF SHE NEEDS AN ECHO YEARLY CANCELED IN DEC DUE TO BEING OK WANTING TO KNOW WHAT HER NEXT STEP IT

## 2024-06-25 PROCEDURE — 93702 BIS XTRACELL FLUID ANALYSIS: CPT

## 2024-06-26 ENCOUNTER — TELEPHONE (OUTPATIENT)
Dept: CARDIOLOGY | Facility: CLINIC | Age: 69
End: 2024-06-26
Payer: COMMERCIAL

## 2024-06-26 ENCOUNTER — HOSPITAL ENCOUNTER (OUTPATIENT)
Dept: OCCUPATIONAL THERAPY | Facility: HOSPITAL | Age: 69
Setting detail: THERAPIES SERIES
Discharge: HOME | End: 2024-06-26
Payer: COMMERCIAL

## 2024-06-26 DIAGNOSIS — C50.412 PRIMARY MALIGNANT NEOPLASM OF UPPER OUTER QUADRANT OF LEFT FEMALE BREAST: ICD-10-CM

## 2024-06-26 DIAGNOSIS — C50.412 MALIGNANT NEOPLASM OF UPPER-OUTER QUADRANT OF LEFT BREAST IN FEMALE, ESTROGEN RECEPTOR POSITIVE: ICD-10-CM

## 2024-06-26 DIAGNOSIS — Z91.89 AT RISK FOR LYMPHEDEMA: Primary | ICD-10-CM

## 2024-06-26 DIAGNOSIS — E66.09 CLASS 1 OBESITY DUE TO EXCESS CALORIES WITH SERIOUS COMORBIDITY AND BODY MASS INDEX (BMI) OF 31.0 TO 31.9 IN ADULT: Primary | ICD-10-CM

## 2024-06-26 DIAGNOSIS — Z17.0 MALIGNANT NEOPLASM OF UPPER-OUTER QUADRANT OF LEFT BREAST IN FEMALE, ESTROGEN RECEPTOR POSITIVE: ICD-10-CM

## 2024-06-26 PROCEDURE — 97165 OT EVAL LOW COMPLEX 30 MIN: CPT

## 2024-06-26 RX ORDER — SEMAGLUTIDE 0.5 MG/.5ML
0.5 INJECTION, SOLUTION SUBCUTANEOUS WEEKLY
Qty: 2 ML | Refills: 0 | Status: SHIPPED | OUTPATIENT
Start: 2024-06-26

## 2024-06-26 NOTE — THERAPY EVALUATION
Outpatient Occupational Therapy Lymphedema Initial Evaluation   [place]     Patient Name: Tatyana Shea  : 1955  MRN: [id number]  Today's Date: 2024      Visit Date: 2024    Patient Active Problem List   Diagnosis    Diverticulitis    Esophageal reflux    Gallstones    Hiatal hernia    Hypertension    Obstructive sleep apnea    Encounter for colonoscopy following colon polyp removal    Iron deficiency anemia    Syncope    Colon polyps    Malabsorption of iron    Nausea    Bloating    Early satiety    Trigger finger of left thumb    Malignant neoplasm of upper-outer quadrant of left breast in female, estrogen receptor positive    Primary malignant neoplasm of upper outer quadrant of left female breast        Past Medical History:   Diagnosis Date    Acid reflux     Arrhythmia     Breast cancer     Diverticulitis     NO CURRENT ISSUES    Diverticulosis 15 yrs ago    Currently having some mild pain in lower left    Headache     Hiatal hernia     HTN (hypertension)     Hyperlipidemia     Iron deficiency anemia     NO CURRENT S/S, HAD IRON INFUSIONS IN SUMMER 22    Low back pain     Obesity In my 40s    Eat for temporary comfort    Obstructive sleep apnea 2021    Peptic ulceration In my 40s    I had a bleeding ulcer due to my lifestyle at that time/NO CURRENT ISSUES    Pulmonary embolism     Due to birth control pills, I was told NONE CURRENT        Past Surgical History:   Procedure Laterality Date    BREAST LUMPECTOMY      CHOLECYSTECTOMY      COLONOSCOPY       with      COLONOSCOPY N/A 2021    Procedure: COLONOSCOPY with polypectomy/snare;  Surgeon: Ruel Foster MD;  Location: [facility] ENDOSCOPY;  Service: Gastroenterology;  Laterality: N/A;  colon polyp    COSMETIC SURGERY      Removed bags under and above my eyes    ENDOSCOPY N/A 2021    Procedure: ESOPHAGOGASTRODUODENOSCOPY with bxs and cold snares;  Surgeon: Ruel Foster MD;   Location: [facility] ENDOSCOPY;  Service: Gastroenterology;  Laterality: N/A;  hiatal hernia  gastric polyps    MASTECTOMY PARTIAL / LUMPECTOMY Left     Had one in Nove and Dec    TRIGGER FINGER RELEASE Left 12/21/2022    Procedure: LEFT THUMB FINGER TRIGGER RELEASE AND LEFT THUMB CYST EXCISION;  Surgeon: Inés Rashid MD;  Location: [facility] OR [facility] [place] [facility];  Service: Orthopedics;  Laterality: Left;    UPPER GASTROINTESTINAL ENDOSCOPY  07/28/2021    Dr. Foster         Visit Dx:     ICD-10-CM ICD-9-CM   1. At risk for lymphedema  Z91.89 V49.89   2. Malignant neoplasm of upper-outer quadrant of left breast in female, estrogen receptor positive  C50.412 174.4    Z17.0 V86.0   3. Primary malignant neoplasm of upper outer quadrant of left female breast  C50.412 174.4        Patient History       Row Name 06/25/24 1400             History    Brief Description of Current Complaint Patient is a 69 year old female with dx of invasive lobular, grade 2 ER/MA+ HER2- carcinoma of the L breast. Patient underwent a lumpectomy w/ sentinel node biopsy on 11/3/2023. Patient also completed 16 fractions to the L breast.  -SF      Hand Dominance right-handed  -SF         Fall Risk Assessment    Any falls in the past year: No  -SF      Does patient have a fear of falling No  -SF         Services    Are you currently receiving Home Health services No  -SF      Do you plan to receive Home Health services in the near future No  -SF         Daily Activities    Primary Language English  -SF      Are you able to read Yes  -SF      Are you able to write Yes  -SF      How does patient learn best? Listening;Reading;Demonstration;Pictures/Video  -SF      Pt Participated in POC and Goals Yes  -SF         Safety    Are you being hurt, hit, or frightened by anyone at home or in your life? No  -SF      Are you being neglected by a caregiver No  -SF      Have you had any of the following issues with N/A  -SF                User Key  (r) = Recorded By, (t) = Taken  "By, (c) = Cosigned By      Initials Name Provider Type    SF Ledy Hugo OT Occupational Therapist                     Lymphedema       Row Name 06/25/24 1400             Subjective Pain    Able to rate subjective pain? yes  -SF      Pre-Treatment Pain Level 0  -SF      Post-Treatment Pain Level 0  -SF      Subjective Pain Comment Patient denies pain.  -SF         Subjective    Subjective Comments Patient reporting no concerns with ROM. Patient reporting occasional swelling at L breast incision.  -SF         Lymphedema Assessment    Lymphedema Classification LUE:;at risk/stage 0  -SF      Lymphedema Cancer Related Sx left;lumpectomy;sentinel node biopsy  -SF      Lymphedema Surgery Comments 11/3/2023  -SF      Lymph Nodes Removed # 3  -SF      Positive Lymph Nodes # 0  -SF      Radiation Therapy Received yes  -SF      Radiation Treatments #/Timeframe 16  -SF      Lymphedema Precautions other (comment)  Post-op seroma  -SF      Lymphedema Assessment Comments Patient completed XRT 4/2024  -SF         LLIS - Physical Concerns    The amount of pain associated with my lymphedema is: 0  -SF      The amount of limb heaviness associated with my lymphedema is: 0  -SF      The amount of skin tightness associated with my lymphedema is: 0  -SF      The size of my swollen limb(s) seems: 0  -SF      Lymphedema affects the movement of my swollen limb(s): 0  -SF      The strength in my swollen limb(s) is: 0  -SF         LLIS - Psychosocial Concerns    Lymphedema affects my body image (i.e., \"how I think I look\"). 0  -SF      Lymphedema affects my socializing with others. 0  -SF      Lymphedema affects my intimate relations with spouse or partner (rate 0 if not applicable 0  -SF      Lymphedema \"gets me down\" (i.e., depression, frustration, or anger) 0  -SF      I must rely on others for help due to my lymphedema. 0  -SF      I know what to do to manage my lymphedema 4  -SF         LLIS - Functional Concerns    Lymphedema affects " my ability to perform self-care activities (i.e. eating, dressing, hygiene) 0  -SF      Lymphedema affects my ability to perform routine home or work-related activities. 0  -SF      Lymphedema affects my performance of preferred leisure activities. 0  -SF      Lymphedema affects proper fit of clothing/shoes 0  -SF      Lymphedema affects my sleep 0  -SF         Posture/Observations    Posture- WNL Posture is WNL  -SF         General ROM    GENERAL ROM COMMENTS BUE WFL  -SF         MMT (Manual Muscle Testing)    General MMT Comments BUE WFL  -SF         Skin Changes/Observations    Location/Assessment Upper Quadrant  -SF      Upper Quadrant Conditions left:;normal;intact;clean  -SF      Upper Quadrant Color/Pigment left:;fibrosis  -SF      Upper Quadrant Skin Details Patient presenting with fibrosis and scar tissue at L breast incision.  -SF         Lymphedema Measurements    Measurement Type(s) Volumetric  -SF      Volumetric Areas Upper extremities  -SF         L-Dex Bioimpedence Screening    L-Dex Measurement Extremity LUE  -SF      L-Dex Patient Position Standing  -SF      L-Dex UE Dominate Side Right  -SF      L-Dex UE At Risk Side Left  -SF      L-Dex UE Pre Surgical Value No  -SF      L-Dex UE Score -6  -SF      L-Dex UE Baseline Score -6  -SF      L-Dex UE Value Change 0  -SF      $ L-Dex Charge yes  -SF         Lymphedema Life Impact Scale Totals    A.  Total Q1 - Q17 (Do not include Q18) 4  -SF      B.  Total number of questions answered (Q1-Q17) 17  -SF      C. Divide A by B 0.24  -SF      D. Multiple C by 25 6  -SF                User Key  (r) = Recorded By, (t) = Taken By, (c) = Cosigned By      Initials Name Provider Type    SF Ledy Hugo, OT Occupational Therapist                    Bioimpedance spectroscopy helps identify the onset of lymphedema in an arm or leg before patients experience noticeable swelling. Research has shown that 92% of patients with early detection of lymphedema using L-Dex  combined with intervention do not progress to chronic lymphedema through three years. Additionally, as of March 2023, the NCCN Guidelines® for Survivorship recommend proactive screening for lymphedema using bioimpedance spectroscopy. Whenever possible, patients are tested for baseline L-Dex score before   cancer treatment begins and then are reassessed during regular follow-up visits using the SOZO device. Otherwise, this can be started postoperatively and continued during regular follow-up visits. If the patient's L-Dex score increases above normal levels, that is a sign that lymphedema is developing and a referral is made to occupational therapy for further evaluation and early compression treatment. Lymphedema assessment with the SOZO L-Dex score is recommended to be done every 3 months for the first 3 years and then every 6 months for years 4 and 5 followed by annually afterwards        Therapy Education  Education Details: Patient provided education on risk factors for lymphedema to include greater than 6 lymph node removal, BMI greater than 30, radiation therapy, and Taxol use and advanced age. Patient provided with the LeAP lymphedema action plan stoplight to recovery handout (Rehabilitation Oncology: July 2019 - Volume 37 - Issue 3 - p 122-127 doi: 10.1097/01.[id number].[id number]) to improve patient's ability to identify lymph exacerbation and provide guidance on appropriate action to be taken to control symptoms. Therapist educating patient on expectations of lymphedema clinic and frequency of appointments. Pt educated on signs and symptoms of infection. Therapist urging pt to seek doctor evaluation if signs and symptoms occur and to remove any compression. Therapist educating pt regarding pt must be on antibiotic for 72 hours if there is an infection prior to continuing compression to prevent the spread of infection. Pt. educated on side effects of radiation therapy including edema, skin integrity and soft  tissue changes. Patient provided with education on a simple self-manual lymphatic drainage technique.  Handouts provided. Therapist providing pt with new HEP. Therapist providing written and visual demonstration of technique with safety cues to prevent injury. Therapist also providing patient with education on scar massage with handout provided.  Given: HEP, Symptoms/condition management, Edema management  Program: New  How Provided: Verbal, Demonstration, Written  Provided to: Patient  Level of Understanding: Verbalized, Demonstrated, Teach back education performed         OT Goals       Row Name 06/26/24 1201          Time Calculation    OT Goal Re-Cert Due Date 07/26/24  -               User Key  (r) = Recorded By, (t) = Taken By, (c) = Cosigned By      Initials Name Provider Type    SF Ledy Hugo OT Occupational Therapist                  GOALS:  1. Post Breast Surgery Care/at risk for Lymphedema  LTG 1: 90 days:  As an indicator of no exacerbation of lymphedema staging, the patient will present with an L-Dex score less than [10] points from preoperative baseline.   STATUS: New  STG 1a: 30 days: Patient will be independent with self-manual lymphatic massage.    STATUS: New  STG 1b: 30 days:  Patient will be independent with identification of signs and symptoms of lymphedema exasperation per stoplight to recovery education handout.   STATUS: New  STG 1c: 30 days: Patient will be independent with HEP to prevent advancement in lymphedema staging.   STATUS: New  TREATMENT:  Self Care/ADL retraining, Therapeutic Activity, Neuromuscular Re-education, Therapeutic Exercise, Bioimpedence Fluid Analysis, Post-Surgical compression garement [address] Zip-ST-High/ Luanne Camisole Kit 2860K, Orthotic Management and training,  and Manual Therapy.      OT Assessment/Plan       Row Name 06/26/24 1008          OT Assessment    Functional Limitations Limitations in functional capacity and performance  -SF     Impairments  Impaired lymphatic circulation  -SF     Assessment Comments Patient is a 69 year old female with dx of invasive lobular, grade 2 ER/DE+ HER2- carcinoma of the L breast. Patient underwent a lumpectomy w/ sentinel node biopsy on 11/3/2023 where she had 3 lymph nodes removed. Patient also completed 16 fractions to the L breast. Patient presents with full ROM and normalized lymphatic functioning. Patient will benefit from skilled occupational therapy to further evaluate ongoing lymphatic functioning to prevent advancement in lymphedema staging, increased pain and decreased range of motion.  -SF     OT Diagnosis At risk for lymphedema  -SF     OT Rehab Potential Good  -SF     Patient/caregiver participated in establishment of treatment plan and goals Yes  -SF     Patient would benefit from skilled therapy intervention Yes  -SF        OT Plan    OT Frequency Other (comment)  -SF     Predicted Duration of Therapy Intervention (OT) Patient will be reevaluated every 3 months years 1 through 3 and every 6 months years 4 through 5.  -SF     Planned CPT's? OT EVAL LOW COMPLEXITY: 01312;OT EVAL MOD COMPLEXITY: 56066;OT EVAL HIGH COMPLEXITY: 86618;OT RE-EVAL: 61970;OT THER PROC EA 15 MIN: [id number];OT THER ACT EA 15 MIN: 69767GG;OT SELF CARE/MGMT/TRAIN 15 MIN: 66393;OT MANUAL THERAPY EA 15 MIN: 02151;OT CARE PLAN EA 15 MIN;OT ORTHOTIC MGMT/TRAIN EA 15 MIN: 69813;OT ORTHO/PROSTHET CHECKOUT EA 15 MIN: 99171  -SF     Planned Therapy Interventions (Optional Details) home exercise program;manual therapy techniques;stretching;strengthening;ROM (Range of Motion);patient/family education  -SF     OT Plan Comments Initiate POC  -SF               User Key  (r) = Recorded By, (t) = Taken By, (c) = Cosigned By      Initials Name Provider Type    SF Ledy Hugo OT Occupational Therapist                  OT eval complexity:    Examination:    Performance Deficits include:   Health Management, Home Management, Leisure Participation, Job  Performance,    # deficits affecting performance:   4   Decision Making:       Treatment Options Considered : Health Promotion, Prevention, Remediation/Restoration   # Treatment options considered : Several (3)    Modification Made for: None   Level of Task Modification: None    Comorbidity Affect Performance: None    How is performance affected NA   Evaluation Level Determined:   Low                  Time Calculation:   Untimed Charges  OT Eval/Re-eval Minutes: 70  Total Minutes  Untimed Charges Total Minutes: 70   Total Minutes: 70     Therapy Charges for Today       Code Description Service Date Service Provider Modifiers Qty    10813943155 HC PT BIS XTRACELL FLUID ANALYSIS 6/25/2024 Ledy Hugo OT  1    92085931101 HC OT EVAL LOW COMPLEXITY 4 6/26/2024 Ledy Hugo OT GO 1                      Ledy Hugo OT  6/26/2024

## 2024-06-26 NOTE — TELEPHONE ENCOUNTER
----- Message from [facility] [employer] sent at 6/26/2024  7:57 AM EDT -----  Regarding: Change in next Wegovy prescription   Contact: [phone]  John, I have been taking Wegovy for the past three weeks and I'm ready for the next level. Can you write my next prescription for the next level of dosage?  Thank you,  Tatyana Shea

## 2024-06-26 NOTE — TELEPHONE ENCOUNTER
Caller: Tatyana Shea    Relationship to patient: Self    Best call back number: [phone]     Chief complaint: CHECK UP     Type of visit: FOLLOW UP     Requested date: ASAP     If rescheduling, when is the original appointment:      Additional notes:  LAST OV NOTE 9.26.23. PATIENT WANTS TO SEE DR. SWANN ONLY. PATIENT STATES IF APPOINTMENT IS GOING TO BE AFTER AUGUST SHE NEEDS A 3PM APPOINTMENT.

## 2024-06-27 ENCOUNTER — TELEPHONE (OUTPATIENT)
Dept: CARDIOLOGY | Facility: CLINIC | Age: 69
End: 2024-06-27

## 2024-06-27 NOTE — TELEPHONE ENCOUNTER
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  IF SHE CAN GET IN BEFORE AUGUST 1ST SHE WOULD BE ABLE TO COME IN BEFORE 3PM.     OK TO REACH OUT THROUGH MY CHART OR A CALL BACK

## 2024-07-02 ENCOUNTER — OFFICE VISIT (OUTPATIENT)
Dept: ORTHOPEDIC SURGERY | Facility: CLINIC | Age: 69
End: 2024-07-02
Payer: COMMERCIAL

## 2024-07-02 VITALS
SYSTOLIC BLOOD PRESSURE: 153 MMHG | OXYGEN SATURATION: 98 % | HEART RATE: 66 BPM | BODY MASS INDEX: 29.09 KG/M2 | HEIGHT: 66 IN | DIASTOLIC BLOOD PRESSURE: 83 MMHG | WEIGHT: 181 LBS

## 2024-07-02 DIAGNOSIS — M65.331 TRIGGER MIDDLE FINGER OF RIGHT HAND: Primary | ICD-10-CM

## 2024-07-02 RX ADMIN — TRIAMCINOLONE ACETONIDE 40 MG: 40 INJECTION, SUSPENSION INTRA-ARTICULAR; INTRAMUSCULAR at 09:00

## 2024-07-02 RX ADMIN — LIDOCAINE HYDROCHLORIDE 1 ML: 10 INJECTION, SOLUTION INFILTRATION; PERINEURAL at 09:00

## 2024-07-02 NOTE — PROGRESS NOTES
"Chief Complaint  Follow-up and Pain of the Right Hand     Subjective      Tatyana Shea presents to [facility] for follow up of the right hand 3 rd digit.  She has locking of the digit in the past.  The injection was 7/18/23.  She noted the injection lasted about 6 months.  She has difficulty with  and [facility] tasks.     No Known Allergies     Social History     Socioeconomic History    Marital status: Single   Tobacco Use    Smoking status: Never     Passive exposure: Never    Smokeless tobacco: Never   Vaping Use    Vaping status: Never Used   Substance and Sexual Activity    Alcohol use: Not Currently     Comment: Glass of wine a couple times a year    Drug use: Never    Sexual activity: Not Currently     Birth control/protection: None        I reviewed the patient's chief complaint, history of present illness, review of systems, past medical history, surgical history, family history, social history, medications, and allergy list.     Review of Systems     Constitutional: Denies fevers, chills, weight loss  Cardiovascular: Denies chest pain, shortness of breath  Skin: Denies rashes, acute skin changes  Neurologic: Denies headache, loss of consciousness  MSK: Right hand.       Vital Signs:   /83   Pulse 66   Ht 167.6 cm (66\")   Wt 82.1 kg (181 lb)   SpO2 98%   BMI 29.21 kg/m²          Physical Exam  General: Alert. No acute distress    Ortho Exam        Right hand.  Negative Compression testing/ Negative Tinels. NegativeFinkelsteins. Negative Barnett's testing. Negative CMC grind testing. Negative Phalens. Mildly Full ROM of the hand, fingers, elbow and wrist. Positive Triggering of the 3 rd digit. Sensation grossly intact to light touch, median, radial and ulnar nerve. Positive AIN, PIN and ulnar nerve motor function intact. Axillary nerve intact. Positive pulses.        Small Joint  Consent given by: patient  Site marked: site marked  Timeout: Immediately prior to " procedure a time out was called to verify the correct patient, procedure, equipment, support staff and site/side marked as required   Supporting Documentation  Indications: pain   Procedure Details  Location: -   Location: Right Hand 3rd finger.Preparation: Patient was prepped and draped in the usual sterile fashion  Needle gauge: 23G.  Medications administered: 1 mL lidocaine 1 %; 40 mg triamcinolone acetonide 40 MG/ML  Patient tolerance: patient tolerated the procedure well with no immediate complications    This injection documentation was Scribed for Inés Rashid MD by Anjelica Sauceda CMA.  07/02/24   09:00 EDT     Imaging Results (Most Recent)       None             Result Review :             Assessment and Plan     Diagnoses and all orders for this visit:    1. Trigger middle finger of right hand (Primary)        Discussed the treatment plan with the patient.     Discussed the risks and benefits of conservative measures. The patient expressed understanding and wished to proceed with a right trigger finger steroid injection.  She tolerated the injection well.       Call or return if worsening symptoms.    Follow Up     PRN      Patient was given instructions and counseling regarding her condition or for health maintenance advice. Please see specific information pulled into the AVS if appropriate.     Scribed for Inés Rashid MD by Reyna Joaquin MA.  07/02/24   08:53 EDT    I have personally performed the services described in this document as scribed by the above individual and it is both accurate and complete. Inés Rashid MD 07/03/24

## 2024-07-03 RX ORDER — TRIAMCINOLONE ACETONIDE 40 MG/ML
40 INJECTION, SUSPENSION INTRA-ARTICULAR; INTRAMUSCULAR
Status: COMPLETED | OUTPATIENT
Start: 2024-07-02 | End: 2024-07-02

## 2024-07-03 RX ORDER — LIDOCAINE HYDROCHLORIDE 10 MG/ML
1 INJECTION, SOLUTION INFILTRATION; PERINEURAL
Status: COMPLETED | OUTPATIENT
Start: 2024-07-02 | End: 2024-07-02

## 2024-07-10 ENCOUNTER — HOSPITAL ENCOUNTER (OUTPATIENT)
Dept: OTHER | Facility: HOSPITAL | Age: 69
Discharge: HOME | End: 2024-07-10

## 2024-07-16 DIAGNOSIS — I10 ESSENTIAL HYPERTENSION: ICD-10-CM

## 2024-07-16 RX ORDER — FUROSEMIDE 20 MG/1
20 TABLET ORAL 2 TIMES DAILY
Qty: 180 TABLET | Refills: 1 | Status: SHIPPED | OUTPATIENT
Start: 2024-07-16

## 2024-07-17 ENCOUNTER — OFFICE VISIT (OUTPATIENT)
Dept: FAMILY MEDICINE CLINIC | Facility: CLINIC | Age: 69
End: 2024-07-17
Payer: COMMERCIAL

## 2024-07-17 VITALS
DIASTOLIC BLOOD PRESSURE: 76 MMHG | HEIGHT: 66 IN | TEMPERATURE: 97.2 F | RESPIRATION RATE: 16 BRPM | SYSTOLIC BLOOD PRESSURE: 130 MMHG | HEART RATE: 79 BPM | OXYGEN SATURATION: 98 % | BODY MASS INDEX: 28.9 KG/M2 | WEIGHT: 179.8 LBS

## 2024-07-17 DIAGNOSIS — Z00.00 ANNUAL PHYSICAL EXAM: Primary | ICD-10-CM

## 2024-07-17 DIAGNOSIS — E66.3 OVERWEIGHT WITH BODY MASS INDEX (BMI) OF 29 TO 29.9 IN ADULT: ICD-10-CM

## 2024-07-17 PROCEDURE — 99397 PER PM REEVAL EST PAT 65+ YR: CPT | Performed by: NURSE PRACTITIONER

## 2024-07-17 RX ORDER — SEMAGLUTIDE 1 MG/.5ML
1 INJECTION, SOLUTION SUBCUTANEOUS WEEKLY
Qty: 2 ML | Refills: 0 | Status: SHIPPED | OUTPATIENT
Start: 2024-07-17

## 2024-07-17 NOTE — PROGRESS NOTES
Chief Complaint  Annual Exam and Obesity    Subjective        Tatyana Shea presents to [facility]  History of Present Illness  Obesity:  has lost 18 pounds since May.      Phentermine caused headaches.  So couldn't take.     Annual exam:  is walking and exercising regularly and is having some constipation on medication but is working on it.  Obesity  Pertinent negatives include no chest pain, coughing, fever, numbness or weakness.       The following portions of the patient's history were personally reviewed and updated as appropriate: allergies, current medications, past medical history, past surgical history, past family history, and past social history.     Body mass index is 29.03 kg/m².           Past History:    Medical History: has a past medical history of Acid reflux, Arrhythmia (2013), Breast cancer, Diverticulitis, Diverticulosis (15 yrs ago), Headache (2021), Hiatal hernia, HTN (hypertension), Hyperlipidemia (2010), Iron deficiency anemia, Low back pain, Obesity (In my 40s), Obstructive sleep apnea (07/02/2021), Peptic ulceration (In my 40s), and Pulmonary embolism (1975).     Surgical History: has a past surgical history that includes Cholecystectomy; Colonoscopy; Colonoscopy (N/A, 07/29/2021); Upper gastrointestinal endoscopy (07/28/2021); Esophagogastroduodenoscopy (N/A, 07/28/2021); Cosmetic surgery (2013); Trigger finger release (Left, 12/21/2022); Mastectomy partial / lumpectomy (Left); and Breast lumpectomy.     Family History: family history includes Atrial fibrillation in an other family member; Developmental Disability in her son; Heart disease in her mother; Heart failure in her mother; Hyperlipidemia in her mother; Hypertension in her mother.     Social History: reports that she has never smoked. She has never been exposed to tobacco smoke. She has never used smokeless tobacco. She reports that she does not currently use alcohol. She reports that she does  not use drugs.    Allergies: Patient has no known allergies.          Current Outpatient Medications:     amLODIPine (NORVASC) 5 MG tablet, TAKE 1 TABLET BY MOUTH DAILY, Disp: 90 tablet, Rfl: 3    anastrozole (ARIMIDEX) 1 MG tablet, Take 1 tablet by mouth Daily., Disp: , Rfl:     atorvastatin (LIPITOR) 10 MG tablet, Take 1 tablet by mouth Every Night., Disp: 90 tablet, Rfl: 1    Calcium Carbonate 500 MG chewable tablet, Chew 1,000 mg 2 (Two) Times a Day., Disp: , Rfl:     Cholecalciferol 125 MCG (5000 UT) tablet, Take 1 tablet by mouth Daily. LAST DOSE 12/16/22, Disp: , Rfl:     furosemide (LASIX) 20 MG tablet, TAKE 1 TABLET BY MOUTH TWICE A DAY, Disp: 180 tablet, Rfl: 1    Krill Oil (Omega-3) 500 MG capsule, Take 500 mg by mouth Daily. LAST DOSE 12/16/22, Disp: , Rfl:     losartan (COZAAR) 50 MG tablet, Take 1 tablet by mouth 2 (Two) Times a Day., Disp: 180 tablet, Rfl: 1    multivitamin with minerals tablet tablet, Take 1 tablet by mouth Daily. LAST DOSE 12/16/22, Disp: , Rfl:     NON FORMULARY, Take 1 tablet by mouth Every Other Day. Stool softener, Disp: , Rfl:     pantoprazole (PROTONIX) 40 MG EC tablet, TAKE ONE TABLET BY MOUTH ONCE NIGHTLY, Disp: 90 tablet, Rfl: 3    Polyethylene Glycol 3350 (MIRALAX PO), Take  by mouth Every Other Day., Disp: , Rfl:     potassium chloride 10 MEQ CR tablet, Take 1 tablet by mouth Daily., Disp: 90 tablet, Rfl: 1    rivaroxaban (XARELTO) 20 MG tablet, Take 1 tablet by mouth Daily., Disp: 90 tablet, Rfl: 1    Semaglutide-Weight Management (Wegovy) 1 MG/0.5ML solution auto-injector, Inject 0.5 mL under the skin into the appropriate area as directed 1 (One) Time Per Week., Disp: 2 mL, Rfl: 0    Medications Discontinued During This Encounter   Medication Reason    Semaglutide-Weight Management (Wegovy) 0.5 MG/0.5ML solution auto-injector *Therapy completed         Review of Systems   Constitutional:  Negative for fever.   Respiratory:  Negative for cough and shortness of breath.   "  Cardiovascular:  Negative for chest pain, palpitations and leg swelling.   Neurological:  Negative for dizziness, weakness, numbness and headache.        Objective         Vitals:    07/17/24 0736   BP: 130/76   BP Location: Right arm   Patient Position: Sitting   Cuff Size: Adult   Pulse: 79   Resp: 16   Temp: 97.2 °F (36.2 °C)   TempSrc: Temporal   SpO2: 98%   Weight: 81.6 kg (179 lb 12.8 oz)   Height: 167.6 cm (65.98\")     Body mass index is 29.03 kg/m².         Physical Exam  Vitals reviewed.   Constitutional:       Appearance: Normal appearance. She is well-developed.   HENT:      Head: Normocephalic and atraumatic.      Mouth/Throat:      Pharynx: No oropharyngeal exudate.   Eyes:      Conjunctiva/sclera: Conjunctivae normal.      Pupils: Pupils are equal, round, and reactive to light.   Cardiovascular:      Rate and Rhythm: Normal rate and regular rhythm.      Heart sounds: Normal heart sounds. No murmur heard.     No friction rub. No gallop.   Pulmonary:      Effort: Pulmonary effort is normal.      Breath sounds: Normal breath sounds. No wheezing or rhonchi.   Abdominal:      General: Bowel sounds are normal.      Palpations: Abdomen is soft.      Tenderness: There is no abdominal tenderness.   Musculoskeletal:         General: Normal range of motion.      Cervical back: Normal range of motion.   Skin:     General: Skin is warm and dry.   Neurological:      Mental Status: She is alert and oriented to person, place, and time.   Psychiatric:         Mood and Affect: Mood and affect normal.         Behavior: Behavior normal.         Thought Content: Thought content normal.         Judgment: Judgment normal.             Result Review :               Assessment and Plan     Diagnoses and all orders for this visit:    1. Annual physical exam (Primary)  Comments:  discussed diet and exercise.    2. Overweight with body mass index (BMI) of 29 to 29.9 in adult  -     Semaglutide-Weight Management (Wegovy) 1 " MG/0.5ML solution auto-injector; Inject 0.5 mL under the skin into the appropriate area as directed 1 (One) Time Per Week.  Dispense: 2 mL; Refill: 0              Follow Up     Return in about 6 weeks (around 8/28/2024).    Patient was given instructions and counseling regarding her condition or for health maintenance advice. Please see specific information pulled into the AVS if appropriate.

## 2024-07-30 ENCOUNTER — HOSPITAL ENCOUNTER (OUTPATIENT)
Dept: CARDIOLOGY | Facility: HOSPITAL | Age: 69
Discharge: HOME | End: 2024-07-30
Admitting: NURSE PRACTITIONER
Payer: COMMERCIAL

## 2024-07-30 DIAGNOSIS — I82.402 ACUTE DEEP VEIN THROMBOSIS (DVT) OF LEFT LOWER EXTREMITY, UNSPECIFIED VEIN: ICD-10-CM

## 2024-07-30 LAB
BH CV LOWER VASCULAR LEFT DISTAL FEMORAL COMPRESS: NORMAL
BH CV LOWER VASCULAR LEFT GASTRONEMIUS COMPRESS: NORMAL
BH CV LOWER VASCULAR LEFT GREATER SAPH BK COMPRESS: NORMAL
BH CV LOWER VASCULAR LEFT MID FEMORAL AUGMENT: NORMAL
BH CV LOWER VASCULAR LEFT MID FEMORAL COMPETENT: NORMAL
BH CV LOWER VASCULAR LEFT MID FEMORAL COMPRESS: NORMAL
BH CV LOWER VASCULAR LEFT MID FEMORAL PHASIC: NORMAL
BH CV LOWER VASCULAR LEFT MID FEMORAL SPONT: NORMAL
BH CV LOWER VASCULAR LEFT PERONEAL COMPRESS: NORMAL
BH CV LOWER VASCULAR LEFT POPLITEAL AUGMENT: NORMAL
BH CV LOWER VASCULAR LEFT POPLITEAL COMPETENT: NORMAL
BH CV LOWER VASCULAR LEFT POPLITEAL COMPRESS: NORMAL
BH CV LOWER VASCULAR LEFT POPLITEAL PHASIC: NORMAL
BH CV LOWER VASCULAR LEFT POPLITEAL SPONT: NORMAL
BH CV LOWER VASCULAR LEFT POSTERIOR TIBIAL COMPRESS: NORMAL
BH CV LOWER VASCULAR LEFT PROXIMAL FEMORAL COMPRESS: NORMAL
BH CV LOWER VASCULAR LEFT SAPHENOFEMORAL JUNCTION COMPRESS: NORMAL
BH CV LOWER VASCULAR RIGHT COMMON FEMORAL AUGMENT: NORMAL
BH CV LOWER VASCULAR RIGHT COMMON FEMORAL COMPETENT: NORMAL
BH CV LOWER VASCULAR RIGHT COMMON FEMORAL COMPRESS: NORMAL
BH CV LOWER VASCULAR RIGHT COMMON FEMORAL PHASIC: NORMAL
BH CV LOWER VASCULAR RIGHT COMMON FEMORAL SPONT: NORMAL

## 2024-07-30 PROCEDURE — 93971 EXTREMITY STUDY: CPT

## 2024-07-30 PROCEDURE — 93971 EXTREMITY STUDY: CPT | Performed by: SURGERY

## 2024-08-05 DIAGNOSIS — I82.402 ACUTE DEEP VEIN THROMBOSIS (DVT) OF LEFT LOWER EXTREMITY, UNSPECIFIED VEIN: ICD-10-CM

## 2024-08-06 RX ORDER — RIVAROXABAN 20 MG/1
20 TABLET, FILM COATED ORAL DAILY
Qty: 90 TABLET | Refills: 1 | Status: SHIPPED | OUTPATIENT
Start: 2024-08-06

## 2024-08-09 ENCOUNTER — TELEPHONE (OUTPATIENT)
Dept: CARDIOLOGY | Facility: CLINIC | Age: 69
End: 2024-08-09
Payer: COMMERCIAL

## 2024-08-09 NOTE — TELEPHONE ENCOUNTER
----- Message from Suzanne Sims sent at 2024  3:20 PM EDT -----  Regarding: FW: Monitoring my aortic valve leak  Contact: [phone]  Please get her an appt with me in the next couple of weeks to address her concerns.  ----- Message -----  From: Veronica Moore MA  Sent: 2024  11:01 AM EDT  To: MERLE Medina  Subject: FW: Monitoring my aortic valve leak                ----- Message -----  From: Tatyana Shea  Sent: 2024   7:01 PM EDT  To: [facility]  Subject: Monitoring my aortic valve leak                  Last year, you diagnosed me with an aortic valve leak after I had an Echo.  How is this monitored? I tried to get an appointment with you to discuss my treatment plan for this, but your office did not consider my schedule, and said it would be January before I could see you. Both my parents  from heart complications, so I would like to know what to do for my condition. Am I supposed to have a yearly Echo, or if not, how can I know if this has gotten worse? I hope you can enlighten me on this.  Thank you.  Tatyana Shea  1955

## 2024-08-20 ENCOUNTER — TELEMEDICINE (OUTPATIENT)
Dept: GASTROENTEROLOGY | Facility: CLINIC | Age: 69
End: 2024-08-20
Payer: COMMERCIAL

## 2024-08-20 VITALS — WEIGHT: 172 LBS | BODY MASS INDEX: 27.77 KG/M2

## 2024-08-20 DIAGNOSIS — D12.6 ADENOMATOUS POLYP OF COLON, UNSPECIFIED PART OF COLON: ICD-10-CM

## 2024-08-20 DIAGNOSIS — K59.00 CONSTIPATION, UNSPECIFIED CONSTIPATION TYPE: Primary | Chronic | ICD-10-CM

## 2024-08-20 DIAGNOSIS — K21.9 GASTROESOPHAGEAL REFLUX DISEASE, UNSPECIFIED WHETHER ESOPHAGITIS PRESENT: Chronic | ICD-10-CM

## 2024-08-20 DIAGNOSIS — K44.9 HIATAL HERNIA: Chronic | ICD-10-CM

## 2024-08-20 DIAGNOSIS — Z12.11 COLON CANCER SCREENING: ICD-10-CM

## 2024-08-20 NOTE — PATIENT INSTRUCTIONS
1.  For GERD and hiatal hernia, continue pantoprazole 40 mg once daily.     2. For GERD and hiatal hernia, we recommend avoiding eating 3-4 hours before bedtime, eating smaller more frequent meals, and avoiding any known food triggers including spicy foods, tomatoes and tomato-based sauces, chocolate, coffee/tea, citrus fruits, carbonated  beverages and alcohol.     3.  Your history of adenomatous colon polyps, your next colonoscopy will be due at a 5-year interval, July 2026 and you are in recall accordingly.    4.  For constipation we will start you on Linzess 145 mcg.  You will take this each morning on an empty stomach.  If you find that after several days that this dose is ineffective, you can take 2 tablets in the morning.  That would be equivocal to the higher strength dose of Linzess.  We will reach out to you in a couple of weeks to assess your symptoms to determine if you are on the right dose of Linzess and make adjustments accordingly.    5.  Recommend next office follow-up visit in 1 year for reassessment of symptoms and medication refills or sooner should your symptoms worsen/recur.

## 2024-08-20 NOTE — PROGRESS NOTES
"Chief Complaint   Patient presents with    Follow-up     GERD, Schatzki's ring, hiatal hernia, colon polyps, colon cancer screening     Patient presents today for telehealth service.    This service was conducted via visual and audio utilizing [employer] technology integrated into [employer] EMR.    This provider is located at [facility], [place], [place].    Patient is being seen remotely via telehealth at  ([place]) home address and has stated they are in a secure environment for the telemedicine visit.    The patient's condition being diagnosed/treated as appropriate for telemedicine.    Patient consent : You have chosen to receive care through a telehealth visit.    Do you consent to use a video/audio connection for your medical care today? Yes        History of Present Illness  69-year-old female presents today for telemedicine follow-up.  She was last seen via telemedicine visit on 6/2/2023.  She has a history of GERD, iron deficiency anemia, bloating, hiatal hernia, colon polyps.    Her last EGD on 10/21/2022 demonstrated an 8 cm hiatal hernia, erosive gastropathy and a mild Schatzki's ring that was traversed and dilated.  She continues pantoprazole and symptoms are well-controlled as long as she takes her medication. She no longer requires use of famotidine.  She denies any nausea or vomiting, or dysphagia. She does not eat late at night and she has given up a few foods to control symptoms.     She reports being on a weight loss program since May. She is now taking Wegovy which has caused \"terrible constipation\". She is having a Bms \"only on the weekend\". When she gets home on Friday she starts taking laxatives to get her to have a BM. She has been taking Colace and Milk of Magnesia. MOM by itself does not work. She is working with a dietician, but thus far no specific foods seem to improve symptoms. She reports a weight of 172 lbs today. Her goal weight is 145 " lbs. She weighed 207 at her office visit on 6/29/2022.     Last colonoscopy on 7/20/2021 revealed 1 benign polyp in the colon.  Next colonoscopy recommended a 5-year interval which will be due July 2026.      Physical Exam  Constitutional:       General: She is not in acute distress.     Appearance: Normal appearance. She is well-developed. She is not ill-appearing.   Eyes:      General: No scleral icterus.  Pulmonary:      Effort: No respiratory distress.   Skin:     Coloration: Skin is not jaundiced or pale.   Neurological:      Mental Status: She is alert and oriented to person, place, and time.   Psychiatric:         Mood and Affect: Mood normal.         Behavior: Behavior normal.         Thought Content: Thought content normal.         Judgment: Judgment normal.         Result Review :      Telemedicine with Richa Soler APRN (06/02/2023)   ENDOSCOPY, INT (10/21/2022)   COLONOSCOPY (07/29/2021 17:16)   SCANNED PATHOLOGY (10/21/2022)   Tissue Pathology Exam (07/29/2021 17:38)     Assessment and Plan    Diagnoses and all orders for this visit:    1. Constipation, unspecified constipation type (Primary)    2. Gastroesophageal reflux disease, unspecified whether esophagitis present    3. Hiatal hernia  Comments:  8 cm    4. Adenomatous polyp of colon, unspecified part of colon    5. Colon cancer screening    Other orders  -     linaclotide (Linzess) 145 MCG capsule capsule; Take 1 capsule by mouth Every Morning Before Breakfast.  Dispense: 30 capsule; Refill: 5           Patient Instructions   1.  For GERD and hiatal hernia, continue pantoprazole 40 mg once daily.     2. For GERD and hiatal hernia, we recommend avoiding eating 3-4 hours before bedtime, eating smaller more frequent meals, and avoiding any known food triggers including spicy foods, tomatoes and tomato-based sauces, chocolate, coffee/tea, citrus fruits, carbonated  beverages and alcohol.     3.  Your history of adenomatous colon polyps, your next  colonoscopy will be due at a 5-year interval, July 2026 and you are in recall accordingly.    4.  For constipation we will start you on Linzess 145 mcg.  You will take this each morning on an empty stomach.  If you find that after several days that this dose is ineffective, you can take 2 tablets in the morning.  That would be equivocal to the higher strength dose of Linzess.  We will reach out to you in a couple of weeks to assess your symptoms to determine if you are on the right dose of Linzess and make adjustments accordingly.    5.  Recommend next office follow-up visit in 1 year for reassessment of symptoms and medication refills or sooner should your symptoms worsen/recur.     Discussion:    For medication induced constipation with Wegovy, we will start the patient on Linzess 145 mcg once daily.  Patient was recommended to start this on a Saturday so we know its effects when she is not working.  Patient is recommended to take this once daily on empty stomach.  Should this dose be ineffective, the patient was recommended to take 2 tablets each morning.  Will contact the patient to get an update as to how well this medication is working for her in 2 weeks.    Next colonoscopy due at a 5-year interval, July 2026 and patient is in recall accordingly.    For now we will continue pantoprazole 40 mg once daily.  Symptoms are very well-controlled since patient has lost 37 pounds.  However, the patient does not wish to make any changes to her heartburn or reflux regimen until she has lost additional weight.  May consider reduction down to pantoprazole 20 mg once a day or possibly famotidine at her next office visit.  We will plan for annual office follow-up for reassessment of symptoms and medication refills or sooner should her symptoms worsen or fail to improve.  Patient verbalized understand above plan of care and is in agreement.  All questions answered and support provided.    EMR Dragon/Transcription  Disclaimer:  This document has been dictated utilizing Dragon dictation.

## 2024-08-22 ENCOUNTER — OFFICE VISIT (OUTPATIENT)
Dept: ONCOLOGY | Facility: HOSPITAL | Age: 69
End: 2024-08-22
Payer: COMMERCIAL

## 2024-08-22 ENCOUNTER — LAB (OUTPATIENT)
Dept: ONCOLOGY | Facility: HOSPITAL | Age: 69
End: 2024-08-22
Payer: COMMERCIAL

## 2024-08-22 VITALS
SYSTOLIC BLOOD PRESSURE: 125 MMHG | DIASTOLIC BLOOD PRESSURE: 68 MMHG | BODY MASS INDEX: 28.94 KG/M2 | TEMPERATURE: 97.9 F | RESPIRATION RATE: 18 BRPM | HEIGHT: 65 IN | OXYGEN SATURATION: 100 % | HEART RATE: 75 BPM | WEIGHT: 173.72 LBS

## 2024-08-22 DIAGNOSIS — D50.9 IRON DEFICIENCY ANEMIA, UNSPECIFIED IRON DEFICIENCY ANEMIA TYPE: Primary | ICD-10-CM

## 2024-08-22 DIAGNOSIS — C50.412 MALIGNANT NEOPLASM OF UPPER-OUTER QUADRANT OF LEFT BREAST IN FEMALE, ESTROGEN RECEPTOR POSITIVE: ICD-10-CM

## 2024-08-22 DIAGNOSIS — R51.9 CHRONIC NONINTRACTABLE HEADACHE, UNSPECIFIED HEADACHE TYPE: ICD-10-CM

## 2024-08-22 DIAGNOSIS — G89.29 CHRONIC NONINTRACTABLE HEADACHE, UNSPECIFIED HEADACHE TYPE: ICD-10-CM

## 2024-08-22 DIAGNOSIS — K21.9 GASTROESOPHAGEAL REFLUX DISEASE, UNSPECIFIED WHETHER ESOPHAGITIS PRESENT: ICD-10-CM

## 2024-08-22 DIAGNOSIS — Z17.0 MALIGNANT NEOPLASM OF UPPER-OUTER QUADRANT OF LEFT BREAST IN FEMALE, ESTROGEN RECEPTOR POSITIVE: ICD-10-CM

## 2024-08-22 DIAGNOSIS — D50.9 IRON DEFICIENCY ANEMIA, UNSPECIFIED IRON DEFICIENCY ANEMIA TYPE: ICD-10-CM

## 2024-08-22 LAB
BASOPHILS # BLD AUTO: 0.02 10*3/MM3 (ref 0–0.2)
BASOPHILS NFR BLD AUTO: 0.4 % (ref 0–1.5)
DEPRECATED RDW RBC AUTO: 41 FL (ref 37–54)
EOSINOPHIL # BLD AUTO: 0.04 10*3/MM3 (ref 0–0.4)
EOSINOPHIL NFR BLD AUTO: 0.8 % (ref 0.3–6.2)
ERYTHROCYTE [DISTWIDTH] IN BLOOD BY AUTOMATED COUNT: 12.2 % (ref 12.3–15.4)
FERRITIN SERPL-MCNC: 57.14 NG/ML (ref 13–150)
HCT VFR BLD AUTO: 37.9 % (ref 34–46.6)
HGB BLD-MCNC: 13.1 G/DL (ref 12–15.9)
IMM GRANULOCYTES # BLD AUTO: 0.01 10*3/MM3 (ref 0–0.05)
IMM GRANULOCYTES NFR BLD AUTO: 0.2 % (ref 0–0.5)
IRON 24H UR-MRATE: 74 MCG/DL (ref 37–145)
IRON SATN MFR SERPL: 24 % (ref 20–50)
LYMPHOCYTES # BLD AUTO: 1.55 10*3/MM3 (ref 0.7–3.1)
LYMPHOCYTES NFR BLD AUTO: 32 % (ref 19.6–45.3)
MCH RBC QN AUTO: 31.6 PG (ref 26.6–33)
MCHC RBC AUTO-ENTMCNC: 34.6 G/DL (ref 31.5–35.7)
MCV RBC AUTO: 91.5 FL (ref 79–97)
MONOCYTES # BLD AUTO: 0.42 10*3/MM3 (ref 0.1–0.9)
MONOCYTES NFR BLD AUTO: 8.7 % (ref 5–12)
NEUTROPHILS NFR BLD AUTO: 2.81 10*3/MM3 (ref 1.7–7)
NEUTROPHILS NFR BLD AUTO: 57.9 % (ref 42.7–76)
NRBC BLD AUTO-RTO: 0 /100 WBC (ref 0–0.2)
PLATELET # BLD AUTO: 204 10*3/MM3 (ref 140–450)
PMV BLD AUTO: 9.1 FL (ref 6–12)
RBC # BLD AUTO: 4.14 10*6/MM3 (ref 3.77–5.28)
TIBC SERPL-MCNC: 311 MCG/DL (ref 298–536)
TRANSFERRIN SERPL-MCNC: 209 MG/DL (ref 200–360)
WBC NRBC COR # BLD AUTO: 4.85 10*3/MM3 (ref 3.4–10.8)

## 2024-08-22 PROCEDURE — 84466 ASSAY OF TRANSFERRIN: CPT

## 2024-08-22 PROCEDURE — 83540 ASSAY OF IRON: CPT

## 2024-08-22 PROCEDURE — 85025 COMPLETE CBC W/AUTO DIFF WBC: CPT

## 2024-08-22 PROCEDURE — 82728 ASSAY OF FERRITIN: CPT

## 2024-08-22 PROCEDURE — 99214 OFFICE O/P EST MOD 30 MIN: CPT | Performed by: INTERNAL MEDICINE

## 2024-08-22 PROCEDURE — G0463 HOSPITAL OUTPT CLINIC VISIT: HCPCS | Performed by: INTERNAL MEDICINE

## 2024-08-22 PROCEDURE — 36415 COLL VENOUS BLD VENIPUNCTURE: CPT

## 2024-08-22 NOTE — PROGRESS NOTES
Chief Complaint  Invasive ductal carcinoma of left breast--4 MO F/U    Willy, John, APRN  Aguilera, John, APRN        Subjective          Tatyana SURJIT Shea presents to [facility] for anemia    Anemia  There has been no abdominal pain, bruising/bleeding easily, fever, light-headedness, palpitations or weight loss.     Ms. Shea presents for follow up. She was diagnosed with breast cancer in August. Received lumpectomy 11/3/23 at  (Dr. Maddi Ureña) with positive margin, 21 mm. ER/KS positive, HER2 negative. Re-resection 12/20/23 was negative and sentinel nodes (3) were all negative. She has recovered well from the surgery. Oncotype of 7. Chemo not recommended. She has started anastrozole as of 2/9/24 and tolerating well.  Has rare occasional hot flashes that are tolerable.  Has seen surgery at  at follow-up in July with good report.  Has been on Wegovy per her PCP and has intentionally lost over 30 pounds.  She is hoping to lose more.  Remains on PPI.  She does report her acid reflux symptoms have resolved.  Has been having constipation however on the Wegovy.  GI has started her on Linzess.  Still reports daily dull headache.  Also has some balance issues that are ongoing.  Headache has been for over 4 years.  No breast related concerns today.    Hematology History    She was given recent Ferrlecit infusions x 8 finishing these on 7/5/2022.     8/21/22: Labs shows iron studies, ferritin are normalized now. Hemoglobin is in the normal range. Folate and B-12 were normal previously. Prior GI evaluation by Dr. Foster showed large HH, gastritis. Polyp was removed.     10/21/22: EGD by Dr. Dunbar: Mild Schatzki ring, dilated, Z-line irregular, non-bleeding erosive gastropathy, normal duodenum. Patient started on Pantoprazole 40 mg.    12/19/22: Ferritin 107, iron sat 33%, hgb 13.3, MCV normal.     6/2/23: Ferritin 90, iron sat 33%, TIBC 311, Hgb 13.5    12/26/23: Hgb 12.1, MCV 94.4,  WBC 5.91, plt 217, Ferritin 45, iron sat 28%, TIBC 322, B12 298          Oncology/Hematology History Overview Note   8/28/23: Left breast biopsy: invasive lobular, grade 2 overall, HER2 1= by IHC (negative), ER and NC both 100% positive    10/4/23: Breast MRI: There is 15 mm enhancing irregular mass in the upper outer left breast, 12 cm from the nipple, with associated postbiopsy hematoma biopsy-proven malignancy. There are no other suspicious enhancing masses or areas of nonmass enhancement.     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 Oncotype 7 so chemotherapy not recommended    12/20/23: Re-resection: no invasive carcinoma seen, 0/3 sentinel nodes positive, pN0.     1/4/24: NM PET: Left breast seroma, no metastatic disease seen.     1/11/24: MRI brain negative    2/9/24: Plan to start anastrozole 1 mg daily    4/4/24: Radiation XRT completed         Malignant neoplasm of upper-outer quadrant of left breast in female, estrogen receptor positive   12/28/2023 Initial Diagnosis    Malignant neoplasm of upper-outer quadrant of left breast in female, estrogen receptor positive     1/16/2024 Cancer Staged    Staging form: Breast, AJCC 8th Edition  - Pathologic: Stage IA (pT2, pN0, cM0, G1, ER+, NC+, HER2-) - Signed by Agustin Riddle MD on 1/16/2024     3/14/2024 - 4/4/2024 Radiation    Radiation OncologyTreatment Course:  Tatyana Shea received 4256 cGy in 16 fractions to the left breast.      Primary malignant neoplasm of upper outer quadrant of left female breast   3/7/2024 Initial Diagnosis    Primary malignant neoplasm of upper outer quadrant of left female breast         Review of Systems   Constitutional:  Positive for fatigue. Negative for appetite change, diaphoresis, fever, unexpected weight gain and unexpected weight loss.   HENT: Negative.  Negative for hearing loss, mouth sores, sore throat, swollen  glands, trouble swallowing and voice change.    Eyes: Negative.  Negative for blurred vision, double vision, pain, redness and visual disturbance.   Respiratory: Negative.  Negative for cough, shortness of breath and wheezing.    Cardiovascular: Negative.  Negative for chest pain, palpitations and leg swelling.   Gastrointestinal: Negative.  Negative for abdominal pain, blood in stool, constipation, diarrhea, nausea and vomiting.   Endocrine: Negative.  Negative for cold intolerance, heat intolerance, polydipsia and polyuria.   Genitourinary: Negative.  Negative for breast pain, decreased urine volume, difficulty urinating, dysuria, frequency, hematuria and urinary incontinence.   Musculoskeletal:  Positive for back pain. Negative for arthralgias, joint swelling and myalgias.   Skin:  Negative for color change, rash, skin lesions and wound.   Allergic/Immunologic: Negative.    Neurological:  Positive for headache. Negative for dizziness, seizures, weakness, light-headedness and numbness.   Hematological: Negative.  Negative for adenopathy. Does not bruise/bleed easily.   Psychiatric/Behavioral: Negative.  Negative for depressed mood. The patient is not nervous/anxious.    All other systems reviewed and are negative.      Current Outpatient Medications on File Prior to Visit   Medication Sig Dispense Refill    amLODIPine (NORVASC) 5 MG tablet TAKE 1 TABLET BY MOUTH DAILY 90 tablet 3    atorvastatin (LIPITOR) 10 MG tablet Take 1 tablet by mouth Every Night. 90 tablet 1    Calcium Carbonate 500 MG chewable tablet Chew 1,000 mg 2 (Two) Times a Day.      Cholecalciferol 125 MCG (5000 UT) tablet Take 1 tablet by mouth Daily. LAST DOSE 12/16/22      furosemide (LASIX) 20 MG tablet TAKE 1 TABLET BY MOUTH TWICE A DAY (Patient taking differently: Take 1 tablet by mouth Daily.) 180 tablet 1    Krill Oil (Omega-3) 500 MG capsule Take 500 mg by mouth Daily. LAST DOSE 12/16/22 (Patient not taking: Reported on 8/20/2024)       linaclotide (Linzess) 145 MCG capsule capsule Take 1 capsule by mouth Every Morning Before Breakfast. 30 capsule 5    losartan (COZAAR) 50 MG tablet Take 1 tablet by mouth 2 (Two) Times a Day. 180 tablet 1    multivitamin with minerals tablet tablet Take 1 tablet by mouth Daily. LAST DOSE 12/16/22      NON FORMULARY Take 1 tablet by mouth Every Other Day. Stool softener (Patient not taking: Reported on 8/20/2024)      pantoprazole (PROTONIX) 40 MG EC tablet TAKE ONE TABLET BY MOUTH ONCE NIGHTLY 90 tablet 3    Polyethylene Glycol 3350 (MIRALAX PO) Take  by mouth Every Other Day.      potassium chloride 10 MEQ CR tablet Take 1 tablet by mouth Daily. 90 tablet 1    Semaglutide-Weight Management (Wegovy) 1 MG/0.5ML solution auto-injector Inject 0.5 mL under the skin into the appropriate area as directed 1 (One) Time Per Week. 2 mL 0    Xarelto 20 MG tablet TAKE 1 TABLET BY MOUTH DAILY 90 tablet 1     No current facility-administered medications on file prior to visit.       No Known Allergies  Past Medical History:   Diagnosis Date    Acid reflux     Arrhythmia 2013    Breast cancer     Diverticulitis     NO CURRENT ISSUES    Diverticulosis 15 yrs ago    Currently having some mild pain in lower left    Headache 2021    Hiatal hernia     HTN (hypertension)     Hyperlipidemia 2010    Iron deficiency anemia     NO CURRENT S/S, HAD IRON INFUSIONS IN SUMMER 22    Low back pain     Obesity In my 40s    Eat for temporary comfort    Obstructive sleep apnea 07/02/2021    Peptic ulceration In my 40s    I had a bleeding ulcer due to my lifestyle at that time/NO CURRENT ISSUES    Pulmonary embolism 1975    Due to birth control pills, I was told NONE CURRENT     Past Surgical History:   Procedure Laterality Date    BREAST LUMPECTOMY      CHOLECYSTECTOMY      COLONOSCOPY      2020 with      COLONOSCOPY N/A 07/29/2021    Procedure: COLONOSCOPY with polypectomy/snare;  Surgeon: Ruel Foster MD;  Location: [facility]  ENDOSCOPY;  Service: Gastroenterology;  Laterality: N/A;  colon polyp    COSMETIC SURGERY  2013    Removed bags under and above my eyes    ENDOSCOPY N/A 07/28/2021    Procedure: ESOPHAGOGASTRODUODENOSCOPY with bxs and cold snares;  Surgeon: Ruel Foster MD;  Location: [facility] ENDOSCOPY;  Service: Gastroenterology;  Laterality: N/A;  hiatal hernia  gastric polyps    MASTECTOMY PARTIAL / LUMPECTOMY Left     Had one in Nove and Dec    TRIGGER FINGER RELEASE Left 12/21/2022    Procedure: LEFT THUMB FINGER TRIGGER RELEASE AND LEFT THUMB CYST EXCISION;  Surgeon: Inés Rashid MD;  Location: [facility] OR [facility];  Service: Orthopedics;  Laterality: Left;    UPPER GASTROINTESTINAL ENDOSCOPY  07/28/2021    Dr. Foster     Social History     Socioeconomic History    Marital status: Single   Tobacco Use    Smoking status: Never     Passive exposure: Never    Smokeless tobacco: Never   Vaping Use    Vaping status: Never Used   Substance and Sexual Activity    Alcohol use: Not Currently     Comment: Glass of wine a couple times a year    Drug use: Never    Sexual activity: Not Currently     Birth control/protection: None     Family History   Problem Relation Age of Onset    Heart failure Mother     Heart disease Mother         Had a heart attack, then congestive heart failure, dead in one month at 82.    Hyperlipidemia Mother     Hypertension Mother     Developmental Disability Son         Auditory processing disorder, anxiety, language and learning disorder in math.    Atrial fibrillation Other         UNSPECIFIED    Colon cancer Neg Hx     Colon polyps Neg Hx     Crohn's disease Neg Hx     Irritable bowel syndrome Neg Hx     Ulcerative colitis Neg Hx     Malig Hyperthermia Neg Hx      Immunization History   Administered Date(s) Administered    ABRYSVO (RSV, 60+ or pregnant women 32-36 wks) 11/10/2023    COVID-19 (MODERNA) 1st,2nd,3rd Dose Monovalent 02/03/2021, 02/04/2021, 03/03/2021, 03/04/2021    COVID-19 (PFIZER) BIVALENT  "12+YRS 11/26/2022    COVID-19 (PFIZER) Purple Cap Monovalent 11/22/2021    Fluzone High-Dose 65+yrs 10/07/2021, 10/10/2022, 11/10/2023    Influenza, Unspecified 10/13/2020    Pneumococcal Conjugate 13-Valent (PCV13) 10/01/2019    Shingrix 10/01/2019, 04/01/2020    Tdap 07/26/2021       Objective   Physical Exam  Well appearing patient in no acute distress on RA  Anicteric sclerae, no rash on exposed skin  Respirations non-labored  Awake, alert, and oriented x 4. Speech intact. No gross neurologic deficit  Appropriate mood and affect    Vitals:    08/22/24 1534   BP: 125/68   Pulse: 75   Resp: 18   Temp: 97.9 °F (36.6 °C)   TempSrc: Temporal   SpO2: 100%   Weight: 78.8 kg (173 lb 11.6 oz)   Height: 165.6 cm (65.2\")   PainSc: 0-No pain                     ECOG: (0) Fully Active - Able to Carry On All Pre-disease Performance Without Restriction  Fall Risk Assessment was completed, and patient is at low risk for falls.  PHQ-9 Total Score:         The patient is  experiencing fatigue. Fatigue score: 5    PT/OT Functional Screening: PT fx screen: No needs identified  Speech Functional Screening: Speech fx screen: No needs identified  Rehab to be ordered: Rehab to be ordered: No needs identified        Result Review :   The following data was reviewed by: Agustin Riddle MD on 08/22/24:  Lab Results   Component Value Date    HGB 12.5 05/01/2024    HCT 36.4 05/01/2024    MCV 97.1 (H) 05/01/2024     05/01/2024    WBC 3.49 05/01/2024    NEUTROABS 2.11 05/01/2024    LYMPHSABS 0.99 05/01/2024    MONOSABS 0.31 05/01/2024    EOSABS 0.06 05/01/2024    BASOSABS 0.01 05/01/2024     Lab Results   Component Value Date    GLUCOSE 107 (H) 05/01/2024    BUN 10 05/01/2024    CREATININE 0.73 05/01/2024     05/01/2024    K 3.6 05/01/2024     05/01/2024    CO2 30.5 (H) 05/01/2024    CALCIUM 9.8 05/01/2024    PROTEINTOT 6.7 05/01/2024    ALBUMIN 4.1 05/01/2024    BILITOT 0.4 05/01/2024    ALKPHOS 66 05/01/2024    AST " 17 05/01/2024    ALT 13 05/01/2024     Labs personally reviewed. Ferritin, iron sat normal. Hgb normal. MCV low.      GI note personally reviewed    PCP note personally reviewed    Surgery note personally reviewed    No radiology results for the last 30 days.          Assessment and Plan    Diagnoses and all orders for this visit:    1. Iron deficiency anemia, unspecified iron deficiency anemia type (Primary)  -     CBC & Differential; Future  -     Ferritin; Future  -     Iron Profile; Future  -     CBC & Differential; Future  -     Ferritin; Future  -     Iron Profile; Future    2. Gastroesophageal reflux disease, unspecified whether esophagitis present    3. Chronic nonintractable headache, unspecified headache type    4. Malignant neoplasm of upper-outer quadrant of left breast in female, estrogen receptor positive          Left ER/CT breast cancer  Required re-resection 12/20/23 with sentinel node b iopsy due to positive margins 11/2023 lumpectomy. 21 mm. 100% ER and CT positive, HER2 negativ (1+ by IHC). Pathologic stage pT2N0 (sn). Recovered well from surgeries. Baseline MRI brain performed due to headaches and this was negative. PET scan also performed and negative for metastatic disease. Completed XRT 4/4/24.  As hormone receptor positive and post-menopausal, recommendation will be for 5 years adjuvant hormone blocker with aromatase inhibitor. Oncotype score of 7, so chemotherapy not recommended. Anastrozole 1 mg daily started as of 2/9/24. Tolerating well. Continue. RTC 3 months for toxicity check. 7/2023 DEXA normal, repeat due 7/2025. Recommend Vit D/Ca.    Chronic headaches  Unclear etiology. MRI brain negative. Patient taking PRN tylenol. Could have tension component.  PRN Fioricet added but stopped after 1 dose. May benefit from neurology referral. Can consider propanolol/topimax in future.     CAMILLA  History of iron deficiency anemia. As of 12/26/23: still has adequate iron stores now. Hgb slightly low  as of 1/28/24. EGD on 10/21/22 with erosive gastropathy, non-bleeding; likely source of iron loss. Patient now on PPI daily, recommend to continue. Repeat iron studies in May borderline but with normal iron sat, no replacement needed. Repeat today and again in 3 months.       GERD  Symptoms improved. Wants to continue PPI until weight is more in normal range.     This is an acute or chronic illness that poses a threat to life or bodily function. The above treatment plan involves a high risk of complications and/or mortality of patient management.        Follow up: 3 month    I spent 20 minutes caring for Tatyana on this date of service. This time includes time spent by me in the following activities:preparing for the visit, reviewing tests, obtaining and/or reviewing a separately obtained history, performing a medically appropriate examination and/or evaluation , counseling and educating the patient/family/caregiver, ordering medications, tests, or procedures, referring and communicating with other health care professionals , documenting information in the medical record and independently interpreting results and communicating that information with the patient/family/caregiver    Patient was given instructions and counseling regarding her condition or for health maintenance advice. Please see specific information pulled into the AVS if appropriate.

## 2024-08-29 ENCOUNTER — OFFICE VISIT (OUTPATIENT)
Dept: FAMILY MEDICINE CLINIC | Facility: CLINIC | Age: 69
End: 2024-08-29
Payer: COMMERCIAL

## 2024-08-29 VITALS
RESPIRATION RATE: 16 BRPM | OXYGEN SATURATION: 98 % | SYSTOLIC BLOOD PRESSURE: 152 MMHG | HEART RATE: 94 BPM | BODY MASS INDEX: 28.89 KG/M2 | WEIGHT: 173.4 LBS | HEIGHT: 65 IN | DIASTOLIC BLOOD PRESSURE: 80 MMHG | TEMPERATURE: 96.9 F

## 2024-08-29 DIAGNOSIS — I10 ESSENTIAL HYPERTENSION: ICD-10-CM

## 2024-08-29 DIAGNOSIS — R51.9 NONINTRACTABLE HEADACHE, UNSPECIFIED CHRONICITY PATTERN, UNSPECIFIED HEADACHE TYPE: ICD-10-CM

## 2024-08-29 DIAGNOSIS — E87.6 HYPOKALEMIA: ICD-10-CM

## 2024-08-29 DIAGNOSIS — E78.2 MIXED HYPERLIPIDEMIA: ICD-10-CM

## 2024-08-29 DIAGNOSIS — E66.3 OVERWEIGHT WITH BODY MASS INDEX (BMI) OF 29 TO 29.9 IN ADULT: Primary | ICD-10-CM

## 2024-08-29 PROCEDURE — 99213 OFFICE O/P EST LOW 20 MIN: CPT | Performed by: NURSE PRACTITIONER

## 2024-08-29 RX ORDER — ATORVASTATIN CALCIUM 10 MG/1
10 TABLET, FILM COATED ORAL NIGHTLY
Qty: 90 TABLET | Refills: 1 | Status: SHIPPED | OUTPATIENT
Start: 2024-08-29

## 2024-08-29 RX ORDER — POTASSIUM CHLORIDE 750 MG/1
10 TABLET, EXTENDED RELEASE ORAL DAILY
Qty: 90 TABLET | Refills: 1 | Status: SHIPPED | OUTPATIENT
Start: 2024-08-29

## 2024-08-29 RX ORDER — LOSARTAN POTASSIUM 50 MG/1
50 TABLET ORAL 2 TIMES DAILY
Qty: 180 TABLET | Refills: 1 | Status: SHIPPED | OUTPATIENT
Start: 2024-08-29

## 2024-08-29 NOTE — PROGRESS NOTES
Chief Complaint  Constipation, Hyperlipidemia, Obesity, and Tinnitus    Subjective        Tatyana Shea presents to [facility]  History of Present Illness  Obesity:  has lost 13 pounds    Phentermine caused headaches.  So couldn't take.   Doing well on wegovy.    Tinnitus:  in the last year noted.  With the low grade headache and balance is off a little. States that on the left eye on the lateral side will have darker vision andcorrectes over time.      Constipation  Pertinent negatives include no fever.   Hyperlipidemia  Exacerbating diseases include obesity. Pertinent negatives include no chest pain.   Obesity  Pertinent negatives include no chest pain, coughing, fever, numbness or weakness.   Tinnitus  Pertinent negatives include no chest pain, coughing, fever, numbness or weakness.       The following portions of the patient's history were personally reviewed and updated as appropriate: allergies, current medications, past medical history, past surgical history, past family history, and past social history.     Body mass index is 28.68 kg/m².           Past History:    Medical History: has a past medical history of Acid reflux, Arrhythmia (2013), Breast cancer, Diverticulitis, Diverticulosis (15 yrs ago), Headache (2021), Hiatal hernia, HTN (hypertension), Hyperlipidemia (2010), Iron deficiency anemia, Low back pain, Obesity (In my 40s), Obstructive sleep apnea (07/02/2021), Peptic ulceration (In my 40s), and Pulmonary embolism (1975).     Surgical History: has a past surgical history that includes Cholecystectomy; Colonoscopy; Colonoscopy (N/A, 07/29/2021); Upper gastrointestinal endoscopy (07/28/2021); Esophagogastroduodenoscopy (N/A, 07/28/2021); Cosmetic surgery (2013); Trigger finger release (Left, 12/21/2022); Mastectomy partial / lumpectomy (Left); and Breast lumpectomy.     Family History: family history includes Atrial fibrillation in an other family member;  Developmental Disability in her son; Heart disease in her mother; Heart failure in her mother; Hyperlipidemia in her mother; Hypertension in her mother.     Social History: reports that she has never smoked. She has never been exposed to tobacco smoke. She has never used smokeless tobacco. She reports that she does not currently use alcohol. She reports that she does not use drugs.    Allergies: Patient has no known allergies.          Current Outpatient Medications:     amLODIPine (NORVASC) 5 MG tablet, TAKE 1 TABLET BY MOUTH DAILY, Disp: 90 tablet, Rfl: 3    atorvastatin (LIPITOR) 10 MG tablet, Take 1 tablet by mouth Every Night., Disp: 90 tablet, Rfl: 1    Calcium Carbonate 500 MG chewable tablet, Chew 1,000 mg 2 (Two) Times a Day., Disp: , Rfl:     Cholecalciferol 125 MCG (5000 UT) tablet, Take 1 tablet by mouth Daily. LAST DOSE 12/16/22, Disp: , Rfl:     furosemide (LASIX) 20 MG tablet, TAKE 1 TABLET BY MOUTH TWICE A DAY (Patient taking differently: Take 1 tablet by mouth Daily.), Disp: 180 tablet, Rfl: 1    Krill Oil (Omega-3) 500 MG capsule, Take 500 mg by mouth Daily. LAST DOSE 12/16/22, Disp: , Rfl:     linaclotide (Linzess) 145 MCG capsule capsule, Take 1 capsule by mouth Every Morning Before Breakfast., Disp: 30 capsule, Rfl: 5    losartan (COZAAR) 50 MG tablet, Take 1 tablet by mouth 2 (Two) Times a Day., Disp: 180 tablet, Rfl: 1    multivitamin with minerals tablet tablet, Take 1 tablet by mouth Daily. LAST DOSE 12/16/22, Disp: , Rfl:     NON FORMULARY, Take 1 tablet by mouth Every Other Day. Stool softener, Disp: , Rfl:     pantoprazole (PROTONIX) 40 MG EC tablet, TAKE ONE TABLET BY MOUTH ONCE NIGHTLY, Disp: 90 tablet, Rfl: 3    Polyethylene Glycol 3350 (MIRALAX PO), Take  by mouth Every Other Day., Disp: , Rfl:     potassium chloride 10 MEQ CR tablet, Take 1 tablet by mouth Daily., Disp: 90 tablet, Rfl: 1    Xarelto 20 MG tablet, TAKE 1 TABLET BY MOUTH DAILY, Disp: 90 tablet, Rfl: 1    Rimegepant  "Sulfate (NURTEC) 75 MG tablet dispersible tablet, Take 1 tablet by mouth Daily As Needed (headache)., Disp: 4 tablet, Rfl: 0    Semaglutide-Weight Management 1.7 MG/0.75ML solution auto-injector, Inject 0.75 mL under the skin into the appropriate area as directed 1 (One) Time Per Week., Disp: 3 mL, Rfl: 0    Medications Discontinued During This Encounter   Medication Reason    Semaglutide-Weight Management (Wegovy) 1 MG/0.5ML solution auto-injector *Therapy completed    losartan (COZAAR) 50 MG tablet Reorder    atorvastatin (LIPITOR) 10 MG tablet Reorder    potassium chloride 10 MEQ CR tablet Reorder         Review of Systems   Constitutional:  Negative for fever.   HENT:  Positive for tinnitus.    Respiratory:  Negative for cough.    Cardiovascular:  Negative for chest pain.   Gastrointestinal:  Positive for constipation.   Neurological:  Negative for weakness and numbness.        Objective         Vitals:    08/29/24 1500   BP: 152/80   BP Location: Right arm   Patient Position: Sitting   Cuff Size: Adult   Pulse: 94   Resp: 16   Temp: 96.9 °F (36.1 °C)   TempSrc: Temporal   SpO2: 98%   Weight: 78.7 kg (173 lb 6.4 oz)   Height: 165.6 cm (65.2\")     Body mass index is 28.68 kg/m².         Physical Exam        Result Review :               Assessment and Plan     Diagnoses and all orders for this visit:    1. Overweight with body mass index (BMI) of 29 to 29.9 in adult (Primary)  -     Semaglutide-Weight Management 1.7 MG/0.75ML solution auto-injector; Inject 0.75 mL under the skin into the appropriate area as directed 1 (One) Time Per Week.  Dispense: 3 mL; Refill: 0    2. Mixed hyperlipidemia  -     atorvastatin (LIPITOR) 10 MG tablet; Take 1 tablet by mouth Every Night.  Dispense: 90 tablet; Refill: 1    3. Essential hypertension  -     losartan (COZAAR) 50 MG tablet; Take 1 tablet by mouth 2 (Two) Times a Day.  Dispense: 180 tablet; Refill: 1    4. Hypokalemia  -     potassium chloride 10 MEQ CR tablet; Take 1 " tablet by mouth Daily.  Dispense: 90 tablet; Refill: 1    5. Nonintractable headache, unspecified chronicity pattern, unspecified headache type  -     Rimegepant Sulfate (NURTEC) 75 MG tablet dispersible tablet; Take 1 tablet by mouth Daily As Needed (headache).  Dispense: 4 tablet; Refill: 0              Follow Up     Return in about 1 month (around 9/29/2024).    Patient was given instructions and counseling regarding her condition or for health maintenance advice. Please see specific information pulled into the AVS if appropriate.

## 2024-09-05 ENCOUNTER — OFFICE VISIT (OUTPATIENT)
Dept: CARDIOLOGY | Facility: CLINIC | Age: 69
End: 2024-09-05
Payer: COMMERCIAL

## 2024-09-05 VITALS
HEIGHT: 65 IN | DIASTOLIC BLOOD PRESSURE: 70 MMHG | HEART RATE: 73 BPM | WEIGHT: 172 LBS | BODY MASS INDEX: 28.66 KG/M2 | SYSTOLIC BLOOD PRESSURE: 134 MMHG

## 2024-09-05 DIAGNOSIS — I10 PRIMARY HYPERTENSION: ICD-10-CM

## 2024-09-05 DIAGNOSIS — I35.1 AORTIC VALVE INSUFFICIENCY, ETIOLOGY OF CARDIAC VALVE DISEASE UNSPECIFIED: Primary | ICD-10-CM

## 2024-09-05 DIAGNOSIS — E78.2 MIXED HYPERLIPIDEMIA: ICD-10-CM

## 2024-09-05 PROCEDURE — 99214 OFFICE O/P EST MOD 30 MIN: CPT

## 2024-09-05 NOTE — PROGRESS NOTES
Chief Complaint  Hypertension and follow up to discuss plan of care     Subjective        History of Present Illness  Tatyana Shea presents to [facility] for follow up.   Patient is a 69-year-old female with past medical history significant for hypertension, hyperlipidemia, mild to early moderate aortic insufficiency who presents for follow-up.  She denies any chest pain or discomfort.  She notes no shortness of breath.  She just reports that as she is pushing the grocery cart she gets tuckers out easier than she used to.  She does exercise on a regular basis with no exertional complaints.  She denies any palpitations, edema or syncope.    Past Medical History:   Diagnosis Date    Acid reflux     Arrhythmia 2013    Breast cancer     Diverticulitis     NO CURRENT ISSUES    Diverticulosis 15 yrs ago    Currently having some mild pain in lower left    Headache 2021    Hiatal hernia     HTN (hypertension)     Hyperlipidemia 2010    Iron deficiency anemia     NO CURRENT S/S, HAD IRON INFUSIONS IN SUMMER 22    Low back pain     Obesity In my 40s    Eat for temporary comfort    Obstructive sleep apnea 07/02/2021    Peptic ulceration In my 40s    I had a bleeding ulcer due to my lifestyle at that time/NO CURRENT ISSUES    Pulmonary embolism 1975    Due to birth control pills, I was told NONE CURRENT       ALLERGY  No Known Allergies     Past Surgical History:   Procedure Laterality Date    BREAST LUMPECTOMY      CHOLECYSTECTOMY      COLONOSCOPY      2020 with      COLONOSCOPY N/A 07/29/2021    Procedure: COLONOSCOPY with polypectomy/snare;  Surgeon: Ruel Foster MD;  Location: [facility];  Service: Gastroenterology;  Laterality: N/A;  colon polyp    COSMETIC SURGERY  2013    Removed bags under and above my eyes    ENDOSCOPY N/A 07/28/2021    Procedure: ESOPHAGOGASTRODUODENOSCOPY with bxs and cold snares;  Surgeon: Ruel Foster MD;  Location: [facility] ENDOSCOPY;   Service: Gastroenterology;  Laterality: N/A;  hiatal hernia  gastric polyps    MASTECTOMY PARTIAL / LUMPECTOMY Left     Had one in Nove and Dec    TRIGGER FINGER RELEASE Left 12/21/2022    Procedure: LEFT THUMB FINGER TRIGGER RELEASE AND LEFT THUMB CYST EXCISION;  Surgeon: Inés Rashid MD;  [place] [facility] [place] [facility]  Service: Orthopedics;  Laterality: Left;    UPPER GASTROINTESTINAL ENDOSCOPY  07/28/2021    Dr. Foster        Social History     Socioeconomic History    Marital status: Single   Tobacco Use    Smoking status: Never     Passive exposure: Never    Smokeless tobacco: Never   Vaping Use    Vaping status: Never Used   Substance and Sexual Activity    Alcohol use: Not Currently     Comment: Glass of wine a couple times a year    Drug use: Never    Sexual activity: Not Currently     Birth control/protection: None       Family History   Problem Relation Age of Onset    Heart failure Mother     Heart disease Mother         Had a heart attack, then congestive heart failure, dead in one month at 82.    Hyperlipidemia Mother     Hypertension Mother     Developmental Disability Son         Auditory processing disorder, anxiety, language and learning disorder in math.    Atrial fibrillation Other         UNSPECIFIED    Colon cancer Neg Hx     Colon polyps Neg Hx     Crohn's disease Neg Hx     Irritable bowel syndrome Neg Hx     Ulcerative colitis Neg Hx     Malig Hyperthermia Neg Hx         Current Outpatient Medications on File Prior to Visit   Medication Sig    amLODIPine (NORVASC) 5 MG tablet TAKE 1 TABLET BY MOUTH DAILY    atorvastatin (LIPITOR) 10 MG tablet Take 1 tablet by mouth Every Night.    Calcium Carbonate 500 MG chewable tablet Chew 1,000 mg 2 (Two) Times a Day.    Cholecalciferol 125 MCG (5000 UT) tablet Take 1 tablet by mouth Daily. LAST DOSE 12/16/22    furosemide (LASIX) 20 MG tablet TAKE 1 TABLET BY MOUTH TWICE A DAY (Patient taking differently: Take 1 tablet by mouth Daily.)    Krill Oil  "(Omega-3) 500 MG capsule Take 500 mg by mouth Daily. LAST DOSE 12/16/22    linaclotide (Linzess) 145 MCG capsule capsule Take 1 capsule by mouth Every Morning Before Breakfast.    losartan (COZAAR) 50 MG tablet Take 1 tablet by mouth 2 (Two) Times a Day.    multivitamin with minerals tablet tablet Take 1 tablet by mouth Daily. LAST DOSE 12/16/22    NON FORMULARY Take 1 tablet by mouth Every Other Day. Stool softener    pantoprazole (PROTONIX) 40 MG EC tablet TAKE ONE TABLET BY MOUTH ONCE NIGHTLY    Polyethylene Glycol 3350 (MIRALAX PO) Take  by mouth Every Other Day.    potassium chloride 10 MEQ CR tablet Take 1 tablet by mouth Daily.    Rimegepant Sulfate (NURTEC) 75 MG tablet dispersible tablet Take 1 tablet by mouth Daily As Needed (headache).    Semaglutide-Weight Management 1.7 MG/0.75ML solution auto-injector Inject 0.75 mL under the skin into the appropriate area as directed 1 (One) Time Per Week.    Xarelto 20 MG tablet TAKE 1 TABLET BY MOUTH DAILY     No current facility-administered medications on file prior to visit.       Objective   Vitals:    09/05/24 1459   BP: 134/70   Pulse: 73   Weight: 78 kg (172 lb)   Height: 165.6 cm (65.2\")       Physical Exam  Constitutional:       General: She is awake. She is not in acute distress.     Appearance: Normal appearance.   HENT:      Head: Normocephalic.      Nose: Nose normal. No congestion.   Eyes:      Extraocular Movements: Extraocular movements intact.      Conjunctiva/sclera: Conjunctivae normal.      Pupils: Pupils are equal, round, and reactive to light.   Neck:      Thyroid: No thyromegaly.      Vascular: No JVD.   Cardiovascular:      Rate and Rhythm: Normal rate and regular rhythm.      Chest Wall: PMI is not displaced.      Pulses: Normal pulses.      Heart sounds: Normal heart sounds, S1 normal and S2 normal. No murmur heard.     No friction rub. No gallop. No S3 or S4 sounds.   Pulmonary:      Effort: Pulmonary effort is normal.      Breath sounds: " Normal breath sounds. No wheezing, rhonchi or rales.   Abdominal:      General: Bowel sounds are normal.      Palpations: Abdomen is soft.      Tenderness: There is no abdominal tenderness.   Musculoskeletal:      Cervical back: No tenderness.      Right lower leg: No edema.      Left lower leg: No edema.   Lymphadenopathy:      Cervical: No cervical adenopathy.   Skin:     General: Skin is warm and dry.      Capillary Refill: Capillary refill takes less than 2 seconds.      Coloration: Skin is not cyanotic.      Findings: No petechiae or rash.      Nails: There is no clubbing.   Neurological:      Mental Status: She is alert.   Psychiatric:         Mood and Affect: Mood normal.         Behavior: Behavior is cooperative.           Result Review     The following data was reviewed by MERLE Huerta on 09/05/24.      CMP          12/26/2023    07:17 1/28/2024    11:12 5/1/2024    06:41   CMP   Glucose 119  114  107    BUN 10  8  10    Creatinine 0.66  0.71  0.73    EGFR 94.5  91.6  88.6    Sodium 141  136  143    Potassium 3.5  3.5  3.6    Chloride 104  98  103    Calcium 9.2  9.8  9.8    Total Protein 6.7  7.2  6.7    Albumin 4.1  3.5  4.1    Globulin 2.6  3.7  2.6    Total Bilirubin 0.3  0.2  0.4    Alkaline Phosphatase 68  102  66    AST (SGOT) 12  18  17    ALT (SGPT) 14  19  13    Albumin/Globulin Ratio 1.6  0.9  1.6    BUN/Creatinine Ratio 15.2  11.3  13.7    Anion Gap 9.0  10.5  9.5      CBC w/diff          1/28/2024    11:12 5/1/2024    06:41 8/22/2024    16:19   CBC w/Diff   WBC 6.09  3.49  4.85    RBC 3.70  3.75  4.14    Hemoglobin 11.5  12.5  13.1    Hematocrit 34.9  36.4  37.9    MCV 94.3  97.1  91.5    MCH 31.1  33.3  31.6    MCHC 33.0  34.3  34.6    RDW 12.6  13.5  12.2    Platelets 266  194  204    Neutrophil Rel % 66.3  60.4  57.9    Immature Granulocyte Rel % 0.3  0.3  0.2    Lymphocyte Rel % 24.1  28.4  32.0    Monocyte Rel % 8.0  8.9  8.7    Eosinophil Rel % 0.8  1.7  0.8    Basophil Rel %  0.5  0.3  0.4       Lipid Panel          12/26/2023    07:17 5/1/2024    06:41   Lipid Panel   Total Cholesterol 181  179    Triglycerides 140  165    HDL Cholesterol 48  51    VLDL Cholesterol 25  29    LDL Cholesterol  108  99        Results for orders placed during the hospital encounter of 07/20/23    Adult Transthoracic Echo Complete W/ Cont if Necessary Per Protocol    Interpretation Summary    Left ventricular ejection fraction appears to be 56 - 60%.  No obvious regional wall motion abnormalities noted.    Left ventricular diastolic function is consistent with (grade I) impaired relaxation and age.    The left atrial cavity is mildly dilated.    Mild to early moderate aortic insufficiency.      === Results for orders placed in visit on 06/21/23 ===    CARDIAC EVENT MONITOR    - Interpretation Summary -    Patient was monitored for 7 days, 9 hours and 54 minutes.    Lowest heart rate was 53 bpm, average heart rate was 88 bpm, maximum heart rate was 132 bpm.    Rare PAC.    4 patient activated events corresponded with normal sinus rhythm.    No abnormal heart rhythms noted.          Procedures    Assessment & Plan  Diagnoses and all orders for this visit:    1. Aortic valve insufficiency, etiology of cardiac valve disease unspecified (Primary)  -     Adult Transthoracic Echo Complete w/ Color, Spectral and Contrast if necessary per protocol; Future    2. Primary hypertension    3. Mixed hyperlipidemia          1.  Patient had an echo in July 2023 that showed normal ejection fraction and mild to early moderate aortic insufficiency.  Plan to repeat echocardiogram to follow-up on this.  She does have a very soft murmur on exam but is asymptomatic.    2.  Stable on current regimen which will be continued.    3.  Continue the Lipitor.      The medical services provided during this encounter are part of ongoing care related to this patient's single serious condition or complex condition.      Follow Up   Return for  With Dr. Perrin.    Patient was given instructions and counseling regarding her condition or for health maintenance advice. Please see specific information pulled into the AVS if appropriate.     Suzanne Sims, MERLE  09/05/24  15:42 EDT    Dictated Utilizing Dragon Dictation

## 2024-09-09 ENCOUNTER — TELEPHONE (OUTPATIENT)
Dept: GASTROENTEROLOGY | Facility: CLINIC | Age: 69
End: 2024-09-09
Payer: COMMERCIAL

## 2024-09-09 NOTE — TELEPHONE ENCOUNTER
"Pt returned call to RN. Pt reports that the Linzess 145 mcg is not effective. She has been administering 2 capsules daily and reports that it is \"not doing anything\". Please advise. EL   "

## 2024-09-09 NOTE — TELEPHONE ENCOUNTER
RN called and left voicemail for patient regarding medication dose and patient's response. Richa Emerson APRN Lovett, Emily, RN  See message below and contact patient to see how she is doing with Linzess.  We are trying to figure out what dose to prescribe.  Thank you.  Richa BLOOM          Previous Messages       ----- Message -----  From: Richa Soler APRN  Sent: 9/3/2024   7:00 AM EDT  To: MERLE Mccarthy    Check to see how the patient is doing on the Linzess 145 mcg once daily.  She is experiencing medication induced constipation with Wegovy.  May need to increase her dosing to the 290 mcg strength.

## 2024-09-10 NOTE — TELEPHONE ENCOUNTER
RN received call back from patient. Recommendations from provider discussed with patient. Pt is agreeable to try and add Senokot to her regimen. EL

## 2024-10-10 ENCOUNTER — HOSPITAL ENCOUNTER (OUTPATIENT)
Dept: CARDIOLOGY | Facility: HOSPITAL | Age: 69
Discharge: HOME | End: 2024-10-10
Payer: COMMERCIAL

## 2024-10-10 ENCOUNTER — OFFICE VISIT (OUTPATIENT)
Dept: FAMILY MEDICINE CLINIC | Facility: CLINIC | Age: 69
End: 2024-10-10
Payer: COMMERCIAL

## 2024-10-10 VITALS
WEIGHT: 168.2 LBS | TEMPERATURE: 96.8 F | DIASTOLIC BLOOD PRESSURE: 72 MMHG | HEART RATE: 73 BPM | RESPIRATION RATE: 16 BRPM | SYSTOLIC BLOOD PRESSURE: 122 MMHG | OXYGEN SATURATION: 98 % | BODY MASS INDEX: 28.02 KG/M2 | HEIGHT: 65 IN

## 2024-10-10 DIAGNOSIS — I10 PRIMARY HYPERTENSION: ICD-10-CM

## 2024-10-10 DIAGNOSIS — I35.1 AORTIC VALVE INSUFFICIENCY, ETIOLOGY OF CARDIAC VALVE DISEASE UNSPECIFIED: ICD-10-CM

## 2024-10-10 DIAGNOSIS — E66.3 OVERWEIGHT WITH BODY MASS INDEX (BMI) OF 27 TO 27.9 IN ADULT: Primary | ICD-10-CM

## 2024-10-10 PROCEDURE — 99214 OFFICE O/P EST MOD 30 MIN: CPT | Performed by: NURSE PRACTITIONER

## 2024-10-10 PROCEDURE — 93306 TTE W/DOPPLER COMPLETE: CPT

## 2024-10-10 RX ORDER — ANASTROZOLE 1 MG/1
TABLET ORAL
COMMUNITY
Start: 2024-09-22

## 2024-10-10 RX ORDER — SEMAGLUTIDE 2.4 MG/.75ML
2.4 INJECTION, SOLUTION SUBCUTANEOUS WEEKLY
Qty: 3 ML | Refills: 2 | Status: CANCELLED | OUTPATIENT
Start: 2024-10-10

## 2024-10-10 NOTE — PROGRESS NOTES
Chief Complaint  Obesity (con) and Constipation    Subjective        Tatyana Shea presents to [facility]  History of Present Illness  Constipation.  Was bloated Saturday after taking laxatives.  Had more stools     wegovy  Obesity  Pertinent negatives include no chest pain, coughing, fever, numbness or weakness.   Constipation  Pertinent negatives include no fever.       The following portions of the patient's history were personally reviewed and updated as appropriate: allergies, current medications, past medical history, past surgical history, past family history, and past social history.     Body mass index is 27.82 kg/m².           Past History:    Medical History: has a past medical history of Acid reflux, Arrhythmia (2013), Breast cancer, Diverticulitis, Diverticulosis (15 yrs ago), Headache (2021), Hiatal hernia, HTN (hypertension), Hyperlipidemia (2010), Iron deficiency anemia, Low back pain, Obesity (In my 40s), Obstructive sleep apnea (07/02/2021), Peptic ulceration (In my 40s), and Pulmonary embolism (1975).     Surgical History: has a past surgical history that includes Cholecystectomy; Colonoscopy; Colonoscopy (N/A, 07/29/2021); Upper gastrointestinal endoscopy (07/28/2021); Esophagogastroduodenoscopy (N/A, 07/28/2021); Cosmetic surgery (2013); Trigger finger release (Left, 12/21/2022); Mastectomy partial / lumpectomy (Left); and Breast lumpectomy.     Family History: family history includes Atrial fibrillation in an other family member; Developmental Disability in her son; Heart disease in her mother; Heart failure in her mother; Hyperlipidemia in her mother; Hypertension in her mother.     Social History: reports that she has never smoked. She has never been exposed to tobacco smoke. She has never used smokeless tobacco. She reports that she does not currently use alcohol. She reports that she does not use drugs.    Allergies: Patient has no known  allergies.          Current Outpatient Medications:     amLODIPine (NORVASC) 5 MG tablet, TAKE 1 TABLET BY MOUTH DAILY, Disp: 90 tablet, Rfl: 3    anastrozole (ARIMIDEX) 1 MG tablet, , Disp: , Rfl:     atorvastatin (LIPITOR) 10 MG tablet, Take 1 tablet by mouth Every Night., Disp: 90 tablet, Rfl: 1    Calcium Carbonate 500 MG chewable tablet, Chew 1,000 mg 2 (Two) Times a Day., Disp: , Rfl:     Cholecalciferol 125 MCG (5000 UT) tablet, Take 1 tablet by mouth Daily. LAST DOSE 12/16/22, Disp: , Rfl:     furosemide (LASIX) 20 MG tablet, TAKE 1 TABLET BY MOUTH TWICE A DAY (Patient taking differently: Take 1 tablet by mouth Daily.), Disp: 180 tablet, Rfl: 1    Krill Oil (Omega-3) 500 MG capsule, Take 500 mg by mouth Daily. LAST DOSE 12/16/22, Disp: , Rfl:     linaclotide (Linzess) 145 MCG capsule capsule, Take 1 capsule by mouth Every Morning Before Breakfast., Disp: 30 capsule, Rfl: 5    losartan (COZAAR) 50 MG tablet, Take 1 tablet by mouth 2 (Two) Times a Day., Disp: 180 tablet, Rfl: 1    multivitamin with minerals tablet tablet, Take 1 tablet by mouth Daily. LAST DOSE 12/16/22, Disp: , Rfl:     NON FORMULARY, Take 1 tablet by mouth Every Other Day. Stool softener, Disp: , Rfl:     pantoprazole (PROTONIX) 40 MG EC tablet, TAKE ONE TABLET BY MOUTH ONCE NIGHTLY, Disp: 90 tablet, Rfl: 3    Polyethylene Glycol 3350 (MIRALAX PO), Take  by mouth Every Other Day., Disp: , Rfl:     potassium chloride 10 MEQ CR tablet, Take 1 tablet by mouth Daily., Disp: 90 tablet, Rfl: 1    Rimegepant Sulfate (NURTEC) 75 MG tablet dispersible tablet, Take 1 tablet by mouth Daily As Needed (headache)., Disp: 4 tablet, Rfl: 0    Semaglutide-Weight Management 1.7 MG/0.75ML solution auto-injector, Inject 0.75 mL under the skin into the appropriate area as directed 1 (One) Time Per Week., Disp: 3 mL, Rfl: 0    Xarelto 20 MG tablet, TAKE 1 TABLET BY MOUTH DAILY, Disp: 90 tablet, Rfl: 1    There are no discontinued medications.      Review of  "Systems   Constitutional:  Negative for fever.   Respiratory:  Negative for cough and shortness of breath.    Cardiovascular:  Negative for chest pain, palpitations and leg swelling.   Gastrointestinal:  Positive for constipation.   Neurological:  Negative for dizziness, weakness, numbness and headache.        Objective         Vitals:    10/10/24 0827   BP: 122/72   BP Location: Right arm   Patient Position: Sitting   Cuff Size: Adult   Pulse: 73   Resp: 16   Temp: 96.8 °F (36 °C)   TempSrc: Temporal   SpO2: 98%   Weight: 76.3 kg (168 lb 3.2 oz)   Height: 165.6 cm (65.2\")     Body mass index is 27.82 kg/m².         Physical Exam  Vitals reviewed.   Constitutional:       Appearance: Normal appearance. She is well-developed.   HENT:      Head: Normocephalic and atraumatic.      Mouth/Throat:      Pharynx: No oropharyngeal exudate.   Eyes:      Conjunctiva/sclera: Conjunctivae normal.      Pupils: Pupils are equal, round, and reactive to light.   Cardiovascular:      Rate and Rhythm: Normal rate and regular rhythm.      Heart sounds: Normal heart sounds. No murmur heard.     No friction rub. No gallop.   Pulmonary:      Effort: Pulmonary effort is normal.      Breath sounds: Normal breath sounds. No wheezing or rhonchi.   Skin:     General: Skin is warm and dry.   Neurological:      Mental Status: She is alert and oriented to person, place, and time.   Psychiatric:         Mood and Affect: Mood and affect normal.         Behavior: Behavior normal.         Thought Content: Thought content normal.         Judgment: Judgment normal.             Result Review :               Assessment and Plan     Diagnoses and all orders for this visit:    1. Overweight with body mass index (BMI) of 27 to 27.9 in adult (Primary)  -     Comprehensive Metabolic Panel; Future  -     Hemoglobin A1c; Future  -     Lipid Panel With / Chol / HDL Ratio; Future    2. Primary hypertension  -     Comprehensive Metabolic Panel; Future  -     Lipid " Panel With / Chol / HDL Ratio; Future  -     Urinalysis With Culture If Indicated -; Future  -     CBC (No Diff); Future              Follow Up     Return in about 3 months (around 1/10/2025).    Patient was given instructions and counseling regarding her condition or for health maintenance advice. Please see specific information pulled into the AVS if appropriate.

## 2024-10-14 DIAGNOSIS — E66.3 OVERWEIGHT WITH BODY MASS INDEX (BMI) OF 29 TO 29.9 IN ADULT: ICD-10-CM

## 2024-10-14 LAB
AORTIC DIMENSIONLESS INDEX: 0.93 (DI)
ASCENDING AORTA: 3.5 CM
BH CV ECHO MEAS - ACS: 2.1 CM
BH CV ECHO MEAS - AI P1/2T: 407.6 MSEC
BH CV ECHO MEAS - AO MAX PG: 9.6 MMHG
BH CV ECHO MEAS - AO MEAN PG: 5 MMHG
BH CV ECHO MEAS - AO ROOT DIAM: 3.4 CM
BH CV ECHO MEAS - AO V2 MAX: 155 CM/SEC
BH CV ECHO MEAS - AO V2 VTI: 31.7 CM
BH CV ECHO MEAS - AVA(I,D): 3 CM2
BH CV ECHO MEAS - EDV(CUBED): 103.8 ML
BH CV ECHO MEAS - EDV(MOD-SP2): 57.1 ML
BH CV ECHO MEAS - EDV(MOD-SP4): 48.6 ML
BH CV ECHO MEAS - EF(MOD-BP): 55.9 %
BH CV ECHO MEAS - EF(MOD-SP2): 59.5 %
BH CV ECHO MEAS - EF(MOD-SP4): 52.3 %
BH CV ECHO MEAS - ESV(CUBED): 29.8 ML
BH CV ECHO MEAS - ESV(MOD-SP2): 23.1 ML
BH CV ECHO MEAS - ESV(MOD-SP4): 23.2 ML
BH CV ECHO MEAS - FS: 34 %
BH CV ECHO MEAS - IVS/LVPW: 1.1 CM
BH CV ECHO MEAS - IVSD: 1.1 CM
BH CV ECHO MEAS - LA DIMENSION: 4 CM
BH CV ECHO MEAS - LAT PEAK E' VEL: 9 CM/SEC
BH CV ECHO MEAS - LV DIASTOLIC VOL/BSA (35-75): 26.5 CM2
BH CV ECHO MEAS - LV MASS(C)D: 175.8 GRAMS
BH CV ECHO MEAS - LV MAX PG: 7.2 MMHG
BH CV ECHO MEAS - LV MEAN PG: 4 MMHG
BH CV ECHO MEAS - LV SYSTOLIC VOL/BSA (12-30): 12.6 CM2
BH CV ECHO MEAS - LV V1 MAX: 134 CM/SEC
BH CV ECHO MEAS - LV V1 VTI: 30.2 CM
BH CV ECHO MEAS - LVIDD: 4.7 CM
BH CV ECHO MEAS - LVIDS: 3.1 CM
BH CV ECHO MEAS - LVOT AREA: 3.1 CM2
BH CV ECHO MEAS - LVOT DIAM: 2 CM
BH CV ECHO MEAS - LVPWD: 1 CM
BH CV ECHO MEAS - MED PEAK E' VEL: 7.8 CM/SEC
BH CV ECHO MEAS - MR MAX PG: 65 MMHG
BH CV ECHO MEAS - MR MAX VEL: 403 CM/SEC
BH CV ECHO MEAS - MV A MAX VEL: 81.2 CM/SEC
BH CV ECHO MEAS - MV DEC SLOPE: 639 CM/SEC2
BH CV ECHO MEAS - MV DEC TIME: 0.21 SEC
BH CV ECHO MEAS - MV E MAX VEL: 71.3 CM/SEC
BH CV ECHO MEAS - MV E/A: 0.88
BH CV ECHO MEAS - MV MEAN PG: 2 MMHG
BH CV ECHO MEAS - MV P1/2T: 47.2 MSEC
BH CV ECHO MEAS - MV V2 VTI: 25.1 CM
BH CV ECHO MEAS - MVA(P1/2T): 4.7 CM2
BH CV ECHO MEAS - MVA(VTI): 3.8 CM2
BH CV ECHO MEAS - PA V2 MAX: 96.2 CM/SEC
BH CV ECHO MEAS - PULM A REVS DUR: 0.15 SEC
BH CV ECHO MEAS - PULM A REVS VEL: 41.7 CM/SEC
BH CV ECHO MEAS - PULM DIAS VEL: 48.3 CM/SEC
BH CV ECHO MEAS - PULM S/D: 1.25
BH CV ECHO MEAS - PULM SYS VEL: 60.6 CM/SEC
BH CV ECHO MEAS - QP/QS: 0.75
BH CV ECHO MEAS - RAP SYSTOLE: 5 MMHG
BH CV ECHO MEAS - RV MAX PG: 3.1 MMHG
BH CV ECHO MEAS - RV V1 MAX: 87.8 CM/SEC
BH CV ECHO MEAS - RV V1 VTI: 22.6 CM
BH CV ECHO MEAS - RVDD: 2.9 CM
BH CV ECHO MEAS - RVOT DIAM: 2 CM
BH CV ECHO MEAS - RVSP: 27.3 MMHG
BH CV ECHO MEAS - SV(LVOT): 94.9 ML
BH CV ECHO MEAS - SV(MOD-SP2): 34 ML
BH CV ECHO MEAS - SV(MOD-SP4): 25.4 ML
BH CV ECHO MEAS - SV(RVOT): 71 ML
BH CV ECHO MEAS - SVI(LVOT): 51.7 ML/M2
BH CV ECHO MEAS - SVI(MOD-SP2): 18.5 ML/M2
BH CV ECHO MEAS - SVI(MOD-SP4): 13.8 ML/M2
BH CV ECHO MEAS - TR MAX PG: 22.3 MMHG
BH CV ECHO MEAS - TR MAX VEL: 236 CM/SEC
BH CV ECHO MEASUREMENTS AVERAGE E/E' RATIO: 8.49
BH CV XLRA - TDI S': 14.7 CM/SEC
IVRT: 98 MS
LEFT ATRIUM VOLUME INDEX: 21.4 ML/M2

## 2024-10-22 ENCOUNTER — PREP FOR SURGERY (OUTPATIENT)
Dept: OTHER | Facility: HOSPITAL | Age: 69
End: 2024-10-22
Payer: COMMERCIAL

## 2024-10-22 ENCOUNTER — OFFICE VISIT (OUTPATIENT)
Dept: ORTHOPEDIC SURGERY | Facility: CLINIC | Age: 69
End: 2024-10-22
Payer: COMMERCIAL

## 2024-10-22 VITALS
WEIGHT: 168 LBS | HEIGHT: 65 IN | HEART RATE: 68 BPM | SYSTOLIC BLOOD PRESSURE: 156 MMHG | BODY MASS INDEX: 27.99 KG/M2 | OXYGEN SATURATION: 97 % | DIASTOLIC BLOOD PRESSURE: 78 MMHG

## 2024-10-22 DIAGNOSIS — M25.841 CYST OF JOINT OF RIGHT HAND: ICD-10-CM

## 2024-10-22 DIAGNOSIS — M65.30 TRIGGER FINGER OF RIGHT HAND: Primary | ICD-10-CM

## 2024-10-22 DIAGNOSIS — M79.641 RIGHT HAND PAIN: Primary | ICD-10-CM

## 2024-10-22 DIAGNOSIS — M65.311 TRIGGER THUMB OF RIGHT HAND: ICD-10-CM

## 2024-10-22 RX ORDER — LIDOCAINE HYDROCHLORIDE 10 MG/ML
1 INJECTION, SOLUTION INFILTRATION; PERINEURAL
Status: COMPLETED | OUTPATIENT
Start: 2024-10-22 | End: 2024-10-22

## 2024-10-22 RX ORDER — TRIAMCINOLONE ACETONIDE 40 MG/ML
40 INJECTION, SUSPENSION INTRA-ARTICULAR; INTRAMUSCULAR
Status: COMPLETED | OUTPATIENT
Start: 2024-10-22 | End: 2024-10-22

## 2024-10-22 RX ADMIN — LIDOCAINE HYDROCHLORIDE 1 ML: 10 INJECTION, SOLUTION INFILTRATION; PERINEURAL at 15:15

## 2024-10-22 RX ADMIN — TRIAMCINOLONE ACETONIDE 40 MG: 40 INJECTION, SUSPENSION INTRA-ARTICULAR; INTRAMUSCULAR at 15:15

## 2024-10-22 NOTE — PROGRESS NOTES
"Chief Complaint  Follow-up of the Right Hand     Subjective      Tatyana Shea presents to [facility] for follow up of the right hand.  She has triggering of the right thumb and has had triggering of the 3 rd digit but has pain over the A 1 pulley of the 3 rd digit also.  She has pain with FMC and decrease  strength.  She has a cyst at the base of the third digit.     No Known Allergies     Social History     Socioeconomic History   • Marital status: Single   Tobacco Use   • Smoking status: Never     Passive exposure: Never   • Smokeless tobacco: Never   Vaping Use   • Vaping status: Never Used   Substance and Sexual Activity   • Alcohol use: Not Currently     Comment: Glass of wine a couple times a year   • Drug use: Never   • Sexual activity: Not Currently     Birth control/protection: None        I reviewed the patient's chief complaint, history of present illness, review of systems, past medical history, surgical history, family history, social history, medications, and allergy list.     Review of Systems     Constitutional: Denies fevers, chills, weight loss  Cardiovascular: Denies chest pain, shortness of breath  Skin: Denies rashes, acute skin changes  Neurologic: Denies headache, loss of consciousness      Vital Signs:   /78   Pulse 68   Ht 165.6 cm (65.2\")   Wt 76.2 kg (168 lb)   SpO2 97%   BMI 27.79 kg/m²          Physical Exam  General: Alert. No acute distress    Ortho Exam        RIGHT HAND Negative Compression testing/ Negative Tinels. NegativeFinkelsteins. Negative Barnett's testing. Negative CMC grind testing. Negative Phalens. Full ROM of the hand, fingers, elbow and wrist. Positive Triggering of the digit. Sensation grossly intact to light touch, median, radial and ulnar nerve. Positive AIN, PIN and ulnar nerve motor function intact. Axillary nerve intact. Positive pulses. Tender over the A1 pulley.         Right Trigger Thumb  Consent given by: " patient  Site marked: site marked  Timeout: Immediately prior to procedure a time out was called to verify the correct patient, procedure, equipment, support staff and site/side marked as required   Supporting Documentation  Indications: pain   Procedure Details  Location: -   Location: Right Trigger Thumb.Preparation: Patient was prepped and draped in the usual sterile fashion  Needle gauge: 23G.  Medications administered: 1 mL lidocaine 1 %; 40 mg triamcinolone acetonide 40 MG/ML  Patient tolerance: patient tolerated the procedure well with no immediate complications        Imaging Results (Most Recent)       Procedure Component Value Units Date/Time    XR Hand 2 View Right [[id number]] Resulted: 10/22/24 1525     Updated: 10/22/24 1528             Result Review :     X-Ray Report:  Right hand  X-Ray  Indication: Evaluation of the right hand.   AP/Lateral view(s)  Findings: No acute fracture or dislocation. Mild degenerative changes.   Prior studies available for comparison: yes                  Assessment and Plan     Diagnoses and all orders for this visit:    1. Right hand pain (Primary)  -     XR Hand 2 View Right    2. Trigger thumb of right hand    3. Cyst of joint of right hand        Discussed the treatment plan with the patient. I reviewed the X-rays that were obtained today with the patient.     Discussed the treatment options with the patient, operative vs non-operative. The patient expressed understanding and wished to proceed with a right thumb and middle finger trigger finger release and right 3 rd digit cyst excision.     The patient tolerated a right trigger thumb injection. She tolerated the injection well.        Discussed surgery., Risks/benefits discussed with patient including, but not limited to: infection, bleeding, neurovascular damage, re-rupture, aesthetic deformity, need for further surgery, and death., Surgery pamphlet given., and Call or return if worsening symptoms.    Follow Up     2  weeks postoperatively       Patient was given instructions and counseling regarding her condition or for health maintenance advice. Please see specific information pulled into the AVS if appropriate.     Scribed for Inés Rashid MD by Reyna Joaquin MA.  10/22/24   15:33 EDT    I have personally performed the services described in this document as scribed by the above individual and it is both accurate and complete. Inés Rashid MD 10/22/24

## 2024-10-29 ENCOUNTER — TELEPHONE (OUTPATIENT)
Dept: CARDIOLOGY | Facility: CLINIC | Age: 69
End: 2024-10-29
Payer: COMMERCIAL

## 2024-10-29 NOTE — TELEPHONE ENCOUNTER
Procedure: RIGHT TRIGGER THUMB AND 3RD TRIGGER FINGER RELEASE    Medication Directive: XARELTO    PMH: AORTIC VALVE INSUFFICIENCY, HTN, HLD    Last Seen: 09/05/24    ECHO: 10/14/24     Left ventricular ejection fraction appears to be 56 - 60%.    Left ventricular diastolic function is consistent with (grade I) impaired relaxation and age.    Estimated right ventricular systolic pressure from tricuspid regurgitation is normal (<35 mmHg).    Mild aortic insufficiency.

## 2024-10-30 ENCOUNTER — TELEPHONE (OUTPATIENT)
Dept: FAMILY MEDICINE CLINIC | Facility: CLINIC | Age: 69
End: 2024-10-30
Payer: COMMERCIAL

## 2024-10-30 NOTE — TELEPHONE ENCOUNTER
The patient has undergone cardiovascular evaluation from a cardiac standpoint only. Patient is low risk and is ok to proceed.  Defer management of Xarelto to PCP.

## 2024-11-04 ENCOUNTER — LAB (OUTPATIENT)
Dept: LAB | Facility: HOSPITAL | Age: 69
End: 2024-11-04
Payer: COMMERCIAL

## 2024-11-04 DIAGNOSIS — E66.3 OVERWEIGHT WITH BODY MASS INDEX (BMI) OF 27 TO 27.9 IN ADULT: ICD-10-CM

## 2024-11-04 DIAGNOSIS — E87.5 HYPERKALEMIA: Primary | ICD-10-CM

## 2024-11-04 DIAGNOSIS — I10 PRIMARY HYPERTENSION: ICD-10-CM

## 2024-11-04 DIAGNOSIS — D50.9 IRON DEFICIENCY ANEMIA, UNSPECIFIED IRON DEFICIENCY ANEMIA TYPE: ICD-10-CM

## 2024-11-04 LAB
ALBUMIN SERPL-MCNC: 3.8 G/DL (ref 3.5–5.2)
ALBUMIN/GLOB SERPL: 1.4 G/DL
ALP SERPL-CCNC: 68 U/L (ref 39–117)
ALT SERPL W P-5'-P-CCNC: 10 U/L (ref 1–33)
ANION GAP SERPL CALCULATED.3IONS-SCNC: 8.9 MMOL/L (ref 5–15)
AST SERPL-CCNC: 12 U/L (ref 1–32)
BACTERIA UR QL AUTO: NORMAL /HPF
BASOPHILS # BLD AUTO: 0.02 10*3/MM3 (ref 0–0.2)
BASOPHILS NFR BLD AUTO: 0.4 % (ref 0–1.5)
BILIRUB SERPL-MCNC: 0.5 MG/DL (ref 0–1.2)
BILIRUB UR QL STRIP: NEGATIVE
BUN SERPL-MCNC: 6 MG/DL (ref 8–23)
BUN/CREAT SERPL: 8.1 (ref 7–25)
CALCIUM SPEC-SCNC: 9.4 MG/DL (ref 8.6–10.5)
CHLORIDE SERPL-SCNC: 102 MMOL/L (ref 98–107)
CHOLEST SERPL-MCNC: 168 MG/DL (ref 0–200)
CLARITY UR: ABNORMAL
CO2 SERPL-SCNC: 27.1 MMOL/L (ref 22–29)
COLOR UR: YELLOW
CREAT SERPL-MCNC: 0.74 MG/DL (ref 0.57–1)
DEPRECATED RDW RBC AUTO: 44 FL (ref 37–54)
EGFRCR SERPLBLD CKD-EPI 2021: 86.6 ML/MIN/1.73
EOSINOPHIL # BLD AUTO: 0.02 10*3/MM3 (ref 0–0.4)
EOSINOPHIL NFR BLD AUTO: 0.4 % (ref 0.3–6.2)
ERYTHROCYTE [DISTWIDTH] IN BLOOD BY AUTOMATED COUNT: 12.8 % (ref 12.3–15.4)
FERRITIN SERPL-MCNC: 63.87 NG/ML (ref 13–150)
GLOBULIN UR ELPH-MCNC: 2.8 GM/DL
GLUCOSE SERPL-MCNC: 85 MG/DL (ref 65–99)
GLUCOSE UR STRIP-MCNC: NEGATIVE MG/DL
HBA1C MFR BLD: 4.9 % (ref 4.8–5.6)
HCT VFR BLD AUTO: 38 % (ref 34–46.6)
HDLC SERPL QL: 3.11
HDLC SERPL-MCNC: 54 MG/DL (ref 40–60)
HGB BLD-MCNC: 13.1 G/DL (ref 12–15.9)
HGB UR QL STRIP.AUTO: NEGATIVE
HOLD SPECIMEN: NORMAL
HYALINE CASTS UR QL AUTO: NORMAL /LPF
IMM GRANULOCYTES # BLD AUTO: 0.01 10*3/MM3 (ref 0–0.05)
IMM GRANULOCYTES NFR BLD AUTO: 0.2 % (ref 0–0.5)
IRON 24H UR-MRATE: 88 MCG/DL (ref 37–145)
IRON SATN MFR SERPL: 30 % (ref 20–50)
KETONES UR QL STRIP: NEGATIVE
LDLC SERPL CALC-MCNC: 100 MG/DL (ref 0–100)
LEUKOCYTE ESTERASE UR QL STRIP.AUTO: ABNORMAL
LYMPHOCYTES # BLD AUTO: 1.07 10*3/MM3 (ref 0.7–3.1)
LYMPHOCYTES NFR BLD AUTO: 23.2 % (ref 19.6–45.3)
MCH RBC QN AUTO: 32.6 PG (ref 26.6–33)
MCHC RBC AUTO-ENTMCNC: 34.5 G/DL (ref 31.5–35.7)
MCV RBC AUTO: 94.5 FL (ref 79–97)
MONOCYTES # BLD AUTO: 0.35 10*3/MM3 (ref 0.1–0.9)
MONOCYTES NFR BLD AUTO: 7.6 % (ref 5–12)
NEUTROPHILS NFR BLD AUTO: 3.14 10*3/MM3 (ref 1.7–7)
NEUTROPHILS NFR BLD AUTO: 68.2 % (ref 42.7–76)
NITRITE UR QL STRIP: NEGATIVE
NRBC BLD AUTO-RTO: 0 /100 WBC (ref 0–0.2)
PH UR STRIP.AUTO: 7 [PH] (ref 5–8)
PLATELET # BLD AUTO: 224 10*3/MM3 (ref 140–450)
PMV BLD AUTO: 9.5 FL (ref 6–12)
POTASSIUM SERPL-SCNC: 3.2 MMOL/L (ref 3.5–5.2)
PROT SERPL-MCNC: 6.6 G/DL (ref 6–8.5)
PROT UR QL STRIP: NEGATIVE
RBC # BLD AUTO: 4.02 10*6/MM3 (ref 3.77–5.28)
RBC # UR STRIP: NORMAL /HPF
REF LAB TEST METHOD: NORMAL
SODIUM SERPL-SCNC: 138 MMOL/L (ref 136–145)
SP GR UR STRIP: 1.02 (ref 1–1.03)
SQUAMOUS #/AREA URNS HPF: NORMAL /HPF
TIBC SERPL-MCNC: 292 MCG/DL (ref 298–536)
TRANSFERRIN SERPL-MCNC: 196 MG/DL (ref 200–360)
TRIGL SERPL-MCNC: 73 MG/DL (ref 0–150)
UROBILINOGEN UR QL STRIP: ABNORMAL
VLDLC SERPL-MCNC: 14 MG/DL (ref 5–40)
WBC # UR STRIP: NORMAL /HPF
WBC NRBC COR # BLD AUTO: 4.61 10*3/MM3 (ref 3.4–10.8)

## 2024-11-04 PROCEDURE — 83036 HEMOGLOBIN GLYCOSYLATED A1C: CPT

## 2024-11-04 PROCEDURE — 83540 ASSAY OF IRON: CPT

## 2024-11-04 PROCEDURE — 84466 ASSAY OF TRANSFERRIN: CPT

## 2024-11-04 PROCEDURE — 80061 LIPID PANEL: CPT

## 2024-11-04 PROCEDURE — 36415 COLL VENOUS BLD VENIPUNCTURE: CPT

## 2024-11-04 PROCEDURE — 80053 COMPREHEN METABOLIC PANEL: CPT

## 2024-11-04 PROCEDURE — 85025 COMPLETE CBC W/AUTO DIFF WBC: CPT

## 2024-11-04 PROCEDURE — 82728 ASSAY OF FERRITIN: CPT

## 2024-11-04 PROCEDURE — 81001 URINALYSIS AUTO W/SCOPE: CPT

## 2024-11-08 DIAGNOSIS — E66.3 OVERWEIGHT WITH BODY MASS INDEX (BMI) OF 29 TO 29.9 IN ADULT: ICD-10-CM

## 2024-11-08 RX ORDER — SEMAGLUTIDE 1.7 MG/.75ML
INJECTION, SOLUTION SUBCUTANEOUS
Qty: 3 ML | Refills: 0 | OUTPATIENT
Start: 2024-11-08

## 2024-11-26 ENCOUNTER — LAB (OUTPATIENT)
Dept: LAB | Facility: HOSPITAL | Age: 69
End: 2024-11-26
Payer: COMMERCIAL

## 2024-11-26 DIAGNOSIS — E87.5 HYPERKALEMIA: ICD-10-CM

## 2024-11-26 PROCEDURE — 80053 COMPREHEN METABOLIC PANEL: CPT

## 2024-11-26 PROCEDURE — 36415 COLL VENOUS BLD VENIPUNCTURE: CPT

## 2024-11-27 ENCOUNTER — OFFICE VISIT (OUTPATIENT)
Dept: FAMILY MEDICINE CLINIC | Facility: CLINIC | Age: 69
End: 2024-11-27
Payer: COMMERCIAL

## 2024-11-27 VITALS
TEMPERATURE: 97.3 F | WEIGHT: 155 LBS | HEART RATE: 76 BPM | OXYGEN SATURATION: 96 % | SYSTOLIC BLOOD PRESSURE: 122 MMHG | DIASTOLIC BLOOD PRESSURE: 76 MMHG | BODY MASS INDEX: 25.83 KG/M2 | HEIGHT: 65 IN

## 2024-11-27 DIAGNOSIS — E87.6 HYPOKALEMIA: ICD-10-CM

## 2024-11-27 DIAGNOSIS — E66.3 OVERWEIGHT WITH BODY MASS INDEX (BMI) OF 29 TO 29.9 IN ADULT: ICD-10-CM

## 2024-11-27 DIAGNOSIS — D64.9 ANEMIA, UNSPECIFIED TYPE: ICD-10-CM

## 2024-11-27 DIAGNOSIS — Z76.0 ENCOUNTER FOR MEDICATION REFILL: Primary | ICD-10-CM

## 2024-11-27 LAB
ALBUMIN SERPL-MCNC: 4 G/DL (ref 3.5–5.2)
ALBUMIN/GLOB SERPL: 1.4 G/DL
ALP SERPL-CCNC: 65 U/L (ref 39–117)
ALT SERPL W P-5'-P-CCNC: 10 U/L (ref 1–33)
ANION GAP SERPL CALCULATED.3IONS-SCNC: 8.7 MMOL/L (ref 5–15)
AST SERPL-CCNC: 17 U/L (ref 1–32)
BILIRUB SERPL-MCNC: 0.3 MG/DL (ref 0–1.2)
BUN SERPL-MCNC: 6 MG/DL (ref 8–23)
BUN/CREAT SERPL: 10.2 (ref 7–25)
CALCIUM SPEC-SCNC: 9.4 MG/DL (ref 8.6–10.5)
CHLORIDE SERPL-SCNC: 99 MMOL/L (ref 98–107)
CO2 SERPL-SCNC: 29.3 MMOL/L (ref 22–29)
CREAT SERPL-MCNC: 0.59 MG/DL (ref 0.57–1)
EGFRCR SERPLBLD CKD-EPI 2021: 96.5 ML/MIN/1.73
GLOBULIN UR ELPH-MCNC: 2.8 GM/DL
GLUCOSE SERPL-MCNC: 94 MG/DL (ref 65–99)
POTASSIUM SERPL-SCNC: 3.9 MMOL/L (ref 3.5–5.2)
PROT SERPL-MCNC: 6.8 G/DL (ref 6–8.5)
SODIUM SERPL-SCNC: 137 MMOL/L (ref 136–145)

## 2024-11-27 PROCEDURE — 99214 OFFICE O/P EST MOD 30 MIN: CPT | Performed by: NURSE PRACTITIONER

## 2024-11-27 RX ORDER — POTASSIUM CHLORIDE 750 MG/1
10 TABLET, EXTENDED RELEASE ORAL DAILY
Qty: 90 TABLET | Refills: 1 | Status: SHIPPED | OUTPATIENT
Start: 2024-11-27

## 2024-11-27 NOTE — PROGRESS NOTES
Chief Complaint  Constipation, Hyperlipidemia, Obesity, Tinnitus, and Labs Only (Wants to discuss labs. Print copy)    Subjective        Tatyana Shea presents to [facility]  History of Present Illness  Obesity:    Doing well on wegovy.    Tinnitus:  in the last year noted.  With the low grade headache and balance is off a little. States that on the left eye on the lateral side will have darker vision andcorrectes over time.      Constipation  Pertinent negatives include no fever.   Hyperlipidemia  Exacerbating diseases include obesity. Pertinent negatives include no chest pain.   Pertinent negative symptoms include no chest pain, no cough, no fever, no numbness and no weakness.   Pertinent negative symptoms include no chest pain, no cough, no fever, no numbness and no weakness.       The following portions of the patient's history were personally reviewed and updated as appropriate: allergies, current medications, past medical history, past surgical history, past family history, and past social history.     Body mass index is 25.64 kg/m².           Past History:    Medical History: has a past medical history of Acid reflux, Arrhythmia (2013), Breast cancer, Diverticulitis, Diverticulosis (15 yrs ago), Headache (2021), Hiatal hernia, HTN (hypertension), Hyperlipidemia (2010), Iron deficiency anemia, Low back pain, Obesity (In my 40s), Obstructive sleep apnea (07/02/2021), Peptic ulceration (In my 40s), and Pulmonary embolism (1975).     Surgical History: has a past surgical history that includes Cholecystectomy; Colonoscopy; Colonoscopy (N/A, 07/29/2021); Upper gastrointestinal endoscopy (07/28/2021); Esophagogastroduodenoscopy (N/A, 07/28/2021); Cosmetic surgery (2013); Trigger finger release (Left, 12/21/2022); Mastectomy partial / lumpectomy (Left); and Breast lumpectomy.     Family History: family history includes Atrial fibrillation in an other family member; Developmental  Disability in her son; Heart disease in her mother; Heart failure in her mother; Hyperlipidemia in her mother; Hypertension in her mother.     Social History: reports that she has never smoked. She has never been exposed to tobacco smoke. She has never used smokeless tobacco. She reports that she does not currently use alcohol. She reports that she does not use drugs.    Allergies: Patient has no known allergies.          Current Outpatient Medications:     amLODIPine (NORVASC) 5 MG tablet, TAKE 1 TABLET BY MOUTH DAILY, Disp: 90 tablet, Rfl: 3    anastrozole (ARIMIDEX) 1 MG tablet, , Disp: , Rfl:     atorvastatin (LIPITOR) 10 MG tablet, Take 1 tablet by mouth Every Night., Disp: 90 tablet, Rfl: 1    Calcium Carbonate 500 MG chewable tablet, Chew 1,000 mg 2 (Two) Times a Day., Disp: , Rfl:     Cholecalciferol 125 MCG (5000 UT) tablet, Take 1 tablet by mouth Daily. LAST DOSE 12/16/22, Disp: , Rfl:     furosemide (LASIX) 20 MG tablet, TAKE 1 TABLET BY MOUTH TWICE A DAY (Patient taking differently: Take 1 tablet by mouth Daily.), Disp: 180 tablet, Rfl: 1    losartan (COZAAR) 50 MG tablet, Take 1 tablet by mouth 2 (Two) Times a Day., Disp: 180 tablet, Rfl: 1    multivitamin with minerals tablet tablet, Take 1 tablet by mouth Daily. LAST DOSE 12/16/22, Disp: , Rfl:     NON FORMULARY, Take 1 tablet by mouth Every Other Day. Stool softener, Disp: , Rfl:     pantoprazole (PROTONIX) 40 MG EC tablet, TAKE ONE TABLET BY MOUTH ONCE NIGHTLY, Disp: 90 tablet, Rfl: 3    Polyethylene Glycol 3350 (MIRALAX PO), Take  by mouth Every Other Day., Disp: , Rfl:     potassium chloride 10 MEQ CR tablet, Take 1 tablet by mouth Daily., Disp: 90 tablet, Rfl: 1    Semaglutide-Weight Management 2.4 MG/0.75ML solution auto-injector, Inject 1 dose under the skin into the appropriate area as directed 1 (One) Time Per Week., Disp: 3 mL, Rfl: 1    Xarelto 20 MG tablet, TAKE 1 TABLET BY MOUTH DAILY, Disp: 90 tablet, Rfl: 1    Medications Discontinued  "During This Encounter   Medication Reason    potassium chloride 10 MEQ CR tablet Reorder    Krill Oil (Omega-3) 500 MG capsule *Therapy completed    linaclotide (Linzess) 145 MCG capsule capsule *Therapy completed    Rimegepant Sulfate (NURTEC) 75 MG tablet dispersible tablet *Therapy completed         Review of Systems   Constitutional:  Negative for fever.   HENT:  Positive for tinnitus.    Respiratory:  Negative for cough.    Cardiovascular:  Negative for chest pain.   Gastrointestinal:  Positive for constipation.   Neurological:  Negative for weakness and numbness.        Objective         Vitals:    11/27/24 0827   BP: 158/82   BP Location: Right arm   Patient Position: Sitting   Cuff Size: Adult   Pulse: 76   Temp: 97.3 °F (36.3 °C)   TempSrc: Temporal   SpO2: 96%   Weight: 70.3 kg (155 lb)   Height: 165.6 cm (65.2\")     Body mass index is 25.64 kg/m².         Physical Exam  Vitals reviewed.   Constitutional:       Appearance: Normal appearance. She is well-developed.   HENT:      Head: Normocephalic and atraumatic.      Mouth/Throat:      Pharynx: No oropharyngeal exudate.   Eyes:      Conjunctiva/sclera: Conjunctivae normal.      Pupils: Pupils are equal, round, and reactive to light.   Cardiovascular:      Rate and Rhythm: Normal rate and regular rhythm.      Heart sounds: Normal heart sounds. No murmur heard.     No friction rub. No gallop.   Pulmonary:      Effort: Pulmonary effort is normal.      Breath sounds: Normal breath sounds. No wheezing or rhonchi.   Skin:     General: Skin is warm and dry.   Neurological:      Mental Status: She is alert and oriented to person, place, and time.   Psychiatric:         Mood and Affect: Mood and affect normal.         Behavior: Behavior normal.         Thought Content: Thought content normal.         Judgment: Judgment normal.             Result Review :               Assessment and Plan     Diagnoses and all orders for this visit:    1. Encounter for medication " refill (Primary)    2. Hypokalemia  -     potassium chloride 10 MEQ CR tablet; Take 1 tablet by mouth Daily.  Dispense: 90 tablet; Refill: 1    3. Overweight with body mass index (BMI) of 29 to 29.9 in adult  -     Lipid Panel With / Chol / HDL Ratio; Future  -     Comprehensive Metabolic Panel; Future  -     CBC (No Diff); Future  -     Urinalysis With Culture If Indicated -; Future  -     Hemoglobin A1c; Future    4. Anemia, unspecified type  -     Vitamin B12; Future              Follow Up     Return in about 6 months (around 5/27/2025).    Patient was given instructions and counseling regarding her condition or for health maintenance advice. Please see specific information pulled into the AVS if appropriate.

## 2024-12-06 ENCOUNTER — OFFICE VISIT (OUTPATIENT)
Dept: ONCOLOGY | Facility: HOSPITAL | Age: 69
End: 2024-12-06
Payer: COMMERCIAL

## 2024-12-06 VITALS
HEART RATE: 73 BPM | WEIGHT: 158.29 LBS | HEIGHT: 65 IN | BODY MASS INDEX: 26.37 KG/M2 | OXYGEN SATURATION: 99 % | SYSTOLIC BLOOD PRESSURE: 139 MMHG | RESPIRATION RATE: 18 BRPM | TEMPERATURE: 97.8 F | DIASTOLIC BLOOD PRESSURE: 82 MMHG

## 2024-12-06 DIAGNOSIS — Z17.0 MALIGNANT NEOPLASM OF UPPER-OUTER QUADRANT OF LEFT BREAST IN FEMALE, ESTROGEN RECEPTOR POSITIVE: ICD-10-CM

## 2024-12-06 DIAGNOSIS — C50.412 MALIGNANT NEOPLASM OF UPPER-OUTER QUADRANT OF LEFT BREAST IN FEMALE, ESTROGEN RECEPTOR POSITIVE: ICD-10-CM

## 2024-12-06 DIAGNOSIS — D50.9 IRON DEFICIENCY ANEMIA, UNSPECIFIED IRON DEFICIENCY ANEMIA TYPE: Primary | ICD-10-CM

## 2024-12-06 PROCEDURE — G0463 HOSPITAL OUTPT CLINIC VISIT: HCPCS | Performed by: INTERNAL MEDICINE

## 2024-12-06 PROCEDURE — 99214 OFFICE O/P EST MOD 30 MIN: CPT | Performed by: INTERNAL MEDICINE

## 2024-12-06 RX ORDER — ANASTROZOLE 1 MG/1
1 TABLET ORAL DAILY
Qty: 90 TABLET | Refills: 1 | Status: SHIPPED | OUTPATIENT
Start: 2024-12-06

## 2024-12-06 NOTE — PROGRESS NOTES
Chief Complaint  FOLLOW UP 1    John Aguilera APRN Payne, Chandra, MERLE Joe SURJIT Shea presents to [facility] for anemia    Anemia  There has been no abdominal pain, bruising/bleeding easily, fever, light-headedness, palpitations or weight loss.     Ms. Shea presents for follow up. She was diagnosed with breast cancer in August. Received lumpectomy 11/3/23 at  (Dr. Maddi Ureña) with positive margin, 21 mm. ER/MA positive, HER2 negative. Re-resection 12/20/23 was negative and sentinel nodes (3) were all negative. She has recovered well from the surgery. Oncotype of 7. Chemo not recommended. She has started anastrozole as of 2/9/24 and tolerating well.  Prophylaxis have dissipated.  She reports 1 day of bad leg pain in her left leg but this has resolved.  She has no nausea or vomiting.  No breast related masses or skin changes.  She does have some sharp electric sharp shocks in both breasts occasion. Iron labs normal. No acute concerns today.    Hematology History    She was given recent Ferrlecit infusions x 8 finishing these on 7/5/2022.     8/21/22: Labs shows iron studies, ferritin are normalized now. Hemoglobin is in the normal range. Folate and B-12 were normal previously. Prior GI evaluation by Dr. Foster showed large HH, gastritis. Polyp was removed.     10/21/22: EGD by Dr. Dunbar: Mild Schatzki ring, dilated, Z-line irregular, non-bleeding erosive gastropathy, normal duodenum. Patient started on Pantoprazole 40 mg.    12/19/22: Ferritin 107, iron sat 33%, hgb 13.3, MCV normal.     6/2/23: Ferritin 90, iron sat 33%, TIBC 311, Hgb 13.5    12/26/23: Hgb 12.1, MCV 94.4, WBC 5.91, plt 217, Ferritin 45, iron sat 28%, TIBC 322, B12 298    11/4/24: WBC 4.61, hgb 13.1, plt 224, ferritin 63.87, iron sat 30%, TIBC 292        Oncology/Hematology History Overview Note   8/28/23: Left breast biopsy: invasive lobular, grade 2 overall, HER2 1= by IHC  (negative), ER and MO both 100% positive    10/4/23: Breast MRI: There is 15 mm enhancing irregular mass in the upper outer left breast, 12 cm from the nipple, with associated postbiopsy hematoma biopsy-proven malignancy. There are no other suspicious enhancing masses or areas of nonmass enhancement.     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 Oncotype 7 so chemotherapy not recommended    12/20/23: Re-resection: no invasive carcinoma seen, 0/3 sentinel nodes positive, pN0.     1/4/24: NM PET: Left breast seroma, no metastatic disease seen.     1/11/24: MRI brain negative    2/9/24: Plan to start anastrozole 1 mg daily    4/4/24: Radiation XRT completed         Malignant neoplasm of upper-outer quadrant of left breast in female, estrogen receptor positive   12/28/2023 Initial Diagnosis    Malignant neoplasm of upper-outer quadrant of left breast in female, estrogen receptor positive     1/16/2024 Cancer Staged    Staging form: Breast, AJCC 8th Edition  - Pathologic: Stage IA (pT2, pN0, cM0, G1, ER+, MO+, HER2-) - Signed by Agustin Riddle MD on 1/16/2024     3/14/2024 - 4/4/2024 Radiation    Radiation OncologyTreatment Course:  Tatyana Shea received 4256 cGy in 16 fractions to the left breast.      Primary malignant neoplasm of upper outer quadrant of left female breast   3/7/2024 Initial Diagnosis    Primary malignant neoplasm of upper outer quadrant of left female breast         Review of Systems   Constitutional:  Positive for fatigue. Negative for appetite change, diaphoresis, fever, unexpected weight gain and unexpected weight loss.   HENT: Negative.  Negative for hearing loss, mouth sores, sore throat, swollen glands, trouble swallowing and voice change.    Eyes: Negative.  Negative for blurred vision, double vision, pain, redness and visual disturbance.   Respiratory: Negative.  Negative for cough,  shortness of breath and wheezing.    Cardiovascular: Negative.  Negative for chest pain, palpitations and leg swelling.   Gastrointestinal: Negative.  Negative for abdominal pain, blood in stool, constipation, diarrhea, nausea and vomiting.   Endocrine: Negative.  Negative for cold intolerance, heat intolerance, polydipsia and polyuria.   Genitourinary: Negative.  Negative for breast pain, decreased urine volume, difficulty urinating, dysuria, frequency, hematuria and urinary incontinence.   Musculoskeletal:  Positive for back pain. Negative for arthralgias, joint swelling and myalgias.   Skin:  Negative for color change, rash, skin lesions and wound.   Allergic/Immunologic: Negative.    Neurological:  Positive for headache. Negative for dizziness, seizures, weakness, light-headedness and numbness.   Hematological: Negative.  Negative for adenopathy. Does not bruise/bleed easily.   Psychiatric/Behavioral: Negative.  Negative for depressed mood. The patient is not nervous/anxious.    All other systems reviewed and are negative.      Current Outpatient Medications on File Prior to Visit   Medication Sig Dispense Refill    amLODIPine (NORVASC) 5 MG tablet TAKE 1 TABLET BY MOUTH DAILY 90 tablet 3    anastrozole (ARIMIDEX) 1 MG tablet       atorvastatin (LIPITOR) 10 MG tablet Take 1 tablet by mouth Every Night. 90 tablet 1    Calcium Carbonate 500 MG chewable tablet Chew 1,000 mg 2 (Two) Times a Day.      Cholecalciferol 125 MCG (5000 UT) tablet Take 1 tablet by mouth Daily. LAST DOSE 12/16/22      furosemide (LASIX) 20 MG tablet TAKE 1 TABLET BY MOUTH TWICE A DAY (Patient taking differently: Take 1 tablet by mouth Daily.) 180 tablet 1    losartan (COZAAR) 50 MG tablet Take 1 tablet by mouth 2 (Two) Times a Day. 180 tablet 1    multivitamin with minerals tablet tablet Take 1 tablet by mouth Daily. LAST DOSE 12/16/22      NON FORMULARY Take 1 tablet by mouth Every Other Day. Stool softener      pantoprazole (PROTONIX) 40  MG EC tablet TAKE ONE TABLET BY MOUTH ONCE NIGHTLY 90 tablet 3    Polyethylene Glycol 3350 (MIRALAX PO) Take  by mouth Every Other Day.      potassium chloride 10 MEQ CR tablet Take 1 tablet by mouth Daily. 90 tablet 1    Semaglutide-Weight Management 2.4 MG/0.75ML solution auto-injector Inject 1 dose under the skin into the appropriate area as directed 1 (One) Time Per Week. 3 mL 1    Xarelto 20 MG tablet TAKE 1 TABLET BY MOUTH DAILY 90 tablet 1     No current facility-administered medications on file prior to visit.       No Known Allergies  Past Medical History:   Diagnosis Date    Acid reflux     Arrhythmia 2013    Breast cancer     Diverticulitis     NO CURRENT ISSUES    Diverticulosis 15 yrs ago    Currently having some mild pain in lower left    Headache 2021    Hiatal hernia     HTN (hypertension)     Hyperlipidemia 2010    Iron deficiency anemia     NO CURRENT S/S, HAD IRON INFUSIONS IN SUMMER 22    Low back pain     Obesity In my 40s    Eat for temporary comfort    Obstructive sleep apnea 07/02/2021    Peptic ulceration In my 40s    I had a bleeding ulcer due to my lifestyle at that time/NO CURRENT ISSUES    Pulmonary embolism 1975    Due to birth control pills, I was told NONE CURRENT     Past Surgical History:   Procedure Laterality Date    BREAST LUMPECTOMY      CHOLECYSTECTOMY      COLONOSCOPY      2020 with      COLONOSCOPY N/A 07/29/2021    Procedure: COLONOSCOPY with polypectomy/snare;  Surgeon: Ruel Foster MD;  Location: formerly [facility];  Service: Gastroenterology;  Laterality: N/A;  colon polyp    COSMETIC SURGERY  2013    Removed bags under and above my eyes    ENDOSCOPY N/A 07/28/2021    Procedure: ESOPHAGOGASTRODUODENOSCOPY with bxs and cold snares;  Surgeon: Ruel Foster MD;  Location: formerly [facility] END[facility];  Service: Gastroenterology;  Laterality: N/A;  hiatal hernia  gastric polyps    MASTECTOMY PARTIAL / LUMPECTOMY Left     Had one in Nove and Dec    TRIGGER FINGER  RELEASE Left 12/21/2022    Procedure: LEFT THUMB FINGER TRIGGER RELEASE AND LEFT THUMB CYST EXCISION;  Surgeon: Inés Rashid MD;  Location: [facility];  Service: Orthopedics;  Laterality: Left;    UPPER GASTROINTESTINAL ENDOSCOPY  07/28/2021    Dr. Foster     Social History     Socioeconomic History    Marital status: Single   Tobacco Use    Smoking status: Never     Passive exposure: Never    Smokeless tobacco: Never   Vaping Use    Vaping status: Never Used   Substance and Sexual Activity    Alcohol use: Not Currently     Comment: Glass of wine a couple times a year    Drug use: Never    Sexual activity: Not Currently     Birth control/protection: None     Family History   Problem Relation Age of Onset    Heart failure Mother     Heart disease Mother         Had a heart attack, then congestive heart failure, dead in one month at 82.    Hyperlipidemia Mother     Hypertension Mother     Developmental Disability Son         Auditory processing disorder, anxiety, language and learning disorder in math.    Atrial fibrillation Other         UNSPECIFIED    Colon cancer Neg Hx     Colon polyps Neg Hx     Crohn's disease Neg Hx     Irritable bowel syndrome Neg Hx     Ulcerative colitis Neg Hx     Malig Hyperthermia Neg Hx      Immunization History   Administered Date(s) Administered    ABRYSVO (RSV, 60+ or pregnant women 32-36 wks) 11/10/2023    COVID-19 (MODERNA) 12YRS+ (SPIKEVAX) 11/23/2024    COVID-19 (MODERNA) 1st,2nd,3rd Dose Monovalent 02/03/2021, 02/04/2021, 03/03/2021, 03/04/2021    COVID-19 (PFIZER) BIVALENT 12+YRS 11/26/2022    COVID-19 (PFIZER) Purple Cap Monovalent 11/22/2021    Fluzone High-Dose 65+YRS 10/04/2024    Fluzone High-Dose 65+yrs 10/07/2021, 10/10/2022, 11/10/2023    Influenza, Unspecified 10/13/2020    Pneumococcal Conjugate 13-Valent (PCV13) 10/01/2019    Shingrix 10/01/2019, 04/01/2020    Tdap 07/26/2021       Objective   Physical Exam  Well appearing patient in no acute distress on  "RA  Anicteric sclerae, no rash on exposed skin  Respirations non-labored  Awake, alert, and oriented x 4. Speech intact. No gross neurologic deficit  Appropriate mood and affect    Vitals:    12/06/24 0818   BP: 139/82   Pulse: 73   Resp: 18   Temp: 97.8 °F (36.6 °C)   TempSrc: Temporal   SpO2: 99%   Weight: 71.8 kg (158 lb 4.6 oz)   Height: 165.6 cm (65.2\")   PainSc: 0-No pain                       ECOG: (0) Fully Active - Able to Carry On All Pre-disease Performance Without Restriction  Fall Risk Assessment was completed, and patient is at low risk for falls.  PHQ-9 Total Score:         The patient is  experiencing fatigue. Fatigue score: 5    PT/OT Functional Screening: PT fx screen: No needs identified  Speech Functional Screening: Speech fx screen: No needs identified  Rehab to be ordered: Rehab to be ordered: No needs identified        Result Review :   The following data was reviewed by: Agustin Riddle MD on 12/06/24:  Lab Results   Component Value Date    HGB 13.1 11/04/2024    HCT 38.0 11/04/2024    MCV 94.5 11/04/2024     11/04/2024    WBC 4.61 11/04/2024    NEUTROABS 3.14 11/04/2024    LYMPHSABS 1.07 11/04/2024    MONOSABS 0.35 11/04/2024    EOSABS 0.02 11/04/2024    BASOSABS 0.02 11/04/2024     Lab Results   Component Value Date    GLUCOSE 94 11/26/2024    BUN 6 (L) 11/26/2024    CREATININE 0.59 11/26/2024     11/26/2024    K 3.9 11/26/2024    CL 99 11/26/2024    CO2 29.3 (H) 11/26/2024    CALCIUM 9.4 11/26/2024    PROTEINTOT 6.8 11/26/2024    ALBUMIN 4.0 11/26/2024    BILITOT 0.3 11/26/2024    ALKPHOS 65 11/26/2024    AST 17 11/26/2024    ALT 10 11/26/2024     Labs personally reviewed. Ferritin, iron sat normal. Hgb normal. MCV low.      Ortho note personally reviewed    PCP note personally reviewed        No radiology results for the last 30 days.          Assessment and Plan    Diagnoses and all orders for this visit:    1. Iron deficiency anemia, unspecified iron deficiency anemia " type (Primary)  -     CBC & Differential; Future  -     Ferritin; Future  -     Iron Profile; Future    2. Malignant neoplasm of upper-outer quadrant of left breast in female, estrogen receptor positive    Other orders  -     anastrozole (ARIMIDEX) 1 MG tablet; Take 1 tablet by mouth Daily.  Dispense: 90 tablet; Refill: 1            Left ER/MD breast cancer  Required re-resection 12/20/23 with sentinel node b iopsy due to positive margins 11/2023 lumpectomy. 21 mm. 100% ER and MD positive, HER2 negativ (1+ by IHC). Pathologic stage pT2N0 (sn). Recovered well from surgeries. Baseline MRI brain performed due to headaches and this was negative. PET scan also performed and negative for metastatic disease. Completed XRT 4/4/24. Oncotype score of 7, so chemotherapy not recommended. As hormone receptor positive and post-menopausal, recommendation will be for 5 years adjuvant hormone blocker with aromatase inhibitor.  Anastrozole 1 mg daily started as of 2/9/24. Tolerating well. Continue. RTC 6 months for toxicity check. 7/2023 DEXA normal, repeat due 7/2025. Recommend Vit D/Ca. Repeat mammogram 7/2025.    Chronic headaches  Unclear etiology. MRI brain negative. Patient taking PRN tylenol. Could have tension component.  PRN Fioricet added but stopped after 1 dose.     CAMILLA  History of iron deficiency anemia. As of 12/26/23: still has adequate iron stores now. Hgb slightly low as of 1/28/24. EGD on 10/21/22 with erosive gastropathy, non-bleeding; likely source of iron loss. Patient now on PPI daily, recommend to continue. Repeat iron studies in November in normal range and no anemia noted. Repeat 6 months.        GERD  Symptoms improved. Wants to continue PPI until weight is more in normal range.     This is an acute or chronic illness that poses a threat to life or bodily function. The above treatment plan involves a high risk of complications and/or mortality of patient management.        Follow up: 6 months    I spent 20  minutes caring for Tatyana on this date of service. This time includes time spent by me in the following activities:preparing for the visit, reviewing tests, obtaining and/or reviewing a separately obtained history, performing a medically appropriate examination and/or evaluation , counseling and educating the patient/family/caregiver, ordering medications, tests, or procedures, referring and communicating with other health care professionals , documenting information in the medical record and independently interpreting results and communicating that information with the patient/family/caregiver    Patient was given instructions and counseling regarding her condition or for health maintenance advice. Please see specific information pulled into the AVS if appropriate.

## 2024-12-19 ENCOUNTER — TELEPHONE (OUTPATIENT)
Dept: ORTHOPEDIC SURGERY | Facility: CLINIC | Age: 69
End: 2024-12-19
Payer: COMMERCIAL

## 2024-12-19 NOTE — TELEPHONE ENCOUNTER
SPOKE WITH PATIENT, SHE STATES SHE WILL HAVE TO CALL BACK TO SCHEDULE SCHEDULE SX FOR HER RIGHT 3RD TRIGGER FINGER RELEASE.

## 2024-12-20 NOTE — PROGRESS NOTES
Follow Up Office Visit      Encounter Date: 12/23/2024   Patient Name: Tatyana Shea  YOB: 1955   Medical Record Number: [id number]   Primary Diagnosis: Malignant neoplasm of upper-outer quadrant of left breast in female, estrogen receptor positive [C50.412, Z17.0]     Cancer Staging   Malignant neoplasm of upper-outer quadrant of left breast in female, estrogen receptor positive  Staging form: Breast, AJCC 8th Edition  - Pathologic: Stage IA (pT2, pN0, cM0, G1, ER+, ND+, HER2-) - Signed by Agustin Riddle MD on 1/16/2024    Radiation Completion Date:  4/4/2024 left breast     Chief Complaint:    Chief Complaint   Patient presents with    Follow-up    Breast Cancer       Oncology/Hematology History Overview Note   8/28/23: Left breast biopsy: invasive lobular, grade 2 overall, HER2 1= by IHC (negative), ER and ND both 100% positive    10/4/23: Breast MRI: There is 15 mm enhancing irregular mass in the upper outer left breast, 12 cm from the nipple, with associated postbiopsy hematoma biopsy-proven malignancy. There are no other suspicious enhancing masses or areas of nonmass enhancement.     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 Oncotype 7 so chemotherapy not recommended    12/20/23: Re-resection: no invasive carcinoma seen, 0/3 sentinel nodes positive, pN0.     1/4/24: NM PET: Left breast seroma, no metastatic disease seen.     1/11/24: MRI brain negative    2/9/24: Plan to start anastrozole 1 mg daily    4/4/24: Radiation XRT completed         Malignant neoplasm of upper-outer quadrant of left breast in female, estrogen receptor positive   12/28/2023 Initial Diagnosis    Malignant neoplasm of upper-outer quadrant of left breast in female, estrogen receptor positive     1/16/2024 Cancer Staged    Staging form: Breast, AJCC 8th Edition  - Pathologic: Stage IA (pT2, pN0, cM0, G1, ER+,  CT+, HER2-) - Signed by Agustin Riddle MD on 1/16/2024     3/14/2024 - 4/4/2024 Radiation    Radiation OncologyTreatment Course:  Tatyana Shea received 4256 cGy in 16 fractions to the left breast.      Primary malignant neoplasm of upper outer quadrant of left female breast   3/7/2024 Initial Diagnosis    Primary malignant neoplasm of upper outer quadrant of left female breast         History of Present Illness: Tatyana Shea is a 69 y.o. female who returns to [facility] for routine follow-up of her breast cancer. She reports feeling well overall with the exception of short bursts of pain through both breasts that she describes as nerve pain. She is understanding as to why she would have this pain in the left breast as this is the breast that was treated but she inquiring about why she would have this pain in the right breast. She has only experienced this pain in the right breast a few times.  She denies palpable masses, nipple changes or nipple discharge, limited range of motion in upper extremity. She followed up with Dr. Riddle on 12/6/24; note reviewed. She is taking anastrozole and tolerating well. At prior visit she was referred to Lymphedema Therapy and she had visit with them on 6/26/24; note reviewed.      Subjective      Review of Systems: Review of Systems   Constitutional:  Positive for fatigue (3/10). Negative for activity change, appetite change, chills, fever and unexpected weight change (Her recent weight loss is intentional/related to medications.).   HENT:  Negative for hearing loss, sore throat and trouble swallowing.    Eyes:  Negative for visual disturbance.   Respiratory:  Negative for cough, chest tightness, shortness of breath and wheezing.    Cardiovascular:  Negative for chest pain, palpitations and leg swelling.   Gastrointestinal:  Positive for constipation (Ongoing, takes laxative almost nightly.). Negative for abdominal distention, abdominal pain, anal bleeding,  blood in stool, diarrhea, nausea and vomiting.   Endocrine: Negative for heat intolerance.   Genitourinary:  Negative for difficulty urinating, dysuria, frequency, hematuria and urgency.   Musculoskeletal:  Positive for back pain (Ongoing but improved) and joint swelling (Noted in ankles, ongoing). Negative for arthralgias and gait problem.        Normal ROM in left arm.  No pain, swelling or numbness noted to left chest, axilla or left arm.   Skin:  Negative for color change, rash and wound.        Dry skin   Neurological:  Positive for dizziness (ongoing, constant.), weakness (Generalized, believes to be r/t age.  Ongoing) and headaches (Daily, mild, over eyes, wakes with, ongoing x4 years.). Negative for tremors, seizures, syncope, speech difficulty and numbness.   Psychiatric/Behavioral:  Positive for sleep disturbance (difficulty falling and staying asleep).        The following portions of the patient's history were reviewed and updated as appropriate: allergies, current medications, past family history, past medical history, past social history, past surgical history and problem list.    Medications:     Current Outpatient Medications:     amLODIPine (NORVASC) 5 MG tablet, TAKE 1 TABLET BY MOUTH DAILY, Disp: 90 tablet, Rfl: 3    anastrozole (ARIMIDEX) 1 MG tablet, Take 1 tablet by mouth Daily., Disp: 90 tablet, Rfl: 1    atorvastatin (LIPITOR) 10 MG tablet, Take 1 tablet by mouth Every Night., Disp: 90 tablet, Rfl: 1    Calcium Carbonate 500 MG chewable tablet, Chew 1,000 mg 2 (Two) Times a Day., Disp: , Rfl:     Cholecalciferol 125 MCG (5000 UT) tablet, Take 1 tablet by mouth Daily. LAST DOSE 12/16/22, Disp: , Rfl:     furosemide (LASIX) 20 MG tablet, TAKE 1 TABLET BY MOUTH TWICE A DAY (Patient taking differently: Take 1 tablet by mouth Daily.), Disp: 180 tablet, Rfl: 1    losartan (COZAAR) 50 MG tablet, Take 1 tablet by mouth 2 (Two) Times a Day., Disp: 180 tablet, Rfl: 1    multivitamin with minerals  tablet tablet, Take 1 tablet by mouth Daily. LAST DOSE 12/16/22, Disp: , Rfl:     pantoprazole (PROTONIX) 40 MG EC tablet, TAKE ONE TABLET BY MOUTH ONCE NIGHTLY, Disp: 90 tablet, Rfl: 3    potassium chloride 10 MEQ CR tablet, Take 1 tablet by mouth Daily., Disp: 90 tablet, Rfl: 1    Semaglutide-Weight Management 2.4 MG/0.75ML solution auto-injector, Inject 1 dose under the skin into the appropriate area as directed 1 (One) Time Per Week., Disp: 3 mL, Rfl: 1    Xarelto 20 MG tablet, TAKE 1 TABLET BY MOUTH DAILY, Disp: 90 tablet, Rfl: 1    NON FORMULARY, Take 1 tablet by mouth Every Other Day. Stool softener (Patient not taking: Reported on 12/23/2024), Disp: , Rfl:     Polyethylene Glycol 3350 (MIRALAX PO), Take  by mouth Every Other Day. (Patient not taking: Reported on 12/23/2024), Disp: , Rfl:     Allergies:   No Known Allergies    Patient Smoking History:   Social History     Tobacco Use   Smoking Status Never    Passive exposure: Never   Smokeless Tobacco Never       Measures:  PHQ-9 Total Score: 3   Quality of Life: 100 - Full Activity   ECOG score: 0  ECOG: (0) Fully active, able to carry on all predisease performance without restriction  Pain: (on a scale of 0-10)   Pain Score    12/23/24 0830   PainSc: 0-No pain     Objective     Physical Exam:   Vital Signs:   Vitals:    12/23/24 0830   BP: 139/77   Pulse: 72   Resp: 16   Temp: 97.2 °F (36.2 °C)   TempSrc: Temporal   SpO2: 100%   Weight: 70.4 kg (155 lb 3.3 oz)   PainSc: 0-No pain     Body mass index is 25.67 kg/m².   Wt Readings from Last 3 Encounters:   12/23/24 70.4 kg (155 lb 3.3 oz)   12/06/24 71.8 kg (158 lb 4.6 oz)   11/27/24 70.3 kg (155 lb)       Physical Exam  Vitals reviewed.   Constitutional:       General: She is not in acute distress.     Appearance: Normal appearance. She is normal weight. She is not ill-appearing.   HENT:      Head: Normocephalic and atraumatic.      Mouth/Throat:      Mouth: Mucous membranes are moist.      Pharynx:  Oropharynx is clear. No oropharyngeal exudate or posterior oropharyngeal erythema.   Eyes:      Conjunctiva/sclera: Conjunctivae normal.      Pupils: Pupils are equal, round, and reactive to light.   Cardiovascular:      Rate and Rhythm: Normal rate and regular rhythm.      Pulses: Normal pulses.      Heart sounds: Normal heart sounds.   Pulmonary:      Effort: Pulmonary effort is normal. No respiratory distress.      Breath sounds: Normal breath sounds.   Chest:   Breasts:     Right: No swelling, bleeding, inverted nipple, mass, nipple discharge, skin change or tenderness.      Left: No swelling, bleeding, inverted nipple, mass, nipple discharge, skin change or tenderness.       Musculoskeletal:         General: Normal range of motion.      Cervical back: Normal range of motion.      Comments: Full AROM LUE    Lymphadenopathy:      Upper Body:      Right upper body: No supraclavicular or axillary adenopathy.      Left upper body: No supraclavicular or axillary adenopathy.   Skin:     General: Skin is warm and dry.   Neurological:      General: No focal deficit present.      Mental Status: She is alert and oriented to person, place, and time. Mental status is at baseline.   Psychiatric:         Mood and Affect: Mood normal.         Behavior: Behavior normal.       Result Review: I independently reviewed the following data.   -- MAMMO DIAGNOSTIC DIGITAL TOMOSYNTHESIS BILATERAL W CAD (07/10/2024 09:56)     Pathology:   Lab Results   Component Value Date    CLININFO  08/28/2023     Breast mass      FINALDX  08/28/2023     Left breast,  1 o'clock position, 15 cm from nipple, stereotactic-guided core biopsy:  - Invasive lobular carcinoma  - Histologic grade ([place] Histologic Score):    - Glandular (Acinar)/Tubular Differentiation:  Score 3  - Nuclear Pleomorphism:  Score 2  - Mitotic Rate:  Score 1  - Overall Grade:  Grade 2   - Size of largest focus of invasion:  6 mm   - Breast biomarker testing:    - See synoptic  checklist    REMARKS: The above positive (malignant) diagnosis was reported to John BLOOM at 10:00 AM on 8/31/23 by and Ignacia Palafox RN at 9:57 AM on 8/31/23 by Dr. Gunn via [employer] Secure Chat.        SYNOPTIC  11/03/2023     INVASIVE CARCINOMA OF THE BREAST: Resection  INVASIVE CARCINOMA OF THE BREAST: COMPLETE EXCISION - All Specimens  8th Edition - Protocol posted: 3/22/2023    SPECIMEN     Procedure:    Excision (less than total mastectomy)      Specimen Laterality:    Left     TUMOR     Histologic Type:    Invasive carcinoma with mixed ductal and lobular features      Histologic Grade ([place] Histologic Score):           Glandular (Acinar) / Tubular Differentiation:    Score 2        Nuclear Pleomorphism:    Score 2        Mitotic Rate:    Score 1        Overall Grade:    Grade 1 (scores of 3, 4 or 5)      Tumor Size:    Greatest dimension of largest invasive focus (Millimeters): 21 mm     Tumor Focality:    Single focus of invasive carcinoma      Ductal Carcinoma In Situ (DCIS):    Not identified      Lobular Carcinoma In Situ (LCIS):    Not identified      Lymphatic and / or Vascular Invasion:    Not identified      Treatment Effect in the Breast:    No known presurgical therapy     MARGINS     Margin Status for Invasive Carcinoma:    Invasive carcinoma present at margin        Margin(s) Involved by Invasive Carcinoma:    Anterior: multiple foci at margin on main specimen; however, unclear if the extended medial margin (specimen B) covers this area        Distance from Invasive Carcinoma to Posterior Margin:    Less than: 1 mm       Distance from Invasive Carcinoma to Inferior Margin:    Less than: 1 mm       Distance from Invasive Carcinoma to Medial Margin:    Less than: 2 mm     Margin Comment:    for the inferior and posterior margins, unclear if these are covered by the extended medial margin (specimen B)     REGIONAL LYMPH NODES     Regional Lymph Node Status:    Not applicable (no  regional lymph nodes submitted or found)     pTNM CLASSIFICATION (AJCC 8th Edition)     Reporting of pT, pN, and (when applicable) pM categories is based on information available to the pathologist at the time the report is issued. As per the AJCC (Chapter 1, 8th Ed.) it is the managing physician’s responsibility to establish the final pathologic stage based upon all pertinent information, including but potentially not limited to this pathology report.     pT Category:    pT2      pN Category:    pN not assigned (no nodes submitted or found)     SPECIAL STUDIES     Estrogen Receptor (ER) Status:    Positive (greater than 10% of cells demonstrate nuclear positivity)        Percentage of Cells with Nuclear Positivity:    >95 %     Progesterone Receptor (PgR) Status:    Positive        Percentage of Cells with Nuclear Positivity:    >95 %     HER2 (by immunohistochemistry):    Negative (Score 1+)      Testing Performed on Case Number:    [id number]      Imaging: No radiology results for the last 90 days.     Labs:   WBC   Date Value Ref Range Status   11/04/2024 4.61 3.40 - 10.80 10*3/mm3 Final     Hemoglobin   Date Value Ref Range Status   11/04/2024 13.1 12.0 - 15.9 g/dL Final     Hematocrit   Date Value Ref Range Status   11/04/2024 38.0 34.0 - 46.6 % Final     Platelets   Date Value Ref Range Status   11/04/2024 224 140 - 450 10*3/mm3 Final     Creatinine   Date Value Ref Range Status   11/26/2024 0.59 0.57 - 1.00 mg/dL Final     BUN   Date Value Ref Range Status   11/26/2024 6 (L) 8 - 23 mg/dL Final     eGFR   Date Value Ref Range Status   11/26/2024 96.5 >60.0 mL/min/1.73 Final     TSH   Date Value Ref Range Status   01/17/2022 2.090 0.270 - 4.200 uIU/mL Final     Assessment / Plan      Impression: Tatyana Shea is a pleasant 69 y.o. female with initial cT1c cN0 cM0 invasive lobular carcinoma of the left breast, grade 2, ER/ND positive, HER2 negative with Ki-67 of 8%. Oncotype DX score of 7 so chemotherapy not  recommended. 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 Repeat resection with SNLB was performed on 12/20/2023 with no invasive carcinoma identified; pathology pT2 pN0. A total of 3 sentinel lymph nodes were removed and none contained metastatic disease. She is now status post external beam radiotherapy to the left breast, completed on 4/4/2024. Received 4256 cGy in 16 fractions. She is doing well overall and remains without evidence of disease clinically. Her bilateral diagnostic mammogram performed on 7/10/24 reveals no radiographic evidence of disease; BI-RADS 2.  She is doing well overall and remains without evidence of disease clinically. She does report experiencing a few episodes of discomfort within the right breast, which she describes as nerve pain.  Pain of unknown etiology but may be musculoskeletal in nature. Discussed this with her today and presented option of continued surveillance versus diagnostic mammogram. Since pain is improving and has not occurred recently she is agreeable with continued surveillance. She will contact our office if the pain persists and right breast diagnostic mammogram will be ordered at that time. She is taking anastrozole and tolerating well. She will return for follow-up in 6 months. If she is doing well at that time then will consider transitioning her to annual visits thereafter.     Chronic headaches: baseline MRI brain performed on 1/11/24 due to headaches and was negative.     Assessment/Plan:   Diagnoses and all orders for this visit:    1. Malignant neoplasm of upper-outer quadrant of left breast in female, estrogen receptor positive (Primary)    2. History of external beam radiation therapy    3. Use of anastrozole (Arimidex)    4. At risk for lymphedema    5. Scar condition and fibrosis of  skin      Follow Up:   Return for follow-up in 6 months.   Recommend continuation of annual screening mammogram; next mammography due in July 2025.    Follow-up with Lymphedema Therapy on 12/31/24.   Follow-up with Dr. Riddle on 6/3/25.    Return in about 6 months (around 6/23/2025) for Office Visit.  Tatyana Shea was encouraged to contact me in the interim with any questions or concerns regarding her care.      MERLE Mathews  Radiation Oncology  [facility]    This document has been signed by MERLE Coleman on December 23, 2024 11:16 EST

## 2024-12-23 ENCOUNTER — OFFICE VISIT (OUTPATIENT)
Dept: RADIATION ONCOLOGY | Facility: HOSPITAL | Age: 69
End: 2024-12-23
Payer: COMMERCIAL

## 2024-12-23 VITALS
BODY MASS INDEX: 25.67 KG/M2 | DIASTOLIC BLOOD PRESSURE: 77 MMHG | OXYGEN SATURATION: 100 % | TEMPERATURE: 97.2 F | RESPIRATION RATE: 16 BRPM | SYSTOLIC BLOOD PRESSURE: 139 MMHG | HEART RATE: 72 BPM | WEIGHT: 155.2 LBS

## 2024-12-23 DIAGNOSIS — Z17.0 MALIGNANT NEOPLASM OF UPPER-OUTER QUADRANT OF LEFT BREAST IN FEMALE, ESTROGEN RECEPTOR POSITIVE: Primary | ICD-10-CM

## 2024-12-23 DIAGNOSIS — C50.412 MALIGNANT NEOPLASM OF UPPER-OUTER QUADRANT OF LEFT BREAST IN FEMALE, ESTROGEN RECEPTOR POSITIVE: Primary | ICD-10-CM

## 2024-12-23 DIAGNOSIS — L90.5 SCAR CONDITION AND FIBROSIS OF SKIN: ICD-10-CM

## 2024-12-23 DIAGNOSIS — Z79.811 USE OF ANASTROZOLE (ARIMIDEX): ICD-10-CM

## 2024-12-23 DIAGNOSIS — Z92.3 HISTORY OF EXTERNAL BEAM RADIATION THERAPY: ICD-10-CM

## 2024-12-23 DIAGNOSIS — Z91.89 AT RISK FOR LYMPHEDEMA: ICD-10-CM

## 2024-12-23 PROCEDURE — G0463 HOSPITAL OUTPT CLINIC VISIT: HCPCS | Performed by: NURSE PRACTITIONER

## 2024-12-31 ENCOUNTER — HOSPITAL ENCOUNTER (OUTPATIENT)
Facility: HOSPITAL | Age: 69
Setting detail: THERAPIES SERIES
Discharge: HOME | End: 2024-12-31
Payer: COMMERCIAL

## 2024-12-31 DIAGNOSIS — Z17.0 MALIGNANT NEOPLASM OF UPPER-OUTER QUADRANT OF LEFT BREAST IN FEMALE, ESTROGEN RECEPTOR POSITIVE: ICD-10-CM

## 2024-12-31 DIAGNOSIS — Z91.89 AT RISK FOR LYMPHEDEMA: Primary | ICD-10-CM

## 2024-12-31 DIAGNOSIS — C50.412 PRIMARY MALIGNANT NEOPLASM OF UPPER OUTER QUADRANT OF LEFT FEMALE BREAST: ICD-10-CM

## 2024-12-31 DIAGNOSIS — C50.412 MALIGNANT NEOPLASM OF UPPER-OUTER QUADRANT OF LEFT BREAST IN FEMALE, ESTROGEN RECEPTOR POSITIVE: ICD-10-CM

## 2024-12-31 PROCEDURE — 93702 BIS XTRACELL FLUID ANALYSIS: CPT

## 2024-12-31 PROCEDURE — 97535 SELF CARE MNGMENT TRAINING: CPT

## 2024-12-31 NOTE — THERAPY RE-EVALUATION
Outpatient Occupational Therapy Lymphedema Re-Evaluation   [place]     Patient Name: Tatyana Shea  : 1955  MRN: [id number]  Today's Date: 2024      Visit Date: 2024    Patient Active Problem List   Diagnosis    Diverticulitis    Esophageal reflux    Gallstones    Hiatal hernia    Hypertension    Obstructive sleep apnea    Encounter for colonoscopy following colon polyp removal    Iron deficiency anemia    Syncope    Colon polyps    Malabsorption of iron    Nausea    Bloating    Early satiety    Trigger finger of left thumb    Malignant neoplasm of upper-outer quadrant of left breast in female, estrogen receptor positive    Primary malignant neoplasm of upper outer quadrant of left female breast        Past Medical History:   Diagnosis Date    Acid reflux     Arrhythmia     Breast cancer     Diverticulitis     NO CURRENT ISSUES    Diverticulosis 15 yrs ago    Currently having some mild pain in lower left    Headache     Hiatal hernia     HTN (hypertension)     Hyperlipidemia     Iron deficiency anemia     NO CURRENT S/S, HAD IRON INFUSIONS IN SUMMER 22    Low back pain     Obesity In my 40s    Eat for temporary comfort    Obstructive sleep apnea 2021    Peptic ulceration In my 40s    I had a bleeding ulcer due to my lifestyle at that time/NO CURRENT ISSUES    Pulmonary embolism     Due to birth control pills, I was told NONE CURRENT        Past Surgical History:   Procedure Laterality Date    BREAST LUMPECTOMY      CHOLECYSTECTOMY      COLONOSCOPY       with      COLONOSCOPY N/A 2021    Procedure: COLONOSCOPY with polypectomy/snare;  Surgeon: Ruel Foster MD;  Location: [facility] ENDOSCOPY;  Service: Gastroenterology;  Laterality: N/A;  colon polyp    COSMETIC SURGERY      Removed bags under and above my eyes    ENDOSCOPY N/A 2021    Procedure: ESOPHAGOGASTRODUODENOSCOPY with bxs and cold snares;  Surgeon: Ruel Foster MD;   Location: [facility] ENDOSCOPY;  Service: Gastroenterology;  Laterality: N/A;  hiatal hernia  gastric polyps    MASTECTOMY PARTIAL / LUMPECTOMY Left     Had one in Nove and Dec    TRIGGER FINGER RELEASE Left 12/21/2022    Procedure: LEFT THUMB FINGER TRIGGER RELEASE AND LEFT THUMB CYST EXCISION;  Surgeon: Inés Rashid MD;  [place] [facility] [place] [facility];  Service: Orthopedics;  Laterality: Left;    UPPER GASTROINTESTINAL ENDOSCOPY  07/28/2021    Dr. Fsoter         Visit Dx:     ICD-10-CM ICD-9-CM   1. At risk for lymphedema  Z91.89 V49.89   2. Malignant neoplasm of upper-outer quadrant of left breast in female, estrogen receptor positive  C50.412 174.4    Z17.0 V86.0   3. Primary malignant neoplasm of upper outer quadrant of left female breast  C50.412 174.4        Patient History       Row Name 12/31/24 0800             History    Chief Complaint Other 1 (comment)  Lymphedema surveillance program  -SF      Brief Description of Current Complaint Patient is a 69 year old female with dx of invasive lobular, grade 2 ER/WI+ HER2- carcinoma of the L breast. Patient underwent a lumpectomy w/ sentinel node biopsy on 11/3/2023. Patient also completed 16 fractions to the L breast.  -SF      Hand Dominance right-handed  -SF         Fall Risk Assessment    Any falls in the past year: No  -SF      Does patient have a fear of falling No  -SF         Services    Are you currently receiving Home Health services No  -SF      Do you plan to receive Home Health services in the near future No  -SF         Daily Activities    Primary Language English  -SF      Are you able to read Yes  -SF      Are you able to write Yes  -SF      How does patient learn best? Listening;Reading;Demonstration;Pictures/Video  -SF      Pt Participated in POC and Goals Yes  -SF         Safety    Are you being hurt, hit, or frightened by anyone at home or in your life? No  -SF      Are you being neglected by a caregiver No  -SF      Have you had any of the following  "issues with N/A  -SF                User Key  (r) = Recorded By, (t) = Taken By, (c) = Cosigned By      Initials Name Provider Type    Ledy Cunha OT Occupational Therapist                     Lymphedema       Row Name 12/31/24 0800             Subjective Pain    Able to rate subjective pain? yes  -SF      Pre-Treatment Pain Level 0  -SF      Post-Treatment Pain Level 0  -SF         Subjective    Subjective Comments Patient reports no new concerns.  Patient denies swelling, pain and decreased range of motion.  Patient denies pain or discomfort at breast incision site.  -SF         Lymphedema Assessment    Lymphedema Classification LUE:;at risk/stage 0  -SF      Lymphedema Cancer Related Sx left;lumpectomy;sentinel node biopsy  -SF      Lymphedema Surgery Comments 11/3/2023  -SF      Lymph Nodes Removed # 3  -SF      Positive Lymph Nodes # 0  -SF      Radiation Therapy Received yes  -SF      Radiation Treatments #/Timeframe --  16  -SF      Lymphedema Precautions other (comment)  post-op seroma  -SF         LLIS - Physical Concerns    The amount of pain associated with my lymphedema is: 0  -SF      The amount of limb heaviness associated with my lymphedema is: 0  -SF      The amount of skin tightness associated with my lymphedema is: 0  -SF      The size of my swollen limb(s) seems: 0  -SF      Lymphedema affects the movement of my swollen limb(s): 0  -SF      The strength in my swollen limb(s) is: 0  -SF         LLIS - Psychosocial Concerns    Lymphedema affects my body image (i.e., \"how I think I look\"). 0  -SF      Lymphedema affects my socializing with others. 0  -SF      Lymphedema affects my intimate relations with spouse or partner (rate 0 if not applicable 0  -SF      Lymphedema \"gets me down\" (i.e., depression, frustration, or anger) 0  -SF      I must rely on others for help due to my lymphedema. 0  -SF      I know what to do to manage my lymphedema 4  -SF         LLIS - Functional Concerns    " Lymphedema affects my ability to perform self-care activities (i.e. eating, dressing, hygiene) 0  -SF      Lymphedema affects my ability to perform routine home or work-related activities. 0  -SF      Lymphedema affects my performance of preferred leisure activities. 0  -SF      Lymphedema affects proper fit of clothing/shoes 0  -SF      Lymphedema affects my sleep 0  -SF         Posture/Observations    Posture- WNL Posture is WNL  -SF         General ROM    GENERAL ROM COMMENTS BUE WFL  -SF         MMT (Manual Muscle Testing)    General MMT Comments BUE WFL  -SF         Skin Changes/Observations    Location/Assessment Upper Quadrant  -SF      Upper Quadrant Conditions left:;normal;intact;clean  -SF      Upper Quadrant Color/Pigment left:;fibrosis  -SF      Upper Quadrant Skin Details Patient presenting with fibrosis and scar tissue at L breast incision. Improvements noted from last visit.  -SF         Lymphedema Measurements    Measurement Type(s) Volumetric  -SF      Volumetric Areas Upper extremities  -SF         L-Dex Bioimpedence Screening    L-Dex Measurement Extremity LUE  -SF      L-Dex Patient Position Standing  -SF      L-Dex UE Dominate Side Right  -SF      L-Dex UE At Risk Side Left  -SF      L-Dex UE Pre Surgical Value No  -SF      L-Dex UE Score -1.1  -SF      L-Dex UE Baseline Score -6  -SF      L-Dex UE Value Change 4.9  -SF      $ L-Dex Charge yes  -SF         Lymphedema Life Impact Scale Totals    A.  Total Q1 - Q17 (Do not include Q18) 4  -SF      B.  Total number of questions answered (Q1-Q17) 17  -SF      C. Divide A by B 0.24  -SF      D. Multiple C by 25 6  -SF                User Key  (r) = Recorded By, (t) = Taken By, (c) = Cosigned By      Initials Name Provider Type    SF Ledy Hugo OT Occupational Therapist                            Therapy Education  Education Details: Patient requesting to transition to every 6-month appointments to coincide with radiation follow-ups.  Therapist  reviewing signs and symptoms of lymphedema and encouraged patient to contact clinic before follow-up appointment if she experiences a change in symptoms.  Patient reporting she still has all of her handouts.  Given: Symptoms/condition management  Program: Reinforced  How Provided: Verbal  Provided to: Patient  Level of Understanding: Verbalized  23816 - OT Self Care/Mgmt Minutes: 24         OT Goals       Row Name 12/31/24 0919          Time Calculation    OT Goal Re-Cert Due Date 01/30/25  -               User Key  (r) = Recorded By, (t) = Taken By, (c) = Cosigned By      Initials Name Provider Type    Ledy Cunha OT Occupational Therapist                  GOALS:  1. Post Breast Surgery Care/at risk for Lymphedema  LTG 1: 90 days:  As an indicator of no exacerbation of lymphedema staging, the patient will present with an L-Dex score less than [10] points from preoperative baseline.              STATUS: Met, ongoing  STG 1a: 30 days: Patient will be independent with self-manual lymphatic massage.               STATUS: Ongoing  STG 1b: 30 days:  Patient will be independent with identification of signs and symptoms of lymphedema exasperation per stoplight to recovery education handout.              STATUS: Ongoing  STG 1c: 30 days: Patient will be independent with HEP to prevent advancement in lymphedema staging.              STATUS: Ongoing  TREATMENT:  Self Care/ADL retraining, Therapeutic Activity, Neuromuscular Re-education, Therapeutic Exercise, Bioimpedence Fluid Analysis, Post-Surgical compression garement [address] Zip-ST-High/ Luanne Camisole Kit 2860K, Orthotic Management and training,  and Manual Therapy.      OT Assessment/Plan       Row Name 12/31/24 0918          OT Assessment    Functional Limitations Limitations in functional capacity and performance  -SF     Impairments Impaired lymphatic circulation  -SF     Assessment Comments Patient is a 69 year old female with dx of invasive lobular,  grade 2 ER/HI+ HER2- carcinoma of the L breast. Patient underwent a lumpectomy w/ sentinel node biopsy on 11/3/2023 where she had 3 lymph nodes removed. Patient also completed 16 fractions to the L breast. Patient is demonstrating normalized lymphatic functioning and subjectively reports no concerns with swelling, pain or decreased range of motion.  Patient will benefit from skilled occupational therapy to further evaluate ongoing lymphatic functioning to prevent advancement in lymphedema staging, increased pain and decreased range of motion.  -SF     OT Diagnosis At risk for lymphedema  -SF     OT Rehab Potential Good  -SF     Patient/caregiver participated in establishment of treatment plan and goals Yes  -SF     Patient would benefit from skilled therapy intervention Yes  -SF        OT Plan    OT Frequency Other (comment)  -SF     Predicted Duration of Therapy Intervention (OT) Patient will be reevaluated every 6 months years 2 through 5  -SF     Planned CPT's? OT EVAL LOW COMPLEXITY: 97430;OT EVAL MOD COMPLEXITY: 87800;OT EVAL HIGH COMPLEXITY: 99645;OT RE-EVAL: 09924;OT THER PROC EA 15 MIN: 44144VY;OT THER ACT EA 15 MIN: 76003LT;OT SELF CARE/MGMT/TRAIN 15 MIN: 58708;OT MANUAL THERAPY EA 15 MIN: 58126;OT CARE PLAN EA 15 MIN;OT ORTHOTIC MGMT/TRAIN EA 15 MIN: 33897;OT ORTHO/PROSTHET CHECKOUT EA 15 MIN: 83097  -SF     Planned Therapy Interventions (Optional Details) home exercise program;manual therapy techniques;stretching;strengthening;ROM (Range of Motion);patient/family education  -SF     OT Plan Comments Continue POC  -SF               User Key  (r) = Recorded By, (t) = Taken By, (c) = Cosigned By      Initials Name Provider Type    SF Ledy Hugo OT Occupational Therapist                              Time Calculation:   Timed Charges  22485 - OT Self Care/Mgmt Minutes: 24  Total Minutes  Timed Charges Total Minutes: 24   Total Minutes: 24     Therapy Charges for Today       Code Description Service Date  Service Provider Modifiers Qty    43624799747 HC PT BIS XTRACELL FLUID ANALYSIS 12/31/2024 Ledy Hugo OT  1    42975200364 HC OT SELF CARE/MGMT/TRAIN EA 15 MIN 12/31/2024 Ledy Hugo OT GO 2                      Ledy Hugo OT  12/31/2024

## 2025-01-13 RX ORDER — SEMAGLUTIDE 2.4 MG/.75ML
INJECTION, SOLUTION SUBCUTANEOUS
Qty: 3 ML | Refills: 1 | Status: SHIPPED | OUTPATIENT
Start: 2025-01-13

## 2025-01-13 NOTE — TELEPHONE ENCOUNTER
UPCOMING APPTS  With Family Medicine (MERLE Alvarenga)  05/28/2025 at 8:00 AM  LAST OFFICE VISIT - THIS DEPT  11/27/2024 John Aguilera APRN

## 2025-01-21 RX ORDER — PANTOPRAZOLE SODIUM 40 MG/1
40 TABLET, DELAYED RELEASE ORAL NIGHTLY
Qty: 90 TABLET | Refills: 2 | Status: SHIPPED | OUTPATIENT
Start: 2025-01-21

## 2025-01-21 NOTE — TELEPHONE ENCOUNTER
Caller: Tatyana Shea    Relationship: Self    Best call back number: [phone]    Requested Prescriptions:   Requested Prescriptions     Pending Prescriptions Disp Refills    pantoprazole (PROTONIX) 40 MG EC tablet 90 tablet 3     Sig: Take 1 tablet by mouth Every Night.        Pharmacy where request should be sent: [facility] [phone] [facility] [address] AT [facility] [address] [facility] [phone] [facility] [phone] FX     Last office visit with prescribing clinician: Visit date not found   Last telemedicine visit with prescribing clinician: 8/20/2024   Next office visit with prescribing clinician: 8/19/2025     Additional details provided by patient: PATIENT HAS BEEN OUT NOW FOR A FEW DAYS, PHARMACY STATES THEY SENT OVER A COUPLE FAXES REGARDING THE REFILL BUT NO RESPONSE     Does the patient have less than a 3 day supply:  [x] Yes  [] No    Would you like a call back once the refill request has been completed: [] Yes [x] No    If the office needs to give you a call back, can they leave a voicemail: [] Yes [x] No    Veronica Coates, Camelia Rep   01/21/25 10:42 EST

## 2025-02-03 DIAGNOSIS — I82.402 ACUTE DEEP VEIN THROMBOSIS (DVT) OF LEFT LOWER EXTREMITY, UNSPECIFIED VEIN: ICD-10-CM

## 2025-02-03 RX ORDER — RIVAROXABAN 20 MG/1
20 TABLET, FILM COATED ORAL DAILY
Qty: 90 TABLET | Refills: 1 | Status: SHIPPED | OUTPATIENT
Start: 2025-02-03

## 2025-02-13 DIAGNOSIS — I10 ESSENTIAL HYPERTENSION: ICD-10-CM

## 2025-02-13 RX ORDER — FUROSEMIDE 20 MG/1
20 TABLET ORAL 2 TIMES DAILY
Qty: 180 TABLET | Refills: 1 | Status: SHIPPED | OUTPATIENT
Start: 2025-02-13

## 2025-03-31 ENCOUNTER — OFFICE VISIT (OUTPATIENT)
Dept: FAMILY MEDICINE CLINIC | Facility: CLINIC | Age: 70
End: 2025-03-31
Payer: COMMERCIAL

## 2025-03-31 VITALS
DIASTOLIC BLOOD PRESSURE: 68 MMHG | HEART RATE: 78 BPM | SYSTOLIC BLOOD PRESSURE: 124 MMHG | TEMPERATURE: 97.4 F | WEIGHT: 151.6 LBS | HEIGHT: 65 IN | OXYGEN SATURATION: 98 % | BODY MASS INDEX: 25.26 KG/M2

## 2025-03-31 DIAGNOSIS — Z85.3 HISTORY OF BREAST CANCER IN FEMALE: Primary | ICD-10-CM

## 2025-03-31 DIAGNOSIS — C50.912 LOBULAR CARCINOMA OF LEFT BREAST: ICD-10-CM

## 2025-03-31 NOTE — PROGRESS NOTES
Chief Complaint  Breast Mass (Found lump in left breast)    Subjective        Tatyana Shea presents to [facility]  History of Present Illness  Breast mass  left breast with at 7 oclock that is thickened on breast and non painful  no lump but no definite lump felt. Same breast as lobular cancer.  Breast Mass  Pertinent negative symptoms include no chest pain, no cough, no fever, no numbness and no weakness.       The following portions of the patient's history were personally reviewed and updated as appropriate: allergies, current medications, past medical history, past surgical history, past family history, and past social history.     Body mass index is 25.08 kg/m².           Past History:    Medical History: has a past medical history of Acid reflux, Arrhythmia (2013), Breast cancer, Diverticulitis, Diverticulosis (15 yrs ago), Headache (2021), Hiatal hernia, HTN (hypertension), Hyperlipidemia (2010), Iron deficiency anemia, Low back pain, Obesity (In my 40s), Obstructive sleep apnea (07/02/2021), Peptic ulceration (In my 40s), and Pulmonary embolism (1975).     Surgical History: has a past surgical history that includes Cholecystectomy; Colonoscopy; Colonoscopy (N/A, 07/29/2021); Upper gastrointestinal endoscopy (07/28/2021); Esophagogastroduodenoscopy (N/A, 07/28/2021); Cosmetic surgery (2013); Trigger finger release (Left, 12/21/2022); Mastectomy partial / lumpectomy (Left); and Breast lumpectomy.     Family History: family history includes Atrial fibrillation in an other family member; Developmental Disability in her son; Heart disease in her mother; Heart failure in her mother; Hyperlipidemia in her mother; Hypertension in her mother.     Social History: reports that she has never smoked. She has never been exposed to tobacco smoke. She has never used smokeless tobacco. She reports that she does not currently use alcohol. She reports that she does not use  drugs.    Allergies: Patient has no known allergies.          Current Outpatient Medications:     amLODIPine (NORVASC) 5 MG tablet, TAKE 1 TABLET BY MOUTH DAILY, Disp: 90 tablet, Rfl: 3    anastrozole (ARIMIDEX) 1 MG tablet, Take 1 tablet by mouth Daily., Disp: 90 tablet, Rfl: 1    atorvastatin (LIPITOR) 10 MG tablet, Take 1 tablet by mouth Every Night., Disp: 90 tablet, Rfl: 1    Calcium Carbonate 500 MG chewable tablet, Chew 1,000 mg 2 (Two) Times a Day., Disp: , Rfl:     Cholecalciferol 125 MCG (5000 UT) tablet, Take 1 tablet by mouth Daily. LAST DOSE 12/16/22, Disp: , Rfl:     furosemide (LASIX) 20 MG tablet, TAKE 1 TABLET BY MOUTH TWICE A DAY, Disp: 180 tablet, Rfl: 1    losartan (COZAAR) 50 MG tablet, Take 1 tablet by mouth 2 (Two) Times a Day., Disp: 180 tablet, Rfl: 1    multivitamin with minerals tablet tablet, Take 1 tablet by mouth Daily. LAST DOSE 12/16/22, Disp: , Rfl:     NON FORMULARY, Take 1 tablet by mouth Every Other Day. Stool softener, Disp: , Rfl:     pantoprazole (PROTONIX) 40 MG EC tablet, Take 1 tablet by mouth Every Night., Disp: 90 tablet, Rfl: 2    Polyethylene Glycol 3350 (MIRALAX PO), Take  by mouth Every Other Day., Disp: , Rfl:     potassium chloride 10 MEQ CR tablet, Take 1 tablet by mouth Daily., Disp: 90 tablet, Rfl: 1    Xarelto 20 MG tablet, TAKE 1 TABLET BY MOUTH DAILY, Disp: 90 tablet, Rfl: 1    Medications Discontinued During This Encounter   Medication Reason    Wegovy 2.4 MG/0.75ML solution auto-injector *Therapy completed         Review of Systems   Constitutional:  Negative for fever.   Respiratory:  Negative for cough and shortness of breath.    Cardiovascular:  Negative for chest pain, palpitations and leg swelling.   Genitourinary:  Positive for breast lump.   Neurological:  Negative for dizziness, weakness, numbness and headache.        Objective         Vitals:    03/31/25 1326   BP: 124/68   BP Location: Right arm   Patient Position: Sitting   Cuff Size: Adult  "  Pulse: 78   Temp: 97.4 °F (36.3 °C)   TempSrc: Temporal   SpO2: 98%   Weight: 68.8 kg (151 lb 9.6 oz)   Height: 165.6 cm (65.2\")     Body mass index is 25.08 kg/m².         Physical Exam  Vitals reviewed.   Constitutional:       Appearance: Normal appearance. She is well-developed.   HENT:      Head: Normocephalic and atraumatic.      Right Ear: External ear normal.      Left Ear: External ear normal.      Mouth/Throat:      Pharynx: No oropharyngeal exudate.   Eyes:      Conjunctiva/sclera: Conjunctivae normal.      Pupils: Pupils are equal, round, and reactive to light.   Cardiovascular:      Rate and Rhythm: Normal rate and regular rhythm.      Heart sounds: No murmur heard.     No friction rub. No gallop.   Pulmonary:      Effort: Pulmonary effort is normal.      Breath sounds: Normal breath sounds. No wheezing or rhonchi.   Chest:          Comments: Thickening area noted to palpation without a discrete lump.  Orange peel like area noted   Abdominal:      General: Bowel sounds are normal. There is no distension.      Palpations: Abdomen is soft.      Tenderness: There is no abdominal tenderness.   Skin:     General: Skin is warm and dry.   Neurological:      Mental Status: She is alert and oriented to person, place, and time.      Cranial Nerves: No cranial nerve deficit.   Psychiatric:         Mood and Affect: Mood and affect normal.         Behavior: Behavior normal.         Thought Content: Thought content normal.         Judgment: Judgment normal.             Result Review :               Assessment and Plan     Diagnoses and all orders for this visit:    1. History of breast cancer in female (Primary)  -     MRI Breast Left With & Without Contrast; Future  -     Mammo Diagnostic Digital Tomosynthesis Bilateral With CAD; Future  -     US Breast Bilateral Limited; Future    2. Lobular carcinoma of left breast  -     MRI Breast Left With & Without Contrast; Future  -     Mammo Diagnostic Digital " Tomosynthesis Bilateral With CAD; Future  -     US Breast Bilateral Limited; Future              Follow Up     Return for Next scheduled follow up.    Patient was given instructions and counseling regarding her condition or for health maintenance advice. Please see specific information pulled into the AVS if appropriate.

## 2025-04-24 ENCOUNTER — HOSPITAL ENCOUNTER (OUTPATIENT)
Dept: ULTRASOUND IMAGING | Facility: HOSPITAL | Age: 70
Discharge: HOME | End: 2025-04-24
Payer: COMMERCIAL

## 2025-04-24 ENCOUNTER — HOSPITAL ENCOUNTER (OUTPATIENT)
Dept: MAMMOGRAPHY | Facility: HOSPITAL | Age: 70
Discharge: HOME | End: 2025-04-24
Payer: COMMERCIAL

## 2025-04-24 DIAGNOSIS — Z85.3 HISTORY OF BREAST CANCER IN FEMALE: ICD-10-CM

## 2025-04-24 DIAGNOSIS — C50.912 LOBULAR CARCINOMA OF LEFT BREAST: ICD-10-CM

## 2025-04-24 PROCEDURE — G0279 TOMOSYNTHESIS, MAMMO: HCPCS

## 2025-04-24 PROCEDURE — 76642 ULTRASOUND BREAST LIMITED: CPT

## 2025-04-24 PROCEDURE — 77066 DX MAMMO INCL CAD BI: CPT

## 2025-05-01 ENCOUNTER — OFFICE VISIT (OUTPATIENT)
Dept: RADIATION ONCOLOGY | Facility: HOSPITAL | Age: 70
End: 2025-05-01
Payer: COMMERCIAL

## 2025-05-01 VITALS
SYSTOLIC BLOOD PRESSURE: 141 MMHG | RESPIRATION RATE: 16 BRPM | HEART RATE: 67 BPM | OXYGEN SATURATION: 100 % | WEIGHT: 164.24 LBS | TEMPERATURE: 98.5 F | DIASTOLIC BLOOD PRESSURE: 74 MMHG | BODY MASS INDEX: 27.17 KG/M2

## 2025-05-01 DIAGNOSIS — Z08 ENCOUNTER FOR FOLLOW-UP SURVEILLANCE OF BREAST CANCER: ICD-10-CM

## 2025-05-01 DIAGNOSIS — L90.5 SCAR CONDITION AND FIBROSIS OF SKIN: ICD-10-CM

## 2025-05-01 DIAGNOSIS — Z91.89 AT RISK FOR LYMPHEDEMA: ICD-10-CM

## 2025-05-01 DIAGNOSIS — Z85.3 ENCOUNTER FOR FOLLOW-UP SURVEILLANCE OF BREAST CANCER: ICD-10-CM

## 2025-05-01 DIAGNOSIS — Z92.3 HISTORY OF EXTERNAL BEAM RADIATION THERAPY: ICD-10-CM

## 2025-05-01 DIAGNOSIS — C50.412 MALIGNANT NEOPLASM OF UPPER-OUTER QUADRANT OF LEFT BREAST IN FEMALE, ESTROGEN RECEPTOR POSITIVE: Primary | ICD-10-CM

## 2025-05-01 DIAGNOSIS — N64.59 THICKENING OF SKIN OF BREAST: ICD-10-CM

## 2025-05-01 DIAGNOSIS — Z17.0 MALIGNANT NEOPLASM OF UPPER-OUTER QUADRANT OF LEFT BREAST IN FEMALE, ESTROGEN RECEPTOR POSITIVE: Primary | ICD-10-CM

## 2025-05-01 DIAGNOSIS — Z79.811 USE OF ANASTROZOLE (ARIMIDEX): ICD-10-CM

## 2025-05-01 PROCEDURE — G0463 HOSPITAL OUTPT CLINIC VISIT: HCPCS | Performed by: NURSE PRACTITIONER

## 2025-05-01 NOTE — PROGRESS NOTES
Follow Up Office Visit      Encounter Date: 05/01/2025   Patient Name: Tatyana Shea  YOB: 1955   Medical Record Number: [id number]   Primary Diagnosis: Malignant neoplasm of upper-outer quadrant of left breast in female, estrogen receptor positive [C50.412, Z17.0]     Cancer Staging   Malignant neoplasm of upper-outer quadrant of left breast in female, estrogen receptor positive  Staging form: Breast, AJCC 8th Edition  - Pathologic: Stage IA (pT2, pN0, cM0, G1, ER+, MD+, HER2-) - Signed by Agustin Riddle MD on 1/16/2024    Radiation Completion Date:  4/4/2024 left breast     Chief Complaint:    Chief Complaint   Patient presents with    Follow-up    Breast Cancer       Oncology/Hematology History Overview Note   8/28/23: Left breast biopsy: invasive lobular, grade 2 overall, HER2 1= by IHC (negative), ER and MD both 100% positive    10/4/23: Breast MRI: There is 15 mm enhancing irregular mass in the upper outer left breast, 12 cm from the nipple, with associated postbiopsy hematoma biopsy-proven malignancy. There are no other suspicious enhancing masses or areas of nonmass enhancement.     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 Oncotype 7 so chemotherapy not recommended    12/20/23: Re-resection: no invasive carcinoma seen, 0/3 sentinel nodes positive, pN0.     1/4/24: NM PET: Left breast seroma, no metastatic disease seen.     1/11/24: MRI brain negative    2/9/24: Plan to start anastrozole 1 mg daily    4/4/24: Radiation XRT completed         Malignant neoplasm of upper-outer quadrant of left breast in female, estrogen receptor positive   12/28/2023 Initial Diagnosis    Malignant neoplasm of upper-outer quadrant of left breast in female, estrogen receptor positive     1/16/2024 Cancer Staged    Staging form: Breast, AJCC 8th Edition  - Pathologic: Stage IA (pT2, pN0, cM0, G1, ER+,  WV+, HER2-) - Signed by Agustin Riddle MD on 1/16/2024     3/14/2024 - 4/4/2024 Radiation    Radiation OncologyTreatment Course:  Tatyana Shea received 4256 cGy in 16 fractions to the left breast.      Primary malignant neoplasm of upper outer quadrant of left female breast   3/7/2024 Initial Diagnosis    Primary malignant neoplasm of upper outer quadrant of left female breast         History of Present Illness: Tatyana Shea is a 69 y.o. female who returns to [facility] for routine follow-up of her breast cancer.   History of Present Illness  The patient presents for evaluation of right breast skin thickening.This was discovered while scratching the area during a television viewing session at the end of 03/2025 and is described as a leathery texture. No associated pain or redness in the area is reported. She saw her PCP regarding this finding and mammography was ordered which revealed skin thickening in the lower inner quadrant of the breast. She recalls a previous MRI conducted prior to her lumpectomy, which was particularly distressing due to claustrophobia and she does not desire to undergo another MRI unless absolutely necessary.     She is currently under the care of Dr. Riddle, who manages her iron levels. Since her breast [place] was [place] [facility], she is also followed by a medical oncologist there who is prescribing her anastrozole. Hot flashes were experienced early in the treatment but have not occurred in the past 6 months. She is no longer following up with Lymphedema Therapy.     Subjective      Review of Systems: Review of Systems   Constitutional:  Negative for activity change, appetite change, chills, fatigue, fever and unexpected weight change (Her recent weight loss is intentional/related to medications.).   Eyes:  Positive for visual disturbance (wears glasses).   Respiratory:  Negative for cough, chest tightness, shortness of breath and wheezing.    Cardiovascular:   "Negative for chest pain, palpitations and leg swelling.   Endocrine:        Denies hot flashes   Musculoskeletal:  Positive for joint swelling (Noted in ankles, ongoing).        Normal ROM in left arm.  No pain, swelling or numbness noted to left chest, axilla or left arm.   Skin:         Dry skin   Neurological:  Positive for dizziness (ongoing). Negative for tremors, seizures, syncope, speech difficulty, numbness and headaches.        \"My balance is terrible.\"   Psychiatric/Behavioral:  Positive for sleep disturbance (difficulty staying asleep).        The following portions of the patient's history were reviewed and updated as appropriate: allergies, current medications, past family history, past medical history, past social history, past surgical history and problem list.    Medications:     Current Outpatient Medications:     amLODIPine (NORVASC) 5 MG tablet, TAKE 1 TABLET BY MOUTH DAILY, Disp: 90 tablet, Rfl: 3    anastrozole (ARIMIDEX) 1 MG tablet, Take 1 tablet by mouth Daily., Disp: 90 tablet, Rfl: 1    atorvastatin (LIPITOR) 10 MG tablet, Take 1 tablet by mouth Every Night., Disp: 90 tablet, Rfl: 1    Calcium Carbonate 500 MG chewable tablet, Chew 1,000 mg 2 (Two) Times a Day., Disp: , Rfl:     Cholecalciferol 125 MCG (5000 UT) tablet, Take 1 tablet by mouth Daily. LAST DOSE 12/16/22, Disp: , Rfl:     furosemide (LASIX) 20 MG tablet, TAKE 1 TABLET BY MOUTH TWICE A DAY, Disp: 180 tablet, Rfl: 1    losartan (COZAAR) 50 MG tablet, Take 1 tablet by mouth 2 (Two) Times a Day., Disp: 180 tablet, Rfl: 1    multivitamin with minerals tablet tablet, Take 1 tablet by mouth Daily. LAST DOSE 12/16/22, Disp: , Rfl:     NON FORMULARY, Take 1 tablet by mouth Every Other Day. Stool softener, Disp: , Rfl:     pantoprazole (PROTONIX) 40 MG EC tablet, Take 1 tablet by mouth Every Night., Disp: 90 tablet, Rfl: 2    Polyethylene Glycol 3350 (MIRALAX PO), Take  by mouth Every Other Day., Disp: , Rfl:     potassium chloride 10 " MEQ CR tablet, Take 1 tablet by mouth Daily., Disp: 90 tablet, Rfl: 1    Xarelto 20 MG tablet, TAKE 1 TABLET BY MOUTH DAILY, Disp: 90 tablet, Rfl: 1    Allergies:   No Known Allergies    Patient Smoking History:   Social History     Tobacco Use   Smoking Status Never    Passive exposure: Never   Smokeless Tobacco Never       Measures:  PHQ-9 Total Score: 1   Quality of Life: 100 - Full Activity   ECOG score: 0  ECOG: (0) Fully active, able to carry on all predisease performance without restriction  Pain: (on a scale of 0-10)   Pain Score    05/01/25 1505   PainSc: 0-No pain     Objective     Physical Exam:   Vital Signs:   Vitals:    05/01/25 1505   BP: 141/74   Pulse: 67   Resp: 16   Temp: 98.5 °F (36.9 °C)   TempSrc: Oral   SpO2: 100%   Weight: 74.5 kg (164 lb 3.9 oz)   PainSc: 0-No pain     Body mass index is 27.17 kg/m².   Wt Readings from Last 3 Encounters:   05/01/25 74.5 kg (164 lb 3.9 oz)   03/31/25 68.8 kg (151 lb 9.6 oz)   12/23/24 70.4 kg (155 lb 3.3 oz)       Physical Exam  Vitals reviewed.   Constitutional:       General: She is not in acute distress.     Appearance: Normal appearance. She is normal weight. She is not ill-appearing.   HENT:      Head: Normocephalic and atraumatic.      Mouth/Throat:      Mouth: Mucous membranes are moist.      Pharynx: Oropharynx is clear. No oropharyngeal exudate or posterior oropharyngeal erythema.   Eyes:      Conjunctiva/sclera: Conjunctivae normal.      Pupils: Pupils are equal, round, and reactive to light.   Cardiovascular:      Rate and Rhythm: Normal rate and regular rhythm.      Pulses: Normal pulses.      Heart sounds: Normal heart sounds.   Pulmonary:      Effort: Pulmonary effort is normal. No respiratory distress.      Breath sounds: Normal breath sounds.   Chest:   Breasts:     Right: No swelling, bleeding, inverted nipple, mass, nipple discharge, skin change or tenderness.      Left: No swelling, bleeding, inverted nipple, mass, nipple discharge, skin  change or tenderness.       Musculoskeletal:         General: Normal range of motion.      Cervical back: Normal range of motion.      Comments: Full AROM LUE    Lymphadenopathy:      Upper Body:      Right upper body: No supraclavicular or axillary adenopathy.      Left upper body: No supraclavicular or axillary adenopathy.   Skin:     General: Skin is warm and dry.   Neurological:      General: No focal deficit present.      Mental Status: She is alert and oriented to person, place, and time. Mental status is at baseline.   Psychiatric:         Mood and Affect: Mood normal.         Behavior: Behavior normal.       Result Review: I independently reviewed the following data.   -- Mammo Diagnostic Digital Tomosynthesis Bilateral With CAD (04/24/2025 10:13)   -- US Breast Left Limited (04/24/2025 11:02)   -- MAMMO DIAGNOSTIC DIGITAL TOMOSYNTHESIS BILATERAL W CAD (07/10/2024 09:56)     Pathology:   Lab Results   Component Value Date    CLININFO  08/28/2023     Breast mass      FINALDX  08/28/2023     Left breast,  1 o'clock position, 15 cm from nipple, stereotactic-guided core biopsy:  - Invasive lobular carcinoma  - Histologic grade (Geovanna Histologic Score):    - Glandular (Acinar)/Tubular Differentiation:  Score 3  - Nuclear Pleomorphism:  Score 2  - Mitotic Rate:  Score 1  - Overall Grade:  Grade 2   - Size of largest focus of invasion:  6 mm   - Breast biomarker testing:    - See synoptic checklist    REMARKS: The above positive (malignant) diagnosis was reported to John BLOOM at 10:00 AM on 8/31/23 by and Ignacia Palafox RN at 9:57 AM on 8/31/23 by Dr. Gunn via [email or web address] Secure Chat.        SYNOPTIC  11/03/2023     INVASIVE CARCINOMA OF THE BREAST: Resection  INVASIVE CARCINOMA OF THE BREAST: COMPLETE EXCISION - All Specimens  8th Edition - Protocol posted: 3/22/2023    SPECIMEN     Procedure:    Excision (less than total mastectomy)      Specimen Laterality:    Left     TUMOR     Histologic Type:     Invasive carcinoma with mixed ductal and lobular features      Histologic Grade (Geovanna Histologic Score):           Glandular (Acinar) / Tubular Differentiation:    Score 2        Nuclear Pleomorphism:    Score 2        Mitotic Rate:    Score 1        Overall Grade:    Grade 1 (scores of 3, 4 or 5)      Tumor Size:    Greatest dimension of largest invasive focus (Millimeters): 21 mm     Tumor Focality:    Single focus of invasive carcinoma      Ductal Carcinoma In Situ (DCIS):    Not identified      Lobular Carcinoma In Situ (LCIS):    Not identified      Lymphatic and / or Vascular Invasion:    Not identified      Treatment Effect in the Breast:    No known presurgical therapy     MARGINS     Margin Status for Invasive Carcinoma:    Invasive carcinoma present at margin        Margin(s) Involved by Invasive Carcinoma:    Anterior: multiple foci at margin on main specimen; however, unclear if the extended medial margin (specimen B) covers this area        Distance from Invasive Carcinoma to Posterior Margin:    Less than: 1 mm       Distance from Invasive Carcinoma to Inferior Margin:    Less than: 1 mm       Distance from Invasive Carcinoma to Medial Margin:    Less than: 2 mm     Margin Comment:    for the inferior and posterior margins, unclear if these are covered by the extended medial margin (specimen B)     REGIONAL LYMPH NODES     Regional Lymph Node Status:    Not applicable (no regional lymph nodes submitted or found)     pTNM CLASSIFICATION (AJCC 8th Edition)     Reporting of pT, pN, and (when applicable) pM categories is based on information available to the pathologist at the time the report is issued. As per the AJCC (Chapter 1, 8th Ed.) it is the managing physician’s responsibility to establish the final pathologic stage based upon all pertinent information, including but potentially not limited to this pathology report.     pT Category:    pT2      pN Category:    pN not assigned (no nodes  submitted or found)     SPECIAL STUDIES     Estrogen Receptor (ER) Status:    Positive (greater than 10% of cells demonstrate nuclear positivity)        Percentage of Cells with Nuclear Positivity:    >95 %     Progesterone Receptor (PgR) Status:    Positive        Percentage of Cells with Nuclear Positivity:    >95 %     HER2 (by immunohistochemistry):    Negative (Score 1+)      Testing Performed on Case Number:    [id number]      Imaging: Mammo Diagnostic Digital Tomosynthesis Bilateral With CAD  Result Date: 4/24/2025  1.  Skin thickening in the lower inner left breast is new compared to 7/10/2024 mammogram. No suspicious mass is seen on mammogram or ultrasound to account for this new symptom/finding. Recommend clinical evaluation by her radiation oncologist. If additional imaging is clinically indicated, breast MRI could be performed. 2.  No mammographic signs of malignancy in either breast. Recommend annual screening mammography. I discussed results with the patient following the exam.  BI-RADS ASSESSMENT: Category 2: Benign   The patient's information is entered into a computerized reminder system with a targeted due date for the next mammogram.  Note:  It has been reported that there is approximately a 15% false negative in mammography.  Therefore, management of a palpable abnormality should not be deferred because of a negative mammogram.            4/24/2025 11:11 AM by Jessica Cowan MD on Workstation: [id number]      US Breast Left Limited  Result Date: 4/24/2025  1.  Skin thickening in the lower inner left breast is new compared to 7/10/2024 mammogram. No suspicious mass is seen on mammogram or ultrasound to account for this new symptom/finding. Recommend clinical evaluation by her radiation oncologist. If additional imaging is clinically indicated, breast MRI could be performed. 2.  No mammographic signs of malignancy in either breast. Recommend annual screening mammography. I discussed results with the  patient following the exam.  BI-RADS ASSESSMENT: Category 2: Benign   The patient's information is entered into a computerized reminder system with a targeted due date for the next mammogram.  Note:  It has been reported that there is approximately a 15% false negative in mammography.  Therefore, management of a palpable abnormality should not be deferred because of a negative mammogram.            4/24/2025 11:11 AM by Jessica Cowan MD on Workstation: [id number]       Labs:   WBC   Date Value Ref Range Status   11/04/2024 4.61 3.40 - 10.80 10*3/mm3 Final     Hemoglobin   Date Value Ref Range Status   11/04/2024 13.1 12.0 - 15.9 g/dL Final     Hematocrit   Date Value Ref Range Status   11/04/2024 38.0 34.0 - 46.6 % Final     Platelets   Date Value Ref Range Status   11/04/2024 224 140 - 450 10*3/mm3 Final     Creatinine   Date Value Ref Range Status   11/26/2024 0.59 0.57 - 1.00 mg/dL Final     BUN   Date Value Ref Range Status   11/26/2024 6 (L) 8 - 23 mg/dL Final     eGFR   Date Value Ref Range Status   11/26/2024 96.5 >60.0 mL/min/1.73 Final     TSH   Date Value Ref Range Status   01/17/2022 2.090 0.270 - 4.200 uIU/mL Final     Assessment / Plan      Impression: Tatyana Shea is a pleasant 69 y.o. female with initial cT1c cN0 cM0 invasive lobular carcinoma of the left breast, grade 2, ER/VA positive, HER2 negative with Ki-67 of 8%. Oncotype DX score of 7 so chemotherapy not recommended. 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 Repeat resection with SNLB was performed on 12/20/2023 with no invasive carcinoma identified; pathology pT2 pN0. A total of 3 sentinel lymph nodes were removed and none contained metastatic disease. She is now status post external beam radiotherapy to the left breast, completed on 4/4/2024. Received 4256 cGy in 16 fractions. Her  bilateral diagnostic mammogram performed on 7/10/2024 reveals no radiographic evidence of disease; BI-RADS 2. She recently developed increased skin thickening within left breast lower inner quadrant and underwent mammography. Diagnostic mammogram bilateral breast on 4/24/2025 shows no mammographic signs of malignancy in either breast; BI-RADS 2. There is skin thickening in the lower inner left breast that is new compared to 7/10/24 mammogram. Ultrasound shows skin thickening with no underlying mass. She is taking anastrozole and tolerating well. Anastrozole is prescribed by her medical oncologist at [facility]. She will return for follow-up in 6 months. If she is doing well at that time then will consider transitioning her to annual visits thereafter.     Chronic headaches: baseline MRI brain performed on 1/11/24 due to headaches and was negative.     Assessment/Plan:   Diagnoses and all orders for this visit:    1. Malignant neoplasm of upper-outer quadrant of left breast in female, estrogen receptor positive (Primary)    2. History of external beam radiation therapy    3. Encounter for follow-up surveillance of breast cancer    4. Use of anastrozole (Arimidex)    5. At risk for lymphedema  Comments:  she is no longer following up [facility]    6. Scar condition and fibrosis of skin    7. Thickening of skin of breast      Assessment & Plan  1. Breast skin thickening.  The mammogram results are reassuring, showing skin thickening in the lower inner quadrant of the breast. This localized thickening is likely due to radiation therapy, which can cause lymphatic channel narrowing and fluid accumulation. This condition is common post-radiation and is not indicative of cancer. Monitoring the area closely and reporting any changes such as redness or pain was advised. Dr. Munoz was present at conclusion of today's visit to evaluate this finding. An MRI was discussed as a potential next step for more detailed  imaging, however, after clinical breast exam was performed by Dr. Munoz additional imaging was not recommended. Additionally the patient expressed reluctance to undergo another breast MRI unless absolutely necessary due to a previous unpleasant experience. The risks and benefits of additional imaging were discussed, including the possibility of detecting non-concerning findings that may require further investigation. Will continue to monitor.     2. Mammography.   She is currently scheduled for screening mammogram in July 2025 as [place] [facility] [place] Since she recently underwent diagnostic mammogram of bilateral breasts, patient was encouraged to contact their office to inquire if they still want mammography to be done in July.     Follow Up:   Return for follow-up in 6 months.   Follow-up with Dr. Riddle on 6/3/25.  Follow-up with [facility] as scheduled.     Return in about 6 months (around 11/1/2025) for Office Visit.  Tatyana Shea was encouraged to contact me in the interim with any questions or concerns regarding her care.      MERLE Mathews  Radiation Oncology  [facility]    This document has been signed by MERLE Coleman on May 1, 2025 16:17 EDT     Patient or patient representative verbalized consent for the use of Ambient Listening during the visit with  MERLE Coleman for chart documentation. 5/1/2025  16:02 EDT

## 2025-05-12 ENCOUNTER — TELEPHONE (OUTPATIENT)
Dept: FAMILY MEDICINE CLINIC | Facility: CLINIC | Age: 70
End: 2025-05-12
Payer: COMMERCIAL

## 2025-05-12 NOTE — TELEPHONE ENCOUNTER
Veronica from  wants to be called regarding the patients mammogram results, she has questions and would like a call back today if possible

## 2025-05-12 NOTE — TELEPHONE ENCOUNTER
Called and spoke to Veronica, she cancelled the mammogram for July.  Pt is aware.  She will discuss with John at her next appointment.

## 2025-05-28 RX ORDER — LOSARTAN POTASSIUM 50 MG/1
50 TABLET ORAL 2 TIMES DAILY
Qty: 180 TABLET | Refills: 1 | Status: CANCELLED | OUTPATIENT
Start: 2025-05-28

## 2025-05-30 ENCOUNTER — TELEPHONE (OUTPATIENT)
Dept: ONCOLOGY | Facility: HOSPITAL | Age: 70
End: 2025-05-30
Payer: COMMERCIAL

## 2025-05-30 NOTE — TELEPHONE ENCOUNTER
Caller: Tatyana Shea    Relationship to patient: Self    Best call back number: [phone]    Chief complaint: CANC. - R/S     Type of visit: F/U 1    Requested date: ANY DAY AFTER 6/9/25, JUST NOT 6/12 OR 6/19 & PREFERS MORNING STARTING AT 8     If rescheduling, when is the original appointment: 6/3/25

## 2025-06-04 ENCOUNTER — TELEPHONE (OUTPATIENT)
Dept: CARDIOLOGY | Facility: CLINIC | Age: 70
End: 2025-06-04
Payer: COMMERCIAL

## 2025-06-04 RX ORDER — AMLODIPINE BESYLATE 5 MG/1
5 TABLET ORAL DAILY
Qty: 30 TABLET | Refills: 0 | Status: SHIPPED | OUTPATIENT
Start: 2025-06-04

## 2025-06-05 ENCOUNTER — TELEPHONE (OUTPATIENT)
Dept: ONCOLOGY | Facility: HOSPITAL | Age: 70
End: 2025-06-05

## 2025-06-05 NOTE — TELEPHONE ENCOUNTER
Caller: Tatyana Shea    Relationship to patient: Self    Best call back number: [phone]    Chief complaint: PATIENT CALLED TO RESCHEDULE     Type of visit: FOLLOW UP 1  Requested date: FIRST OR SECOND WEEK OF JULY IN THE AM     If rescheduling, when is the original appointment: 6-3-25

## 2025-06-10 ENCOUNTER — LAB (OUTPATIENT)
Dept: LAB | Facility: HOSPITAL | Age: 70
End: 2025-06-10
Payer: COMMERCIAL

## 2025-06-10 ENCOUNTER — TELEPHONE (OUTPATIENT)
Dept: ORTHOPEDIC SURGERY | Facility: CLINIC | Age: 70
End: 2025-06-10
Payer: COMMERCIAL

## 2025-06-10 DIAGNOSIS — D50.9 IRON DEFICIENCY ANEMIA, UNSPECIFIED IRON DEFICIENCY ANEMIA TYPE: ICD-10-CM

## 2025-06-10 DIAGNOSIS — D64.9 ANEMIA, UNSPECIFIED TYPE: ICD-10-CM

## 2025-06-10 DIAGNOSIS — E66.3 OVERWEIGHT WITH BODY MASS INDEX (BMI) OF 29 TO 29.9 IN ADULT: ICD-10-CM

## 2025-06-10 LAB
ALBUMIN SERPL-MCNC: 3.8 G/DL (ref 3.5–5.2)
ALBUMIN/GLOB SERPL: 1.3 G/DL
ALP SERPL-CCNC: 70 U/L (ref 39–117)
ALT SERPL W P-5'-P-CCNC: 15 U/L (ref 1–33)
ANION GAP SERPL CALCULATED.3IONS-SCNC: 10 MMOL/L (ref 5–15)
AST SERPL-CCNC: 23 U/L (ref 1–32)
BASOPHILS # BLD AUTO: 0.01 10*3/MM3 (ref 0–0.2)
BASOPHILS NFR BLD AUTO: 0.3 % (ref 0–1.5)
BILIRUB SERPL-MCNC: 0.2 MG/DL (ref 0–1.2)
BILIRUB UR QL STRIP: NEGATIVE
BUN SERPL-MCNC: 9 MG/DL (ref 8–23)
BUN/CREAT SERPL: 11 (ref 7–25)
CALCIUM SPEC-SCNC: 9.6 MG/DL (ref 8.6–10.5)
CHLORIDE SERPL-SCNC: 102 MMOL/L (ref 98–107)
CHOLEST SERPL-MCNC: 157 MG/DL (ref 0–200)
CLARITY UR: CLEAR
CO2 SERPL-SCNC: 27 MMOL/L (ref 22–29)
COLOR UR: YELLOW
CREAT SERPL-MCNC: 0.82 MG/DL (ref 0.57–1)
DEPRECATED RDW RBC AUTO: 43.9 FL (ref 37–54)
EGFRCR SERPLBLD CKD-EPI 2021: 76.6 ML/MIN/1.73
EOSINOPHIL # BLD AUTO: 0.06 10*3/MM3 (ref 0–0.4)
EOSINOPHIL NFR BLD AUTO: 1.6 % (ref 0.3–6.2)
ERYTHROCYTE [DISTWIDTH] IN BLOOD BY AUTOMATED COUNT: 12.8 % (ref 12.3–15.4)
FERRITIN SERPL-MCNC: 30.71 NG/ML (ref 13–150)
GLOBULIN UR ELPH-MCNC: 3 GM/DL
GLUCOSE SERPL-MCNC: 97 MG/DL (ref 65–99)
GLUCOSE UR STRIP-MCNC: NEGATIVE MG/DL
HBA1C MFR BLD: 4.7 % (ref 4.8–5.6)
HCT VFR BLD AUTO: 36.2 % (ref 34–46.6)
HDLC SERPL QL: 2.66
HDLC SERPL-MCNC: 59 MG/DL (ref 40–60)
HGB BLD-MCNC: 12.1 G/DL (ref 12–15.9)
HGB UR QL STRIP.AUTO: NEGATIVE
HOLD SPECIMEN: NORMAL
IMM GRANULOCYTES # BLD AUTO: 0 10*3/MM3 (ref 0–0.05)
IMM GRANULOCYTES NFR BLD AUTO: 0 % (ref 0–0.5)
IRON 24H UR-MRATE: 51 MCG/DL (ref 37–145)
IRON SATN MFR SERPL: 14 % (ref 20–50)
KETONES UR QL STRIP: NEGATIVE
LDLC SERPL CALC-MCNC: 85 MG/DL (ref 0–100)
LEUKOCYTE ESTERASE UR QL STRIP.AUTO: NEGATIVE
LYMPHOCYTES # BLD AUTO: 1.24 10*3/MM3 (ref 0.7–3.1)
LYMPHOCYTES NFR BLD AUTO: 33.1 % (ref 19.6–45.3)
MCH RBC QN AUTO: 31.9 PG (ref 26.6–33)
MCHC RBC AUTO-ENTMCNC: 33.4 G/DL (ref 31.5–35.7)
MCV RBC AUTO: 95.5 FL (ref 79–97)
MONOCYTES # BLD AUTO: 0.3 10*3/MM3 (ref 0.1–0.9)
MONOCYTES NFR BLD AUTO: 8 % (ref 5–12)
NEUTROPHILS NFR BLD AUTO: 2.14 10*3/MM3 (ref 1.7–7)
NEUTROPHILS NFR BLD AUTO: 57 % (ref 42.7–76)
NITRITE UR QL STRIP: NEGATIVE
NRBC BLD AUTO-RTO: 0 /100 WBC (ref 0–0.2)
PH UR STRIP.AUTO: 7.5 [PH] (ref 5–8)
PLATELET # BLD AUTO: 184 10*3/MM3 (ref 140–450)
PMV BLD AUTO: 9.9 FL (ref 6–12)
POTASSIUM SERPL-SCNC: 3.8 MMOL/L (ref 3.5–5.2)
PROT SERPL-MCNC: 6.8 G/DL (ref 6–8.5)
PROT UR QL STRIP: NEGATIVE
RBC # BLD AUTO: 3.79 10*6/MM3 (ref 3.77–5.28)
SODIUM SERPL-SCNC: 139 MMOL/L (ref 136–145)
SP GR UR STRIP: <=1.005 (ref 1–1.03)
TIBC SERPL-MCNC: 364 MCG/DL (ref 298–536)
TRANSFERRIN SERPL-MCNC: 244 MG/DL (ref 200–360)
TRIGL SERPL-MCNC: 68 MG/DL (ref 0–150)
UROBILINOGEN UR QL STRIP: NORMAL
VIT B12 BLD-MCNC: 233 PG/ML (ref 211–946)
VLDLC SERPL-MCNC: 13 MG/DL (ref 5–40)
WBC NRBC COR # BLD AUTO: 3.75 10*3/MM3 (ref 3.4–10.8)

## 2025-06-10 PROCEDURE — 83036 HEMOGLOBIN GLYCOSYLATED A1C: CPT

## 2025-06-10 PROCEDURE — 36415 COLL VENOUS BLD VENIPUNCTURE: CPT

## 2025-06-10 PROCEDURE — 80053 COMPREHEN METABOLIC PANEL: CPT

## 2025-06-10 PROCEDURE — 81003 URINALYSIS AUTO W/O SCOPE: CPT

## 2025-06-10 PROCEDURE — 80061 LIPID PANEL: CPT

## 2025-06-10 PROCEDURE — 82728 ASSAY OF FERRITIN: CPT

## 2025-06-10 PROCEDURE — 85025 COMPLETE CBC W/AUTO DIFF WBC: CPT

## 2025-06-10 PROCEDURE — 82607 VITAMIN B-12: CPT

## 2025-06-10 PROCEDURE — 84466 ASSAY OF TRANSFERRIN: CPT

## 2025-06-10 PROCEDURE — 83540 ASSAY OF IRON: CPT

## 2025-06-10 NOTE — TELEPHONE ENCOUNTER
Provider: SARAH    Caller: Tatyana Shea    Relationship to Patient: Self    Pharmacy:     Phone Number: [phone]    Reason for Call: PT CALLED AND IS WANTING TO KNOW WHAT DR DUTTA SX SCHEDULE LOOKS LIKE FOR JULY

## 2025-06-12 ENCOUNTER — OFFICE VISIT (OUTPATIENT)
Dept: FAMILY MEDICINE CLINIC | Facility: CLINIC | Age: 70
End: 2025-06-12
Payer: COMMERCIAL

## 2025-06-12 VITALS
HEIGHT: 65 IN | WEIGHT: 157.2 LBS | BODY MASS INDEX: 26.19 KG/M2 | TEMPERATURE: 97.8 F | DIASTOLIC BLOOD PRESSURE: 64 MMHG | OXYGEN SATURATION: 98 % | SYSTOLIC BLOOD PRESSURE: 130 MMHG | HEART RATE: 69 BPM | RESPIRATION RATE: 16 BRPM

## 2025-06-12 DIAGNOSIS — E66.3 OVERWEIGHT WITH BODY MASS INDEX (BMI) OF 27 TO 27.9 IN ADULT: Primary | ICD-10-CM

## 2025-06-12 DIAGNOSIS — R53.83 FATIGUE, UNSPECIFIED TYPE: ICD-10-CM

## 2025-06-12 DIAGNOSIS — D50.9 IRON DEFICIENCY ANEMIA, UNSPECIFIED IRON DEFICIENCY ANEMIA TYPE: ICD-10-CM

## 2025-06-12 DIAGNOSIS — E55.9 VITAMIN D DEFICIENCY: ICD-10-CM

## 2025-06-12 DIAGNOSIS — Z78.0 POST-MENOPAUSAL: ICD-10-CM

## 2025-06-12 DIAGNOSIS — I10 ESSENTIAL HYPERTENSION: ICD-10-CM

## 2025-06-12 DIAGNOSIS — Z51.81 MEDICATION MONITORING ENCOUNTER: ICD-10-CM

## 2025-06-12 DIAGNOSIS — E78.2 MIXED HYPERLIPIDEMIA: ICD-10-CM

## 2025-06-12 DIAGNOSIS — E87.6 HYPOKALEMIA: ICD-10-CM

## 2025-06-12 RX ORDER — ATORVASTATIN CALCIUM 10 MG/1
10 TABLET, FILM COATED ORAL NIGHTLY
Qty: 90 TABLET | Refills: 1 | Status: SHIPPED | OUTPATIENT
Start: 2025-06-12

## 2025-06-12 RX ORDER — PHENTERMINE HYDROCHLORIDE 37.5 MG/1
37.5 TABLET ORAL
Qty: 30 TABLET | Refills: 0 | Status: SHIPPED | OUTPATIENT
Start: 2025-06-12

## 2025-06-12 RX ORDER — POTASSIUM CHLORIDE 750 MG/1
10 TABLET, EXTENDED RELEASE ORAL DAILY
Qty: 90 TABLET | Refills: 1 | Status: SHIPPED | OUTPATIENT
Start: 2025-06-12

## 2025-06-12 NOTE — PROGRESS NOTES
Chief Complaint  Hypertension, Hyperlipidemia, and Heartburn (Pt has telehealth with gastro and will discuss the EGD with them then.)    Subjective        Tatyana Shea presents to [facility]  History of Present Illness  Hypertension:  doing well on medication. 130/64.      Reviewed labs concerns for ferritin and hemoglobin dripping as well as sats.      Overweight:  has still been trying to lose the last few pounds trying to get 140.  Had constipation with wegovy.  Has taken a few doses left over from last year and hasn't had a headache.  Understands need to lose 4 pounds and the risk for PPH with taking phentermine.  She also understands to continue on will need to have a controlled blood pressure.  Hypertension  Associated symptoms: headaches    Associated symptoms: no chest pain, no palpitations, no shortness of breath and no dizziness    Hyperlipidemia  Pertinent negatives include no chest pain or shortness of breath.   Heartburn  She reports no chest pain or no coughing.   Headache      The following portions of the patient's history were personally reviewed and updated as appropriate: allergies, current medications, past medical history, past surgical history, past family history, and past social history.     Body mass index is 26 kg/m².    BMI is >= 25 and <30. (Overweight) The following options were offered after discussion;: weight loss educational material (shared in after visit summary)      Past History:    Medical History: has a past medical history of Acid reflux, Arrhythmia (2013), Breast cancer, Diverticulitis, Diverticulosis (15 yrs ago), Headache (2021), Hiatal hernia, HTN (hypertension), Hyperlipidemia (2010), Iron deficiency anemia, Low back pain, Obesity (In my 40s), Obstructive sleep apnea (07/02/2021), Peptic ulceration (In my 40s), and Pulmonary embolism (1975).     Surgical History: has a past surgical history that includes Cholecystectomy; Colonoscopy;  Colonoscopy (N/A, 07/29/2021); Upper gastrointestinal endoscopy (07/28/2021); Esophagogastroduodenoscopy (N/A, 07/28/2021); Cosmetic surgery (2013); Trigger finger release (Left, 12/21/2022); Mastectomy partial / lumpectomy (Left); Breast lumpectomy; Hand surgery (2022); and Trigger point injection (2022).     Family History: family history includes Atrial fibrillation in an other family member; Developmental Disability in her son; Heart disease in her mother; Heart failure in her mother; Hyperlipidemia in her mother; Hypertension in her mother.     Social History: reports that she has never smoked. She has never been exposed to tobacco smoke. She has never used smokeless tobacco. She reports that she does not currently use alcohol. She reports that she does not use drugs.    Allergies: Patient has no known allergies.          Current Outpatient Medications:     amLODIPine (NORVASC) 5 MG tablet, TAKE 1 TABLET BY MOUTH DAILY, Disp: 30 tablet, Rfl: 0    anastrozole (ARIMIDEX) 1 MG tablet, Take 1 tablet by mouth Daily., Disp: 90 tablet, Rfl: 1    atorvastatin (LIPITOR) 10 MG tablet, Take 1 tablet by mouth Every Night., Disp: 90 tablet, Rfl: 1    Calcium Carbonate 500 MG chewable tablet, Chew 1,000 mg 2 (Two) Times a Day., Disp: , Rfl:     Cholecalciferol 125 MCG (5000 UT) tablet, Take 1 tablet by mouth Daily. LAST DOSE 12/16/22, Disp: , Rfl:     furosemide (LASIX) 20 MG tablet, TAKE 1 TABLET BY MOUTH TWICE A DAY, Disp: 180 tablet, Rfl: 1    losartan (COZAAR) 50 MG tablet, Take 1 tablet by mouth 2 (Two) Times a Day. (Patient taking differently: Take 1 tablet by mouth Daily.), Disp: 180 tablet, Rfl: 1    multivitamin with minerals tablet tablet, Take 1 tablet by mouth Daily. LAST DOSE 12/16/22, Disp: , Rfl:     NON FORMULARY, Take 1 tablet by mouth Every Other Day. Stool softener, Disp: , Rfl:     pantoprazole (PROTONIX) 40 MG EC tablet, Take 1 tablet by mouth Every Night., Disp: 90 tablet, Rfl: 2    Polyethylene Glycol  "3350 (MIRALAX PO), Take  by mouth Every Other Day., Disp: , Rfl:     potassium chloride 10 MEQ CR tablet, Take 1 tablet by mouth Daily., Disp: 90 tablet, Rfl: 1    Xarelto 20 MG tablet, TAKE 1 TABLET BY MOUTH DAILY, Disp: 90 tablet, Rfl: 1    phentermine (ADIPEX-P) 37.5 MG tablet, Take 1 tablet by mouth Every Morning Before Breakfast., Disp: 30 tablet, Rfl: 0    Medications Discontinued During This Encounter   Medication Reason    atorvastatin (LIPITOR) 10 MG tablet Reorder    potassium chloride 10 MEQ CR tablet Reorder         Review of Systems   Respiratory:  Negative for cough and shortness of breath.    Cardiovascular:  Negative for chest pain and palpitations.   Neurological:  Negative for dizziness.        Objective         Vitals:    06/12/25 0913   BP: 130/64   BP Location: Right arm   Patient Position: Sitting   Cuff Size: Adult   Pulse: 69   Resp: 16   Temp: 97.8 °F (36.6 °C)   TempSrc: Temporal   SpO2: 98%   Weight: 71.3 kg (157 lb 3.2 oz)   Height: 165.6 cm (65.2\")     Body mass index is 26 kg/m².         Physical Exam  Vitals reviewed.   Constitutional:       Appearance: Normal appearance. She is well-developed.   HENT:      Head: Normocephalic and atraumatic.      Mouth/Throat:      Pharynx: No oropharyngeal exudate.   Eyes:      Conjunctiva/sclera: Conjunctivae normal.      Pupils: Pupils are equal, round, and reactive to light.   Cardiovascular:      Rate and Rhythm: Normal rate and regular rhythm.      Heart sounds: Normal heart sounds. No murmur heard.     No friction rub. No gallop.   Pulmonary:      Effort: Pulmonary effort is normal.      Breath sounds: Normal breath sounds. No wheezing or rhonchi.   Skin:     General: Skin is warm and dry.   Neurological:      Mental Status: She is alert and oriented to person, place, and time.   Psychiatric:         Mood and Affect: Mood and affect normal.         Behavior: Behavior normal.         Thought Content: Thought content normal.         Judgment: " Judgment normal.             Result Review :               Assessment and Plan     Diagnoses and all orders for this visit:    1. Overweight with body mass index (BMI) of 27 to 27.9 in adult (Primary)  -     phentermine (ADIPEX-P) 37.5 MG tablet; Take 1 tablet by mouth Every Morning Before Breakfast.  Dispense: 30 tablet; Refill: 0    2. Hypokalemia  -     potassium chloride 10 MEQ CR tablet; Take 1 tablet by mouth Daily.  Dispense: 90 tablet; Refill: 1  -     Ferritin; Future  -     Iron Profile w/o Ferritin; Future    3. Mixed hyperlipidemia  -     atorvastatin (LIPITOR) 10 MG tablet; Take 1 tablet by mouth Every Night.  Dispense: 90 tablet; Refill: 1    4. Essential hypertension  -     Lipid Panel With / Chol / HDL Ratio; Future  -     Comprehensive Metabolic Panel; Future  -     CBC (No Diff); Future  -     Urinalysis With Culture If Indicated -; Future    5. Iron deficiency anemia, unspecified iron deficiency anemia type  -     Ferritin; Future  -     Iron Profile w/o Ferritin; Future    6. Post-menopausal  -     DEXA Bone Density Axial; Future    7. Fatigue, unspecified type  -     Vitamin B12; Future    8. Vitamin D deficiency  -     Vitamin D,25-Hydroxy; Future    9. Medication monitoring encounter  -     POC Medline 12 Panel Urine Drug Screen    Will recheck iron and ferritin in 1 month to see if continuing to trend down.  Or if stable.  To try to increase iron rich foods and will determine further with next labs.          Follow Up     Return in about 1 month (around 7/12/2025).    Patient was given instructions and counseling regarding her condition or for health maintenance advice. Please see specific information pulled into the AVS if appropriate.

## 2025-06-17 DIAGNOSIS — E87.6 HYPOKALEMIA: ICD-10-CM

## 2025-06-17 DIAGNOSIS — E78.2 MIXED HYPERLIPIDEMIA: ICD-10-CM

## 2025-06-17 RX ORDER — POTASSIUM CHLORIDE 750 MG/1
10 TABLET, EXTENDED RELEASE ORAL DAILY
Qty: 90 TABLET | Refills: 1 | OUTPATIENT
Start: 2025-06-17

## 2025-06-17 RX ORDER — ATORVASTATIN CALCIUM 10 MG/1
10 TABLET, FILM COATED ORAL NIGHTLY
Qty: 90 TABLET | Refills: 1 | OUTPATIENT
Start: 2025-06-17

## 2025-06-27 ENCOUNTER — TELEPHONE (OUTPATIENT)
Dept: FAMILY MEDICINE CLINIC | Facility: CLINIC | Age: 70
End: 2025-06-27
Payer: COMMERCIAL

## 2025-06-27 ENCOUNTER — OFFICE VISIT (OUTPATIENT)
Dept: ORTHOPEDIC SURGERY | Facility: CLINIC | Age: 70
End: 2025-06-27
Payer: COMMERCIAL

## 2025-06-27 ENCOUNTER — PREP FOR SURGERY (OUTPATIENT)
Dept: OTHER | Facility: HOSPITAL | Age: 70
End: 2025-06-27
Payer: COMMERCIAL

## 2025-06-27 VITALS
WEIGHT: 157 LBS | HEART RATE: 65 BPM | BODY MASS INDEX: 26.16 KG/M2 | DIASTOLIC BLOOD PRESSURE: 70 MMHG | HEIGHT: 65 IN | OXYGEN SATURATION: 98 % | SYSTOLIC BLOOD PRESSURE: 139 MMHG

## 2025-06-27 DIAGNOSIS — M65.311 TRIGGER THUMB OF RIGHT HAND: Primary | ICD-10-CM

## 2025-06-27 DIAGNOSIS — M65.30 TRIGGER FINGER OF RIGHT HAND: Primary | ICD-10-CM

## 2025-06-27 DIAGNOSIS — M65.331 TRIGGER MIDDLE FINGER OF RIGHT HAND: ICD-10-CM

## 2025-06-27 PROCEDURE — 99214 OFFICE O/P EST MOD 30 MIN: CPT | Performed by: ORTHOPAEDIC SURGERY

## 2025-06-27 NOTE — H&P (VIEW-ONLY)
"Chief Complaint  Follow-up of the Right Hand       Subjective      Tatyana Shea presents to [facility] for a follow up for her right hand. She has been treating her right first and third trigger fingers conservatively.  She states her fingers have been locking, catching and triggering a lot more. She is starting to have more pain to her fingers. She has had prior injections in the past with some relief. She is interested in operative treatment options. She has had a left thumb trigger finger release in the past and is overall doing well regarding this.     No Known Allergies     Social History     Socioeconomic History    Marital status: Single   Tobacco Use    Smoking status: Never     Passive exposure: Never    Smokeless tobacco: Never   Vaping Use    Vaping status: Never Used   Substance and Sexual Activity    Alcohol use: Not Currently     Comment: Glass of wine a couple times a year    Drug use: Never    Sexual activity: Not Currently     Birth control/protection: None        I reviewed the patient's chief complaint, history of present illness, review of systems, past medical history, surgical history, family history, social history, medications, and allergy list.     Review of Systems     Constitutional: Denies fevers, chills, weight loss  Cardiovascular: Denies chest pain, shortness of breath  Skin: Denies rashes, acute skin changes  Neurologic: Denies headache, loss of consciousness  MSK: right hand pain       Vital Signs:   /70   Pulse 65   Ht 165.6 cm (65.2\")   Wt 71.2 kg (157 lb)   SpO2 98%   BMI 25.97 kg/m²          Physical Exam  General: Alert. No acute distress    Ortho Exam        Right upper extremity: positive  locking, catching and triggering to the right first and third finger, tender to the right first and third A1 pulley, mild deformity to her middle finger, neurovascularly intact, positive  pulses, sensation intact to the medial, radial and ulnar " nerve    Procedures        Imaging Results (Most Recent)       None             Result Review :       No results found.           Assessment and Plan     Diagnoses and all orders for this visit:    1. Trigger thumb of right hand (Primary)    2. Trigger middle finger of right hand        The patient presents here today for a follow up for her right hand.     Discussed operative treatment options regarding a right first and third trigger finger release versus non operative treatment options regarding injections, medications and therapy.     Patient wishes to proceed with operative treatment options. Risks and benefits was discussed and reviewed with the patient today. Patient expressed understanding and wishes to proceed.     Discussed surgery., Risks/benefits discussed with patient including, but not limited to: infection, bleeding, neurovascular damage, re-rupture, aesthetic deformity, need for further surgery, and death., Surgery pamphlet given., and Call or return if worsening symptoms.    Follow Up     2 weeks post-operatively      Patient was given instructions and counseling regarding her condition or for health maintenance advice. Please see specific information pulled into the AVS if appropriate.     TransScribed for Inés Rashid MD by Amanda Friedman.  06/27/25   14:01 EDT        I have personally performed the services described in this document as scribed by the above individual and it is both accurate and complete. Inés Rashid MD 06/30/25

## 2025-06-27 NOTE — PROGRESS NOTES
"Chief Complaint  Follow-up of the Right Hand       Subjective      Tatyana Shea presents to [facility] for a follow up for her right hand. She has been treating her right first and third trigger fingers conservatively.  She states her fingers have been locking, catching and triggering a lot more. She is starting to have more pain to her fingers. She has had prior injections in the past with some relief. She is interested in operative treatment options. She has had a left thumb trigger finger release in the past and is overall doing well regarding this.     No Known Allergies     Social History     Socioeconomic History    Marital status: Single   Tobacco Use    Smoking status: Never     Passive exposure: Never    Smokeless tobacco: Never   Vaping Use    Vaping status: Never Used   Substance and Sexual Activity    Alcohol use: Not Currently     Comment: Glass of wine a couple times a year    Drug use: Never    Sexual activity: Not Currently     Birth control/protection: None        I reviewed the patient's chief complaint, history of present illness, review of systems, past medical history, surgical history, family history, social history, medications, and allergy list.     Review of Systems     Constitutional: Denies fevers, chills, weight loss  Cardiovascular: Denies chest pain, shortness of breath  Skin: Denies rashes, acute skin changes  Neurologic: Denies headache, loss of consciousness  MSK: right hand pain       Vital Signs:   /70   Pulse 65   Ht 165.6 cm (65.2\")   Wt 71.2 kg (157 lb)   SpO2 98%   BMI 25.97 kg/m²          Physical Exam  General: Alert. No acute distress    Ortho Exam        Right upper extremity: positive  locking, catching and triggering to the right first and third finger, tender to the right first and third A1 pulley, mild deformity to her middle finger, neurovascularly intact, positive  pulses, sensation intact to the medial, radial and ulnar " nerve    Procedures        Imaging Results (Most Recent)       None             Result Review :       No results found.           Assessment and Plan     Diagnoses and all orders for this visit:    1. Trigger thumb of right hand (Primary)    2. Trigger middle finger of right hand        The patient presents here today for a follow up for her right hand.     Discussed operative treatment options regarding a right first and third trigger finger release versus non operative treatment options regarding injections, medications and therapy.     Patient wishes to proceed with operative treatment options. Risks and benefits was discussed and reviewed with the patient today. Patient expressed understanding and wishes to proceed.     Discussed surgery., Risks/benefits discussed with patient including, but not limited to: infection, bleeding, neurovascular damage, re-rupture, aesthetic deformity, need for further surgery, and death., Surgery pamphlet given., and Call or return if worsening symptoms.    Follow Up     2 weeks post-operatively      Patient was given instructions and counseling regarding her condition or for health maintenance advice. Please see specific information pulled into the AVS if appropriate.     TransScribed for Inés Rashid MD by Amanda Friedman.  06/27/25   14:01 EDT        I have personally performed the services described in this document as scribed by the above individual and it is both accurate and complete. Inés Rashid MD 06/30/25

## 2025-06-30 ENCOUNTER — TELEPHONE (OUTPATIENT)
Dept: CARDIOLOGY | Facility: CLINIC | Age: 70
End: 2025-06-30
Payer: COMMERCIAL

## 2025-06-30 ENCOUNTER — TELEPHONE (OUTPATIENT)
Dept: ORTHOPEDIC SURGERY | Facility: CLINIC | Age: 70
End: 2025-06-30
Payer: COMMERCIAL

## 2025-06-30 NOTE — TELEPHONE ENCOUNTER
Procedure: Right 1st/3rd Trigger Finger Release    Medication Directive: NA    PMH: Aortic Valve insufficiency, HTN, HLD    Last Seen:09/05/24    ECHO: 10/10/24     Left ventricular ejection fraction appears to be 56 - 60%.    Left ventricular diastolic function is consistent with (grade I) impaired relaxation and age.    Estimated right ventricular systolic pressure from tricuspid regurgitation is normal (<35 mmHg).    Mild aortic insufficiency.

## 2025-06-30 NOTE — TELEPHONE ENCOUNTER
PATIENT NOTIFIED PER MEDICAL CLEARANCE / MED DIRECTIVE RECEIVED FROM BHUPENDRA BLOOM THAT SHE IS TO HOLD/STOP XARELTO 5 DAYS PRIOR TO SURGERY. PATIENT VERBALIZED UNDERSTANDING.

## 2025-07-07 ENCOUNTER — OFFICE VISIT (OUTPATIENT)
Dept: ONCOLOGY | Facility: HOSPITAL | Age: 70
End: 2025-07-07
Payer: COMMERCIAL

## 2025-07-07 VITALS
HEART RATE: 70 BPM | BODY MASS INDEX: 26.19 KG/M2 | SYSTOLIC BLOOD PRESSURE: 132 MMHG | DIASTOLIC BLOOD PRESSURE: 68 MMHG | WEIGHT: 157.19 LBS | HEIGHT: 65 IN | OXYGEN SATURATION: 100 % | RESPIRATION RATE: 18 BRPM

## 2025-07-07 DIAGNOSIS — C50.412 MALIGNANT NEOPLASM OF UPPER-OUTER QUADRANT OF LEFT BREAST IN FEMALE, ESTROGEN RECEPTOR POSITIVE: Primary | ICD-10-CM

## 2025-07-07 DIAGNOSIS — Z17.0 MALIGNANT NEOPLASM OF UPPER-OUTER QUADRANT OF LEFT BREAST IN FEMALE, ESTROGEN RECEPTOR POSITIVE: Primary | ICD-10-CM

## 2025-07-07 DIAGNOSIS — Z12.31 ENCOUNTER FOR SCREENING MAMMOGRAM FOR MALIGNANT NEOPLASM OF BREAST: ICD-10-CM

## 2025-07-07 DIAGNOSIS — D50.9 IRON DEFICIENCY ANEMIA, UNSPECIFIED IRON DEFICIENCY ANEMIA TYPE: ICD-10-CM

## 2025-07-07 DIAGNOSIS — E53.8 B12 DEFICIENCY: ICD-10-CM

## 2025-07-07 PROCEDURE — G0463 HOSPITAL OUTPT CLINIC VISIT: HCPCS | Performed by: INTERNAL MEDICINE

## 2025-07-07 PROCEDURE — 99214 OFFICE O/P EST MOD 30 MIN: CPT | Performed by: INTERNAL MEDICINE

## 2025-07-07 RX ORDER — AMLODIPINE BESYLATE 5 MG/1
5 TABLET ORAL DAILY
Qty: 30 TABLET | Refills: 0 | Status: SHIPPED | OUTPATIENT
Start: 2025-07-07

## 2025-07-07 RX ORDER — ANASTROZOLE 1 MG/1
1 TABLET ORAL DAILY
Qty: 90 TABLET | Refills: 1 | Status: SHIPPED | OUTPATIENT
Start: 2025-07-07

## 2025-07-07 RX ORDER — FERROUS GLUCONATE 324(38)MG
324 TABLET ORAL EVERY OTHER DAY
Qty: 45 TABLET | Refills: 1 | Status: SHIPPED | OUTPATIENT
Start: 2025-07-07

## 2025-07-07 RX ORDER — MAGNESIUM 200 MG
1000 TABLET ORAL DAILY
Qty: 90 EACH | Refills: 1 | Status: SHIPPED | OUTPATIENT
Start: 2025-07-07

## 2025-07-07 NOTE — PROGRESS NOTES
Chief Complaint   Invasive ductal carcinoma of left breast    Willy, John, MERLE Aguilera, John, APRN        Subjective          Tatyana SURJIT Shea presents to [facility] for anemia    Anemia  There has been no abdominal pain, bruising/bleeding easily, fever, light-headedness, palpitations or weight loss.     Ms. Shea presents for follow up. She was diagnosed with breast cancer in August. Received lumpectomy 11/3/23 at  (Dr. Maddi Ureña) with positive margin, 21 mm. ER/ME positive, HER2 negative. Re-resection 12/20/23 was negative and sentinel nodes (3) were all negative.     She has started anastrozole as of 2/9/24 and tolerating well.  Due for refill.  Patient noticed a hard spot on her breast a few months ago.  Patient underwent diagnostic mammogram and ultrasound on 4/24/2025. This showed skin thickening in the left inner breast that is new without a suspicious mass.  Radiation oncologist evaluated patient and told her it was likely side effect of radiation.  No signs of malignancy in either breast.  Patient is desiring repeat mammogram for follow-up.  Will plan to do this in 3 months.  Patient denies any other pain or concern for recurrence.  No nausea or vomiting reported.  She remains on PPI.  She continues on medication for weight loss.  She reports she does not eat a lot of iron rich foods.    Hematology History    She was given recent Ferrlecit infusions x 8 finishing these on 7/5/2022.     8/21/22: Labs shows iron studies, ferritin are normalized now. Hemoglobin is in the normal range. Folate and B-12 were normal previously. Prior GI evaluation by Dr. Foster showed large HH, gastritis. Polyp was removed.     10/21/22: EGD by Dr. Dunbar: Mild Schatzki ring, dilated, Z-line irregular, non-bleeding erosive gastropathy, normal duodenum. Patient started on Pantoprazole 40 mg.    12/19/22: Ferritin 107, iron sat 33%, hgb 13.3, MCV normal.     6/2/23: Ferritin 90, iron sat  33%, TIBC 311, Hgb 13.5    12/26/23: Hgb 12.1, MCV 94.4, WBC 5.91, plt 217, Ferritin 45, iron sat 28%, TIBC 322, B12 298    11/4/24: WBC 4.61, hgb 13.1, plt 224, ferritin 63.87, iron sat 30%, TIBC 292    6/10/25: Hgb 12.1,  Ferritin 30.7, iron sat 14%, B12 233. Start oral iron and SL B12.         Oncology/Hematology History Overview Note   8/28/23: Left breast biopsy: invasive lobular, grade 2 overall, HER2 1= by IHC (negative), ER and NM both 100% positive    10/4/23: Breast MRI: There is 15 mm enhancing irregular mass in the upper outer left breast, 12 cm from the nipple, with associated postbiopsy hematoma biopsy-proven malignancy. There are no other suspicious enhancing masses or areas of nonmass enhancement.     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 Oncotype 7 so chemotherapy not recommended    12/20/23: Re-resection: no invasive carcinoma seen, 0/3 sentinel nodes positive, pN0.     1/4/24: NM PET: Left breast seroma, no metastatic disease seen.     1/11/24: MRI brain negative    2/9/24: Plan to start anastrozole 1 mg daily    4/4/24: Radiation XRT completed         Malignant neoplasm of upper-outer quadrant of left breast in female, estrogen receptor positive   12/28/2023 Initial Diagnosis    Malignant neoplasm of upper-outer quadrant of left breast in female, estrogen receptor positive     1/16/2024 Cancer Staged    Staging form: Breast, AJCC 8th Edition  - Pathologic: Stage IA (pT2, pN0, cM0, G1, ER+, NM+, HER2-) - Signed by Agustin Riddle MD on 1/16/2024     3/14/2024 - 4/4/2024 Radiation    Radiation OncologyTreatment Course:  Tatyana Shea received 4256 cGy in 16 fractions to the left breast.      Primary malignant neoplasm of upper outer quadrant of left female breast   3/7/2024 Initial Diagnosis    Primary malignant neoplasm of upper outer quadrant of left female breast         Review of  Systems   Constitutional:  Positive for fatigue. Negative for appetite change, diaphoresis, fever, unexpected weight gain and unexpected weight loss.   HENT: Negative.  Negative for hearing loss, mouth sores, sore throat, swollen glands, trouble swallowing and voice change.    Eyes: Negative.  Negative for blurred vision, double vision, pain, redness and visual disturbance.   Respiratory: Negative.  Negative for cough, shortness of breath and wheezing.    Cardiovascular: Negative.  Negative for chest pain, palpitations and leg swelling.   Gastrointestinal: Negative.  Negative for abdominal pain, blood in stool, constipation, diarrhea, nausea and vomiting.   Endocrine: Negative.  Negative for cold intolerance, heat intolerance, polydipsia and polyuria.   Genitourinary: Negative.  Negative for breast pain, decreased urine volume, difficulty urinating, dysuria, frequency, hematuria and urinary incontinence.   Musculoskeletal:  Positive for back pain. Negative for arthralgias, joint swelling and myalgias.   Skin:  Negative for color change, rash, skin lesions and wound.   Allergic/Immunologic: Negative.    Neurological:  Positive for headache. Negative for dizziness, seizures, weakness, light-headedness and numbness.   Hematological: Negative.  Negative for adenopathy. Does not bruise/bleed easily.   Psychiatric/Behavioral: Negative.  Negative for depressed mood. The patient is not nervous/anxious.    All other systems reviewed and are negative.      Current Outpatient Medications on File Prior to Visit   Medication Sig Dispense Refill    amLODIPine (NORVASC) 5 MG tablet TAKE 1 TABLET BY MOUTH DAILY 30 tablet 0    anastrozole (ARIMIDEX) 1 MG tablet Take 1 tablet by mouth Daily. 90 tablet 1    atorvastatin (LIPITOR) 10 MG tablet Take 1 tablet by mouth Every Night. 90 tablet 1    Calcium Carbonate 500 MG chewable tablet Chew 1,000 mg 2 (Two) Times a Day.      Cholecalciferol 125 MCG (5000 UT) tablet Take 1 tablet by mouth  Daily. LAST DOSE 12/16/22      furosemide (LASIX) 20 MG tablet TAKE 1 TABLET BY MOUTH TWICE A  tablet 1    losartan (COZAAR) 50 MG tablet Take 1 tablet by mouth 2 (Two) Times a Day. (Patient taking differently: Take 1 tablet by mouth Daily.) 180 tablet 1    multivitamin with minerals tablet tablet Take 1 tablet by mouth Daily. LAST DOSE 12/16/22      NON FORMULARY Take 1 tablet by mouth Every Other Day. Stool softener      pantoprazole (PROTONIX) 40 MG EC tablet Take 1 tablet by mouth Every Night. 90 tablet 2    phentermine (ADIPEX-P) 37.5 MG tablet Take 1 tablet by mouth Every Morning Before Breakfast. 30 tablet 0    Polyethylene Glycol 3350 (MIRALAX PO) Take  by mouth Every Other Day.      potassium chloride 10 MEQ CR tablet Take 1 tablet by mouth Daily. 90 tablet 1    Xarelto 20 MG tablet TAKE 1 TABLET BY MOUTH DAILY 90 tablet 1     No current facility-administered medications on file prior to visit.       No Known Allergies  Past Medical History:   Diagnosis Date    Acid reflux     Arrhythmia 2013    Breast cancer     Diverticulitis     NO CURRENT ISSUES    Diverticulosis 15 yrs ago    Currently having some mild pain in lower left    Headache 2021    Hiatal hernia     HTN (hypertension)     Hyperlipidemia 2010    Iron deficiency anemia     NO CURRENT S/S, HAD IRON INFUSIONS IN SUMMER 22    Low back pain     Obesity In my 40s    Eat for temporary comfort    Obstructive sleep apnea 07/02/2021    Peptic ulceration In my 40s    I had a bleeding ulcer due to my lifestyle at that time/NO CURRENT ISSUES    Pulmonary embolism 1975    Due to birth control pills, I was told NONE CURRENT     Past Surgical History:   Procedure Laterality Date    BREAST LUMPECTOMY      CHOLECYSTECTOMY      COLONOSCOPY      2020 with      COLONOSCOPY N/A 07/29/2021    Procedure: COLONOSCOPY with polypectomy/snare;  Surgeon: Ruel Foster MD;  Location: [facility] ENDOSCOPY;  Service: Gastroenterology;  Laterality: N/A;   colon polyp    COSMETIC SURGERY  2013    Removed bags under and above my eyes    ENDOSCOPY N/A 07/28/2021    Procedure: ESOPHAGOGASTRODUODENOSCOPY with bxs and cold snares;  Surgeon: Ruel Foster MD;  Location: [facility] ENDOSCOPY;  Service: Gastroenterology;  Laterality: N/A;  hiatal hernia  gastric polyps    HAND SURGERY  2022    Trigger finger/thumb    MASTECTOMY PARTIAL / LUMPECTOMY Left     Had one in Nove and Dec    TRIGGER FINGER RELEASE Left 12/21/2022    Procedure: LEFT THUMB FINGER TRIGGER RELEASE AND LEFT THUMB CYST EXCISION;  Surgeon: Inés Rashid MD;  [place] [facility] [place] [facility] [place]  Service: Orthopedics;  Laterality: Left;    TRIGGER POINT INJECTION  2022    UPPER GASTROINTESTINAL ENDOSCOPY  07/28/2021    Dr. Foster     Social History     Socioeconomic History    Marital status: Single   Tobacco Use    Smoking status: Never     Passive exposure: Never    Smokeless tobacco: Never   Vaping Use    Vaping status: Never Used   Substance and Sexual Activity    Alcohol use: Not Currently     Comment: Glass of wine a couple times a year    Drug use: Never    Sexual activity: Not Currently     Birth control/protection: None     Family History   Problem Relation Age of Onset    Heart failure Mother     Heart disease Mother         Had a heart attack, then congestive heart failure, dead in one month at 82.    Hyperlipidemia Mother     Hypertension Mother     Developmental Disability Son         Auditory processing disorder, anxiety, language and learning disorder in math.    Atrial fibrillation Other         UNSPECIFIED    Colon cancer Neg Hx     Colon polyps Neg Hx     Crohn's disease Neg Hx     Irritable bowel syndrome Neg Hx     Ulcerative colitis Neg Hx     Malig Hyperthermia Neg Hx      Immunization History   Administered Date(s) Administered    ABRYSVO (RSV, 60+ or pregnant women 32-36 wks) 11/10/2023    COVID-19 (MODERNA) 12YRS+ (SPIKEVAX) 11/23/2024    COVID-19 (MODERNA) 1st,2nd,3rd Dose Monovalent  "02/03/2021, 02/04/2021, 03/03/2021, 03/04/2021    COVID-19 (PFIZER) BIVALENT 12+YRS 11/26/2022    COVID-19 (PFIZER) Purple Cap Monovalent 11/22/2021    Fluzone High-Dose 65+YRS 10/04/2024    Fluzone High-Dose 65+yrs 10/07/2021, 10/10/2022, 11/10/2023    Influenza, Unspecified 10/13/2020    PCV21 (CAPVAXIVE) 12/31/2024    Pneumococcal Conjugate 13-Valent (PCV13) 10/01/2019    Shingrix 10/01/2019, 04/01/2020    Tdap 07/26/2021       Objective   Physical Exam  Well appearing patient in no acute distress on RA  Anicteric sclerae, no rash on exposed skin  Respirations non-labored  Awake, alert, and oriented x 4. Speech intact. No gross neurologic deficit  Appropriate mood and affect    Vitals:    07/07/25 0911   BP: 132/68   Pulse: 70   Resp: 18   SpO2: 100%   Weight: 71.3 kg (157 lb 3 oz)   Height: 165.6 cm (65.2\")   PainSc: 0-No pain                         ECOG: (0) Fully Active - Able to Carry On All Pre-disease Performance Without Restriction  Fall Risk Assessment was completed, and patient is at low risk for falls.  PHQ-9 Total Score:         The patient is  experiencing fatigue. Fatigue score: 5    PT/OT Functional Screening: PT fx screen: No needs identified  Speech Functional Screening: Speech fx screen: No needs identified  Rehab to be ordered: Rehab to be ordered: No needs identified        Result Review :   The following data was reviewed by: Agustin Riddle MD on 07/07/25:  Lab Results   Component Value Date    HGB 12.1 06/10/2025    HCT 36.2 06/10/2025    MCV 95.5 06/10/2025     06/10/2025    WBC 3.75 06/10/2025    NEUTROABS 2.14 06/10/2025    LYMPHSABS 1.24 06/10/2025    MONOSABS 0.30 06/10/2025    EOSABS 0.06 06/10/2025    BASOSABS 0.01 06/10/2025     Lab Results   Component Value Date    GLUCOSE 97 06/10/2025    BUN 9.0 06/10/2025    CREATININE 0.82 06/10/2025     06/10/2025    K 3.8 06/10/2025     06/10/2025    CO2 27.0 06/10/2025    CALCIUM 9.6 06/10/2025    PROTEINTOT 6.8 " 06/10/2025    ALBUMIN 3.8 06/10/2025    BILITOT 0.2 06/10/2025    ALKPHOS 70 06/10/2025    AST 23 06/10/2025    ALT 15 06/10/2025     Labs personally reviewed. Ferritin lower. Iron sat low. Hgb normal. MCV normal    6/2025 PCP note personally reviewed        No radiology results for the last 30 days.          Assessment and Plan    Diagnoses and all orders for this visit:    1. Malignant neoplasm of upper-outer quadrant of left breast in female, estrogen receptor positive (Primary)  -     anastrozole (ARIMIDEX) 1 MG tablet; Take 1 tablet by mouth Daily.  Dispense: 90 tablet; Refill: 1    2. B12 deficiency  -     Cyanocobalamin (B-12) 1000 MCG sublingual tablet; Place 1 tablet under the tongue Daily.  Dispense: 90 each; Refill: 1  -     Vitamin B12; Future  -     CBC & Differential; Future    3. Encounter for screening mammogram for malignant neoplasm of breast  -     Mammo screening digital tomosynthesis bilateral w CAD; Future    4. Iron deficiency anemia, unspecified iron deficiency anemia type  -     ferrous gluconate (FERGON) 324 MG tablet; Take 1 tablet by mouth Every Other Day.  Dispense: 45 tablet; Refill: 1  -     Iron Profile w/o Ferritin; Future  -     Ferritin; Future  -     CBC & Differential; Future          Left ER/MI breast cancer  Required re-resection 12/20/23 with sentinel node b iopsy due to positive margins 11/2023 lumpectomy. 21 mm. 100% ER and MI positive, HER2 negativ (1+ by IHC). Pathologic stage pT2N0 (sn). Recovered well from surgeries. Baseline MRI brain performed due to headaches and this was negative. PET scan also performed and negative for metastatic disease. Completed XRT 4/4/24. Oncotype score of 7, so chemotherapy not recommended. As hormone receptor positive and post-menopausal, recommendation will be for 5 years adjuvant hormone blocker with aromatase inhibitor.  Anastrozole 1 mg daily started as of 2/9/24. Tolerating well. Continue. RTC 6 months for toxicity check. 7/2023 DEXA  normal, repeat due 7/2025. Recommend Vit D/Ca. Repeat diagnostic mammogram 4/2025 with skin thickening.  No suspicious mass. Likely from radiation changes. Repeat screening mammogram in 3 months. RTC at that time.     B12 deficiency  Start SL B12 (7/7/25). Repeat level 3 months.     CAMILLA  History of iron deficiency anemia.  EGD on 10/21/22 with erosive gastropathy, non-bleeding; likely source of iron loss. Patient now on PPI daily, recommend to continue. Repeat iron studies in June worse. Recommend repeat trial of PO iron. Can take daily to every other day.  No anemia fortunately. Can consider repeat EGD at any time.     GERD  Symptoms improved. Wants to continue PPI until weight is more in normal range. May need to consider repeat scope.     Chronic headaches  MRI brain negative. PRN Fioricet added but stopped after 1 dose.     This is an acute or chronic illness that poses a threat to life or bodily function. The above treatment plan involves a high risk of complications and/or mortality of patient management.        Follow up: 3 months    I spent 30 minutes caring for Tatyana on this date of service. This time includes time spent by me in the following activities:preparing for the visit, reviewing tests, obtaining and/or reviewing a separately obtained history, performing a medically appropriate examination and/or evaluation , counseling and educating the patient/family/caregiver, ordering medications, tests, or procedures, referring and communicating with other health care professionals , documenting information in the medical record and independently interpreting results and communicating that information with the patient/family/caregiver    Patient was given instructions and counseling regarding her condition or for health maintenance advice. Please see specific information pulled into the AVS if appropriate.

## 2025-07-14 ENCOUNTER — OFFICE VISIT (OUTPATIENT)
Dept: CARDIOLOGY | Facility: CLINIC | Age: 70
End: 2025-07-14
Payer: COMMERCIAL

## 2025-07-14 ENCOUNTER — TELEPHONE (OUTPATIENT)
Dept: CARDIOLOGY | Facility: CLINIC | Age: 70
End: 2025-07-14

## 2025-07-14 VITALS
OXYGEN SATURATION: 100 % | WEIGHT: 158.7 LBS | HEIGHT: 65 IN | SYSTOLIC BLOOD PRESSURE: 143 MMHG | HEART RATE: 72 BPM | BODY MASS INDEX: 26.44 KG/M2 | DIASTOLIC BLOOD PRESSURE: 81 MMHG

## 2025-07-14 DIAGNOSIS — I51.89 DIASTOLIC DYSFUNCTION: ICD-10-CM

## 2025-07-14 DIAGNOSIS — R06.09 DOE (DYSPNEA ON EXERTION): Primary | ICD-10-CM

## 2025-07-14 DIAGNOSIS — I10 ESSENTIAL HYPERTENSION: ICD-10-CM

## 2025-07-14 PROCEDURE — 93000 ELECTROCARDIOGRAM COMPLETE: CPT | Performed by: INTERNAL MEDICINE

## 2025-07-14 PROCEDURE — 99214 OFFICE O/P EST MOD 30 MIN: CPT | Performed by: INTERNAL MEDICINE

## 2025-07-14 RX ORDER — LOSARTAN POTASSIUM 50 MG/1
50 TABLET ORAL DAILY
Qty: 90 TABLET | Refills: 2 | Status: SHIPPED | OUTPATIENT
Start: 2025-07-14

## 2025-07-14 NOTE — PROGRESS NOTES
[facility]  INTERVENTIONAL CARDIOLOGY FOLLOW-UP PROGRESS NOTE        Chief Complaint  Med Refill, ECHO results, and Follow-up    Subjective            History of Present Illness  Tatyana Shea is a 70 y.o. female who presents to [facility] CARDIOLOGY    Patient is here for follow-up visit.  Patient previously seen for hypertension, diastolic dysfunction.  She had echocardiogram in 9/2024 that showed preserved ejection fraction and grade 1 diastolic dysfunction.  Patient takes amlodipine 5 mg daily and losartan 25 mg daily for blood pressure.  Her blood pressure today is 143/81 with a heart rate of 72.  She states her blood pressure is better controlled at home.  Patient denies chest pain however she has been having shortness of breath on exertion and is unsure if she is supposed to be feeling this way.  EKG done in the office today shows sinus rhythm with T wave abnormalities in anterior leads.  Mild aortic insufficiency was noted in the previous echo which is unchanged compared to echo from 2023.       Past History:  Past Medical History:   Diagnosis Date    Acid reflux     Arrhythmia 2013    Breast cancer     Diverticulitis     NO CURRENT ISSUES    Diverticulosis 15 yrs ago    Currently having some mild pain in lower left    Headache 2021    Hiatal hernia     HTN (hypertension)     Hyperlipidemia 2010    Iron deficiency anemia     NO CURRENT S/S, HAD IRON INFUSIONS IN SUMMER 22    Low back pain     Obesity In my 40s    Eat for temporary comfort    Obstructive sleep apnea 07/02/2021    Peptic ulceration In my 40s    I had a bleeding ulcer due to my lifestyle at that time/NO CURRENT ISSUES    Pulmonary embolism 1975    Due to birth control pills, I was told NONE CURRENT       Medical History:  Past Medical History:   Diagnosis Date    Acid reflux     Arrhythmia 2013    Breast cancer     Diverticulitis     NO CURRENT ISSUES    Diverticulosis 15 yrs ago    Currently having some mild pain  in lower left    Headache 2021    Hiatal hernia     HTN (hypertension)     Hyperlipidemia 2010    Iron deficiency anemia     NO CURRENT S/S, HAD IRON INFUSIONS IN SUMMER 20    Low back pain     Obesity In my 40s    Eat for temporary comfort    Obstructive sleep apnea 07/02/2021    Peptic ulceration In my 40s    I had a bleeding ulcer due to my lifestyle at that time/NO CURRENT ISSUES    Pulmonary embolism 1975    Due to birth control pills, I was told NONE CURRENT       Surgical History:   has a past surgical history that includes Cholecystectomy; Colonoscopy; Colonoscopy (N/A, 07/29/2021); Upper gastrointestinal endoscopy (07/28/2021); Esophagogastroduodenoscopy (N/A, 07/28/2021); Cosmetic surgery (2013); Trigger finger release (Left, 12/21/2022); Mastectomy partial / lumpectomy (Left); Breast lumpectomy; Hand surgery (2022); and Trigger point injection (2022).     Family History:   family history includes Atrial fibrillation in an other family member; Developmental Disability in her son; Heart disease in her mother; Heart failure in her mother; Hyperlipidemia in her mother; Hypertension in her mother.     Social History:   reports that she has never smoked. She has never been exposed to tobacco smoke. She has never used smokeless tobacco. She reports that she does not currently use alcohol. She reports that she does not use drugs.    Allergies:   Patient has no known allergies.    Current Outpatient Medications on File Prior to Visit   Medication Sig    amLODIPine (NORVASC) 5 MG tablet TAKE 1 TABLET BY MOUTH DAILY    anastrozole (ARIMIDEX) 1 MG tablet Take 1 tablet by mouth Daily.    atorvastatin (LIPITOR) 10 MG tablet Take 1 tablet by mouth Every Night.    Calcium Carbonate 500 MG chewable tablet Chew 1,000 mg 2 (Two) Times a Day.    Cholecalciferol 125 MCG (5000 UT) tablet Take 1 tablet by mouth Daily. LAST DOSE 12/16/22    Cyanocobalamin (B-12) 1000 MCG sublingual tablet Place 1 tablet under the tongue Daily.  "   ferrous gluconate (FERGON) 324 MG tablet Take 1 tablet by mouth Every Other Day.    furosemide (LASIX) 20 MG tablet TAKE 1 TABLET BY MOUTH TWICE A DAY    multivitamin with minerals tablet tablet Take 1 tablet by mouth Daily. LAST DOSE 12/16/22    pantoprazole (PROTONIX) 40 MG EC tablet Take 1 tablet by mouth Every Night.    potassium chloride 10 MEQ CR tablet Take 1 tablet by mouth Daily.    Xarelto 20 MG tablet TAKE 1 TABLET BY MOUTH DAILY    [DISCONTINUED] losartan (COZAAR) 50 MG tablet Take 1 tablet by mouth 2 (Two) Times a Day. (Patient taking differently: Take 1 tablet by mouth Daily.)    NON FORMULARY Take 1 tablet by mouth Every Other Day. Stool softener (Patient not taking: Reported on 7/14/2025)    [DISCONTINUED] phentermine (ADIPEX-P) 37.5 MG tablet Take 1 tablet by mouth Every Morning Before Breakfast. (Patient not taking: Reported on 7/14/2025)    [DISCONTINUED] Polyethylene Glycol 3350 (MIRALAX PO) Take  by mouth Every Other Day. (Patient not taking: Reported on 7/14/2025)     No current facility-administered medications on file prior to visit.          Review of Systems   Negative except as mentioned in HPI above.    Objective     /81 (BP Location: Left arm, Patient Position: Sitting, Cuff Size: Adult)   Pulse 72   Ht 165.6 cm (65.2\")   Wt 72 kg (158 lb 11.2 oz)   SpO2 100%   BMI 26.25 kg/m²       Physical Exam    General : Alert, awake, no acute distress  Neck : Supple, no carotid bruit, no jugular venous distention  CVS : Regular rate and rhythm, no murmur, rubs or gallops  Lungs: Clear to auscultation bilaterally, no crackles or rhonchi  Abdomen: Soft, nontender, bowel sounds heard in all 4 quadrants  Extremities: Warm, well-perfused, no pedal edema    Result Review :     The following data was reviewed by: Hugo Goss MD on 07/14/2025:    CMP          11/4/2024    08:21 11/26/2024    11:23 6/10/2025    06:49   CMP   Glucose 85  94  97    BUN 6  6  9.0    Creatinine 0.74  0.59  " 0.82    EGFR 86.6  96.5  76.6    Sodium 138  137  139    Potassium 3.2  3.9  3.8    Chloride 102  99  102    Calcium 9.4  9.4  9.6    Total Protein 6.6  6.8  6.8    Albumin 3.8  4.0  3.8    Globulin 2.8  2.8  3.0    Total Bilirubin 0.5  0.3  0.2    Alkaline Phosphatase 68  65  70    AST (SGOT) 12  17  23    ALT (SGPT) 10  10  15    Albumin/Globulin Ratio 1.4  1.4  1.3    BUN/Creatinine Ratio 8.1  10.2  11.0    Anion Gap 8.9  8.7  10.0      CBC          8/22/2024    16:19 11/4/2024    08:21 6/10/2025    06:49   CBC   WBC 4.85  4.61  3.75    RBC 4.14  4.02  3.79    Hemoglobin 13.1  13.1  12.1    Hematocrit 37.9  38.0  36.2    MCV 91.5  94.5  95.5    MCH 31.6  32.6  31.9    MCHC 34.6  34.5  33.4    RDW 12.2  12.8  12.8    Platelets 204  224  184        Lipid Panel          11/4/2024    08:21 6/10/2025    06:49   Lipid Panel   Total Cholesterol 168  157    Triglycerides 73  68    HDL Cholesterol 54  59    VLDL Cholesterol 14  13    LDL Cholesterol  100  85       A1C Last 3 Results          11/4/2024    08:21 6/10/2025    06:49   HGBA1C Last 3 Results   Hemoglobin A1C 4.90  4.70          Data reviewed: Cardiology studies    Results for orders placed during the hospital encounter of 10/10/24    Adult Transthoracic Echo Complete w/ Color, Spectral and Contrast if necessary per protocol    Interpretation Summary    Left ventricular ejection fraction appears to be 56 - 60%.    Left ventricular diastolic function is consistent with (grade I) impaired relaxation and age.    Estimated right ventricular systolic pressure from tricuspid regurgitation is normal (<35 mmHg).    Mild aortic insufficiency.        ECG 12 Lead    Date/Time: 7/14/2025 9:01 AM  Performed by: Hugo Goss MD    Authorized by: Hugo Goss MD  Comparison: compared with previous ECG   Similar to previous ECG  Comparison to previous ECG: Compared to EKG from 2021  Rhythm: sinus rhythm  Rate: normal  QRS axis: normal    Clinical impression: abnormal  EKG  Comments: Sinus rhythm with T wave abnormalities in anterior leads.        No results found for this or any previous visit.        ASCVD Score  The 10-year ASCVD risk score (Silver DK, et al., 2019) is: 18%    Values used to calculate the score:      Age: 70 years      Sex: Female      Is Non- : No      Diabetic: No      Tobacco smoker: No      Systolic Blood Pressure: 143 mmHg      Is BP treated: Yes      HDL Cholesterol: 59 mg/dL      Total Cholesterol: 157 mg/dL         Assessment and Plan          Diagnoses and all orders for this visit:    1. AKERS (dyspnea on exertion) (Primary)  -     Stress Test With Myocardial Perfusion One Day; Future    2. Essential hypertension  -     losartan (COZAAR) 50 MG tablet; Take 1 tablet by mouth Daily.  Dispense: 90 tablet; Refill: 2  -     Stress Test With Myocardial Perfusion One Day; Future    3. Diastolic dysfunction  -     Stress Test With Myocardial Perfusion One Day; Future    Other orders  -     empagliflozin (Jardiance) 10 MG tablet tablet; Take 1 tablet by mouth Daily.  Dispense: 90 tablet; Refill: 2  -     ECG 12 Lead          Plan  - Patient with abnormal EKG, shortness of breath on exertion.  Will obtain a stress test MPI for further evaluation.  Echocardiogram with diastolic dysfunction and preserved ejection fraction.  - Will start on Jardiance 10 mg daily for diastolic dysfunction.  Continue amlodipine 5 mg daily and losartan 25 mg daily.  - Will follow-up in 6 months or sooner if needed  - Patient can follow-up with Dr. Perrin who is her primary cardiologist.  She prefers to be followed up with Dr. Perrin.      Patient was educated on heart healthy diet, daily exercise and achieving optimal weight.     Follow Up     Return in about 6 months (around 1/14/2026) for Next scheduled follow up with Dr. Perrin.      Tatyana Shea  reports that she has never smoked. She has never been exposed to tobacco smoke. She has never used smokeless  tobacco.        I spent 30 minutes caring for this patient on this date of service. This time includes time spent by me in the following activities:preparing for the visit, reviewing tests, obtaining and/or reviewing a separately obtained history, performing a medically appropriate examination and/or evaluation , counseling and educating the patient/family/caregiver, ordering medications, tests, or procedures, referring and communicating with other health care professionals , documenting information in the medical record, independently interpreting results and communicating that information with the patient/family/caregiver, and care coordination.    I have reviewed the family history, social history, and past medical history for this patient. Previous information and data has been reviewed and updated as needed. I have reviewed and verified the chief complaint, history, and other documentation. The patient was interviewed and examined in the clinic and the chart reviewed. The previous observations, recommendations, and conclusions were reviewed including those of other providers.     The plan was discussed with the patient and/or family. The patient was given time to ask questions and these questions were answered. At the conclusion of their visit they had no additional questions or concerns and all questions were answered to their satisfaction.     Patient was given instructions and counseling regarding her condition or for health maintenance advice. Please see specific information pulled into the AVS if appropriate.      Hugo Goss MD, FACC  07/14/25  08:58 EDT    Dictated Utilizing Dragon Dictation

## 2025-07-17 ENCOUNTER — SPECIALTY PHARMACY (OUTPATIENT)
Dept: CARDIOLOGY | Facility: CLINIC | Age: 70
End: 2025-07-17
Payer: COMMERCIAL

## 2025-07-17 NOTE — PROGRESS NOTES
Specialty Pharmacy Patient Management Program  Introduction to Services Outreach      Tatyana is seen by their provider for Heart Failure. This was an initial outreach to introduce the Patient Management Program and Specialty Pharmacy services offered by [facility], as the patient is taking specialty medication(s): Jardiance.    It is undecided if the patient will be filling specialty medication at [facility] and/or enrolling in the Endocrine Disorders Patient Management Program at this time and enrollment is therefore UNDER REVIEW.     Benefit Investigation  As of 7/17/2025 13:53 EDT  Insurance: [employer]  Medication(s) and Costs: Russ Khan Pharm.D.  7/17/2025  13:53 EDT

## 2025-07-21 ENCOUNTER — TELEPHONE (OUTPATIENT)
Dept: CARDIOLOGY | Facility: CLINIC | Age: 70
End: 2025-07-21
Payer: COMMERCIAL

## 2025-07-21 NOTE — TELEPHONE ENCOUNTER
Received call from patient. Patient had questions regarding last office visit. Advised patient on jardiance usage. Advised on reasoning for ordering stress test and what a stress test entails. Patient verbalized understanding.

## 2025-07-22 ENCOUNTER — TELEPHONE (OUTPATIENT)
Dept: GASTROENTEROLOGY | Facility: CLINIC | Age: 70
End: 2025-07-22
Payer: COMMERCIAL

## 2025-07-23 ENCOUNTER — HOSPITAL ENCOUNTER (OUTPATIENT)
Facility: HOSPITAL | Age: 70
Setting detail: HOSPITAL OUTPATIENT SURGERY
Discharge: HOME | End: 2025-07-23
Attending: ORTHOPAEDIC SURGERY | Admitting: ORTHOPAEDIC SURGERY
Payer: COMMERCIAL

## 2025-07-23 ENCOUNTER — ANESTHESIA EVENT (OUTPATIENT)
Dept: PERIOP | Facility: HOSPITAL | Age: 70
End: 2025-07-23
Payer: COMMERCIAL

## 2025-07-23 ENCOUNTER — ANESTHESIA (OUTPATIENT)
Dept: PERIOP | Facility: HOSPITAL | Age: 70
End: 2025-07-23
Payer: COMMERCIAL

## 2025-07-23 VITALS
OXYGEN SATURATION: 97 % | WEIGHT: 155.42 LBS | BODY MASS INDEX: 25.9 KG/M2 | SYSTOLIC BLOOD PRESSURE: 152 MMHG | HEART RATE: 73 BPM | RESPIRATION RATE: 16 BRPM | HEIGHT: 65 IN | TEMPERATURE: 97.5 F | DIASTOLIC BLOOD PRESSURE: 63 MMHG

## 2025-07-23 DIAGNOSIS — M65.30 TRIGGER FINGER OF RIGHT HAND: ICD-10-CM

## 2025-07-23 PROCEDURE — 25010000002 ONDANSETRON PER 1 MG: Performed by: NURSE ANESTHETIST, CERTIFIED REGISTERED

## 2025-07-23 PROCEDURE — 88304 TISSUE EXAM BY PATHOLOGIST: CPT | Performed by: ORTHOPAEDIC SURGERY

## 2025-07-23 PROCEDURE — 25010000002 CEFAZOLIN PER 500 MG: Performed by: ORTHOPAEDIC SURGERY

## 2025-07-23 PROCEDURE — 25010000002 LIDOCAINE PF 2% 2 % SOLUTION: Performed by: NURSE ANESTHETIST, CERTIFIED REGISTERED

## 2025-07-23 PROCEDURE — 25010000002 DEXAMETHASONE PER 1 MG: Performed by: NURSE ANESTHETIST, CERTIFIED REGISTERED

## 2025-07-23 PROCEDURE — 26160 REMOVE TENDON SHEATH LESION: CPT | Performed by: ORTHOPAEDIC SURGERY

## 2025-07-23 PROCEDURE — 26055 INCISE FINGER TENDON SHEATH: CPT | Performed by: ORTHOPAEDIC SURGERY

## 2025-07-23 PROCEDURE — 25010000002 BUPIVACAINE (PF) 0.5 % SOLUTION: Performed by: ORTHOPAEDIC SURGERY

## 2025-07-23 PROCEDURE — 25010000002 FENTANYL CITRATE (PF) 50 MCG/ML SOLUTION: Performed by: NURSE ANESTHETIST, CERTIFIED REGISTERED

## 2025-07-23 PROCEDURE — 25810000003 LACTATED RINGERS PER 1000 ML: Performed by: ANESTHESIOLOGY

## 2025-07-23 PROCEDURE — 25010000002 PROPOFOL 10 MG/ML EMULSION: Performed by: NURSE ANESTHETIST, CERTIFIED REGISTERED

## 2025-07-23 RX ORDER — LIDOCAINE HYDROCHLORIDE 20 MG/ML
INJECTION, SOLUTION EPIDURAL; INFILTRATION; INTRACAUDAL; PERINEURAL AS NEEDED
Status: DISCONTINUED | OUTPATIENT
Start: 2025-07-23 | End: 2025-07-23 | Stop reason: SURG

## 2025-07-23 RX ORDER — EPHEDRINE SULFATE 50 MG/ML
INJECTION INTRAVENOUS AS NEEDED
Status: DISCONTINUED | OUTPATIENT
Start: 2025-07-23 | End: 2025-07-23 | Stop reason: SURG

## 2025-07-23 RX ORDER — ONDANSETRON 2 MG/ML
4 INJECTION INTRAMUSCULAR; INTRAVENOUS ONCE AS NEEDED
Status: DISCONTINUED | OUTPATIENT
Start: 2025-07-23 | End: 2025-07-23 | Stop reason: HOSPADM

## 2025-07-23 RX ORDER — SODIUM CHLORIDE, SODIUM LACTATE, POTASSIUM CHLORIDE, CALCIUM CHLORIDE 600; 310; 30; 20 MG/100ML; MG/100ML; MG/100ML; MG/100ML
9 INJECTION, SOLUTION INTRAVENOUS CONTINUOUS PRN
Status: DISCONTINUED | OUTPATIENT
Start: 2025-07-23 | End: 2025-07-23 | Stop reason: HOSPADM

## 2025-07-23 RX ORDER — HYDROCODONE BITARTRATE AND ACETAMINOPHEN 7.5; 325 MG/1; MG/1
1 TABLET ORAL EVERY 4 HOURS PRN
Qty: 21 TABLET | Refills: 0 | Status: SHIPPED | OUTPATIENT
Start: 2025-07-23 | End: 2025-07-24

## 2025-07-23 RX ORDER — ONDANSETRON 2 MG/ML
INJECTION INTRAMUSCULAR; INTRAVENOUS AS NEEDED
Status: DISCONTINUED | OUTPATIENT
Start: 2025-07-23 | End: 2025-07-23 | Stop reason: SURG

## 2025-07-23 RX ORDER — HYDROCODONE BITARTRATE AND ACETAMINOPHEN 7.5; 325 MG/1; MG/1
1 TABLET ORAL ONCE AS NEEDED
Status: DISCONTINUED | OUTPATIENT
Start: 2025-07-23 | End: 2025-07-23 | Stop reason: HOSPADM

## 2025-07-23 RX ORDER — OXYCODONE HYDROCHLORIDE 5 MG/1
5 TABLET ORAL
Status: DISCONTINUED | OUTPATIENT
Start: 2025-07-23 | End: 2025-07-23 | Stop reason: HOSPADM

## 2025-07-23 RX ORDER — DEXAMETHASONE SODIUM PHOSPHATE 4 MG/ML
INJECTION, SOLUTION INTRA-ARTICULAR; INTRALESIONAL; INTRAMUSCULAR; INTRAVENOUS; SOFT TISSUE AS NEEDED
Status: DISCONTINUED | OUTPATIENT
Start: 2025-07-23 | End: 2025-07-23 | Stop reason: SURG

## 2025-07-23 RX ORDER — FENTANYL CITRATE 50 UG/ML
INJECTION, SOLUTION INTRAMUSCULAR; INTRAVENOUS AS NEEDED
Status: DISCONTINUED | OUTPATIENT
Start: 2025-07-23 | End: 2025-07-23 | Stop reason: SURG

## 2025-07-23 RX ORDER — BUPIVACAINE HYDROCHLORIDE 5 MG/ML
INJECTION, SOLUTION EPIDURAL; INTRACAUDAL; PERINEURAL AS NEEDED
Status: DISCONTINUED | OUTPATIENT
Start: 2025-07-23 | End: 2025-07-23 | Stop reason: HOSPADM

## 2025-07-23 RX ORDER — PROPOFOL 10 MG/ML
VIAL (ML) INTRAVENOUS AS NEEDED
Status: DISCONTINUED | OUTPATIENT
Start: 2025-07-23 | End: 2025-07-23 | Stop reason: SURG

## 2025-07-23 RX ORDER — ACETAMINOPHEN 500 MG
500 TABLET ORAL ONCE
Status: COMPLETED | OUTPATIENT
Start: 2025-07-23 | End: 2025-07-23

## 2025-07-23 RX ORDER — PROMETHAZINE HYDROCHLORIDE 25 MG/1
25 SUPPOSITORY RECTAL ONCE AS NEEDED
Status: DISCONTINUED | OUTPATIENT
Start: 2025-07-23 | End: 2025-07-23 | Stop reason: HOSPADM

## 2025-07-23 RX ORDER — PROMETHAZINE HYDROCHLORIDE 12.5 MG/1
25 TABLET ORAL ONCE AS NEEDED
Status: DISCONTINUED | OUTPATIENT
Start: 2025-07-23 | End: 2025-07-23 | Stop reason: HOSPADM

## 2025-07-23 RX ADMIN — FENTANYL CITRATE 50 MCG: 50 INJECTION, SOLUTION INTRAMUSCULAR; INTRAVENOUS at 09:29

## 2025-07-23 RX ADMIN — ACETAMINOPHEN 500 MG: 500 TABLET ORAL at 08:29

## 2025-07-23 RX ADMIN — SODIUM CHLORIDE 2 G: 9 INJECTION, SOLUTION INTRAVENOUS at 09:07

## 2025-07-23 RX ADMIN — PROPOFOL 150 MG: 10 INJECTION, EMULSION INTRAVENOUS at 09:12

## 2025-07-23 RX ADMIN — ONDANSETRON 4 MG: 2 INJECTION INTRAMUSCULAR; INTRAVENOUS at 09:20

## 2025-07-23 RX ADMIN — EPHEDRINE SULFATE 10 MG: 50 INJECTION INTRAVENOUS at 09:25

## 2025-07-23 RX ADMIN — LIDOCAINE HYDROCHLORIDE 60 MG: 20 INJECTION, SOLUTION INTRAVENOUS at 09:12

## 2025-07-23 RX ADMIN — SODIUM CHLORIDE, POTASSIUM CHLORIDE, SODIUM LACTATE AND CALCIUM CHLORIDE 9 ML/HR: 600; 310; 30; 20 INJECTION, SOLUTION INTRAVENOUS at 08:29

## 2025-07-23 RX ADMIN — DEXAMETHASONE SODIUM PHOSPHATE 4 MG: 4 INJECTION, SOLUTION INTRAMUSCULAR; INTRAVENOUS at 09:20

## 2025-07-23 RX ADMIN — FENTANYL CITRATE 50 MCG: 50 INJECTION, SOLUTION INTRAMUSCULAR; INTRAVENOUS at 09:12

## 2025-07-23 RX ADMIN — EPHEDRINE SULFATE 10 MG: 50 INJECTION INTRAVENOUS at 09:21

## 2025-07-23 NOTE — DISCHARGE INSTRUCTIONS
DISCHARGE INSTRUCTIONS  ORTHOPEDICS      For your surgery you had:  General anesthesia (you may have a sore throat for the first 24 hours)  IV sedation.  Local anesthesia  Monitored anesthesia care  You may experience dizziness, drowsiness, or light-headedness for several hours following surgery  Do not stay alone today or tonight.  Limit your activity for 24 hours.  Resume your diet slowly.  Follow whatever special dietary instructions you may have been given by the doctor.  You should not drive or operate machinery or drink alcohol for 24 hours or while you are taking pain medication.  You should not sign any legally binding documents.  If you had an axillary or regional block, you will not have control of the involved limb for up to 12 hours.  Protect the arm with a sling or follow your physician's specific instructions.  You may remove dressing:  [] in 24 hours  [] in 48 hours  [x] Other: POST-OP DAY 3-SATURDAY  You may shower SATURDAY  Sleep with the injured part elevated on a pillow.  Medications per physician's instructions as indicated on Discharge Medication Information Sheet.  Follow verbal instructions of your doctor.  Carry the upper arm in a sling so that the hand and wrist are above the level of the heart.  Sit with the lower leg propped up on a footstool or chair with pillows.  Exercise fingers or toes for 10 minutes every hour while awake. Ice bag to injured area for 72 hours.  Apply 20 minutes on - 20 minutes off.  Never place ice directly on skin or cast.    Avoid getting cast or dressing wet.  In addition to these instructions, follow the discharge instructions on postoperative arthroscopic surgery.  SPECIAL INSTRUCTIONS:             Last dose of pain medication was given at:      NOTIFY THE PHYSICIAN IF YOU EXPERIENCE:  Numbness of fingers or toes.  Inability to move fingers or toes.  Extreme coldness, paleness or blue dis-coloration of fingers or toes.  Excessive swelling of affected surgical  site or swelling that causes the cast to rub or cut into skin.  Pain unrelieved by pain medication  Nausea/vomiting not relieved by prescribed medication  Unable to urinate in 6 hours after surgery  Temperature greater than 101 degree Fahrenheit or chills  If unable to reach your doctor, please go to the closest emergency room    You should see BASSAM HERNANDEZ for follow-up care  on AUGUST 7, 2025 @ 3:45PM    Phone number: [phone]

## 2025-07-23 NOTE — OP NOTE
FINGER TRIGGER RELEASE, EXCISION CYST  Procedure Report    Patient Name:  Tatyana Shea  YOB: 1955    Date of Surgery:  7/23/2025     Indications:  locking/catching of finger, failed conservative care    Pre-op Diagnosis:   Trigger finger of right hand [M65.30] Right thumb and 3rd trigger fingers, right 3rd finger mass       Post-Op Diagnosis Codes:     * Trigger finger of right hand [M65.30] Right thumb and 3rd trigger fingers, right 3rd finger mass    Procedure(s):  RIGHT FIRST AND THIRD FINGER TRIGGER RELEASE  RIGHT THIRD FINGER CYST EXCISION    Staff:  Surgeon(s):  Inés Rashid MD    Assistant: Kaila Kimbrough    Anesthesia: General    Estimated Blood Loss: 0 mL    Implants:    Nothing was implanted during the procedure    Specimen:          Specimens       ID Source Type Tests Collected By Collected At Frozen?    A Hand, Digit Right Tissue TISSUE PATHOLOGY EXAM   Inés Rashid MD 7/23/25 0931     Description: right middle finger mass                Findings: triggering resolved    Complications: none    Description of Procedure: The patient was brought to the operating room where the patient underwent anesthesia. The patient was prepped and draped in a sterile fashion. An incision was made directly over the A1 pulley of the third finger. This was dissected down.  A tendinous cyst/nodule was identified.  This was excised.  This measured approximately 0.5 cm x 0.75 cm x 0.5 cm.  The A1 pulley was identified. This was then opened using a 15 blade, then completed using tenotomy scissors. Tendon was checked. There was no tendinous damage. This was then thoroughly irrigated. Closed using 4-0 nylon.  Attention then turned to the trigger thumb.  Dissected down.  The A1 pulley was identified this was opened and released.  Good release was achieved.  Care was taken avoid any neurovascular tendon structures throughout the surgery.  This was thoroughly irrigated.  Closed using 4-0 nylon.  Half  percent plain Marcaine was injected. A sterile dressing was applied. Tourniquet deflated. Taken to the recovery room in stable condition. No complications.    Assistant: Kaila Kimbrough  was responsible for performing the following activities: Retraction, Suction, Irrigation, and Placing Dressing and their skilled assistance was necessary for the success of this case.    Inés Rashid MD     Date: 7/23/2025  Time: 09:50 EDT

## 2025-07-23 NOTE — ANESTHESIA PREPROCEDURE EVALUATION
Anesthesia Evaluation     Patient summary reviewed and Nursing notes reviewed   no history of anesthetic complications:   NPO Solid Status: > 8 hours  NPO Liquid Status: > 2 hours           Airway   Mallampati: II  TM distance: >3 FB  Neck ROM: full  No difficulty expected  Dental - normal exam     Pulmonary - normal exam    breath sounds clear to auscultation  (+) pulmonary embolism,sleep apnea  Cardiovascular - normal exam  Exercise tolerance: good (4-7 METS)    Rhythm: regular  Rate: normal    (+) hypertension      Neuro/Psych  (+) headaches, syncope  GI/Hepatic/Renal/Endo    (+) obesity, hiatal hernia, GERD well controlled, PUD    Musculoskeletal (-) negative ROS    Abdominal  - normal exam   Substance History - negative use     OB/GYN negative ob/gyn ROS         Other      history of cancer    ROS/Med Hx Other: PAT Nursing Notes unavailable.                     Anesthesia Plan    ASA 2     MAC     (Total IV Anesthesia    Requests Moderate/deep sedation  )  intravenous induction     Anesthetic plan, risks, benefits, and alternatives have been provided, discussed and informed consent has been obtained with: patient.  Pre-procedure education provided  Plan discussed with CRNA.        CODE STATUS:

## 2025-07-23 NOTE — ANESTHESIA POSTPROCEDURE EVALUATION
Patient: Tatyana Shea    Procedure Summary       Date: 07/23/25 Room / Location: [facility]  / [facility]    Anesthesia Start: 0907 Anesthesia Stop: 0953    Procedures:       RIGHT FIRST AND THIRD FINGER TRIGGER RELEASE (Right: Fingers)      RIGHT THIRD FINGER CYST EXCISION (Right) Diagnosis:       Trigger finger of right hand      (Trigger finger of right hand [M65.30])    Surgeons: Inés Rashid MD Provider: Fannie Metcalf MD    Anesthesia Type: MAC ASA Status: 2            Anesthesia Type: MAC    Vitals  Vitals Value Taken Time   /63 07/23/25 10:08   Temp 36.4 °C (97.5 °F) 07/23/25 09:52   Pulse 73 07/23/25 10:18   Resp 16 07/23/25 09:52   SpO2 97 % 07/23/25 10:18           Post Anesthesia Care and Evaluation    Patient location during evaluation: bedside  Patient participation: complete - patient participated  Level of consciousness: awake  Pain management: adequate    Airway patency: patent  PONV Status: none  Cardiovascular status: acceptable and stable  Respiratory status: acceptable  Hydration status: acceptable

## 2025-07-24 ENCOUNTER — TELEPHONE (OUTPATIENT)
Dept: CARDIOLOGY | Facility: CLINIC | Age: 70
End: 2025-07-24

## 2025-07-24 ENCOUNTER — OFFICE VISIT (OUTPATIENT)
Dept: FAMILY MEDICINE CLINIC | Facility: CLINIC | Age: 70
End: 2025-07-24
Payer: COMMERCIAL

## 2025-07-24 ENCOUNTER — TELEPHONE (OUTPATIENT)
Dept: GASTROENTEROLOGY | Facility: CLINIC | Age: 70
End: 2025-07-24
Payer: COMMERCIAL

## 2025-07-24 VITALS
TEMPERATURE: 97.5 F | HEART RATE: 72 BPM | HEIGHT: 65 IN | WEIGHT: 155.6 LBS | OXYGEN SATURATION: 98 % | BODY MASS INDEX: 25.92 KG/M2 | SYSTOLIC BLOOD PRESSURE: 130 MMHG | RESPIRATION RATE: 18 BRPM | DIASTOLIC BLOOD PRESSURE: 68 MMHG

## 2025-07-24 DIAGNOSIS — D50.9 IRON DEFICIENCY ANEMIA, UNSPECIFIED IRON DEFICIENCY ANEMIA TYPE: Primary | ICD-10-CM
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
## 2025-07-24 PROCEDURE — 99214 OFFICE O/P EST MOD 30 MIN: CPT | Performed by: NURSE PRACTITIONER

## 2025-07-24 NOTE — PROGRESS NOTES
Chief Complaint  Fall (Tripped down the basement, her foot hung on a box, fell on the left side,  has pain in her back and has pain that starts at the ankle and goes up. Feel 2 weeks ago Sunday)    Subjective        Tatyana Shea presents to [facility]  History of Present Illness  Fall:  was  having a garage sale and hung foot on box and fell    Anemia:  is borderline and oncology wanted to be on infusions.  And is doing well on medication.  Fall  Pertinent negatives include no fever or numbness.       The following portions of the patient's history were personally reviewed and updated as appropriate: allergies, current medications, past medical history, past surgical history, past family history, and past social history.     Body mass index is 25.89 kg/m².           Past History:    Medical History: has a past medical history of Acid reflux, Arrhythmia (2013), Breast cancer, Diverticulitis, Diverticulosis (15 yrs ago), Headache (2021), Hiatal hernia, HTN (hypertension), Hyperlipidemia (2010), Iron deficiency anemia, Low back pain, Obesity (In my 40s), Obstructive sleep apnea (07/02/2021), Peptic ulceration (In my 40s), and Pulmonary embolism (1975).     Surgical History: has a past surgical history that includes Cholecystectomy; Colonoscopy; Colonoscopy (N/A, 07/29/2021); Upper gastrointestinal endoscopy (07/28/2021); Esophagogastroduodenoscopy (N/A, 07/28/2021); Cosmetic surgery (2013); Trigger finger release (Left, 12/21/2022); Mastectomy partial / lumpectomy (Left); Breast lumpectomy; Hand surgery (2022); Trigger point injection (2022); Trigger finger release (Right, 7/23/2025); and Cyst Removal (Right, 7/23/2025).     Family History: family history includes Atrial fibrillation in an other family member; Developmental Disability in her son; Heart disease in her mother; Heart failure in her mother; Hyperlipidemia in her mother; Hypertension in her mother.     Social History:  reports that she has never smoked. She has never been exposed to tobacco smoke. She has never used smokeless tobacco. She reports that she does not currently use alcohol. She reports that she does not use drugs.    Allergies: Patient has no known allergies.          Current Outpatient Medications:     amLODIPine (NORVASC) 5 MG tablet, TAKE 1 TABLET BY MOUTH DAILY, Disp: 30 tablet, Rfl: 0    anastrozole (ARIMIDEX) 1 MG tablet, Take 1 tablet by mouth Daily., Disp: 90 tablet, Rfl: 1    atorvastatin (LIPITOR) 10 MG tablet, Take 1 tablet by mouth Every Night., Disp: 90 tablet, Rfl: 1    Calcium Carbonate 500 MG chewable tablet, Chew 1,000 mg 2 (Two) Times a Day., Disp: , Rfl:     Cholecalciferol 125 MCG (5000 UT) tablet, Take 1 tablet by mouth Daily. LAST DOSE 12/16/22, Disp: , Rfl:     Cyanocobalamin (B-12) 1000 MCG sublingual tablet, Place 1 tablet under the tongue Daily., Disp: 90 each, Rfl: 1    ferrous gluconate (FERGON) 324 MG tablet, Take 1 tablet by mouth Every Other Day., Disp: 45 tablet, Rfl: 1    furosemide (LASIX) 20 MG tablet, TAKE 1 TABLET BY MOUTH TWICE A DAY, Disp: 180 tablet, Rfl: 1    losartan (COZAAR) 50 MG tablet, Take 1 tablet by mouth Daily., Disp: 90 tablet, Rfl: 2    multivitamin with minerals tablet tablet, Take 1 tablet by mouth Daily. LAST DOSE 12/16/22, Disp: , Rfl:     NON FORMULARY, Take 1 tablet by mouth Every Other Day. Stool softener, Disp: , Rfl:     potassium chloride 10 MEQ CR tablet, Take 1 tablet by mouth Daily., Disp: 90 tablet, Rfl: 1    Xarelto 20 MG tablet, TAKE 1 TABLET BY MOUTH DAILY, Disp: 90 tablet, Rfl: 1    empagliflozin (Jardiance) 10 MG tablet tablet, Take 1 tablet by mouth Daily. (Patient not taking: Reported on 7/24/2025), Disp: 90 tablet, Rfl: 2  No current facility-administered medications for this visit.    Medications Discontinued During This Encounter   Medication Reason    pantoprazole (PROTONIX) 40 MG EC tablet *Therapy completed    HYDROcodone-acetaminophen  "(NORCO) 7.5-325 MG per tablet *Therapy completed         Review of Systems   Constitutional:  Negative for fever.   Respiratory:  Negative for cough and shortness of breath.    Cardiovascular:  Negative for chest pain, palpitations and leg swelling.   Neurological:  Negative for dizziness, weakness, numbness and headache.        Objective         Vitals:    07/24/25 0733   BP: 130/68   BP Location: Right arm   Patient Position: Sitting   Cuff Size: Adult   Pulse: 72   Resp: 18   Temp: 97.5 °F (36.4 °C)   TempSrc: Temporal   SpO2: 98%   Weight: 70.6 kg (155 lb 9.6 oz)   Height: 165.1 cm (65\")     Body mass index is 25.89 kg/m².         Physical Exam  Vitals reviewed.   Constitutional:       Appearance: Normal appearance. She is well-developed.   HENT:      Head: Normocephalic and atraumatic.      Mouth/Throat:      Pharynx: No oropharyngeal exudate.   Eyes:      Conjunctiva/sclera: Conjunctivae normal.      Pupils: Pupils are equal, round, and reactive to light.   Cardiovascular:      Rate and Rhythm: Normal rate and regular rhythm.      Heart sounds: Normal heart sounds. No murmur heard.     No friction rub. No gallop.   Pulmonary:      Effort: Pulmonary effort is normal.      Breath sounds: Normal breath sounds. No wheezing or rhonchi.   Skin:     General: Skin is warm and dry.   Neurological:      Mental Status: She is alert and oriented to person, place, and time.   Psychiatric:         Mood and Affect: Mood and affect normal.         Behavior: Behavior normal.         Thought Content: Thought content normal.         Judgment: Judgment normal.             Result Review :               Assessment and Plan     Diagnoses and all orders for this visit:    1. Iron deficiency anemia, unspecified iron deficiency anemia type (Primary)  -     CBC (No Diff); Future    2. Fall, initial encounter  Comments:  tenderness in buttock.  with pain tingling in left leg.          30 mintues spent with patient in discussion " evaluation in the room.    Follow Up     Return for Next scheduled follow up.    Patient was given instructions and counseling regarding her condition or for health maintenance advice. Please see specific information pulled into the AVS if appropriate.

## 2025-07-28 ENCOUNTER — TELEPHONE (OUTPATIENT)
Dept: CARDIOLOGY | Facility: CLINIC | Age: 70
End: 2025-07-28
Payer: COMMERCIAL

## 2025-07-28 NOTE — TELEPHONE ENCOUNTER
APPT SCHEDULED WITH MERLE GOODMAN FOR 09/19/2025. PT ONLY WANTS TO SEE DR SWANN, BUT DUE TO AVAILABILITY SHE IS BEING SEEN BY MERLE GOODMAN. PT CONCERNED WITH RECENT DIAGNOSIS.

## 2025-07-28 NOTE — TELEPHONE ENCOUNTER
Caller: Tatyana Shea    Relationship: Self    Best call back number: [phone]     What is the best time to reach you: ANYTIME    Who are you requesting to speak with (clinical staff, provider,  specific staff member): ANYONE    Do you know the name of the person who called:     What was the call regarding: PATIENT CALLED IN UPSET THAT SHE IS SEEING DR. MEIER INSTEAD OF DR. SWANN. PATIENT HAD AN APPOINTMENT WITH MERLE GUARDADO AND WAS RESCHEDULED TO DR. MEIER. PATIENT STATES SHE HAS BEEN ON NEW MEDICATION BUT WON'T START IT UNTIL SHE SEES DR. SWANN. PATIENT DOES NOT WANT TO SEE DR. MEIER.     [facility]IED CALLED THE OFFICE, UNSUCCESSFUL AS ONE NOTE HAS ALREADY BEEN SENT LAST WEEK.     Is it okay if the provider responds through [employer]: NO, PLEASE CALL.

## 2025-08-02 DIAGNOSIS — I82.402 ACUTE DEEP VEIN THROMBOSIS (DVT) OF LEFT LOWER EXTREMITY, UNSPECIFIED VEIN: ICD-10-CM

## 2025-08-04 ENCOUNTER — LAB (OUTPATIENT)
Dept: LAB | Facility: HOSPITAL | Age: 70
End: 2025-08-04
Payer: COMMERCIAL

## 2025-08-04 DIAGNOSIS — D50.9 IRON DEFICIENCY ANEMIA, UNSPECIFIED IRON DEFICIENCY ANEMIA TYPE: ICD-10-CM

## 2025-08-04 LAB
DEPRECATED RDW RBC AUTO: 46.4 FL (ref 37–54)
ERYTHROCYTE [DISTWIDTH] IN BLOOD BY AUTOMATED COUNT: 13.1 % (ref 12.3–15.4)
FERRITIN SERPL-MCNC: 34.9 NG/ML (ref 13–150)
HCT VFR BLD AUTO: 33.6 % (ref 34–46.6)
HGB BLD-MCNC: 11.3 G/DL (ref 12–15.9)
IRON 24H UR-MRATE: 56 MCG/DL (ref 37–145)
IRON SATN MFR SERPL: 17 % (ref 20–50)
MCH RBC QN AUTO: 32.5 PG (ref 26.6–33)
MCHC RBC AUTO-ENTMCNC: 33.6 G/DL (ref 31.5–35.7)
MCV RBC AUTO: 96.6 FL (ref 79–97)
PLATELET # BLD AUTO: 175 10*3/MM3 (ref 140–450)
PMV BLD AUTO: 9.4 FL (ref 6–12)
RBC # BLD AUTO: 3.48 10*6/MM3 (ref 3.77–5.28)
TIBC SERPL-MCNC: 332 MCG/DL (ref 298–536)
TRANSFERRIN SERPL-MCNC: 223 MG/DL (ref 200–360)
WBC NRBC COR # BLD AUTO: 3.76 10*3/MM3 (ref 3.4–10.8)

## 2025-08-04 PROCEDURE — 83540 ASSAY OF IRON: CPT

## 2025-08-04 PROCEDURE — 85027 COMPLETE CBC AUTOMATED: CPT

## 2025-08-04 PROCEDURE — 36415 COLL VENOUS BLD VENIPUNCTURE: CPT

## 2025-08-04 PROCEDURE — 82728 ASSAY OF FERRITIN: CPT

## 2025-08-04 PROCEDURE — 84466 ASSAY OF TRANSFERRIN: CPT

## 2025-08-04 RX ORDER — RIVAROXABAN 20 MG/1
20 TABLET, FILM COATED ORAL DAILY
Qty: 90 TABLET | Refills: 1 | Status: SHIPPED | OUTPATIENT
Start: 2025-08-04

## 2025-08-05 ENCOUNTER — HOSPITAL ENCOUNTER (OUTPATIENT)
Dept: BONE DENSITY | Facility: HOSPITAL | Age: 70
Discharge: HOME | End: 2025-08-05
Admitting: NURSE PRACTITIONER
Payer: COMMERCIAL

## 2025-08-05 DIAGNOSIS — Z78.0 POST-MENOPAUSAL: ICD-10-CM

## 2025-08-05 PROCEDURE — 77080 DXA BONE DENSITY AXIAL: CPT

## 2025-08-05 RX ORDER — AMLODIPINE BESYLATE 5 MG/1
5 TABLET ORAL DAILY
Qty: 90 TABLET | Refills: 0 | Status: SHIPPED | OUTPATIENT
Start: 2025-08-05

## 2025-08-07 ENCOUNTER — OFFICE VISIT (OUTPATIENT)
Dept: ORTHOPEDIC SURGERY | Facility: CLINIC | Age: 70
End: 2025-08-07
Payer: COMMERCIAL

## 2025-08-07 VITALS
DIASTOLIC BLOOD PRESSURE: 78 MMHG | HEART RATE: 85 BPM | BODY MASS INDEX: 25.83 KG/M2 | WEIGHT: 155 LBS | HEIGHT: 65 IN | OXYGEN SATURATION: 97 % | SYSTOLIC BLOOD PRESSURE: 170 MMHG

## 2025-08-07 DIAGNOSIS — Z98.890 S/P EXCISION OF GANGLION CYST: ICD-10-CM

## 2025-08-07 DIAGNOSIS — Z98.890 STATUS POST TRIGGER FINGER RELEASE: Primary | ICD-10-CM

## 2025-08-12 ENCOUNTER — TELEPHONE (OUTPATIENT)
Dept: ONCOLOGY | Facility: HOSPITAL | Age: 70
End: 2025-08-12
Payer: COMMERCIAL

## 2025-08-28 ENCOUNTER — TELEPHONE (OUTPATIENT)
Dept: ONCOLOGY | Facility: HOSPITAL | Age: 70
End: 2025-08-28
Payer: COMMERCIAL

## (undated) DEVICE — SNAR E/S POLYP SNAREMASTER OVL/10MM 2.8X2300MM YEL

## (undated) DEVICE — ADHS LIQ MASTISOL 2/3ML

## (undated) DEVICE — GLV SURG SENSICARE SLT PF LF 8.5 STRL

## (undated) DEVICE — GLOVE,SURG,SENSICARE SLT,LF,PF,8.5: Brand: MEDLINE

## (undated) DEVICE — UNDERCAST PADDING: Brand: DEROYAL

## (undated) DEVICE — GAUZE,SPONGE,4"X4",16PLY,STRL,LF,10/TRAY: Brand: MEDLINE

## (undated) DEVICE — EXTREMITY-LF: Brand: MEDLINE INDUSTRIES, INC.

## (undated) DEVICE — THE SINGLE USE ETRAP – POLYP TRAP IS USED FOR SUCTION RETRIEVAL OF ENDOSCOPICALLY REMOVED POLYPS.: Brand: ETRAP

## (undated) DEVICE — COLON KIT: Brand: MEDLINE INDUSTRIES, INC.

## (undated) DEVICE — STRIP,CLOSURE,WOUND,MEDI-STRIP,1/2X4: Brand: MEDLINE

## (undated) DEVICE — THE STERILE LIGHT HANDLE COVER IS USED WITH STERIS SURGICAL LIGHTING AND VISUALIZATION SYSTEMS.

## (undated) DEVICE — SOL IRRG H2O PL/BG 1000ML STRL

## (undated) DEVICE — DISPOSABLE TOURNIQUET CUFF SINGLE BLADDER, SINGLE PORT AND QUICK CONNECT CONNECTOR: Brand: COLOR CUFF

## (undated) DEVICE — SUT MNCRYL PLS ANTIB UD 3/0 PS2 27IN

## (undated) DEVICE — BANDAGE,GAUZE,CONFORMING,2"X75",STRL,LF: Brand: MEDLINE

## (undated) DEVICE — BANDAGE,ELASTIC,SOFTWRAP,STERILE,4"X5YD: Brand: MEDLINE

## (undated) DEVICE — NDL HYPO ECLPS SFTY 22G 1 1/2IN

## (undated) DEVICE — GLV SURG BIOGEL LTX PF 8 1/2

## (undated) DEVICE — EGD OR ERCP KIT: Brand: MEDLINE INDUSTRIES, INC.

## (undated) DEVICE — ANTIBACTERIAL UNDYED BRAIDED (POLYGLACTIN 910), SYNTHETIC ABSORBABLE SUTURE: Brand: COATED VICRYL

## (undated) DEVICE — SYR LL TP 10ML STRL

## (undated) DEVICE — SINGLE-USE BIOPSY FORCEPS: Brand: RADIAL JAW 4

## (undated) DEVICE — BNDG GZ SOF-FORM CONFRM 2X75IN LF STRL

## (undated) DEVICE — VAGINAL PREP TRAY: Brand: MEDLINE INDUSTRIES, INC.

## (undated) DEVICE — NDL HYPO PRECISIONGLIDE REG 22G 1 1/2

## (undated) DEVICE — BNDG ESMARK 4IN 12FT LF STRL BLU

## (undated) DEVICE — BNDG ELAS ECON W/CLIP 4IN 5YD LF STRL

## (undated) DEVICE — GOWN,REINF,POLY,SIRUS,BRTH SLV,XLNG/XXL: Brand: MEDLINE

## (undated) DEVICE — DRESSING,GAUZE,OIL EMULSION,CURAD,3"X3": Brand: CURAD

## (undated) DEVICE — STERILE POLYISOPRENE POWDER-FREE SURGICAL GLOVES WITH EMOLLIENT COATING: Brand: PROTEXIS

## (undated) DEVICE — GLV SURG SENSICARE SLT PF LF 7 STRL

## (undated) DEVICE — PENCL E/S SMOKEEVAC W/TELESCP CANN

## (undated) DEVICE — PENCL SMOKE/EVAC MEGADYNE TELESCP 10FT

## (undated) DEVICE — SUT ETHLN 4/0 FS2 18IN 662H

## (undated) DEVICE — DRSNG WND GZ CURAD OIL EMULSION 3X3IN STRL

## (undated) DEVICE — HYPODERMIC SAFETY NEEDLE: Brand: MONOJECT

## (undated) DEVICE — BNDG COMPR S/ADHR 1IN 5YD TN NS

## (undated) DEVICE — Device: Brand: DEFENDO AIR/WATER/SUCTION AND BIOPSY VALVE

## (undated) DEVICE — GLOVE,SURG,SENSICARE SLT,LF,PF,7: Brand: MEDLINE

## (undated) DEVICE — GOWN,SIRUS,POLYRNF,BRTHSLV,XLN/XXL,18/CS: Brand: MEDLINE

## (undated) DEVICE — INTENDED FOR TISSUE SEPARATION, AND OTHER PROCEDURES THAT REQUIRE A SHARP SURGICAL BLADE TO PUNCTURE OR CUT.: Brand: BARD-PARKER ® CARBON RIB-BACK BLADES

## (undated) DEVICE — GLV SURG SENSICARE PI PF LF 7 GRN STRL